# Patient Record
Sex: FEMALE | Race: WHITE | NOT HISPANIC OR LATINO | Employment: OTHER | URBAN - METROPOLITAN AREA
[De-identification: names, ages, dates, MRNs, and addresses within clinical notes are randomized per-mention and may not be internally consistent; named-entity substitution may affect disease eponyms.]

---

## 2018-06-03 ENCOUNTER — HOSPITAL ENCOUNTER (EMERGENCY)
Facility: HOSPITAL | Age: 43
Discharge: HOME | End: 2018-06-03
Attending: EMERGENCY MEDICINE
Payer: COMMERCIAL

## 2018-06-03 ENCOUNTER — APPOINTMENT (OUTPATIENT)
Dept: RADIOLOGY | Facility: HOSPITAL | Age: 43
End: 2018-06-03
Payer: COMMERCIAL

## 2018-06-03 VITALS
WEIGHT: 220 LBS | SYSTOLIC BLOOD PRESSURE: 131 MMHG | HEIGHT: 64 IN | OXYGEN SATURATION: 95 % | DIASTOLIC BLOOD PRESSURE: 73 MMHG | BODY MASS INDEX: 37.56 KG/M2 | RESPIRATION RATE: 20 BRPM | TEMPERATURE: 97.9 F | HEART RATE: 67 BPM

## 2018-06-03 DIAGNOSIS — R07.9 CHEST PAIN: Primary | ICD-10-CM

## 2018-06-03 LAB
ANION GAP SERPL CALC-SCNC: 8 MMOL/L (ref 3–11)
BUN SERPL-MCNC: 10 MG/DL (ref 7–25)
CALCIUM SERPL-MCNC: 8.9 MG/DL (ref 8.6–10.3)
CHLORIDE SERPL-SCNC: 104 MMOL/L (ref 98–107)
CO2 SERPL-SCNC: 25 MMOL/L (ref 21–32)
CREAT SERPL-MCNC: 0.8 MG/DL (ref 0.6–1.2)
D DIMER PPP FEU-MCNC: <0.27 UG/ML FEU (ref 0–0.5)
ERYTHROCYTE [DISTWIDTH] IN BLOOD BY AUTOMATED COUNT: 13 % (ref 11.5–14)
GFR SERPL CREATININE-BSD FRML MDRD: 79 ML/MIN/1.73M*2
GLUCOSE SERPL-MCNC: 96 MG/DL (ref 70–105)
HCT VFR BLD AUTO: 36.9 % (ref 34–45)
HGB BLD-MCNC: 12.8 G/DL (ref 11.5–15.5)
MCH RBC QN AUTO: 30.5 PG (ref 28–33)
MCHC RBC AUTO-ENTMCNC: 34.7 G/DL (ref 32–36)
MCV RBC AUTO: 87.8 FL (ref 81–97)
PLATELET # BLD AUTO: 258 10*3/UL (ref 140–350)
PMV BLD AUTO: 8.2 FL (ref 6.9–10.8)
POTASSIUM SERPL-SCNC: 4.1 MMOL/L (ref 3.5–5.1)
RBC # BLD AUTO: 4.2 10*6/ΜL (ref 3.7–5.3)
SODIUM SERPL-SCNC: 137 MMOL/L (ref 135–145)
WBC # BLD AUTO: 8 10*3/UL (ref 4.5–10.5)

## 2018-06-03 PROCEDURE — 6360000200 HC RX 636 W HCPCS (ALT 250 FOR IP): Performed by: EMERGENCY MEDICINE

## 2018-06-03 PROCEDURE — 85379 FIBRIN DEGRADATION QUANT: CPT | Performed by: EMERGENCY MEDICINE

## 2018-06-03 PROCEDURE — 80048 BASIC METABOLIC PNL TOTAL CA: CPT | Performed by: EMERGENCY MEDICINE

## 2018-06-03 PROCEDURE — 93005 ELECTROCARDIOGRAM TRACING: CPT

## 2018-06-03 PROCEDURE — 85027 COMPLETE CBC AUTOMATED: CPT | Performed by: EMERGENCY MEDICINE

## 2018-06-03 PROCEDURE — 99284 EMERGENCY DEPT VISIT MOD MDM: CPT | Performed by: EMERGENCY MEDICINE

## 2018-06-03 PROCEDURE — 36415 COLL VENOUS BLD VENIPUNCTURE: CPT | Performed by: EMERGENCY MEDICINE

## 2018-06-03 PROCEDURE — 71046 X-RAY EXAM CHEST 2 VIEWS: CPT

## 2018-06-03 PROCEDURE — 96372 THER/PROPH/DIAG INJ SC/IM: CPT

## 2018-06-03 RX ORDER — NAPROXEN 500 MG/1
500 TABLET ORAL 2 TIMES DAILY WITH MEALS
Qty: 20 TABLET | Refills: 0 | Status: SHIPPED | OUTPATIENT
Start: 2018-06-03 | End: 2019-06-03

## 2018-06-03 RX ORDER — CYCLOBENZAPRINE HCL 10 MG
TABLET ORAL
Refills: 0 | COMMUNITY
Start: 2018-05-30 | End: 2019-09-24 | Stop reason: SDUPTHER

## 2018-06-03 RX ORDER — IBUPROFEN 800 MG/1
800 TABLET ORAL
Refills: 0 | COMMUNITY
Start: 2018-05-30 | End: 2019-09-24 | Stop reason: ALTCHOICE

## 2018-06-03 RX ORDER — KETOROLAC TROMETHAMINE 30 MG/ML
60 INJECTION, SOLUTION INTRAMUSCULAR; INTRAVENOUS ONCE
Status: COMPLETED | OUTPATIENT
Start: 2018-06-03 | End: 2018-06-03

## 2018-06-03 RX ADMIN — KETOROLAC TROMETHAMINE 60 MG: 30 INJECTION, SOLUTION INTRAMUSCULAR at 19:35

## 2018-06-03 ASSESSMENT — ENCOUNTER SYMPTOMS
SHORTNESS OF BREATH: 1
SORE THROAT: 0
COUGH: 0
DYSURIA: 0
EYE PAIN: 0
VOMITING: 0
NAUSEA: 0
HEADACHES: 0
CHILLS: 0
DIARRHEA: 0
FEVER: 0
ABDOMINAL PAIN: 0
RHINORRHEA: 0
NECK PAIN: 0
BACK PAIN: 0

## 2018-06-03 ASSESSMENT — PAIN DESCRIPTION - DESCRIPTORS: DESCRIPTORS: SHARP

## 2018-06-04 NOTE — ED PROVIDER NOTES
Room 18    HPI:  Chief Complaint   Patient presents with   • Chest Pain     Chest pain and SOB x7 days. She states it is worse with breathing. Pain is sharp and right sided. Pt was at urgent care last week and diagnosed with inflammation to her rib cartilage.        HPI     Patient is a 43 year old female presenting with right side chest pain. Patient states her symptoms have been gradually worsening over the past 10 days. She has had some shortness of breath with this but she denies any cough or fevers. She has not had any pain or swelling in her legs. She denies recent illness. She has not had any pain or swelling in her legs. No recent travels or surgery. Patient has been seen by her primary care provider for her symptoms and was told this was secondary to costochondritis. However, she states she has continued to have worsening pain.     HISTORY:  Past Medical History:   Diagnosis Date   • Depression    • Diverticulitis    • Fibromyalgia    • Hashimoto's thyroiditis        Past Surgical History:   Procedure Laterality Date   • BACK SURGERY     • CHOLECYSTECTOMY     • HYSTERECTOMY     • REDUCTION MAMMAPLASTY     • TUBAL LIGATION         History reviewed. No pertinent family history.    Social History   Substance Use Topics   • Smoking status: Current Every Day Smoker   • Smokeless tobacco: Never Used   • Alcohol use No       ROS:  Review of Systems   Constitutional: Negative for chills and fever.   HENT: Negative for congestion, rhinorrhea and sore throat.    Eyes: Negative for pain.   Respiratory: Positive for shortness of breath. Negative for cough.    Cardiovascular: Positive for chest pain.   Gastrointestinal: Negative for abdominal pain, diarrhea, nausea and vomiting.   Genitourinary: Negative for dysuria.   Musculoskeletal: Negative for back pain and neck pain.   Skin: Negative for rash.   Neurological: Negative for headaches.       PE:  ED Triage Vitals   Temp Heart Rate Resp BP SpO2   06/03/18 1923  06/03/18 1917 06/03/18 1917 06/03/18 1917 06/03/18 1917   36.6 °C (97.9 °F) 55 18 155/80 97 %      Temp Source Heart Rate Source Patient Position BP Location FiO2 (%)   06/03/18 1923 -- -- -- --   Oral           Physical Exam   Constitutional: She appears well-developed.   HENT:   Head: Atraumatic.   Eyes: Conjunctivae are normal.   Neck: Neck supple.   Cardiovascular: Normal rate and regular rhythm.    Pulmonary/Chest: Effort normal and breath sounds normal. No respiratory distress. She exhibits tenderness.   No crepitus or bruising.    Abdominal: Soft. There is no tenderness.   Musculoskeletal: Normal range of motion. She exhibits no edema.   Neurological: She is alert.   Skin: Skin is warm and dry. No rash noted.   Psychiatric: She has a normal mood and affect.   Nursing note and vitals reviewed.      ED LABS:  Labs Reviewed   D-DIMER, QUANTITATIVE - Normal       Result Value    D-Dimer, Quant (FEU) <0.27      Narrative:     The method used in the determination of this result has a cutoff value of 0.50 ug/mL FEU with a negative predictive value of 95% for DVT and PE.   CBC - Normal    WBC 8.0      RBC 4.20      Hemoglobin 12.8      Hematocrit 36.9      MCV 87.8      MCH 30.5      MCHC 34.7      RDW 13.0      Platelets 258      MPV 8.2     BASIC METABOLIC PANEL - Normal    Sodium 137      Potassium 4.1      Chloride 104      CO2 25      BUN 10      Creatinine 0.8      Glucose 96      Calcium 8.9      Anion Gap 8      eGFR 79      Narrative:     ESTIMATED GFR CALCULATED USING THE IDMS-TRACEABLE MDRD STUDY EQUATION WITH THE RESULT NORMALIZED TO 1.73M^2 BODY SURFACE AREA.    AVERAGE GFR BY AGE RANGE     20-30 years 116 mL/min/1.73m^2  30-40 years 107 mL/min/1.73m^2  40-50 years 99 mL/min/1.73m^2  50-60 years 93 mL/min/1.73m^2  60-70 years 85 mL/min/1.73m^2  70-up years 75 mL/min/1.73m^2         ED IMAGES:  XR chest 2 views   Final Result   Impression:   Stable chest x-ray without acute abnormality seen.          ED  PROCEDURES:  ECG 12 lead - Chest pain  Date/Time: 6/3/2018 10:56 PM  Performed by: BLAKE CARBALLO  Authorized by: BLAKE CARBALLO     Comments:      Sinus rhythm rate 71; no ischemia or ectopy. unchanged from previous.         ED COURSE:       MDM:  MDM    Patient presents for evaluation of chest pain and shortness of breath. She states she has had this for the past week and she has been seen by her primary care provider for this.  She was diagnosed with costochondritis.  She does have chest wall tenderness.  Her records were reviewed and show that she had a chest x-ray completed 10 days ago. this was repeated today but is not changed from previous. EKG shows no signs of PE or cardiac etiology. Labs here are completely normal. She does not have signs of electrolyte abnormality or anemia. Her WBC is normal and her d-dimer is negative. She does have chest wall tenderness on exam and I do not believe this represents an acute cardiac event or a pulmonary emboli. She was given Toradol and feels better on re-evaluation. Naproxen will be prescribed and I encouraged primary care follow-up.      Final diagnoses:   [R07.9] Chest pain   Non-specific chest pain  Shortness of breath  Fibromyalgia     By signing my name, I, Nicole Edward, attest that this documentation has been prepared under the direction and in the presence of Dr. Carballo, 6/3/2018, 10:58 PM.         I, Blake Carballo MD,  have personally performed the services described as documentation by the scibe in my presence.  I attest this is both accurate and complete.     Blake Carballo MD  06/04/18 0101

## 2018-06-04 NOTE — DISCHARGE INSTRUCTIONS
Use ice or heat on chest wall.  Continue pain medication.  Return if new or worsening symptoms or concerns.  Follow-up with your doctor for close reevaluation.

## 2018-06-22 ENCOUNTER — TELEPHONE (OUTPATIENT)
Dept: NEUROSURGERY | Facility: CLINIC | Age: 43
End: 2018-06-22

## 2018-06-22 NOTE — TELEPHONE ENCOUNTER
----- Message from Sasha Wren RN sent at 6/20/2018  5:02 PM MDT -----  Regarding: ref to Dr. Knox  Check with [facility] to see if the MRI ordered back in Dec, 2017 was done there for our review.      ----- Message -----  From: George Mahoney  Sent: 6/20/2018   2:37 PM  To: , #    referral for [facility] for Dr. Knox from [facility]

## 2018-06-22 NOTE — TELEPHONE ENCOUNTER
I called and spoke to the patient who states she is feeling better and would like to call us back if she needs to be seen.  I advised her that I would close this referral in her chart and wait to hear from her.  She stated understanding.

## 2018-11-12 ENCOUNTER — APPOINTMENT (OUTPATIENT)
Dept: RADIOLOGY | Facility: HOSPITAL | Age: 43
End: 2018-11-12
Payer: COMMERCIAL

## 2018-11-12 ENCOUNTER — HOSPITAL ENCOUNTER (EMERGENCY)
Facility: HOSPITAL | Age: 43
Discharge: HOME | End: 2018-11-12
Payer: COMMERCIAL

## 2018-11-12 VITALS
OXYGEN SATURATION: 97 % | SYSTOLIC BLOOD PRESSURE: 157 MMHG | WEIGHT: 234.35 LBS | HEART RATE: 73 BPM | TEMPERATURE: 97.9 F | BODY MASS INDEX: 40.23 KG/M2 | RESPIRATION RATE: 18 BRPM | DIASTOLIC BLOOD PRESSURE: 79 MMHG

## 2018-11-12 DIAGNOSIS — S39.012A LUMBAR STRAIN, INITIAL ENCOUNTER: Primary | ICD-10-CM

## 2018-11-12 DIAGNOSIS — M54.50 LOW BACK PAIN: ICD-10-CM

## 2018-11-12 PROCEDURE — 96372 THER/PROPH/DIAG INJ SC/IM: CPT

## 2018-11-12 PROCEDURE — 2500000200 HC RX 250 WO HCPCS: Performed by: PHYSICIAN ASSISTANT

## 2018-11-12 PROCEDURE — 72100 X-RAY EXAM L-S SPINE 2/3 VWS: CPT

## 2018-11-12 PROCEDURE — 6360000200 HC RX 636 W HCPCS (ALT 250 FOR IP): Performed by: PHYSICIAN ASSISTANT

## 2018-11-12 PROCEDURE — 6370000100 HC RX 637 (ALT 250 FOR IP): Performed by: PHYSICIAN ASSISTANT

## 2018-11-12 PROCEDURE — 99283 EMERGENCY DEPT VISIT LOW MDM: CPT

## 2018-11-12 RX ORDER — KETOROLAC TROMETHAMINE 30 MG/ML
30 INJECTION, SOLUTION INTRAMUSCULAR; INTRAVENOUS ONCE
Status: COMPLETED | OUTPATIENT
Start: 2018-11-12 | End: 2018-11-12

## 2018-11-12 RX ORDER — TIZANIDINE HYDROCHLORIDE 4 MG/1
4 CAPSULE, GELATIN COATED ORAL 3 TIMES DAILY
Qty: 90 CAPSULE | Refills: 0 | Status: SHIPPED | OUTPATIENT
Start: 2018-11-12 | End: 2019-09-24 | Stop reason: ALTCHOICE

## 2018-11-12 RX ORDER — ONDANSETRON 4 MG/1
4 TABLET, ORALLY DISINTEGRATING ORAL ONCE
Status: COMPLETED | OUTPATIENT
Start: 2018-11-12 | End: 2018-11-12

## 2018-11-12 RX ORDER — HYDROCODONE BITARTRATE AND ACETAMINOPHEN 5; 325 MG/1; MG/1
1 TABLET ORAL EVERY 6 HOURS PRN
Qty: 15 TABLET | Refills: 0 | Status: SHIPPED | OUTPATIENT
Start: 2018-11-12 | End: 2019-09-24 | Stop reason: ALTCHOICE

## 2018-11-12 RX ORDER — METHYLPREDNISOLONE 4 MG/1
TABLET ORAL
Qty: 21 TABLET | Refills: 0 | Status: SHIPPED | OUTPATIENT
Start: 2018-11-12 | End: 2019-09-24 | Stop reason: ALTCHOICE

## 2018-11-12 RX ORDER — GABAPENTIN 100 MG/1
CAPSULE ORAL
Refills: 3 | COMMUNITY
Start: 2018-11-05 | End: 2019-09-24 | Stop reason: DRUGHIGH

## 2018-11-12 RX ORDER — ORPHENADRINE CITRATE 30 MG/ML
60 INJECTION INTRAMUSCULAR; INTRAVENOUS ONCE
Status: COMPLETED | OUTPATIENT
Start: 2018-11-12 | End: 2018-11-12

## 2018-11-12 RX ORDER — HYDROCODONE BITARTRATE AND ACETAMINOPHEN 5; 325 MG/1; MG/1
2 TABLET ORAL ONCE
Status: COMPLETED | OUTPATIENT
Start: 2018-11-12 | End: 2018-11-12

## 2018-11-12 RX ADMIN — ONDANSETRON 4 MG: 4 TABLET, ORALLY DISINTEGRATING ORAL at 17:54

## 2018-11-12 RX ADMIN — ORPHENADRINE CITRATE 60 MG: 30 INJECTION INTRAMUSCULAR; INTRAVENOUS at 17:54

## 2018-11-12 RX ADMIN — HYDROCODONE BITARTRATE AND ACETAMINOPHEN 2 TABLET: 5; 325 TABLET ORAL at 18:32

## 2018-11-12 RX ADMIN — KETOROLAC TROMETHAMINE 30 MG: 30 INJECTION, SOLUTION INTRAMUSCULAR at 17:56

## 2018-11-12 ASSESSMENT — ENCOUNTER SYMPTOMS
DYSURIA: 0
BACK PAIN: 1
COUGH: 0
FEVER: 0
ABDOMINAL PAIN: 0
HEMATURIA: 0
SORE THROAT: 0
NAUSEA: 1
VOMITING: 0
SHORTNESS OF BREATH: 0
HEADACHES: 0
ARTHRALGIAS: 0
CHILLS: 0
PALPITATIONS: 0
RHINORRHEA: 0
COLOR CHANGE: 0
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
## 2018-11-13 NOTE — DISCHARGE INSTRUCTIONS
Medications as prescribed.  Follow-up with spine surgery for follow-up of emergency department visit and possible referral to physical therapy.  Follow-up with your primary care in 1 week.  Return to the emergency department for worsening or other concerning symptoms.

## 2018-11-15 NOTE — PROGRESS NOTES
Please call patient and first see if she was expecting to see us directly or if she was to follow-up with her primary care.  The wording in the ER documentation is vague and there is no referral sent to our office.  It looks as though the patient was to follow with her primary care for referral to PT and to us if necessary after conservative therapy.  Otherwise we can offer next available appointment which without further imaging will be difficult to speed up any process.  We will need to evaluate patient first.  They were to be seen earlier in the year but declined an appointment and may do well with conservative therapy.  Otherwise she does still need to follow-up with her primary care for starting conservative therapies and further imaging.  If she would just like to start with this route first that is acceptable as well.

## 2018-12-03 ENCOUNTER — TELEPHONE (OUTPATIENT)
Dept: NEUROSURGERY | Facility: CLINIC | Age: 43
End: 2018-12-03

## 2018-12-03 NOTE — TELEPHONE ENCOUNTER
I left pt a message to see if she had followed up with her primary provider or if she wants to be seen in the clinic. Number provided for her to call back.

## 2018-12-06 NOTE — PROGRESS NOTES
3 attempts have been made to contact the patient with no return call.  (See also telephone encounter from EBENEZER Pink)  In June, 2018, the patient was contacted regarding a referral received for her to be seen and she denied need for apt stating she was feeling better.  She would contact us if needed.

## 2019-02-09 ENCOUNTER — HOSPITAL ENCOUNTER (EMERGENCY)
Facility: HOSPITAL | Age: 44
Discharge: HOME | End: 2019-02-09
Attending: EMERGENCY MEDICINE
Payer: COMMERCIAL

## 2019-02-09 ENCOUNTER — APPOINTMENT (OUTPATIENT)
Dept: CT IMAGING | Facility: HOSPITAL | Age: 44
End: 2019-02-09
Payer: COMMERCIAL

## 2019-02-09 VITALS
SYSTOLIC BLOOD PRESSURE: 156 MMHG | DIASTOLIC BLOOD PRESSURE: 86 MMHG | BODY MASS INDEX: 37.56 KG/M2 | HEIGHT: 64 IN | OXYGEN SATURATION: 97 % | WEIGHT: 220 LBS | TEMPERATURE: 98.6 F | RESPIRATION RATE: 16 BRPM | HEART RATE: 70 BPM

## 2019-02-09 DIAGNOSIS — R07.89 CHEST WALL PAIN: ICD-10-CM
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
## 2019-02-09 DIAGNOSIS — M54.9 BACK PAIN: ICD-10-CM

## 2019-02-09 PROCEDURE — 2500000200 HC RX 250 WO HCPCS: Performed by: EMERGENCY MEDICINE

## 2019-02-09 PROCEDURE — 71260 CT THORAX DX C+: CPT

## 2019-02-09 PROCEDURE — 72128 CT CHEST SPINE W/O DYE: CPT

## 2019-02-09 PROCEDURE — 96374 THER/PROPH/DIAG INJ IV PUSH: CPT

## 2019-02-09 PROCEDURE — 99284 EMERGENCY DEPT VISIT MOD MDM: CPT

## 2019-02-09 PROCEDURE — 2550000100 HC RX 255: Mod: JW | Performed by: EMERGENCY MEDICINE

## 2019-02-09 PROCEDURE — 99284 EMERGENCY DEPT VISIT MOD MDM: CPT | Performed by: EMERGENCY MEDICINE

## 2019-02-09 PROCEDURE — 6360000200 HC RX 636 W HCPCS (ALT 250 FOR IP): Performed by: EMERGENCY MEDICINE

## 2019-02-09 RX ORDER — IOPAMIDOL 755 MG/ML
140 INJECTION, SOLUTION INTRAVASCULAR ONCE
Status: COMPLETED | OUTPATIENT
Start: 2019-02-09 | End: 2019-02-09

## 2019-02-09 RX ORDER — ONDANSETRON 4 MG/1
4 TABLET, ORALLY DISINTEGRATING ORAL ONCE
Status: COMPLETED | OUTPATIENT
Start: 2019-02-09 | End: 2019-02-09

## 2019-02-09 RX ORDER — FENTANYL CITRATE/PF 50 MCG/ML
25 PLASTIC BAG, INJECTION (ML) INTRAVENOUS ONCE
Status: COMPLETED | OUTPATIENT
Start: 2019-02-09 | End: 2019-02-09

## 2019-02-09 RX ADMIN — IOPAMIDOL 140 ML: 755 INJECTION, SOLUTION INTRAVENOUS at 17:50

## 2019-02-09 RX ADMIN — FENTANYL CITRATE 25 MCG: 50 INJECTION, SOLUTION INTRAMUSCULAR; INTRAVENOUS at 17:21

## 2019-02-09 RX ADMIN — ONDANSETRON 4 MG: 4 TABLET, ORALLY DISINTEGRATING ORAL at 17:20

## 2019-02-09 NOTE — ED PROVIDER NOTES
Chief Complaint: Back Pain (PT REPORTS SHE FELL DOWN FOUR STAIRS AT AROUND 1630 TODAY FALLING ONTO BACK. REPORTS LUMBAR PAIN. HX OF BACK SURGERIES.)        HPI:    The patient states just prior to arrival she slipped on some ice and fell down 4 stairs onto her back and left side.  She denies head injury loss of conscious neck pain or sensorimotor loss.  She denies shortness of breath.  She complains of sharp diffuse mid and lower back pain and pain into her flank and left posterior chest wall.  She denies shortness of breath or other complaints or exacerbating or relieving factors.    Past Medical History:   Diagnosis Date   • Depression    • Diverticulitis    • Fibromyalgia    • Hashimoto's thyroiditis        Past Surgical History:   Procedure Laterality Date   • BACK SURGERY     • CHOLECYSTECTOMY     • HYSTERECTOMY     • REDUCTION MAMMAPLASTY     • TUBAL LIGATION         Social History     Socioeconomic History   • Marital status: Single     Spouse name: Not on file   • Number of children: Not on file   • Years of education: Not on file   • Highest education level: Not on file   Social Needs   • Financial resource strain: Not on file   • Food insecurity - worry: Not on file   • Food insecurity - inability: Not on file   • Transportation needs - medical: Not on file   • Transportation needs - non-medical: Not on file   Occupational History   • Not on file   Tobacco Use   • Smoking status: Former Smoker   • Smokeless tobacco: Never Used   Substance and Sexual Activity   • Alcohol use: No   • Drug use: No   • Sexual activity: Not on file   Other Topics Concern   • Not on file   Social History Narrative   • Not on file       No family history on file.    Allergies   Allergen Reactions   • Cephalexin      hives, throat swelling   • Morphine Other (see comments)     Chest Pain         Current Outpatient Medications:   •  ALPRAZolam (XANAX) 0.5 mg tablet, TK 1 T PO  D PRF INCREASED ANXIETY. 20 TS TO LAST 30 DAYS, Disp:  20, Rfl: 5  •  cyclobenzaprine (FLEXERIL) 10 mg tablet, TK 1 T PO TID FOR 10 DAYS PRN, Disp: , Rfl: 0  •  doxepin (SINEquan) 25 mg capsule, Take 25 mg by mouth nightly., Disp: , Rfl:   •  FLUoxetine (PROzac) 20 mg capsule, Take 1 capsule every day by oral route in the morning for 30 days., Disp: 30, Rfl: 3  •  gabapentin (NEURONTIN) 100 mg capsule, TK 2 CS PO TID, Disp: , Rfl: 3  •  HYDROcodone-acetaminophen (NORCO) 5-325 mg per tablet, Take 1 tablet by mouth every 6 (six) hours as needed (Pain)., Disp: 15 tablet, Rfl: 0  •  ibuprofen (ADVIL,MOTRIN) 800 mg tablet, TK 1 T PO TID WF OR MILK FOR 10 DAYS PRN, Disp: , Rfl: 0  •  levothyroxine (SYNTHROID, LEVOTHROID) 175 mcg tablet, TAKE 1 TABLET PO EVERY MORNING ON AN EMPTY STOMACH, Disp: 30, Rfl: 5  •  methylPREDNISolone (MEDROL, ROGE,) 4 mg tablet, Half daily dose at breakfast with food and half daily dose at lunch with food., Disp: 21 tablet, Rfl: 0  •  naproxen (NAPROSYN) 500 mg tablet, Take 1 tablet (500 mg total) by mouth 2 (two) times a day with meals., Disp: 20 tablet, Rfl: 0  •  TiZANidine (ZANAFLEX) 4 mg capsule, Take 1 capsule (4 mg total) by mouth 3 (three) times a day., Disp: 90 capsule, Rfl: 0      ROS:  Constitutional: negative for fever  Eyes: negative for eye pain  ENT: negative for sore throat  Cardiovascular: Positive for chest pain  Respiratory: negative for hemoptysis  GI: negative for abdominal pain  : negative for hematuria  Musculoskeletal: Positive for back pain  Neuro: negative for headache  Hematology: negative for bleeding  Immunology: negative for joint pain      ED Triage Vitals [02/09/19 1656]   Temp Heart Rate Resp BP SpO2   37 °C (98.6 °F) 73 20 179/98 97 %      Temp Source Heart Rate Source Patient Position BP Location FiO2 (%)   Oral -- -- -- --         Physical Exam:  Nursing note and vitals reviewed.  Constitutional: appears well-developed.   HENT: No facial injury  Head: Normocephalic and atraumatic.   Eyes: Pupils are equal, round,  and reactive to light.   Neck: Supple, no lymphadenopathy nontender  Cardiovascular: Regular rate and rhythm with no murmur, rub, or gallop.  Normal pulses.  Pulmonary/Chest: No respiratory distress.  Clear to auscultation bilaterally.  Left posterior lateral chest wall tenderness   abdominal: Soft and nontender.    Back: Mild left CVA tenderness.  Diffuse left thoracic and lumbar paraspinal tenderness  Musculoskeletal: No edema  Neurological: Alert. Focal neurologic deficits  Skin: Skin is warm and dry. No rash noted.   Psychiatric: Normal mood and affect.           Labs:  Labs Reviewed - No data to display      Imaging:  CT thoracic lumbar spine without contrast   Final Result   IMPRESSION:    1. Spinal fixation hardware extending from T6 through L3 with fusion of these vertebra. No acute fracture or subluxation is identified. Findings are unchanged from prior studies.      CT CHEST ABDOMEN PELVIS W IV CONTRAST No Oral Contrast   Final Result   Impression:   No acute injury appreciated.                MDM and ED COURSE  The patient was treated with IV fentanyl and oral Zofran and on reexamination feels much better and is resting comfortably in the room with no new complaints or abnormalities or sign of injury from the event.  She will be discharged home and will return for new or worsening symptoms.       Given   Medications   ondansetron ODT (ZOFRAN-ODT) disintegrating tablet 4 mg (4 mg oral Given 2/9/19 1720)   fentaNYL citrate (PF) 50 mcg/mL injection 25 mcg (25 mcg intravenous Given 2/9/19 1721)   iopamidol (ISOVUE-370) 76 % injection 140 mL (140 mL intravenous Given 2/9/19 1750)           Procedures        Clinical Impression:    Final diagnoses:   [W19.XXXA] Fall, initial encounter   [M54.9] Back pain   [R07.89] Chest wall pain           A voice recognition program was used to aid in documentation of this record.  Sometimes words are not printed exactly as they were spoken.  While efforts were made to  carefully edit and correct any inaccuracies, some areas may be present; please take these into context.  Please contact the provider if areas are identified.       Fransisco Riley MD  02/09/19 1800

## 2019-02-10 NOTE — DISCHARGE INSTRUCTIONS
Return for new or worsening symptoms continue current medication follow-up with your primary care physician

## 2019-04-29 ENCOUNTER — HOSPITAL ENCOUNTER (EMERGENCY)
Facility: HOSPITAL | Age: 44
Discharge: HOME | End: 2019-04-29
Attending: EMERGENCY MEDICINE
Payer: COMMERCIAL

## 2019-04-29 VITALS
BODY MASS INDEX: 39.11 KG/M2 | HEART RATE: 91 BPM | SYSTOLIC BLOOD PRESSURE: 142 MMHG | OXYGEN SATURATION: 92 % | RESPIRATION RATE: 16 BRPM | WEIGHT: 229.06 LBS | TEMPERATURE: 98.1 F | DIASTOLIC BLOOD PRESSURE: 72 MMHG | HEIGHT: 64 IN

## 2019-04-29 DIAGNOSIS — K08.89 PAIN, DENTAL: Primary | ICD-10-CM

## 2019-04-29 DIAGNOSIS — K02.9 PAIN DUE TO DENTAL CARIES: ICD-10-CM

## 2019-04-29 PROCEDURE — 6370000100 HC RX 637 (ALT 250 FOR IP): Performed by: EMERGENCY MEDICINE

## 2019-04-29 PROCEDURE — 99283 EMERGENCY DEPT VISIT LOW MDM: CPT | Performed by: EMERGENCY MEDICINE

## 2019-04-29 RX ORDER — PENICILLIN V POTASSIUM 500 MG/1
500 TABLET, FILM COATED ORAL 4 TIMES DAILY
Qty: 20 TABLET | Refills: 0 | Status: SHIPPED | OUTPATIENT
Start: 2019-04-29 | End: 2019-05-04

## 2019-04-29 RX ORDER — IBUPROFEN 600 MG/1
600 TABLET ORAL ONCE
Status: COMPLETED | OUTPATIENT
Start: 2019-04-29 | End: 2019-04-29

## 2019-04-29 RX ADMIN — IBUPROFEN 600 MG: 600 TABLET ORAL at 08:44

## 2019-04-29 NOTE — ED PROVIDER NOTES
"    HPI:  Chief Complaint   Patient presents with   • Dental Pain     Toothache for 2 days. \"Crown cracked.\"       Patient presents emerged from a chief complaint of toothache.  Patient has known problem with dental caries and she states to her right jaw area she has not tooth that has had a cap on it and it broke over this past weekend.  She states her \"tooth crumbled\".  She complains of constant moderate severe pain to this area.  She denies any facial swelling, trismus, fever, chills, nausea or vomiting.  It is made worse with contact            HISTORY:  Past Medical History:   Diagnosis Date   • Depression    • Diverticulitis    • Fibromyalgia    • Hashimoto's thyroiditis        Past Surgical History:   Procedure Laterality Date   • BACK SURGERY     • CHOLECYSTECTOMY     • HYSTERECTOMY     • REDUCTION MAMMAPLASTY     • TUBAL LIGATION         History reviewed. No pertinent family history.    Social History     Tobacco Use   • Smoking status: Former Smoker   • Smokeless tobacco: Never Used   Substance Use Topics   • Alcohol use: No   • Drug use: No         ROS:  Review of Systems  Pertinent system review and history  PE:  ED Triage Vitals [04/29/19 0820]   Temp Heart Rate Resp BP SpO2   36.7 °C (98.1 °F) 91 16 142/72 92 %      Temp Source Heart Rate Source Patient Position BP Location FiO2 (%)   Oral -- -- -- --       Physical Exam  Patient awake alert mild distress.  No trismus.  Relatively extensive poor dental hygiene with dental caries.  Dentition in question shows marked erosive caries to the right mandible molar dentition.  Slight jamie-gingival erythema.  No fluctuance.  No facial swelling.  No cervical lymphadenopathy.  ED LABS:  Labs Reviewed - No data to display      ED IMAGES:  No orders to display       ED PROCEDURES:  Procedures    ED COURSE:  Clinical Impressions as of Apr 29 0844   Pain, dental   Pain due to dental caries            MDM:  MDM  Patient be given antibiotic and advised use ibuprofen " for dental caries and follow-up with dentist ASAP  Final diagnoses:   [K08.89] Pain, dental   [K02.9] Pain due to dental caries        Corky Garcia MD  04/29/19 0951

## 2019-05-01 ENCOUNTER — TRANSFERRED RECORDS (OUTPATIENT)
Dept: HEALTH INFORMATION MANAGEMENT | Facility: CLINIC | Age: 44
End: 2019-05-01

## 2019-05-08 ENCOUNTER — TRANSFERRED RECORDS (OUTPATIENT)
Dept: HEALTH INFORMATION MANAGEMENT | Facility: CLINIC | Age: 44
End: 2019-05-08

## 2019-05-29 ENCOUNTER — HOSPITAL ENCOUNTER (EMERGENCY)
Facility: HOSPITAL | Age: 44
Discharge: HOME | End: 2019-05-29
Attending: EMERGENCY MEDICINE
Payer: COMMERCIAL

## 2019-05-29 VITALS
BODY MASS INDEX: 40.39 KG/M2 | DIASTOLIC BLOOD PRESSURE: 78 MMHG | RESPIRATION RATE: 16 BRPM | HEIGHT: 64 IN | HEART RATE: 84 BPM | TEMPERATURE: 98.2 F | WEIGHT: 236.55 LBS | SYSTOLIC BLOOD PRESSURE: 152 MMHG | OXYGEN SATURATION: 92 %

## 2019-05-29 DIAGNOSIS — G89.29 CHRONIC BACK PAIN: ICD-10-CM

## 2019-05-29 DIAGNOSIS — M54.50 LOW BACK PAIN: Primary | ICD-10-CM

## 2019-05-29 DIAGNOSIS — M54.9 CHRONIC BACK PAIN: ICD-10-CM

## 2019-05-29 PROCEDURE — 99283 EMERGENCY DEPT VISIT LOW MDM: CPT | Performed by: EMERGENCY MEDICINE

## 2019-05-29 PROCEDURE — 96372 THER/PROPH/DIAG INJ SC/IM: CPT

## 2019-05-29 PROCEDURE — 6360000200 HC RX 636 W HCPCS (ALT 250 FOR IP): Performed by: EMERGENCY MEDICINE

## 2019-05-29 PROCEDURE — 2500000200 HC RX 250 WO HCPCS: Performed by: EMERGENCY MEDICINE

## 2019-05-29 RX ORDER — MORPHINE SULFATE 4 MG/ML
14 INJECTION, SOLUTION INTRAMUSCULAR; INTRAVENOUS ONCE
Status: DISCONTINUED | OUTPATIENT
Start: 2019-05-29 | End: 2019-05-29

## 2019-05-29 RX ORDER — OXYCODONE AND ACETAMINOPHEN 5; 325 MG/1; MG/1
1 TABLET ORAL EVERY 4 HOURS PRN
Qty: 12 TABLET | Refills: 0 | Status: SHIPPED | OUTPATIENT
Start: 2019-05-29 | End: 2019-09-24 | Stop reason: ALTCHOICE

## 2019-05-29 RX ORDER — MORPHINE SULFATE 10 MG/ML
INJECTION INTRAVENOUS
Status: COMPLETED
Start: 2019-05-29 | End: 2019-05-29

## 2019-05-29 RX ORDER — PROMETHAZINE HYDROCHLORIDE 25 MG/ML
25 INJECTION, SOLUTION INTRAMUSCULAR; INTRAVENOUS ONCE
Status: COMPLETED | OUTPATIENT
Start: 2019-05-29 | End: 2019-05-29

## 2019-05-29 RX ORDER — MORPHINE SULFATE 4 MG/ML
14 INJECTION, SOLUTION INTRAMUSCULAR; INTRAVENOUS ONCE
Status: COMPLETED | OUTPATIENT
Start: 2019-05-29 | End: 2019-05-29

## 2019-05-29 RX ADMIN — PROMETHAZINE HYDROCHLORIDE 25 MG: 25 INJECTION INTRAMUSCULAR; INTRAVENOUS at 23:05

## 2019-05-29 RX ADMIN — MORPHINE SULFATE 14 MG: 4 INJECTION, SOLUTION INTRAMUSCULAR; INTRAVENOUS at 23:04
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
## 2019-05-30 NOTE — DISCHARGE INSTRUCTIONS
Follow-up with the rehab doctors in the morning.  Take your medicines as prescribed.  Return to this emergency department for any other problems.

## 2019-06-12 ENCOUNTER — HOSPITAL ENCOUNTER (EMERGENCY)
Facility: HOSPITAL | Age: 44
Discharge: HOME | End: 2019-06-12
Attending: EMERGENCY MEDICINE
Payer: COMMERCIAL

## 2019-06-12 VITALS
BODY MASS INDEX: 38.98 KG/M2 | SYSTOLIC BLOOD PRESSURE: 140 MMHG | OXYGEN SATURATION: 96 % | DIASTOLIC BLOOD PRESSURE: 73 MMHG | HEART RATE: 60 BPM | TEMPERATURE: 98.4 F | WEIGHT: 227.07 LBS | RESPIRATION RATE: 15 BRPM

## 2019-06-12 DIAGNOSIS — M54.50 LOW BACK PAIN: Primary | ICD-10-CM

## 2019-06-12 DIAGNOSIS — G89.29 CHRONIC BACK PAIN: ICD-10-CM

## 2019-06-12 DIAGNOSIS — M54.9 CHRONIC BACK PAIN: ICD-10-CM

## 2019-06-12 PROCEDURE — 2500000200 HC RX 250 WO HCPCS: Performed by: EMERGENCY MEDICINE

## 2019-06-12 PROCEDURE — 99283 EMERGENCY DEPT VISIT LOW MDM: CPT | Performed by: EMERGENCY MEDICINE

## 2019-06-12 PROCEDURE — 96372 THER/PROPH/DIAG INJ SC/IM: CPT

## 2019-06-12 PROCEDURE — 6360000200 HC RX 636 W HCPCS (ALT 250 FOR IP): Performed by: EMERGENCY MEDICINE

## 2019-06-12 RX ORDER — KETOROLAC TROMETHAMINE 30 MG/ML
60 INJECTION, SOLUTION INTRAMUSCULAR; INTRAVENOUS ONCE
Status: COMPLETED | OUTPATIENT
Start: 2019-06-12 | End: 2019-06-12

## 2019-06-12 RX ORDER — ONDANSETRON 4 MG/1
4 TABLET, ORALLY DISINTEGRATING ORAL ONCE
Status: COMPLETED | OUTPATIENT
Start: 2019-06-12 | End: 2019-06-12

## 2019-06-12 RX ORDER — ORPHENADRINE CITRATE 30 MG/ML
60 INJECTION INTRAMUSCULAR; INTRAVENOUS ONCE
Status: COMPLETED | OUTPATIENT
Start: 2019-06-12 | End: 2019-06-12

## 2019-06-12 RX ADMIN — KETOROLAC TROMETHAMINE 60 MG: 30 INJECTION, SOLUTION INTRAMUSCULAR at 22:14

## 2019-06-12 RX ADMIN — ONDANSETRON 4 MG: 4 TABLET, ORALLY DISINTEGRATING ORAL at 22:11

## 2019-06-12 RX ADMIN — ORPHENADRINE CITRATE 60 MG: 30 INJECTION INTRAMUSCULAR; INTRAVENOUS at 22:12

## 2019-06-13 NOTE — DISCHARGE INSTRUCTIONS
Continue your medications for your chronic back pain.  Follow-up with your doctor or pain management doctor for any additional prescriptions.  Continue to use ice or heat on this.  Return if new or worsening symptoms or concerns.  Follow-up with your doctor for close reevaluation.

## 2019-06-13 NOTE — ED PROVIDER NOTES
Room: 3    HPI:  Chief Complaint   Patient presents with   • Back Pain     Pt c/o lower back pain, L leg pain. Pt reports she fell in February, has been waiting for a consult to [facility] and pain is worse.    • Leg Pain     HPI  Patient presents for evaluation of low back pain.  She has a history of chronic low back pain here after fall 5 months ago.  She states she has been to surgeons here who cannot find anything to fix.  She states she is waiting for a referral to [facility] for further evaluation.  She is on gabapentin and muscle relaxants and chronic medications and has been to pain management.  Tonight pain is worse which brings her in.  This is in her left leg and groin and down her entire leg.  She has no loss of bowel control.  She has no weakness or acute neurologic changes.  I reviewed her old records and she has been here several times for dental pain and back pain recently.  She denies urinary symptoms or other symptoms.    HISTORY:  Past Medical History:   Diagnosis Date   • Depression    • Diverticulitis    • Fibromyalgia    • Hashimoto's thyroiditis    Anxiety  Chronic back pain    Past Surgical History:   Procedure Laterality Date   • BACK SURGERY     • CHOLECYSTECTOMY     • HYSTERECTOMY     • REDUCTION MAMMAPLASTY     • TUBAL LIGATION         History reviewed. No pertinent family history.    Social History     Tobacco Use   • Smoking status: Former Smoker   • Smokeless tobacco: Never Used   • Tobacco comment: 'VAPES'   Substance Use Topics   • Alcohol use: No   • Drug use: No       ROS:  Constitutional: Negative for fever.   HENT: Negative for sore throat.    Eyes: Negative for pain.   Respiratory: Negative for shortness of breath.    Cardiovascular: Negative for chest pain.   Gastrointestinal: Negative for abdominal pain.   Endocrine: Negative for polyuria.   Genitourinary: Negative for flank pain.   Musculoskeletal: Positive for back pain.  Pain down left leg  Neurological: Negative for headaches.    Hematological: Negative for bleeding.    PHYSICAL EXAM:  ED Triage Vitals   Temp Heart Rate Resp BP SpO2   06/12/19 2120 06/12/19 2120 06/12/19 2120 06/12/19 2121 06/12/19 2120   36.9 °C (98.4 °F) 60 15 140/73 96 %      Temp Source Heart Rate Source Patient Position BP Location FiO2 (%)   06/12/19 2120 -- -- -- --   Oral         Nursing note and vitals reviewed.  Constitutional: appears well-developed.   HENT: Moist oral mucosa  Head: Normocephalic and atraumatic.   Eyes: Pupils are equal, round, and reactive to light.   Neck: Supple, no lymphadenopathy  Cardiovascular: Regular rate and rhythm with no murmur, rub, or gallop.  Normal pulses.  Pulmonary/Chest: No respiratory distress.  Clear to auscultation bilaterally.    Back: No CVA tenderness.  Musculoskeletal: No edema  Neurological: Alert.   Skin: Skin is warm and dry. No rash noted.   Psychiatric: Normal mood and affect.      Labs Reviewed - No data to display    No orders to display             PROCEDURES:  Procedures      ED COURSE:       MDM:     Patient presents with exacerbation of chronic low back pain.  She is had pain here for months and apparently there is no significant findings to operate on.  Despite this she continues to have pain.  She is been to pain management and had injections.  She is on chronic pain medications.  She comes in tonight with exacerbation of this pain.  She has no acute neuro changes.  She was given Toradol and Norflex intramuscular.  She was given some Zofran at her request.  With her chronic noncancer pain I do not believe narcotics are indicated and she was told to continue her current medications and call her doctor for any changes.  Symptomatic treatment was discussed and outpatient follow-up encouraged.      CLINICAL IMPRESSION:  Final diagnoses:   [M54.5] Low back pain   [M54.9, G89.29] Chronic back pain   Fibromyalgia  Anxiety/depression  Hypothyroid      A voice recognition program was used to aid in documentation of  this record.  Sometimes words are not printed exactly as they were spoken.  While efforts were made to carefully edit and correct any inaccuracies, some areas may be present; please take these into context.  Please contact the provider if areas are identified.             Sandra Carballo MD  06/12/19 3401

## 2019-07-12 ENCOUNTER — HOSPITAL ENCOUNTER (EMERGENCY)
Facility: HOSPITAL | Age: 44
Discharge: HOME | End: 2019-07-12
Attending: EMERGENCY MEDICINE
Payer: COMMERCIAL

## 2019-07-12 ENCOUNTER — APPOINTMENT (OUTPATIENT)
Dept: RADIOLOGY | Facility: HOSPITAL | Age: 44
End: 2019-07-12
Attending: EMERGENCY MEDICINE
Payer: COMMERCIAL

## 2019-07-12 VITALS
BODY MASS INDEX: 39.2 KG/M2 | RESPIRATION RATE: 20 BRPM | OXYGEN SATURATION: 96 % | SYSTOLIC BLOOD PRESSURE: 134 MMHG | HEART RATE: 74 BPM | DIASTOLIC BLOOD PRESSURE: 96 MMHG | TEMPERATURE: 98.1 F | WEIGHT: 228.4 LBS

## 2019-07-12 DIAGNOSIS — M54.9 BACK PAIN: Primary | ICD-10-CM

## 2019-07-12 PROCEDURE — 96372 THER/PROPH/DIAG INJ SC/IM: CPT

## 2019-07-12 PROCEDURE — 6370000100 HC RX 637 (ALT 250 FOR IP): Performed by: EMERGENCY MEDICINE

## 2019-07-12 PROCEDURE — 71046 X-RAY EXAM CHEST 2 VIEWS: CPT

## 2019-07-12 PROCEDURE — 99283 EMERGENCY DEPT VISIT LOW MDM: CPT | Performed by: EMERGENCY MEDICINE

## 2019-07-12 PROCEDURE — 6360000200 HC RX 636 W HCPCS (ALT 250 FOR IP): Performed by: EMERGENCY MEDICINE

## 2019-07-12 RX ORDER — HYDROMORPHONE HYDROCHLORIDE 1 MG/ML
1 INJECTION, SOLUTION INTRAMUSCULAR; INTRAVENOUS; SUBCUTANEOUS ONCE
Status: COMPLETED | OUTPATIENT
Start: 2019-07-12 | End: 2019-07-12

## 2019-07-12 RX ORDER — KETOROLAC TROMETHAMINE 30 MG/ML
60 INJECTION, SOLUTION INTRAMUSCULAR; INTRAVENOUS ONCE
Status: COMPLETED | OUTPATIENT
Start: 2019-07-12 | End: 2019-07-12

## 2019-07-12 RX ORDER — DIAZEPAM 5 MG/1
5 TABLET ORAL ONCE
Status: COMPLETED | OUTPATIENT
Start: 2019-07-12 | End: 2019-07-12

## 2019-07-12 RX ORDER — CYCLOBENZAPRINE HCL 10 MG
10 TABLET ORAL 3 TIMES DAILY PRN
Qty: 20 TABLET | Refills: 0 | Status: SHIPPED | OUTPATIENT
Start: 2019-07-12 | End: 2019-09-25 | Stop reason: HOSPADM

## 2019-07-12 RX ADMIN — HYDROMORPHONE HYDROCHLORIDE 1 MG: 1 INJECTION, SOLUTION INTRAMUSCULAR; INTRAVENOUS; SUBCUTANEOUS at 19:38

## 2019-07-12 RX ADMIN — DIAZEPAM 5 MG: 5 TABLET ORAL at 18:12

## 2019-07-12 RX ADMIN — KETOROLAC TROMETHAMINE 60 MG: 30 INJECTION, SOLUTION INTRAMUSCULAR at 18:13

## 2019-07-12 NOTE — ED PROVIDER NOTES
Chief Complaint   Patient presents with   • Shoulder Pain         HPI:    Patient is a 44 y.o. female who presents with back pain located below her left shoulder blade. Her pain is exacerbated by breathing and moving her arms. She fell 5 months ago and incurred several injuries. She has not had a cough. She states that she is going to [facility].       Past Medical History:   Diagnosis Date   • Depression    • Diverticulitis    • Fibromyalgia    • Hashimoto's thyroiditis        Past Surgical History:   Procedure Laterality Date   • BACK SURGERY     • CHOLECYSTECTOMY     • HYSTERECTOMY     • REDUCTION MAMMAPLASTY     • TUBAL LIGATION         Social History     Socioeconomic History   • Marital status: Single     Spouse name: Not on file   • Number of children: Not on file   • Years of education: Not on file   • Highest education level: Not on file   Occupational History   • Not on file   Social Needs   • Financial resource strain: Not on file   • Food insecurity:     Worry: Not on file     Inability: Not on file   • Transportation needs:     Medical: Not on file     Non-medical: Not on file   Tobacco Use   • Smoking status: Former Smoker   • Smokeless tobacco: Never Used   • Tobacco comment: 'VAPES'   Substance and Sexual Activity   • Alcohol use: No   • Drug use: No   • Sexual activity: Defer   Lifestyle   • Physical activity:     Days per week: Not on file     Minutes per session: Not on file   • Stress: Not on file   Relationships   • Social connections:     Talks on phone: Not on file     Gets together: Not on file     Attends [employer] service: Not on file     Active member of club or organization: Not on file     Attends meetings of clubs or organizations: Not on file     Relationship status: Not on file   • Intimate partner violence:     Fear of current or ex partner: Not on file     Emotionally abused: Not on file     Physically abused: Not on file     Forced sexual activity: Not on file   Other Topics  Concern   • Not on file   Social History Narrative   • Not on file       History reviewed. No pertinent family history.    Allergies   Allergen Reactions   • Cephalexin      hives, throat swelling   • Morphine Other (see comments)     Chest Pain         Current Outpatient Medications:   •  cyclobenzaprine (FLEXERIL) 10 mg tablet, Take 1 tablet (10 mg total) by mouth 3 (three) times a day as needed for muscle spasms for up to 10 days, Disp: 20 tablet, Rfl: 0  •  oxyCODONE-acetaminophen (PERCOCET) 5-325 mg per tablet, Take 1 tablet by mouth every 4 (four) hours as needed for pain scale 8-10/10 Max Daily Amount: 6 tablets, Disp: 12 tablet, Rfl: 0  •  gabapentin (NEURONTIN) 100 mg capsule, TK 2 CS PO TID, Disp: , Rfl: 3  •  HYDROcodone-acetaminophen (NORCO) 5-325 mg per tablet, Take 1 tablet by mouth every 6 (six) hours as needed (Pain)., Disp: 15 tablet, Rfl: 0  •  TiZANidine (ZANAFLEX) 4 mg capsule, Take 1 capsule (4 mg total) by mouth 3 (three) times a day., Disp: 90 capsule, Rfl: 0  •  methylPREDNISolone (MEDROL, ROGE,) 4 mg tablet, Half daily dose at breakfast with food and half daily dose at lunch with food., Disp: 21 tablet, Rfl: 0  •  cyclobenzaprine (FLEXERIL) 10 mg tablet, TK 1 T PO TID FOR 10 DAYS PRN, Disp: , Rfl: 0  •  ibuprofen (ADVIL,MOTRIN) 800 mg tablet, TK 1 T PO TID WF OR MILK FOR 10 DAYS PRN, Disp: , Rfl: 0  •  doxepin (SINEquan) 25 mg capsule, Take 25 mg by mouth nightly., Disp: , Rfl:   •  ALPRAZolam (XANAX) 0.5 mg tablet, TK 1 T PO  D PRF INCREASED ANXIETY. 20 TS TO LAST 30 DAYS, Disp: 20, Rfl: 5  •  FLUoxetine (PROzac) 20 mg capsule, Take 1 capsule every day by oral route in the morning for 30 days., Disp: 30, Rfl: 3  •  levothyroxine (SYNTHROID, LEVOTHROID) 175 mcg tablet, TAKE 1 TABLET PO EVERY MORNING ON AN EMPTY STOMACH, Disp: 30, Rfl: 5      ROS:  Constitutional: negative for fever  Eyes: negative for eye pain  ENT: negative for sore throat  Cardiovascular: negative for chest  pain  Respiratory: negative for hemoptysis  GI: negative for abdominal pain  : negative for hematuria  Musculoskeletal: positive for back pain  Neuro: negative for headache  Hematology: negative for bleeding  Immunology: negative for joint pain      ED Triage Vitals [07/12/19 1735]   Temp Heart Rate Resp BP SpO2   36.7 °C (98.1 °F) 74 20 134/96 96 %      Temp Source Heart Rate Source Patient Position BP Location FiO2 (%)   Oral -- -- -- --         Physical Exam:  Nursing note and vitals reviewed.  Constitutional: appears well-developed.   Head: Normocephalic and atraumatic.   Eyes: Pupils are equal, round, and reactive to light.   Neck: Supple, no lymphadenopathy  Cardiovascular: Regular rate and rhythm with no murmur, rub, or gallop.  Normal pulses.  Pulmonary/Chest: No respiratory distress.  Clear to auscultation bilaterally.  Abdominal: Soft and nontender.    Back: Tender to palpation over left rhomboid major and tender with any movement of her left shoulder   Musculoskeletal: No edema  Neurological: Alert.   Skin: Skin is warm and dry. No rash noted.   Psychiatric: Normal mood and affect.       Labs:  Labs Reviewed - No data to display      Imaging:  X-ray chest 2 views   Final Result   IMPRESSION:   1.  Negative, no acute process.                MDM:  Patient presents with back pain that is worse with deep breaths and arm movement. She is hemodynamically stable. She is point tender over the rhomboid major. She is PERC negative making pulmonary embolism unlikely. Chest x-ray shows no evidence of pneumonia or rib fracture. She was given Toradol and Dilaudid in the emergency department for pain. She was also given Valium here. She will be given a prescription for Flexeril. All results were discussed with the patient.  She is comfortable with discharge and outpatient follow-up.  Return instructions were given.    Given   ED Medication Administration from 07/12/2019 3061 to 07/12/2019 1948       Date/Time Order  Dose Route Action Action by     07/12/2019 1812 diazePAM (VALIUM) tablet 5 mg 5 mg oral Given [place], T     07/12/2019 1813 ketorolac (TORADOL) injection 60 mg 60 mg intramuscular Given [place], T     07/12/2019 1938 HYDROmorphone (DILAUDID) injection 1 mg 1 mg intramuscular Given [place], T                   Procedures        Clinical Impression:  Back pain  Fibromyalgia    By signing my name, IKeny attest that this documentation has been prepared under the direction and in the presence of the attending physician, 7/12/2019, 5:50 PM.     A voice recognition program was used to aid in documentation of this record.  Sometimes words are not printed exactly as they were spoken.  While efforts were made to carefully edit and correct any inaccuracies, some areas may be present; please take these into context.  Please contact the provider if areas are identified.      I, Dr. De La Cruz, personally performed the services described in this documentation as scribed by the medical scribe in my presence, and it is both accurate and complete.       Marvin De La Cruz MD  07/21/19 9217

## 2019-07-13 NOTE — DISCHARGE INSTRUCTIONS
Take medications as prescribed.  Continue all other medications.  Follow-up with your primary physician.

## 2019-08-29 ENCOUNTER — TRANSFERRED RECORDS (OUTPATIENT)
Dept: HEALTH INFORMATION MANAGEMENT | Facility: CLINIC | Age: 44
End: 2019-08-29

## 2019-09-24 ENCOUNTER — HOSPITAL ENCOUNTER (INPATIENT)
Facility: HOSPITAL | Age: 44
LOS: 1 days | Discharge: HOME | DRG: 392 | End: 2019-09-25
Attending: EMERGENCY MEDICINE | Admitting: INTERNAL MEDICINE
Payer: COMMERCIAL

## 2019-09-24 ENCOUNTER — APPOINTMENT (OUTPATIENT)
Dept: CT IMAGING | Facility: HOSPITAL | Age: 44
DRG: 392 | End: 2019-09-24
Payer: COMMERCIAL

## 2019-09-24 DIAGNOSIS — E66.9 OBESITY: ICD-10-CM

## 2019-09-24 DIAGNOSIS — R11.0 NAUSEA: ICD-10-CM

## 2019-09-24 DIAGNOSIS — K92.2 LOWER GI BLEED: ICD-10-CM

## 2019-09-24 DIAGNOSIS — K57.90 DIVERTICULOSIS: ICD-10-CM

## 2019-09-24 DIAGNOSIS — M79.7 FIBROMYALGIA: ICD-10-CM

## 2019-09-24 DIAGNOSIS — R10.31 RIGHT LOWER QUADRANT ABDOMINAL PAIN: Primary | ICD-10-CM

## 2019-09-24 DIAGNOSIS — R73.9 HYPERGLYCEMIA: ICD-10-CM

## 2019-09-24 DIAGNOSIS — G89.29 CHRONIC PAIN: ICD-10-CM

## 2019-09-24 PROBLEM — E03.8 OTHER SPECIFIED HYPOTHYROIDISM: Status: ACTIVE | Noted: 2019-09-24

## 2019-09-24 PROBLEM — M54.9 CHRONIC BACK PAIN: Status: ACTIVE | Noted: 2019-09-24

## 2019-09-24 LAB
ABO GROUP (TYPE) IN BLOOD: NORMAL
ALBUMIN SERPL-MCNC: 4 G/DL (ref 3.5–5.3)
ALP SERPL-CCNC: 83 U/L (ref 37–98)
ALT SERPL-CCNC: 41 U/L (ref 0–52)
ANION GAP SERPL CALC-SCNC: 8 MMOL/L (ref 3–11)
ANTIBODY SCREEN: NORMAL
APTT PPP: 31.3 S (ref 25.1–36.5)
AST SERPL-CCNC: 18 U/L (ref 0–39)
BACTERIA #/AREA URNS AUTO: NORMAL /HPF
BASOPHILS # BLD AUTO: 0 10*3/UL
BASOPHILS # BLD AUTO: 0 10*3/UL
BASOPHILS NFR BLD AUTO: 1 % (ref 0–2)
BASOPHILS NFR BLD AUTO: 1 % (ref 0–2)
BILIRUB SERPL-MCNC: 0.27 MG/DL (ref 0–1.4)
BILIRUB UR QL STRIP.AUTO: NEGATIVE
BUN SERPL-MCNC: 7 MG/DL (ref 7–25)
CALCIUM ALBUM COR SERPL-MCNC: 9 MG/DL (ref 8.6–10.3)
CALCIUM SERPL-MCNC: 9 MG/DL (ref 8.6–10.3)
CHLORIDE SERPL-SCNC: 103 MMOL/L (ref 98–107)
CLARITY UR: CLEAR
CO2 SERPL-SCNC: 25 MMOL/L (ref 21–32)
COLOR UR: ABNORMAL
CREAT SERPL-MCNC: 0.61 MG/DL (ref 0.6–1.2)
D AG BLD QL: NORMAL
EOSINOPHIL # BLD AUTO: 0.1 10*3/UL
EOSINOPHIL # BLD AUTO: 0.1 10*3/UL
EOSINOPHIL NFR BLD AUTO: 2 % (ref 0–3)
EOSINOPHIL NFR BLD AUTO: 2 % (ref 0–3)
ERYTHROCYTE [DISTWIDTH] IN BLOOD BY AUTOMATED COUNT: 12.9 % (ref 11.5–14)
ERYTHROCYTE [DISTWIDTH] IN BLOOD BY AUTOMATED COUNT: 13.1 % (ref 11.5–14)
GFR SERPL CREATININE-BSD FRML MDRD: 111 ML/MIN/1.73M*2
GLUCOSE SERPL-MCNC: 127 MG/DL (ref 70–105)
GLUCOSE UR STRIP.AUTO-MCNC: NEGATIVE MG/DL
HCT VFR BLD AUTO: 34.7 % (ref 34–45)
HCT VFR BLD AUTO: 36.7 % (ref 34–45)
HGB BLD-MCNC: 11.9 G/DL (ref 11.5–15.5)
HGB BLD-MCNC: 12.8 G/DL (ref 11.5–15.5)
HGB UR QL STRIP.AUTO: NEGATIVE
INR BLD: 1.1 INR
KETONES UR STRIP.AUTO-MCNC: NEGATIVE MG/DL
LEUKOCYTE ESTERASE UR QL STRIP: NEGATIVE
LIPASE SERPL-CCNC: 8 U/L (ref 11–82)
LYMPHOCYTES # BLD AUTO: 1.7 10*3/UL
LYMPHOCYTES # BLD AUTO: 2.1 10*3/UL
LYMPHOCYTES NFR BLD AUTO: 28 % (ref 11–47)
LYMPHOCYTES NFR BLD AUTO: 35 % (ref 11–47)
MCH RBC QN AUTO: 30.2 PG (ref 28–33)
MCH RBC QN AUTO: 30.5 PG (ref 28–33)
MCHC RBC AUTO-ENTMCNC: 34.2 G/DL (ref 32–36)
MCHC RBC AUTO-ENTMCNC: 34.9 G/DL (ref 32–36)
MCV RBC AUTO: 87.2 FL (ref 81–97)
MCV RBC AUTO: 88.2 FL (ref 81–97)
MONOCYTES # BLD AUTO: 0.3 10*3/UL
MONOCYTES # BLD AUTO: 0.4 10*3/UL
MONOCYTES NFR BLD AUTO: 5 % (ref 3–11)
MONOCYTES NFR BLD AUTO: 6 % (ref 3–11)
NEUTROPHILS # BLD AUTO: 3.4 10*3/UL
NEUTROPHILS # BLD AUTO: 3.9 10*3/UL
NEUTROPHILS NFR BLD AUTO: 56 % (ref 41–81)
NEUTROPHILS NFR BLD AUTO: 65 % (ref 41–81)
NITRITE UR QL STRIP.AUTO: NEGATIVE
PH UR STRIP.AUTO: 7 PH
PLATELET # BLD AUTO: 201 10*3/UL (ref 140–350)
PLATELET # BLD AUTO: 229 10*3/UL (ref 140–350)
PMV BLD AUTO: 7.8 FL (ref 6.9–10.8)
PMV BLD AUTO: 7.8 FL (ref 6.9–10.8)
POTASSIUM SERPL-SCNC: 3.8 MMOL/L (ref 3.5–5.1)
PROT SERPL-MCNC: 6.7 G/DL (ref 6–8.3)
PROT UR STRIP.AUTO-MCNC: NEGATIVE MG/DL
PROTHROMBIN TIME: 12.1 S (ref 9.4–12.5)
RBC # BLD AUTO: 3.94 10*6/ΜL (ref 3.7–5.3)
RBC # BLD AUTO: 4.21 10*6/ΜL (ref 3.7–5.3)
RBC #/AREA URNS AUTO: NORMAL /HPF
SECOND/CONFIRMATORY ABORH PERFORMED: NORMAL
SODIUM SERPL-SCNC: 136 MMOL/L (ref 135–145)
SP GR UR STRIP.AUTO: 1.04 (ref 1–1.03)
SQUAMOUS #/AREA URNS AUTO: NORMAL /HPF
UROBILINOGEN UR STRIP.AUTO-MCNC: <2 E.U./DL
WBC # BLD AUTO: 6 10*3/UL (ref 4.5–10.5)
WBC # BLD AUTO: 6.1 10*3/UL (ref 4.5–10.5)
WBC #/AREA URNS AUTO: NEGATIVE /HPF

## 2019-09-24 PROCEDURE — 86900 BLOOD TYPING SEROLOGIC ABO: CPT | Performed by: EMERGENCY MEDICINE

## 2019-09-24 PROCEDURE — 2580000300 HC RX 258: Performed by: INTERNAL MEDICINE

## 2019-09-24 PROCEDURE — 74177 CT ABD & PELVIS W/CONTRAST: CPT

## 2019-09-24 PROCEDURE — 96374 THER/PROPH/DIAG INJ IV PUSH: CPT

## 2019-09-24 PROCEDURE — 99285 EMERGENCY DEPT VISIT HI MDM: CPT | Performed by: EMERGENCY MEDICINE

## 2019-09-24 PROCEDURE — 36415 COLL VENOUS BLD VENIPUNCTURE: CPT | Performed by: EMERGENCY MEDICINE

## 2019-09-24 PROCEDURE — 83690 ASSAY OF LIPASE: CPT | Performed by: EMERGENCY MEDICINE

## 2019-09-24 PROCEDURE — (BLANK) HC ROOM PRIVATE

## 2019-09-24 PROCEDURE — 96375 TX/PRO/DX INJ NEW DRUG ADDON: CPT

## 2019-09-24 PROCEDURE — 85025 COMPLETE CBC W/AUTO DIFF WBC: CPT | Performed by: EMERGENCY MEDICINE

## 2019-09-24 PROCEDURE — 99222 1ST HOSP IP/OBS MODERATE 55: CPT | Mod: AI | Performed by: INTERNAL MEDICINE

## 2019-09-24 PROCEDURE — 2550000100 HC RX 255: Mod: JW | Performed by: EMERGENCY MEDICINE

## 2019-09-24 PROCEDURE — 80053 COMPREHEN METABOLIC PANEL: CPT | Performed by: EMERGENCY MEDICINE

## 2019-09-24 PROCEDURE — 85610 PROTHROMBIN TIME: CPT | Performed by: EMERGENCY MEDICINE

## 2019-09-24 PROCEDURE — 2500000200 HC RX 250 WO HCPCS: Performed by: INTERNAL MEDICINE

## 2019-09-24 PROCEDURE — 6370000100 HC RX 637 (ALT 250 FOR IP): Performed by: INTERNAL MEDICINE

## 2019-09-24 PROCEDURE — 85025 COMPLETE CBC W/AUTO DIFF WBC: CPT | Performed by: INTERNAL MEDICINE

## 2019-09-24 PROCEDURE — 2580000300 HC RX 258: Performed by: EMERGENCY MEDICINE

## 2019-09-24 PROCEDURE — 6370000100 HC RX 637 (ALT 250 FOR IP): Performed by: NEUROLOGICAL SURGERY

## 2019-09-24 PROCEDURE — 99285 EMERGENCY DEPT VISIT HI MDM: CPT

## 2019-09-24 PROCEDURE — 81001 URINALYSIS AUTO W/SCOPE: CPT | Performed by: EMERGENCY MEDICINE

## 2019-09-24 PROCEDURE — 6360000200 HC RX 636 W HCPCS (ALT 250 FOR IP): Performed by: INTERNAL MEDICINE

## 2019-09-24 PROCEDURE — 85730 THROMBOPLASTIN TIME PARTIAL: CPT | Performed by: EMERGENCY MEDICINE

## 2019-09-24 PROCEDURE — 6360000200 HC RX 636 W HCPCS (ALT 250 FOR IP): Performed by: EMERGENCY MEDICINE

## 2019-09-24 PROCEDURE — 2500000200 HC RX 250 WO HCPCS: Performed by: EMERGENCY MEDICINE

## 2019-09-24 RX ORDER — TRAZODONE HYDROCHLORIDE 50 MG/1
50 TABLET ORAL ONCE
Status: COMPLETED | OUTPATIENT
Start: 2019-09-24 | End: 2019-09-24

## 2019-09-24 RX ORDER — OXYCODONE HYDROCHLORIDE 5 MG/1
5 TABLET ORAL EVERY 4 HOURS PRN
Status: DISCONTINUED | OUTPATIENT
Start: 2019-09-24 | End: 2019-09-25 | Stop reason: HOSPADM

## 2019-09-24 RX ORDER — GABAPENTIN 100 MG/1
100 CAPSULE ORAL 3 TIMES DAILY
Status: DISCONTINUED | OUTPATIENT
Start: 2019-09-24 | End: 2019-09-24

## 2019-09-24 RX ORDER — MORPHINE SULFATE 4 MG/ML
INJECTION, SOLUTION INTRAMUSCULAR; INTRAVENOUS
Status: DISCONTINUED
Start: 2019-09-24 | End: 2019-09-25 | Stop reason: HOSPADM

## 2019-09-24 RX ORDER — SODIUM CHLORIDE 0.9 % (FLUSH) 0.9 %
3 SYRINGE (ML) INJECTION AS NEEDED
Status: DISCONTINUED | OUTPATIENT
Start: 2019-09-24 | End: 2019-09-25 | Stop reason: HOSPADM

## 2019-09-24 RX ORDER — ALPRAZOLAM 0.25 MG/1
0.25 TABLET ORAL 2 TIMES DAILY PRN
Status: DISCONTINUED | OUTPATIENT
Start: 2019-09-24 | End: 2019-09-25

## 2019-09-24 RX ORDER — MORPHINE SULFATE 4 MG/ML
4 INJECTION, SOLUTION INTRAMUSCULAR; INTRAVENOUS ONCE
Status: COMPLETED | OUTPATIENT
Start: 2019-09-24 | End: 2019-09-24

## 2019-09-24 RX ORDER — GABAPENTIN 600 MG/1
1200 TABLET ORAL 3 TIMES DAILY
Refills: 3 | COMMUNITY
Start: 2019-08-29 | End: 2021-08-19 | Stop reason: SDUPTHER

## 2019-09-24 RX ORDER — ESOMEPRAZOLE SODIUM 40 MG/1
40 INJECTION, POWDER, LYOPHILIZED, FOR SOLUTION INTRAVENOUS
Status: DISCONTINUED | OUTPATIENT
Start: 2019-09-24 | End: 2019-09-25 | Stop reason: HOSPADM

## 2019-09-24 RX ORDER — MORPHINE SULFATE 4 MG/ML
4 INJECTION, SOLUTION INTRAMUSCULAR; INTRAVENOUS ONCE
Status: DISCONTINUED | OUTPATIENT
Start: 2019-09-24 | End: 2019-09-25 | Stop reason: HOSPADM

## 2019-09-24 RX ORDER — IOPAMIDOL 755 MG/ML
140 INJECTION, SOLUTION INTRAVASCULAR ONCE
Status: COMPLETED | OUTPATIENT
Start: 2019-09-24 | End: 2019-09-24

## 2019-09-24 RX ORDER — ALPRAZOLAM 1 MG/1
.5-1 TABLET ORAL 2 TIMES DAILY
Refills: 5 | COMMUNITY
Start: 2019-09-17 | End: 2022-09-28 | Stop reason: ALTCHOICE

## 2019-09-24 RX ORDER — FLUOXETINE HYDROCHLORIDE 20 MG/1
20 CAPSULE ORAL NIGHTLY
Status: DISCONTINUED | OUTPATIENT
Start: 2019-09-25 | End: 2019-09-25 | Stop reason: HOSPADM

## 2019-09-24 RX ORDER — ONDANSETRON 4 MG/1
4 TABLET, FILM COATED ORAL EVERY 6 HOURS PRN
Status: DISCONTINUED | OUTPATIENT
Start: 2019-09-24 | End: 2019-09-25 | Stop reason: HOSPADM

## 2019-09-24 RX ORDER — FLUOXETINE HYDROCHLORIDE 20 MG/1
40 CAPSULE ORAL DAILY
Status: DISCONTINUED | OUTPATIENT
Start: 2019-09-25 | End: 2019-09-24

## 2019-09-24 RX ORDER — FLUOXETINE HYDROCHLORIDE 20 MG/1
20 CAPSULE ORAL DAILY
Status: DISCONTINUED | OUTPATIENT
Start: 2019-09-24 | End: 2019-09-24

## 2019-09-24 RX ORDER — FLUOXETINE HYDROCHLORIDE 20 MG/1
40 CAPSULE ORAL NIGHTLY
Status: DISCONTINUED | OUTPATIENT
Start: 2019-09-24 | End: 2019-09-25 | Stop reason: HOSPADM

## 2019-09-24 RX ORDER — FLUOXETINE HYDROCHLORIDE 40 MG/1
40 CAPSULE ORAL EVERY EVENING
Refills: 6 | COMMUNITY
Start: 2019-09-12 | End: 2022-09-28 | Stop reason: ALTCHOICE

## 2019-09-24 RX ORDER — TIZANIDINE 4 MG/1
4 TABLET ORAL EVERY 6 HOURS PRN
Status: DISCONTINUED | OUTPATIENT
Start: 2019-09-24 | End: 2019-09-25 | Stop reason: HOSPADM

## 2019-09-24 RX ORDER — GABAPENTIN 400 MG/1
1200 CAPSULE ORAL 3 TIMES DAILY
Status: DISCONTINUED | OUTPATIENT
Start: 2019-09-24 | End: 2019-09-25 | Stop reason: HOSPADM

## 2019-09-24 RX ORDER — TRAZODONE HYDROCHLORIDE 100 MG/1
300 TABLET ORAL NIGHTLY
Refills: 2 | COMMUNITY
Start: 2019-09-08

## 2019-09-24 RX ORDER — OXYCODONE HYDROCHLORIDE 5 MG/1
2.5 TABLET ORAL EVERY 6 HOURS PRN
Refills: 0 | COMMUNITY
Start: 2019-09-11 | End: 2019-09-24 | Stop reason: ALTCHOICE

## 2019-09-24 RX ORDER — SODIUM CHLORIDE 9 MG/ML
100 INJECTION, SOLUTION INTRAVENOUS CONTINUOUS
Status: DISCONTINUED | OUTPATIENT
Start: 2019-09-24 | End: 2019-09-25 | Stop reason: HOSPADM

## 2019-09-24 RX ORDER — AMOXICILLIN 500 MG/1
500 CAPSULE ORAL 3 TIMES DAILY
Refills: 0 | COMMUNITY
Start: 2019-09-11 | End: 2019-09-24 | Stop reason: ALTCHOICE

## 2019-09-24 RX ORDER — SODIUM CHLORIDE 9 MG/ML
1000 INJECTION, SOLUTION INTRAVENOUS ONCE
Status: COMPLETED | OUTPATIENT
Start: 2019-09-24 | End: 2019-09-24

## 2019-09-24 RX ORDER — ONDANSETRON HYDROCHLORIDE 2 MG/ML
4 INJECTION, SOLUTION INTRAVENOUS EVERY 6 HOURS PRN
Status: DISCONTINUED | OUTPATIENT
Start: 2019-09-24 | End: 2019-09-25 | Stop reason: HOSPADM

## 2019-09-24 RX ORDER — OXYCODONE HYDROCHLORIDE 5 MG/1
10 TABLET ORAL EVERY 4 HOURS PRN
Status: DISCONTINUED | OUTPATIENT
Start: 2019-09-24 | End: 2019-09-25

## 2019-09-24 RX ORDER — ACETAMINOPHEN 325 MG/1
325-650 TABLET ORAL EVERY 4 HOURS PRN
Status: DISCONTINUED | OUTPATIENT
Start: 2019-09-24 | End: 2019-09-25 | Stop reason: HOSPADM

## 2019-09-24 RX ORDER — HYDROMORPHONE HYDROCHLORIDE 1 MG/ML
0.4 INJECTION, SOLUTION INTRAMUSCULAR; INTRAVENOUS; SUBCUTANEOUS EVERY 4 HOURS PRN
Status: DISCONTINUED | OUTPATIENT
Start: 2019-09-24 | End: 2019-09-25 | Stop reason: HOSPADM

## 2019-09-24 RX ORDER — CARISOPRODOL 350 MG/1
350 TABLET ORAL 4 TIMES DAILY
Refills: 0 | COMMUNITY
Start: 2019-08-19 | End: 2022-01-12 | Stop reason: ALTCHOICE

## 2019-09-24 RX ORDER — ONDANSETRON HYDROCHLORIDE 2 MG/ML
4 INJECTION, SOLUTION INTRAVENOUS ONCE
Status: COMPLETED | OUTPATIENT
Start: 2019-09-24 | End: 2019-09-24

## 2019-09-24 RX ADMIN — OXYCODONE HYDROCHLORIDE 5 MG: 5 TABLET ORAL at 20:45

## 2019-09-24 RX ADMIN — IOPAMIDOL 140 ML: 755 INJECTION, SOLUTION INTRAVENOUS at 12:05

## 2019-09-24 RX ADMIN — SODIUM CHLORIDE 100 ML/HR: 9 INJECTION, SOLUTION INTRAVENOUS at 16:32

## 2019-09-24 RX ADMIN — FLUOXETINE 20 MG: 20 CAPSULE ORAL at 16:41

## 2019-09-24 RX ADMIN — TIZANIDINE 4 MG: 4 TABLET ORAL at 18:43

## 2019-09-24 RX ADMIN — GABAPENTIN 1200 MG: 400 CAPSULE ORAL at 20:45

## 2019-09-24 RX ADMIN — MORPHINE SULFATE 4 MG: 4 INJECTION, SOLUTION INTRAMUSCULAR; INTRAVENOUS at 11:15

## 2019-09-24 RX ADMIN — ONDANSETRON 4 MG: 2 INJECTION INTRAMUSCULAR; INTRAVENOUS at 22:22

## 2019-09-24 RX ADMIN — GABAPENTIN 100 MG: 100 CAPSULE ORAL at 16:41

## 2019-09-24 RX ADMIN — FLUOXETINE 40 MG: 20 CAPSULE ORAL at 22:21

## 2019-09-24 RX ADMIN — TRAZODONE HYDROCHLORIDE 50 MG: 50 TABLET ORAL at 22:21

## 2019-09-24 RX ADMIN — ESOMEPRAZOLE SODIUM 40 MG: 40 INJECTION, POWDER, LYOPHILIZED, FOR SOLUTION INTRAVENOUS at 16:41

## 2019-09-24 RX ADMIN — SODIUM CHLORIDE 1000 ML: 9 INJECTION, SOLUTION INTRAVENOUS at 11:14

## 2019-09-24 RX ADMIN — ONDANSETRON 4 MG: 2 INJECTION INTRAMUSCULAR; INTRAVENOUS at 11:15

## 2019-09-24 ASSESSMENT — ACTIVITIES OF DAILY LIVING (ADL)
ADEQUATE_TO_COMPLETE_ADL: YES
ASSISTIVE_DEVICES: CANE;EYEGLASSES;DENTURES LOWER;DENTURES UPPER

## 2019-09-24 NOTE — MEDICATION HISTORY SPECIALIST NOTES
[facility] ED-225    CSN: [id number]  : [id number]    Information sources:  EPIC  Complete Dispense Report  Go Reconcile    Allergies verified.    Spoke with patient for home medications and last doses.  Updated per CDR.    New medications added: alprazolam  Discontinued medications:ibuprofen oxycodone, amoxicillin, percocet norco tizanidine medrol flexeril, doxepin  Dose changes: gabapentin

## 2019-09-24 NOTE — H&P
HOSPITALIST HISTORY AND PHYSICAL NOTE      Subjective     Chief Complaint   Patient presents with   • Abdominal Pain     PT C/O RECTAL BLEEDING. ONSET TODAY. BRIGHT RED. HX DIVERTICULITIS/HEMORRHIDS.                HPI:  Isabell Harper is an 44 y.o. female with history of hypothyroidism, fibromyalgia, chronic back pain, depression/anxiety, diverticulosis who presents to the ED with complaints of right lower quadrant abdominal pain ongoing for the past 3 days and 5 episodes of bloody bowel movements this morning.     Patient reports of right lower quadrant abdominal pain ongoing for the past 3 days.  Gradual onset.  Moderate to severe intensity.  Constant.  No relieving, precipitating factors.  Reports of mild nausea.  No vomiting episodes.  Denies any fever or chills.    From this morning onward, patient reports of 5 bloody bowel movements.    Reports that it was not stool mixed with blood; rather, just bloody bowel movments with clots.     Reports bowel movements in prior days were normal.  Patient reports of history of diverticulosis.  Reports of colonoscopy done around 3 years ago as outpatient            Past Medical History:   Diagnosis Date   • Chronic pain    • Depression    • Diverticulitis    • Fibromyalgia    • Hashimoto's thyroiditis          Past Surgical History:   Procedure Laterality Date   • BACK SURGERY     • CHOLECYSTECTOMY     • HYSTERECTOMY     • REDUCTION MAMMAPLASTY     • TUBAL LIGATION           Prior to Admission medications    Medication Sig Start Date End Date Taking? Authorizing Provider   carisoprodol (SOMA ORAL) Take by mouth   Yes Historical Provider, MD   trazodone HCl (TRAZODONE ORAL) Take by mouth   Yes Historical Provider, MD   gabapentin (NEURONTIN) 100 mg capsule TK 2 CS PO TID 11/5/18  Yes Historical Provider, MD   ibuprofen (ADVIL,MOTRIN) 800 mg tablet TK 1 T PO TID WF OR MILK FOR 10 DAYS PRN 5/30/18  Yes Historical Provider, MD   ALPRAZolam (XANAX) 0.5 mg tablet TK 1 T PO  D PRF  INCREASED ANXIETY. 20 TS TO LAST 30 DAYS 3/1/17  Yes Conversion Provider   FLUoxetine (PROzac) 20 mg capsule Take 1 capsule every day by oral route in the morning for 30 days. 2/13/17  Yes Conversion Provider   levothyroxine (SYNTHROID, LEVOTHROID) 175 mcg tablet TAKE 1 TABLET PO EVERY MORNING ON AN EMPTY STOMACH 2/23/17  Yes Conversion Provider   cyclobenzaprine (FLEXERIL) 10 mg tablet Take 1 tablet (10 mg total) by mouth 3 (three) times a day as needed for muscle spasms for up to 10 days 7/12/19 7/22/19  Marvin De La Cruz MD   oxyCODONE-acetaminophen (PERCOCET) 5-325 mg per tablet Take 1 tablet by mouth every 4 (four) hours as needed for pain scale 8-10/10 Max Daily Amount: 6 tablets 5/29/19   Stephen Conroy MD   HYDROcodone-acetaminophen (NORCO) 5-325 mg per tablet Take 1 tablet by mouth every 6 (six) hours as needed (Pain). 11/12/18   BRADLEY Clark   TiZANidine (ZANAFLEX) 4 mg capsule Take 1 capsule (4 mg total) by mouth 3 (three) times a day. 11/12/18 11/12/19  BRADLEY Clark   methylPREDNISolone (MEDROL, ROGE,) 4 mg tablet Half daily dose at breakfast with food and half daily dose at lunch with food. 11/12/18   BRADLEY Clark   cyclobenzaprine (FLEXERIL) 10 mg tablet TK 1 T PO TID FOR 10 DAYS PRN 5/30/18   Historical Provider, MD   doxepin (SINEquan) 25 mg capsule Take 25 mg by mouth nightly.    Historical Provider, MD               Allergies   Allergen Reactions   • Cephalexin      hives, throat swelling   • Morphine Other (see comments)     Chest Pain; pt reported to this RN that she does not have an allergy to Morphine and was okay w/Dr. Conroy ordering Morphine for her pain.           History reviewed. No pertinent family history.      Social History     Socioeconomic History   • Marital status: Single     Spouse name: Not on file   • Number of children: Not on file   • Years of education: Not on file   • Highest education level: Not on file   Occupational History   • Not on file   Social Needs  "  • Financial resource strain: Not on file   • Food insecurity:     Worry: Not on file     Inability: Not on file   • Transportation needs:     Medical: Not on file     Non-medical: Not on file   Tobacco Use   • Smoking status: Former Smoker     Packs/day: 0.00   • Smokeless tobacco: Never Used   • Tobacco comment: 'VAPES'   Substance and Sexual Activity   • Alcohol use: No   • Drug use: No   • Sexual activity: Defer   Lifestyle   • Physical activity:     Days per week: Not on file     Minutes per session: Not on file   • Stress: Not on file   Relationships   • Social connections:     Talks on phone: Not on file     Gets together: Not on file     Attends Zoroastrianism service: Not on file     Active member of club or organization: Not on file     Attends meetings of clubs or organizations: Not on file     Relationship status: Not on file   • Intimate partner violence:     Fear of current or ex partner: Not on file     Emotionally abused: Not on file     Physically abused: Not on file     Forced sexual activity: Not on file   Other Topics Concern   • Not on file   Social History Narrative   • Not on file          Review of Systems  Remainder of review of system negative other than mentioned history of presenting    Objective       /61 (Patient Position: Head of bed 30 degrees or higher)   Pulse 60   Temp 36.7 °C (98.1 °F) (Oral)   Resp 16   Ht 1.626 m (5' 4\")   Wt 101 kg (222 lb 7.1 oz)   SpO2 99%   BMI 38.18 kg/m²       Physical Exam     General- No distress  HEENT- Atraumatic  Neck-Supple  Chest- Bilateral air entry, clear  CVS- S1, S2 heard regular  P/A-Soft, mild right lower quadrant abdominal tenderness.  No guarding, no rigidity, bowel sounds heard  CNS- Alert, oriented non focal exam  Extremities- no edema  Psych-anxious        Labs:   CBC:   Lab Results   Component Value Date    WBC 6.1 09/24/2019    RBC 4.21 09/24/2019    HGB 12.8 09/24/2019    HCT 36.7 09/24/2019     09/24/2019     CMP: "   Lab Results   Component Value Date     09/24/2019    K 3.8 09/24/2019     09/24/2019    CO2 25 09/24/2019    GLUCOSE 127 (H) 09/24/2019    CREATININE 0.61 09/24/2019    CALCIUM 9.0 09/24/2019    ALBUMIN 4.0 09/24/2019    ALKPHOS 83 09/24/2019    BILITOT 0.27 09/24/2019    ALT 41 09/24/2019    AST 18 09/24/2019    BUN 7 09/24/2019    ANIONGAP 8 09/24/2019     BMP:   Lab Results   Component Value Date    GLUCOSE 127 (H) 09/24/2019     09/24/2019    K 3.8 09/24/2019     09/24/2019    CO2 25 09/24/2019    BUN 7 09/24/2019    CREATININE 0.61 09/24/2019    CALCIUM 9.0 09/24/2019     BNP: No results found for: BNP  Coagulation:   Lab Results   Component Value Date    PT 12.1 09/24/2019    INR 1.1 09/24/2019    APTT 31.3 09/24/2019     Cardiac markers: No results found for: TROPONINT, MYOGLOBIN  ABGs: No results found for: PHART, PHCAP, PHVEN, PO2, PO2ART, PO2CAP, QKX3BKM, YHE5LTH, PCO2, BASEEXCESS  Amylase: No results found for: AMYLASE  Lactate: No results found for: LACTATE      Radiology  Ct Abdomen Pelvis W Iv Contrast No Oral Contrast    Result Date: 9/24/2019  Narrative: Exam: CT of the abdomen and pelvis with contrast from 09/24/2019 Clinical History:  Abdominal pain Comparison(s): CT 2/9/2019 Technique: Helical axial imaging was performed through the abdomen and pelvis after the intravenous administration of 100 cc of Isovue-370 and without enteric contrast. Five millimeter axial reconstructions and coronal reformations were constructed and reviewed. Dose Reduction Technique: Dose reduction technique utilized; automatic/anatomic modulation of X-ray tube current (Auto mA). Findings: Mild nonspecific nodularity of the right adrenal gland is unchanged. Hepatic steatosis. Cholecystectomy changes. Otherwise unremarkable intra-abdominal organs. Colonic diverticulosis. No dilated bowel. Appendix unremarkable. Lung bases clear. Fusion instrumentation of the spine.     Impression: Impression:  Colonic diverticulosis. No acute findings.            ASSESSMENT and PLAN    1. Lower GI bleed  Reports of 5 bloody bowel movements this morning.  Right lower quadrant pain for past 3 days.  CT abdomen unremarkable other than diverticulosis  Could be self-limited diverticular bleed, hemorrhoidal bleed.  Stable vital signs, hemoglobin hematocrit 12.8  Spoke with GI.  Outpatient records reviewed-colonoscopy done in 2016-noted diverticulosis and internal hemorrhoids.  Monitor hemoglobin hematocrit.  Keep on IV fluids.  Keep on liquid diet for today.    2. Hypothyroidism  Resume Synthroid    3.  Chronic back pain  Keep on as needed pain medications, muscle relaxant    4. Anxiety /depression  Resume antidepressant.    5.  Fibromyalgia  Resume gabapentin    DVT PROPHYLAXIS: Antiembolic calf pump.  No Lovenox/heparin in view of GI bleed    CODE STATUS : Full CODE STATUS      ANTIONETTE WESTBROOK MD  1:36 PM, 9/24/2019.

## 2019-09-24 NOTE — PLAN OF CARE
Problem: Safety Adult - Fall  Goal: Free from fall injury  Description  INTERVENTIONS:    Please reference Cares/Safety Flowsheet under Aparicio Fall Risk for interventions.  Outcome: Progressing     Problem: Pain - Adult  Goal: Verbalizes/displays adequate comfort level or baseline comfort level  Description  INTERVENTIONS:  1. Encourage patient to monitor pain and request interventions  2. Assess pain using the appropriate pain scale  3. Administer analgesics based on type and severity of pain and evaluate response  4. Educate/Implement non-pharmacological measures as appropriate and evaluate response  5. Consider cultural, developmental and social influences on pain and pain management  6. Notify Provider if interventions unsuccessful or patient reports new pain  Outcome: Progressing     Problem: Infection - Adult  Goal: Absence of infection during hospitalization  Description  INTERVENTIONS:  1. Assess and monitor for signs and symptoms of infection  2. Monitor lab/diagnostic results  3. Monitor all insertion sites/wounds/incisions  4. Monitor secretions for changes in amount and color  5. Administer medications as ordered  6. Educate and encourage patient and family to use good hand hygiene technique  7. Identify and educate in appropriate isolation precautions for identified infection/condition  Outcome: Progressing     Problem: Safety Adult  Goal: Patient will remain safe during hospitalization  Description  INTERVENTIONS    1. Assess patient for fall risk and implement interventions if needed  2. Use safe transport techniques  3. Assess patient using the appropriate Joss skin assessment scale  4. Assess patient for risk of aspiration  5. Assess patient for risk of elopement  6. Assess patient for risk of suicide  Outcome: Progressing     Problem: Daily Care  Goal: Daily care needs are met  Description  INTERVENTIONS:   1. Assess and monitor skin integrity  2. Identify patients at risk for skin breakdown on  admission and per policy  3. Assess and monitor ability to perform self care and identify potential discharge needs  4. Assess skin integrity/risk for skin breakdown  5. Assist patient with activities of daily living as needed  6. Encourage independent activity per ability   7. Provide mouth care   8. Include patient/family/caregiver in decisions related to daily care   Outcome: Progressing     Problem: Knowledge Deficit  Goal: Patient/family/caregiver demonstrates understanding of disease process, treatment plan, medications, and discharge instructions  Description  INTERVENTIONS:   1. Complete learning assessment and assess knowledge base  2. Provide teaching at level of understanding   3. Provide teaching via preferred learning methods  Outcome: Progressing     Problem: Discharge Barriers  Goal: Patient's discharge needs are met  Description  INTERVENTIONS:  1. Assess patient for self-management skills  2. Encourage participation in management  3. Identify potential discharge barriers on admission and throughout hospital stay  4. Involve patient/family/caregiver in discharge planning process  5. Collaborate with case management/ for discharge needs  Outcome: Progressing     Problem: Gastrointestinal - Adult  Goal: Minimal or absence of nausea and vomiting  Description  INTERVENTIONS:  1. Ensure adequate hydration  2. Monitor intake and output  3. Maintain NPO status until nausea and vomiting are resolved  4. Nasogastric tube to low intermittent suction as ordered  5. Administer ordered antiemetic medications as needed  6. Provide nonpharmacologic comfort measures as appropriate  7. Advance diet as ordered  8. Nutrition consult as indicated   Outcome: Progressing  Goal: Maintains or returns to baseline digestive function  Description  INTERVENTIONS:  1. Assess bowel function  2. Ensure adequate hydration  3. Administer ordered medications as needed  4. Encourage mobilization and activity  5.  Nutrition consult as indicated  6. Assess hydration and nutritional status  7. Assess characteristics and frequency of stool  8. Monitor for metabolic panel imbalances  9. Assess for treatment effectiveness  Outcome: Progressing  Goal: Maintains adequate nutritional intake  Description  INTERVENTIONS:  1. Monitor percentage of each meal consumed  2. Identify factors contributing to decreased intake, treat as appropriate  3. Assist with meals as needed  4. Monitor I&O, weight and lab values  5. Obtain nutritional consult as indicated  6. Administer alternative nutrition interventions as ordered  Outcome: Progressing  Goal: Establish and maintain optimal ostomy function  Description  INTERVENTIONS:  1. Assess bowel function  2. Encourage mobilization and activity  3. Assess ostomy stoma characteristics  4. Assess skin surrounding ostomy stoma  5. Empty/Change ostomy pouch as needed  6. Provide ostomy care  7. Consult Wound Care/Ostomy Nurses as indicated  8. Nutrition consult as indicated  Outcome: Progressing  Goal: Maintains Optimal Tissue Perfusion  Description  INTERVENTIONS:  1. Monitor for increased abdominal girth, firmness or intra-abdominal pressure  2. Monitor nutritional intake, intake/output, and weight  3. Assess for signs of dehydration  4. Assess blood pressure, heart rate, and oxygen saturation  5. Assess skin color, capillary refill and edema  6. Monitor metabolic panels, blood count panels, and coagulations panels as ordered  7. Assess bowel function  8. Assess for blood in emesis and stool  Outcome: Progressing  Goal: Nutrient intake appropriate for improving, restoring or maintaining nutritional needs  Description  INTERVENTIONS:  1. Assess nutritional status  2. Monitor oral intake, labs, and treatment plans  3. Provide appropriate diets, oral nutritional supplements, and vitamin/mineral supplements  4. Monitor, and adjust tube feedings and TPN/PPN based on assessed needs  5. Provide specific  nutrition education as appropriate  Outcome: Progressing

## 2019-09-24 NOTE — ED PROVIDER NOTES
Abdominal Pain (PT C/O RECTAL BLEEDING. ONSET TODAY. BRIGHT RED. HX DIVERTICULITIS/HEMORRHIDS. ) and Black or Bloody Stool        HPI:  This is a 44-year-old female presenting to the emergency department with complaints of abdominal pain.    Patient describes a sharp stabbing type discomfort located right lower quadrant.  Symptoms have been present for the last 3 days.  She is unaware of anything that worsens or improves her pain.  She does feel as though she has some radiation of her discomfort to the right lower back.  She is uncertain of this, as she suffers from chronic back and hip pain.  She denies any fevers or chills.  No vomiting or diarrhea.  Patient does state that she has had some rectal bleeding.  She describes passing both bright red blood and clots.  In addition, patient states that she has a history of diverticulitis.  She believes she has hemorrhoids as well but has not had significant rectal bleeding in the past.        Past Medical History:   Diagnosis Date   • Chronic pain    • Depression    • Diverticulitis    • Fibromyalgia    • Hashimoto's thyroiditis        Past Surgical History:   Procedure Laterality Date   • BACK SURGERY     • CHOLECYSTECTOMY     • HYSTERECTOMY     • REDUCTION MAMMAPLASTY     • TUBAL LIGATION         Social History     Socioeconomic History   • Marital status: Single     Spouse name: Not on file   • Number of children: Not on file   • Years of education: Not on file   • Highest education level: Not on file   Occupational History   • Not on file   Social Needs   • Financial resource strain: Not on file   • Food insecurity:     Worry: Not on file     Inability: Not on file   • Transportation needs:     Medical: Not on file     Non-medical: Not on file   Tobacco Use   • Smoking status: Former Smoker     Packs/day: 0.00   • Smokeless tobacco: Never Used   • Tobacco comment: 'VAPES'   Substance and Sexual Activity   • Alcohol use: No   • Drug use: No   • Sexual activity: Defer    Lifestyle   • Physical activity:     Days per week: Not on file     Minutes per session: Not on file   • Stress: Not on file   Relationships   • Social connections:     Talks on phone: Not on file     Gets together: Not on file     Attends Gnosticism service: Not on file     Active member of club or organization: Not on file     Attends meetings of clubs or organizations: Not on file     Relationship status: Not on file   • Intimate partner violence:     Fear of current or ex partner: Not on file     Emotionally abused: Not on file     Physically abused: Not on file     Forced sexual activity: Not on file   Other Topics Concern   • Not on file   Social History Narrative   • Not on file       History reviewed. No pertinent family history.    Allergies   Allergen Reactions   • Cephalexin      hives, throat swelling   • Morphine Other (see comments)     Chest Pain         Current Outpatient Medications:   •  carisoprodol (SOMA ORAL), Take by mouth, Disp: , Rfl:   •  trazodone HCl (TRAZODONE ORAL), Take by mouth, Disp: , Rfl:   •  gabapentin (NEURONTIN) 100 mg capsule, TK 2 CS PO TID, Disp: , Rfl: 3  •  ibuprofen (ADVIL,MOTRIN) 800 mg tablet, TK 1 T PO TID WF OR MILK FOR 10 DAYS PRN, Disp: , Rfl: 0  •  ALPRAZolam (XANAX) 0.5 mg tablet, TK 1 T PO  D PRF INCREASED ANXIETY. 20 TS TO LAST 30 DAYS, Disp: 20, Rfl: 5  •  FLUoxetine (PROzac) 20 mg capsule, Take 1 capsule every day by oral route in the morning for 30 days., Disp: 30, Rfl: 3  •  levothyroxine (SYNTHROID, LEVOTHROID) 175 mcg tablet, TAKE 1 TABLET PO EVERY MORNING ON AN EMPTY STOMACH, Disp: 30, Rfl: 5  •  cyclobenzaprine (FLEXERIL) 10 mg tablet, Take 1 tablet (10 mg total) by mouth 3 (three) times a day as needed for muscle spasms for up to 10 days, Disp: 20 tablet, Rfl: 0  •  oxyCODONE-acetaminophen (PERCOCET) 5-325 mg per tablet, Take 1 tablet by mouth every 4 (four) hours as needed for pain scale 8-10/10 Max Daily Amount: 6 tablets, Disp: 12 tablet, Rfl: 0  •   HYDROcodone-acetaminophen (NORCO) 5-325 mg per tablet, Take 1 tablet by mouth every 6 (six) hours as needed (Pain)., Disp: 15 tablet, Rfl: 0  •  TiZANidine (ZANAFLEX) 4 mg capsule, Take 1 capsule (4 mg total) by mouth 3 (three) times a day., Disp: 90 capsule, Rfl: 0  •  methylPREDNISolone (MEDROL, ROGE,) 4 mg tablet, Half daily dose at breakfast with food and half daily dose at lunch with food., Disp: 21 tablet, Rfl: 0  •  cyclobenzaprine (FLEXERIL) 10 mg tablet, TK 1 T PO TID FOR 10 DAYS PRN, Disp: , Rfl: 0  •  doxepin (SINEquan) 25 mg capsule, Take 25 mg by mouth nightly., Disp: , Rfl:       ROS:  Constitutional: negative for fever  Eyes: negative for visual changes  ENT: negative for sore throat  Cardiovascular: negative for chest pain  Respiratory: negative for shortness of breath   GI: Positive for abdominal pain  : negative for hematuria  Musculoskeletal: Positive for back pain  Neuro: negative for headache  Hematology: Positive ve for bleeding  Immunology: negative for joint pain      ED Triage Vitals   Temp Heart Rate Resp BP SpO2   09/24/19 1104 09/24/19 1104 09/24/19 1104 09/24/19 1104 09/24/19 1104   36.7 °C (98.1 °F) 66 16 120/67 95 %      Temp Source Heart Rate Source Patient Position BP Location FiO2 (%)   09/24/19 1104 -- 09/24/19 1157 -- --   Oral  Head of bed 30 degrees or higher           Physical Exam:  Nursing note and vitals reviewed.  Constitutional: Nontoxic-appearing obese middle-aged female.  Head: Normocephalic and atraumatic. Mucous membranes are moist.   Eyes: Pupils are equal, round, and reactive to light.   Neck: Supple.  Cardiovascular: Bradycardic without murmur.   Pulmonary/Chest: No respiratory distress.  Clear to auscultation bilaterally.  Abdominal: Moderate tenderness in the right lower quadrant.  Remainder of the abdomen is unremarkable.  Back: No CVA tenderness. No midline tenderness.   Musculoskeletal: No edema  Neurological: Alert.   Skin: Skin is warm and dry. No rash  noted.   Psychiatric: Normal mood and affect.        Labs:  Labs Reviewed   COMPREHENSIVE METABOLIC PANEL - Abnormal       Result Value    Sodium 136      Potassium 3.8      Chloride 103      CO2 25      Anion Gap 8      BUN 7      Creatinine 0.61      Glucose 127 (*)     Calcium 9.0      AST 18      ALT (SGPT) 41      Alkaline Phosphatase 83      Total Protein 6.7      Albumin 4.0      Total Bilirubin 0.27      eGFR 111      Corrected Calcium 9.0      Narrative:     Estimated GFR calculated using the 2009 CKD-EPI creatinine equation.   LIPASE - Abnormal    Lipase 8 (*)    CBC WITH AUTO DIFFERENTIAL    WBC 6.1      RBC 4.21      Hemoglobin 12.8      Hematocrit 36.7      MCV 87.2      MCH 30.5      MCHC 34.9      RDW 12.9      Platelets 229      MPV 7.8      Neutrophils% 65      Lymphocytes% 28      Monocytes% 5      Eosinophils% 2      Basophils% 1      Neutrophils Absolute 3.90      Lymphocytes Absolute 1.70      Monocytes Absolute 0.30      Eosinophils Absolute 0.10      Basophils Absolute 0.00     TYPE AND SCREEN    Narrative:     The following orders were created for panel order Type and screen.  Procedure                               Abnormality         Status                     ---------                               -----------         ------                     Type and screen[[id number]]                                   Final result               Second/Confirm ABO/RH Typ...[[id number]]                      Final result                 Please view results for these tests on the individual orders.   TYPE AND SCREEN (PERFORMABLE ONLY)    ABO Type A      Rh Type POS      Antibody Screen NEG     URINALYSIS WITH MICROSCOPIC    Narrative:     The following orders were created for panel order Urinalysis w/microscopic Urine, Clean Catch.  Procedure                               Abnormality         Status                     ---------                               -----------         ------                      Urinalysis, microscopic U...[[id number]]                      In process                 Urinalysis, dipstick Urin...[[id number]]                      In process                   Please view results for these tests on the individual orders.   URINALYSIS, MICROSCOPIC ONLY   URINALYSIS, DIPSTICK ONLY, FOR USE WITH MICROSCOPIC PANEL       Imaging:  CT ABDOMEN PELVIS W IV CONTRAST No Oral Contrast   Final Result   Impression:   Colonic diverticulosis. No acute findings.          MDM:   This is a 44-year-old female presenting to the emergency department with complaints of right lower quadrant pain.  Patient has a history of diverticular disease and also complains of some rectal bleeding.  She will be given a liter normal saline along with morphine and Zofran for discomfort.  Baseline labs will be checked and a CT of her abdomen pelvis will be performed.    She remained stable.  Patient states that she does not feel any better however.  She reports at least 5 episodes of bright red blood per rectum this morning.  CT does reveal diverticulosis.  Remainder of her work-up is normal and her hemoglobin appears stable.  Given her lack of resolution of symptoms and numerous episodes of bright red blood with a history of diverticulosis, the patient will be admitted to the hospitalist service.  Case was discussed with the admitting physician.  She will be placed on medical floor.          Given   Medications   morphine injection 4 mg (4 mg intravenous Given 9/24/19 1115)   sodium chloride 0.9 % bolus 1,000 mL (1,000 mL intravenous New Bag/New Syringe 9/24/19 1114)   ondansetron (ZOFRAN) injection 4 mg (4 mg intravenous Given 9/24/19 1115)   iopamidol (ISOVUE-370) 76 % injection 140 mL (140 mL intravenous Given 9/24/19 1205)         Procedure    Procedures        Clinical Impression:    Final diagnoses:   [R10.31] Right lower quadrant abdominal pain   [K57.90] Diverticulosis   [K92.2] Lower GI bleed   [R73.9] Hyperglycemia   [E66.9]  Obesity   [G89.29] Chronic pain   [M79.7] Fibromyalgia                Stephen Conroy MD  09/24/19 1790

## 2019-09-25 VITALS
SYSTOLIC BLOOD PRESSURE: 158 MMHG | HEIGHT: 64 IN | RESPIRATION RATE: 18 BRPM | BODY MASS INDEX: 37.98 KG/M2 | WEIGHT: 222.44 LBS | OXYGEN SATURATION: 97 % | HEART RATE: 72 BPM | TEMPERATURE: 97.9 F | DIASTOLIC BLOOD PRESSURE: 65 MMHG

## 2019-09-25 LAB
ANION GAP SERPL CALC-SCNC: 7 MMOL/L (ref 3–11)
BUN SERPL-MCNC: 4 MG/DL (ref 7–25)
CALCIUM SERPL-MCNC: 8.9 MG/DL (ref 8.6–10.3)
CHLORIDE SERPL-SCNC: 106 MMOL/L (ref 98–107)
CO2 SERPL-SCNC: 28 MMOL/L (ref 21–32)
CREAT SERPL-MCNC: 0.66 MG/DL (ref 0.6–1.2)
CRP SERPL-MCNC: 11.4 MG/L
ERYTHROCYTE [DISTWIDTH] IN BLOOD BY AUTOMATED COUNT: 12.8 % (ref 11.5–14)
GFR SERPL CREATININE-BSD FRML MDRD: 108 ML/MIN/1.73M*2
GLUCOSE SERPL-MCNC: 108 MG/DL (ref 70–105)
HCT VFR BLD AUTO: 36.6 % (ref 34–45)
HGB BLD-MCNC: 12.6 G/DL (ref 11.5–15.5)
HGB BLD-MCNC: 12.7 G/DL (ref 11.5–15.5)
MCH RBC QN AUTO: 30.4 PG (ref 28–33)
MCHC RBC AUTO-ENTMCNC: 34.5 G/DL (ref 32–36)
MCV RBC AUTO: 88 FL (ref 81–97)
PLATELET # BLD AUTO: 222 10*3/UL (ref 140–350)
PMV BLD AUTO: 7.9 FL (ref 6.9–10.8)
POTASSIUM SERPL-SCNC: 3.8 MMOL/L (ref 3.5–5.1)
RBC # BLD AUTO: 4.16 10*6/ΜL (ref 3.7–5.3)
SODIUM SERPL-SCNC: 141 MMOL/L (ref 135–145)
WBC # BLD AUTO: 5.9 10*3/UL (ref 4.5–10.5)

## 2019-09-25 PROCEDURE — 85027 COMPLETE CBC AUTOMATED: CPT | Performed by: INTERNAL MEDICINE

## 2019-09-25 PROCEDURE — 86140 C-REACTIVE PROTEIN: CPT | Performed by: INTERNAL MEDICINE

## 2019-09-25 PROCEDURE — 6370000100 HC RX 637 (ALT 250 FOR IP): Performed by: INTERNAL MEDICINE

## 2019-09-25 PROCEDURE — 36415 COLL VENOUS BLD VENIPUNCTURE: CPT | Performed by: INTERNAL MEDICINE

## 2019-09-25 PROCEDURE — 80048 BASIC METABOLIC PNL TOTAL CA: CPT | Performed by: INTERNAL MEDICINE

## 2019-09-25 PROCEDURE — 2500000200 HC RX 250 WO HCPCS: Performed by: INTERNAL MEDICINE

## 2019-09-25 PROCEDURE — 6360000200 HC RX 636 W HCPCS (ALT 250 FOR IP): Performed by: INTERNAL MEDICINE

## 2019-09-25 PROCEDURE — 2580000300 HC RX 258: Performed by: INTERNAL MEDICINE

## 2019-09-25 PROCEDURE — 85018 HEMOGLOBIN: CPT | Performed by: INTERNAL MEDICINE

## 2019-09-25 RX ORDER — CARISOPRODOL 350 MG/1
350 TABLET ORAL
Status: DISCONTINUED | OUTPATIENT
Start: 2019-09-25 | End: 2019-09-25 | Stop reason: HOSPADM

## 2019-09-25 RX ORDER — ONDANSETRON 4 MG/1
4 TABLET, FILM COATED ORAL EVERY 6 HOURS PRN
Qty: 10 TABLET | Refills: 2 | Status: SHIPPED | OUTPATIENT
Start: 2019-09-25 | End: 2019-10-02

## 2019-09-25 RX ORDER — HYDROMORPHONE HYDROCHLORIDE 1 MG/ML
0.4 INJECTION, SOLUTION INTRAMUSCULAR; INTRAVENOUS; SUBCUTANEOUS EVERY 4 HOURS PRN
Status: DISCONTINUED | OUTPATIENT
Start: 2019-09-25 | End: 2019-09-25 | Stop reason: HOSPADM

## 2019-09-25 RX ORDER — HYDROMORPHONE HYDROCHLORIDE 1 MG/ML
0.8 INJECTION, SOLUTION INTRAMUSCULAR; INTRAVENOUS; SUBCUTANEOUS EVERY 4 HOURS PRN
Status: DISCONTINUED | OUTPATIENT
Start: 2019-09-25 | End: 2019-09-25 | Stop reason: HOSPADM

## 2019-09-25 RX ORDER — OXYCODONE HYDROCHLORIDE 5 MG/1
5 TABLET ORAL EVERY 4 HOURS PRN
Qty: 15 TABLET | Refills: 0 | Status: SHIPPED | OUTPATIENT
Start: 2019-09-25 | End: 2019-09-28

## 2019-09-25 RX ORDER — METOCLOPRAMIDE HYDROCHLORIDE 5 MG/ML
10 INJECTION INTRAMUSCULAR; INTRAVENOUS EVERY 6 HOURS PRN
Status: DISCONTINUED | OUTPATIENT
Start: 2019-09-25 | End: 2019-09-25 | Stop reason: HOSPADM

## 2019-09-25 RX ORDER — KETOROLAC TROMETHAMINE 30 MG/ML
30 INJECTION, SOLUTION INTRAMUSCULAR; INTRAVENOUS EVERY 6 HOURS PRN
Status: DISCONTINUED | OUTPATIENT
Start: 2019-09-25 | End: 2019-09-25 | Stop reason: HOSPADM

## 2019-09-25 RX ORDER — ALPRAZOLAM 0.5 MG/1
1 TABLET ORAL 2 TIMES DAILY PRN
Status: DISCONTINUED | OUTPATIENT
Start: 2019-09-25 | End: 2019-09-25 | Stop reason: HOSPADM

## 2019-09-25 RX ADMIN — OXYCODONE HYDROCHLORIDE 5 MG: 5 TABLET ORAL at 10:39

## 2019-09-25 RX ADMIN — CARISOPRODOL 350 MG: 350 TABLET ORAL at 17:01

## 2019-09-25 RX ADMIN — KETOROLAC TROMETHAMINE 30 MG: 30 INJECTION, SOLUTION INTRAMUSCULAR at 09:08

## 2019-09-25 RX ADMIN — TIZANIDINE 4 MG: 4 TABLET ORAL at 02:09

## 2019-09-25 RX ADMIN — ALPRAZOLAM 0.75 MG: 0.25 TABLET ORAL at 15:43

## 2019-09-25 RX ADMIN — ESOMEPRAZOLE SODIUM 40 MG: 40 INJECTION, POWDER, LYOPHILIZED, FOR SOLUTION INTRAVENOUS at 15:44

## 2019-09-25 RX ADMIN — SODIUM CHLORIDE 100 ML/HR: 9 INJECTION, SOLUTION INTRAVENOUS at 03:35

## 2019-09-25 RX ADMIN — CARISOPRODOL 350 MG: 350 TABLET ORAL at 10:35

## 2019-09-25 RX ADMIN — ONDANSETRON 4 MG: 2 INJECTION INTRAMUSCULAR; INTRAVENOUS at 06:06

## 2019-09-25 RX ADMIN — ALPRAZOLAM 0.25 MG: 0.25 TABLET ORAL at 14:34

## 2019-09-25 RX ADMIN — KETOROLAC TROMETHAMINE 30 MG: 30 INJECTION, SOLUTION INTRAMUSCULAR at 17:12

## 2019-09-25 RX ADMIN — LEVOTHYROXINE SODIUM 175 MCG: 150 TABLET ORAL at 06:06

## 2019-09-25 RX ADMIN — OXYCODONE HYDROCHLORIDE 5 MG: 5 TABLET ORAL at 06:07

## 2019-09-25 RX ADMIN — GABAPENTIN 1200 MG: 400 CAPSULE ORAL at 09:08

## 2019-09-25 RX ADMIN — OXYCODONE HYDROCHLORIDE 5 MG: 5 TABLET ORAL at 02:08

## 2019-09-25 RX ADMIN — GABAPENTIN 1200 MG: 400 CAPSULE ORAL at 14:34

## 2019-09-25 NOTE — NURSING END OF SHIFT
Nursing End of Shift Summary    Goals:  Clinical Goals for the Shift: monitor Bowel movements for blood, maintain hemodynamic stability, and control pain    Narrative Summary of Progress Towards Clinical Goals:  No BM this shift, no evidence of bleeding with urination.  Hemodynamic stability maintained and pain controlled with PO meds.    Barriers for Transfer or Discharge: yes  Pending cause of bleeding

## 2019-09-25 NOTE — INTERDISCIPLINARY/THERAPY
Case Management [id number]    Chart reviewed, CM rounded with MDR this morning. CM met with patient at this time. CM introduced self and explained role of CM. Patient denies any DME at home except for a CPAP which she does not use. Preferred pharmacy is [facility]. Patient states that she will have a ride at discharge from her boyfriend or a friend. No concerns voiced from patient at this time. CM will continue to follow.

## 2019-09-25 NOTE — PLAN OF CARE
Problem: Pain - Adult  Goal: Verbalizes/displays adequate comfort level or baseline comfort level  Description  INTERVENTIONS:  1. Encourage patient to monitor pain and request interventions  2. Assess pain using the appropriate pain scale  3. Administer analgesics based on type and severity of pain and evaluate response  4. Educate/Implement non-pharmacological measures as appropriate and evaluate response  5. Consider cultural, developmental and social influences on pain and pain management  6. Notify Provider if interventions unsuccessful or patient reports new pain  Outcome: Progressing     Problem: Infection - Adult  Goal: Absence of infection during hospitalization  Description  INTERVENTIONS:  1. Assess and monitor for signs and symptoms of infection  2. Monitor lab/diagnostic results  3. Monitor all insertion sites/wounds/incisions  4. Monitor secretions for changes in amount and color  5. Administer medications as ordered  6. Educate and encourage patient and family to use good hand hygiene technique  7. Identify and educate in appropriate isolation precautions for identified infection/condition  Outcome: Progressing     Problem: Safety Adult  Goal: Patient will remain safe during hospitalization  Description  INTERVENTIONS    1. Assess patient for fall risk and implement interventions if needed  2. Use safe transport techniques  3. Assess patient using the appropriate Joss skin assessment scale  4. Assess patient for risk of aspiration  5. Assess patient for risk of elopement  6. Assess patient for risk of suicide  Outcome: Progressing     Problem: Knowledge Deficit  Goal: Patient/family/caregiver demonstrates understanding of disease process, treatment plan, medications, and discharge instructions  Description  INTERVENTIONS:   1. Complete learning assessment and assess knowledge base  2. Provide teaching at level of understanding   3. Provide teaching via preferred learning methods  Outcome:  Progressing  Flowsheets (Taken 9/25/2019 1130)  Patient/family/caregiver demonstrates understanding of disease process, treatment plan, medications, and discharge instructions: Provide teaching at level of understanding     Problem: Gastrointestinal - Adult  Goal: Minimal or absence of nausea and vomiting  Description  INTERVENTIONS:  1. Ensure adequate hydration  2. Monitor intake and output  3. Maintain NPO status until nausea and vomiting are resolved  4. Nasogastric tube to low intermittent suction as ordered  5. Administer ordered antiemetic medications as needed  6. Provide nonpharmacologic comfort measures as appropriate  7. Advance diet as ordered  8. Nutrition consult as indicated   Outcome: Progressing  Goal: Maintains adequate nutritional intake  Description  INTERVENTIONS:  1. Monitor percentage of each meal consumed  2. Identify factors contributing to decreased intake, treat as appropriate  3. Assist with meals as needed  4. Monitor I&O, weight and lab values  5. Obtain nutritional consult as indicated  6. Administer alternative nutrition interventions as ordered  Outcome: Progressing

## 2019-09-26 NOTE — DISCHARGE SUMMARY
Inpatient Discharge Summary    BRIEF OVERVIEW  Admitting Provider: Gael Han MD  Discharge Provider: Get Halie MD  Consultations: None  Primary Care Physician at Discharge: NIKKY FERNÁNDEZ [phone]     Admission Date: 9/24/2019     Discharge Date: 9/25/2019    Primary Discharge Diagnosis  Hematochezia, presumptively due to diverticular bleed    Secondary Discharge Diagnosis  Known diverticulosis  No evidence of diverticulitis  Tension headache, improved  Anxiety  Fibromyalgia    Discharge Disposition  01 - Home or Self-Care  Code Status at Discharge: Full Code     Discharge medication list      START taking these medications      Instructions   ondansetron 4 mg tablet  Commonly known as:  ZOFRAN   Take 1 tablet (4 mg total) by mouth every 6 (six) hours as needed for nausea for up to 7 days     oxyCODONE 5 mg immediate release tablet  Commonly known as:  ROXICODONE   Take 1 tablet (5 mg total) by mouth every 4 (four) hours as needed for pain scale 8-10/10 or pain scale 4-7/10 (pain) for up to 3 days Max Daily Amount: 30 mg        CONTINUE taking these medications      Instructions   ALPRAZolam 1 mg tablet  Commonly known as:  XANAX      FLUoxetine 20 mg capsule  Commonly known as:  PROzac      FLUoxetine 40 mg capsule  Commonly known as:  PROzac      gabapentin 600 mg tablet  Commonly known as:  NEURONTIN      levothyroxine 175 mcg tablet  Commonly known as:  SYNTHROID, LEVOTHROID      carisoprodol 350 mg tablet  Commonly known as:  SOMA      traZODone 100 mg tablet  Commonly known as:  DESYREL         STOP taking these medications    cyclobenzaprine 10 mg tablet  Commonly known as:  FLEXERIL           Where to Get Your Medications      These medications were sent to [id number] - [place] [id number]  [address] [facility] [address]  [address] [facility] [address]    Phone:  [phone]   · ondansetron 4 mg tablet     You can get these medications from any  pharmacy    Bring a paper prescription for each of these medications  · oxyCODONE 5 mg immediate release tablet             Outpatient Follow-Up  No future appointments.      Test Results Pending at Discharge      DETAILS OF HOSPITAL STAY    Presenting Problem/History of Present Illness  Diverticulosis [K57.90]  Obesity [E66.9]  Chronic pain [G89.29]  Lower GI bleed [K92.2]  Hyperglycemia [R73.9]  Fibromyalgia [M79.7]  Right lower quadrant abdominal pain [R10.31]  From the admission History and Physical of Dr. Gael Han:  Isabell Harper is an 44 y.o. female with history of hypothyroidism, fibromyalgia, chronic back pain, depression/anxiety, diverticulosis who presents to the ED with complaints of right lower quadrant abdominal pain ongoing for the past 3 days and 5 episodes of bloody bowel movements this morning.      Patient reports of right lower quadrant abdominal pain ongoing for the past 3 days.  Gradual onset.  Moderate to severe intensity.  Constant.  No relieving, precipitating factors.  Reports of mild nausea.  No vomiting episodes.  Denies any fever or chills.     From this morning onward, patient reports of 5 bloody bowel movements.    Reports that it was not stool mixed with blood; rather, just bloody bowel movements with clots.      Reports bowel movements in prior days were normal.  Patient reports of history of diverticulosis.  Reports of colonoscopy done around 3 years ago as outpatient    Hospital Course  Patient was admitted for observation with a report of 5 bloody stools. Her hematochezia was non-recurrent during her hospitalization, thought likely due to a diverticular bleed, and her hemoglobin remained stable. Discharge was allowed later on 9/25 with the patient in stable condition. Outpatient colonoscopy was discussed with the GI service, who set it up.    The patient complained of a severe headache the morning of 9/25, thought likely representative of a tension headache, possibly with an  element of rebound off NSAID/Soma. She had some associated nausea. She denied photophobia and phonophobia.  With query, her headache was pressure-like, not pounding/pulsatile. She reported having infrequent migraines on the order of once every 2 months, and when pressed indicated this headache was different. Toradol 30 mg IV x1 was ordered, her Soma reordered/reconciled, and she had a significant improvement in her headache with near resolution by the time of discharge.     Operative Procedures Performed  None    Hospital Problems  Principal Problem:    Lower GI bleed  Active Problems:    Right lower quadrant abdominal pain    Fibromyalgia    Chronic back pain    Other specified hypothyroidism        Physical Exam at Discharge  Discharge Condition: Stable  Heart Rate: 72  Resp: 18  BP: 158/65  Temp: 36.6 °C (97.9 °F)  Weight: 101 kg (222 lb 7.1 oz)  General: Alert, cooperative, obese, non-toxic/ill appearing.  HEENT: Mucous membranes moist.  Neck: No nuchal rigidity.  Lungs: Clear to ausculation.  Cor: Regular rhythm without murmur, rub or gallop.  Abdomen: Soft, rounded, nontender.  Extremities: Without cyanosis or edema.  Neuro: Nonfocal exam.    Time spent coordinating discharge: 35 minutes    KELLEE ATKINSON MD

## 2019-10-08 ENCOUNTER — OFFICE VISIT (OUTPATIENT)
Dept: PAIN MEDICINE | Facility: HOSPITAL | Age: 44
End: 2019-10-08
Payer: COMMERCIAL

## 2019-10-08 VITALS — HEART RATE: 58 BPM | DIASTOLIC BLOOD PRESSURE: 64 MMHG | SYSTOLIC BLOOD PRESSURE: 130 MMHG | RESPIRATION RATE: 18 BRPM

## 2019-10-08 DIAGNOSIS — M96.1 POSTLAMINECTOMY SYNDROME OF LUMBAR REGION: ICD-10-CM

## 2019-10-08 DIAGNOSIS — M54.41 CHRONIC LEFT-SIDED LOW BACK PAIN WITH BILATERAL SCIATICA: Primary | ICD-10-CM

## 2019-10-08 DIAGNOSIS — M47.26 OSTEOARTHRITIS OF SPINE WITH RADICULOPATHY, LUMBAR REGION: ICD-10-CM

## 2019-10-08 DIAGNOSIS — G89.29 CHRONIC LEFT-SIDED LOW BACK PAIN WITH BILATERAL SCIATICA: Primary | ICD-10-CM

## 2019-10-08 DIAGNOSIS — M46.1 SACROILIITIS (CMS/HCC): ICD-10-CM
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
## 2019-10-08 DIAGNOSIS — M54.42 CHRONIC LEFT-SIDED LOW BACK PAIN WITH BILATERAL SCIATICA: Primary | ICD-10-CM

## 2019-10-08 DIAGNOSIS — G89.29 OTHER CHRONIC PAIN: ICD-10-CM
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
## 2019-10-08 PROCEDURE — G0463 HOSPITAL OUTPT CLINIC VISIT: HCPCS

## 2019-10-08 RX ORDER — METHYLPREDNISOLONE 4 MG/1
TABLET ORAL
Qty: 21 TABLET | Refills: 0 | Status: SHIPPED | OUTPATIENT
Start: 2019-10-08 | End: 2019-10-15

## 2019-10-08 ASSESSMENT — ENCOUNTER SYMPTOMS
CONSTIPATION: 0
WHEEZING: 0
DIAPHORESIS: 1
FEVER: 0
HEADACHES: 1
NUMBNESS: 1
VOMITING: 1
JOINT SWELLING: 0
ABDOMINAL PAIN: 1
BLOOD IN STOOL: 1
ACTIVITY CHANGE: 1
NECK PAIN: 0
BACK PAIN: 1
PALPITATIONS: 0
HEMATURIA: 0
SHORTNESS OF BREATH: 0
MYALGIAS: 1
APPETITE CHANGE: 1
BRUISES/BLEEDS EASILY: 0
FATIGUE: 1
DIFFICULTY URINATING: 0
NAUSEA: 1
TREMORS: 0
SEIZURES: 0
WEAKNESS: 1
ABDOMINAL DISTENTION: 1
COUGH: 0
DIARRHEA: 1
SLEEP DISTURBANCE: 0
CHILLS: 0
DIZZINESS: 0
ARTHRALGIAS: 1

## 2019-10-08 ASSESSMENT — PAIN - FUNCTIONAL ASSESSMENT: PAIN_FUNCTIONAL_ASSESSMENT: 0-10

## 2019-10-08 NOTE — PATIENT INSTRUCTIONS
Pernell if any changes and if Aric has any ideas      Patient Education     Facet Syndrome    Facet syndrome is a condition in which joints (facet joints) that connect the bones of the spine (vertebrae) become damaged. Facet joints help the spine move, and they usually wear down (degenerate) or become inflamed as you age. This can cause pain and stiffness in the neck (cervical facet syndrome) or in the lower back (lumbar facet syndrome).  When a facet joint becomes damaged, a vertebra may slip forward, out of its normal place in the spine. Damage to a facet joint can also damage nerves near the spine, which can cause tingling or weakness in the arms or legs. Facet syndrome can make it difficult to turn the head or bend backward without pain. This condition typically gets worse over time.  What are the causes?  Common causes of this condition include:  · Age-related inflammation of the facet joints (arthritis) that may create extra bone on the joint surface (bone spurs).  · Age-related decrease in space between the vertebrae (disk degeneration and cartilage degeneration).  · Repetitive stress on the spine, such as repetitive twisting of the back.  · Injury (trauma) to the back or neck.  What increases the risk?  The following factors may make you more likely to develop this condition:  · Playing contact sports.  · Doing activities or sports that involve repetitive twisting motions or repetitive heavy lifting.  · Having poor back strength and flexibility.  · Having another back or spine condition, such as scoliosis.  What are the signs or symptoms?  Symptoms of facet syndrome may include:  · An ache in the neck or lower back. This may get worse when you twist or arch your back, or when you look up.  · Stiffness in the neck or lower back.  · Numbness, tingling, or weakness in the arms or legs.  Symptoms of cervical facet syndrome may include:  · Headache.  · Pain at the back of the head.  · Pain in the shoulder  blades.  Symptoms of lumbar facet syndrome may include pain in any of the following areas:  · Groin.  · Thighs.  · Lower back.  · Buttocks.  · Hips.  How is this diagnosed?  This condition may be diagnosed based on:  · Your symptoms.  · Your medical history.  · A physical exam.  · Imaging tests, such as:  ? X-rays.  ? MRI.  · A procedure in which medicines to numb the area (local anesthetic) and medicines to reduce inflammation (steroids) are injected into your affected joint (facet joint block).  How is this treated?  Treatment for this condition may include:  · Stopping or modifying activities that make your condition worse.  · Medicines that help reduce pain and inflammation.  · Steroid injections to help reduce severe pain.  · Physical therapy.  · Radiofrequency ablation. This is a surgical procedure that uses high-frequency radio waves to block signals from affected nerves.  · Surgery to stabilize your spine or to take pressure off your nerves. This is rare.  Follow these instructions at home:  Activity  · Rest your neck and back as told by your health care provider.  · Return to your normal activities as told by your health care provider. Ask your health care provider what activities are safe for you.  · If physical therapy was prescribed, do exercises as told by your health care provider.  General instructions  · Take over-the-counter and prescription medicines only as told by your health care provider.  · Do not drive or operate heavy machinery while taking prescription pain medicines.  · Do not use any tobacco products, such as cigarettes, chewing tobacco, and e-cigarettes. Tobacco can delay bone healing. If you need help quitting, ask your health care provider.  · Use good posture throughout your daily activities. Good posture means that your spine is in its natural S-curve position (your spine is neutral), your shoulders are pulled back slightly, and your head is not forward.  · Keep all follow-up visits  as told by your health care provider. This is important.  Contact a health care provider if:  · You have symptoms that get worse or do not improve in 2-4 weeks of treatment.  · You have numbness or weakness in any part of your body.  · You lose control over your bladder or bowel function.  This information is not intended to replace advice given to you by your health care provider. Make sure you discuss any questions you have with your health care provider.  Document Released: 12/18/2006 Document Revised: 12/27/2018 Document Reviewed: 08/29/2016  [employer] Interactive Patient Education © 2019 [employer]       Patient Education     Facet Joint Block  The facet joints connect the bones of the spine (vertebrae). They make it possible for you to bend, twist, and make other movements with your spine. They also keep you from bending too far, twisting too far, and making other excessive movements.  A facet joint block is a procedure where a numbing medicine (anesthetic) is injected into a facet joint. Often, a type of anti-inflammatory medicine called a steroid is also injected. A facet joint block may be done to diagnose neck or back pain. If the pain gets better after a facet joint block, it means the pain is probably coming from the facet joint. If the pain does not get better, it means the pain is probably not coming from the facet joint. A facet joint block may also be done to relieve neck or back pain caused by an inflamed facet joint. A facet joint block is only done to relieve pain if the pain does not improve with other methods, such as medicine, exercise programs, and physical therapy.  Tell a health care provider about:  · Any allergies you have.  · All medicines you are taking, including vitamins, herbs, eye drops, creams, and over-the-counter medicines.  · Any problems you or family members have had with anesthetic medicines.  · Any blood disorders you have.  · Any surgeries you have had.  · Any medical  conditions you have.  · Whether you are pregnant or may be pregnant.  What are the risks?  Generally, this is a safe procedure. However, problems may occur, including:  · Bleeding.  · Injury to a nerve near the injection site.  · Pain at the injection site.  · Weakness or numbness in areas controlled by nerves near the injection site.  · Infection.  · Temporary fluid retention.  · Allergic reactions to medicines or dyes.  · Injury to other structures or organs near the injection site.  What happens before the procedure?  · Follow instructions from your health care provider about eating or drinking restrictions.  · Ask your health care provider about:  ? Changing or stopping your regular medicines. This is especially important if you are taking diabetes medicines or blood thinners.  ? Taking medicines such as aspirin and ibuprofen. These medicines can thin your blood. Do not take these medicines before your procedure if your health care provider instructs you not to.  · Do not take any new dietary supplements or medicines without asking your health care provider first.  · Plan to have someone take you home after the procedure.  What happens during the procedure?    · You may need to remove your clothing and dress in an open-back gown.  · The procedure will be done while you are lying on an X-ray table. You will most likely be asked to lie on your stomach, but you may be asked to lie in a different position if an injection will be made in your neck.  · Machines will be used to monitor your oxygen levels, heart rate, and blood pressure.  · If an injection will be made in your neck, an IV tube will be inserted into one of your veins. Fluids and medicine will flow directly into your body through the IV tube.  · The area over the facet joint where the injection will be made will be cleaned with soap. The surrounding skin will be covered with clean drapes.  · A numbing medicine (local anesthetic) will be applied to your  skin. Your skin may sting or burn for a moment.  · A video X-ray machine (fluoroscopy) will be used to locate the joint. In some cases, a CT scan may be used.  · A contrast dye may be injected into the facet joint area to help locate the joint.  · When the joint is located, an anesthetic will be injected into the joint through the needle.  · Your health care provider will ask you whether you feel pain relief. If you do feel relief, a steroid may be injected to provide pain relief for a longer period of time. If you do not feel relief or feel only partial relief, additional injections of an anesthetic may be made in other facet joints.  · The needle will be removed.  · Your skin will be cleaned.  · A bandage (dressing) will be applied over each injection site.  The procedure may vary among health care providers and hospitals.  What happens after the procedure?  · You will be observed for 15-30 minutes before being allowed to go home.  This information is not intended to replace advice given to you by your health care provider. Make sure you discuss any questions you have with your health care provider.  Document Released: 05/08/2008 Document Revised: 12/20/2018 Document Reviewed: 09/12/2016  [email or web address] Interactive Patient Education © 2019 [employer]       Patient Education   Radiofrequency Lesioning  Radiofrequency lesioning is a procedure that is performed to relieve pain. The procedure is often used for back, neck, or arm pain. Radiofrequency lesioning involves the use of a machine that creates radio waves to make heat. During the procedure, the heat is applied to the nerve that carries the pain signal. The heat damages the nerve and interferes with the pain signal. Pain relief usually starts about 2 weeks after the procedure and lasts for 6 months to 1 year.  Tell a health care provider about:  · Any allergies you have.  · All medicines you are taking, including vitamins, herbs, eye drops, creams, and  over-the-counter medicines.  · Any problems you or family members have had with anesthetic medicines.  · Any blood disorders you have.  · Any surgeries you have had.  · Any medical conditions you have.  · Whether you are pregnant or may be pregnant.  What are the risks?  Generally, this is a safe procedure. However, problems may occur, including:  · Pain or soreness at the injection site.  · Infection at the injection site.  · Damage to nerves or blood vessels.  What happens before the procedure?  · Ask your health care provider about:  ? Changing or stopping your regular medicines. This is especially important if you are taking diabetes medicines or blood thinners.  ? Taking medicines such as aspirin and ibuprofen. These medicines can thin your blood. Do not take these medicines before your procedure if your health care provider instructs you not to.  · Follow instructions from your health care provider about eating or drinking restrictions.  · Plan to have someone take you home after the procedure.  · If you go home right after the procedure, plan to have someone with you for 24 hours.  What happens during the procedure?  · You will be given one or more of the following:  ? A medicine to help you relax (sedative).  ? A medicine to numb the area (local anesthetic).  · You will be awake during the procedure. You will need to be able to talk with the health care provider during the procedure.  · With the help of a type of X-ray (fluoroscopy), the health care provider will insert a radiofrequency needle into the area to be treated.  · Next, a wire that carries the radio waves (electrode) will be put through the radiofrequency needle. An electrical pulse will be sent through the electrode to verify the correct nerve. You will feel a tingling sensation, and you may have muscle twitching.  · Then, the tissue that is around the needle tip will be heated by an electric current that is passed using the radiofrequency  machine. This will numb the nerves.  · A bandage (dressing) will be put on the insertion area after the procedure is done.  The procedure may vary among health care providers and hospitals.  What happens after the procedure?  · Your blood pressure, heart rate, breathing rate, and blood oxygen level will be monitored often until the medicines you were given have worn off.  · Return to your normal activities as directed by your health care provider.  This information is not intended to replace advice given to you by your health care provider. Make sure you discuss any questions you have with your health care provider.  Document Released: 08/15/2012 Document Revised: 05/25/2017 Document Reviewed: 01/25/2016  [employer] Interactive Patient Education © 2019 [employer]       Patient Education     Sacroiliac Joint Dysfunction    Sacroiliac joint dysfunction is a condition that causes inflammation on one or both sides of the sacroiliac (SI) joint. The SI joint connects the lower part of the spine (sacrum) with the two upper portions of the pelvis (ilium). This condition causes deep aching or burning pain in the low back. In some cases, the pain may also spread into one or both buttocks, hips, or thighs.  What are the causes?  This condition may be caused by:  · Pregnancy. During pregnancy, extra stress is put on the SI joints because the pelvis widens.  · Injury, such as:  ? Injuries from car accidents.  ? Sports-related injuries.  ? Work-related injuries.  · Having one leg that is shorter than the other.  · Conditions that affect the joints, such as:  ? Rheumatoid arthritis.  ? Gout.  ? Psoriatic arthritis.  ? Joint infection (septic arthritis).  Sometimes, the cause of SI joint dysfunction is not known.  What are the signs or symptoms?  Symptoms of this condition include:  · Aching or burning pain in the lower back. The pain may also spread to other areas, such as:  ? Buttocks.  ? Groin.  ? Thighs.  · Muscle spasms in or  around the painful areas.  · Increased pain when standing, walking, running, stair climbing, bending, or lifting.  How is this diagnosed?  This condition is diagnosed with a physical exam and medical history. During the exam, the health care provider may move one or both of your legs to different positions to check for pain. Various tests may be done to confirm the diagnosis, including:  · Imaging tests to look for other causes of pain. These may include:  ? MRI.  ? CT scan.  ? Bone scan.  · Diagnostic injection. A numbing medicine is injected into the SI joint using a needle. If your pain is temporarily improved or stopped after the injection, this can indicate that SI joint dysfunction is the problem.  How is this treated?  Treatment depends on the cause and severity of your condition. Treatment options may include:  · Ice or heat applied to the lower back area after an injury. This may help reduce pain and muscle spasms.  · Medicines to relieve pain or inflammation or to relax the muscles.  · Wearing a back brace (sacroiliac brace) to help support the joint while your back is healing.  · Physical therapy to increase muscle strength around the joint and flexibility at the joint. This may also involve learning proper body positions and ways of moving to relieve stress on the joint.  · Direct manipulation of the SI joint.  · Injections of steroid medicine into the joint to reduce pain and swelling.  · Radiofrequency ablation to burn away nerves that are carrying pain messages from the joint.  · Use of a device that provides electrical stimulation to help reduce pain at the joint.  · Surgery to put in screws and plates that limit or prevent joint motion. This is rare.  Follow these instructions at home:  Medicines  · Take over-the-counter and prescription medicines only as told by your health care provider.  · Do not drive or use heavy machinery while taking prescription pain medicine.  · If you are taking  prescription pain medicine, take actions to prevent or treat constipation. Your health care provider may recommend that you:  ? Drink enough fluid to keep your urine pale yellow.  ? Eat foods that are high in fiber, such as fresh fruits and vegetables, whole grains, and beans.  ? Limit foods that are high in fat and processed sugars, such as fried or sweet foods.  ? Take an over-the-counter or prescription medicine for constipation.  If you have a brace:  · Wear the brace as told by your health care provider. Remove it only as told by your health care provider.  · Keep the brace clean.  · If the brace is not waterproof:  ? Do not let it get wet.  ? Cover it with a watertight covering when you take a bath or a shower.  Managing pain, stiffness, and swelling    · Icing can help with pain and swelling. Heat may help with muscle tension or spasms. Ask your health care provider if you should use ice or heat.  · If directed, put ice on the affected area:  ? If you have a removable brace, remove it as told by your health care provider.  ? Put ice in a plastic bag.  ? Place a towel between your skin and the bag.  ? Leave the ice on for 20 minutes, 2-3 times a day.  · If directed, apply heat to the affected area. Use the heat source that your health care provider recommends, such as a moist heat pack or a heating pad.  ? Place a towel between your skin and the heat source.  ? Leave the heat on for 20-30 minutes.  ? Remove the heat if your skin turns bright red. This is especially important if you are unable to feel pain, heat, or cold. You may have a greater risk of getting burned.  General instructions  · Rest as needed. Ask your health care provider what activities are safe for you.  · Return to your normal activities as told by your health care provider.  · Exercise as directed by your health care provider or physical therapist.  · Do not use any products that contain nicotine or tobacco, such as cigarettes and  e-cigarettes. These can delay bone healing. If you need help quitting, ask your health care provider.  · Keep all follow-up visits as told by your health care provider. This is important.  Contact a health care provider if:  · Your pain is not controlled with medicine.  · You have a fever.  · Your pain is getting worse.  Get help right away if:  · You have weakness, numbness, or tingling in your legs or feet.  · You lose control of your bladder or bowel.  Summary  · Sacroiliac joint dysfunction is a condition that causes inflammation on one or both sides of the sacroiliac (SI) joint.  · This condition causes deep aching or burning pain in the low back. In some cases, the pain may also spread into one or both buttocks, hips, or thighs.  · Treatment depends on the cause and severity of your condition. It may include medicines to reduce pain and swelling or to relax muscles.  This information is not intended to replace advice given to you by your health care provider. Make sure you discuss any questions you have with your health care provider.  Document Released: 03/16/2010 Document Revised: 01/28/2019 Document Reviewed: 01/28/2019  [employer] Interactive Patient Education © 2019 [employer]
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
## 2019-11-08 ENCOUNTER — PROCEDURE VISIT (OUTPATIENT)
Dept: PAIN MEDICINE | Facility: HOSPITAL | Age: 44
End: 2019-11-08
Payer: COMMERCIAL

## 2019-11-08 ENCOUNTER — HOSPITAL ENCOUNTER (OUTPATIENT)
Dept: FLUOROSCOPY | Facility: HOSPITAL | Age: 44
Discharge: STILL ADMITTED OR ADMINISTRATIVE | End: 2019-11-08
Payer: COMMERCIAL

## 2019-11-08 VITALS
HEART RATE: 67 BPM | DIASTOLIC BLOOD PRESSURE: 75 MMHG | SYSTOLIC BLOOD PRESSURE: 131 MMHG | OXYGEN SATURATION: 95 % | RESPIRATION RATE: 16 BRPM

## 2019-11-08 DIAGNOSIS — M54.42 CHRONIC LEFT-SIDED LOW BACK PAIN WITH BILATERAL SCIATICA: ICD-10-CM

## 2019-11-08 DIAGNOSIS — G89.29 CHRONIC LEFT-SIDED LOW BACK PAIN WITH BILATERAL SCIATICA: ICD-10-CM

## 2019-11-08 DIAGNOSIS — M54.41 CHRONIC LEFT-SIDED LOW BACK PAIN WITH BILATERAL SCIATICA: ICD-10-CM
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
## 2019-11-08 DIAGNOSIS — M96.1 POSTLAMINECTOMY SYNDROME OF LUMBAR REGION: ICD-10-CM

## 2019-11-08 DIAGNOSIS — M46.1 SACROILIITIS (CMS/HCC): ICD-10-CM

## 2019-11-08 DIAGNOSIS — G89.29 OTHER CHRONIC PAIN: ICD-10-CM

## 2019-11-08 DIAGNOSIS — M47.26 OSTEOARTHRITIS OF SPINE WITH RADICULOPATHY, LUMBAR REGION: ICD-10-CM

## 2019-11-08 PROCEDURE — 64494 INJ PARAVERT F JNT L/S 2 LEV: CPT | Mod: LT

## 2019-11-08 PROCEDURE — 64493 INJ PARAVERT F JNT L/S 1 LEV: CPT | Mod: LT

## 2019-11-08 PROCEDURE — 64495 INJ PARAVERT F JNT L/S 3 LEV: CPT | Mod: LT

## 2019-11-08 PROCEDURE — 6360000200 HC RX 636 W HCPCS (ALT 250 FOR IP)

## 2019-11-08 RX ORDER — LIDOCAINE HYDROCHLORIDE 10 MG/ML
30 INJECTION, SOLUTION EPIDURAL; INFILTRATION; INTRACAUDAL; PERINEURAL ONCE
Status: COMPLETED | OUTPATIENT
Start: 2019-11-08 | End: 2019-11-08

## 2019-11-08 RX ORDER — BUPIVACAINE HYDROCHLORIDE 5 MG/ML
30 INJECTION, SOLUTION EPIDURAL; INTRACAUDAL ONCE
Status: COMPLETED | OUTPATIENT
Start: 2019-11-08 | End: 2019-11-08

## 2019-11-08 RX ORDER — TRIAMCINOLONE ACETONIDE 40 MG/ML
40 INJECTION, SUSPENSION INTRA-ARTICULAR; INTRAMUSCULAR ONCE
Status: COMPLETED | OUTPATIENT
Start: 2019-11-08 | End: 2019-11-08

## 2019-11-08 RX ADMIN — TRIAMCINOLONE ACETONIDE 40 MG: 40 INJECTION, SUSPENSION INTRA-ARTICULAR; INTRAMUSCULAR at 14:28

## 2019-11-08 RX ADMIN — LIDOCAINE HYDROCHLORIDE 30 ML: 10 INJECTION, SOLUTION EPIDURAL; INFILTRATION; INTRACAUDAL; PERINEURAL at 14:27

## 2019-11-08 RX ADMIN — BUPIVACAINE HYDROCHLORIDE 30 ML: 5 INJECTION, SOLUTION EPIDURAL; INTRACAUDAL at 14:28

## 2019-11-08 ASSESSMENT — PAIN SCALES - GENERAL: PAINLEVEL: 8

## 2019-11-08 ASSESSMENT — PAIN - FUNCTIONAL ASSESSMENT: PAIN_FUNCTIONAL_ASSESSMENT: 0-10

## 2019-11-08 ASSESSMENT — PAIN DESCRIPTION - DESCRIPTORS: DESCRIPTORS: ACHING;BURNING;SHARP
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
## 2019-11-08 NOTE — PATIENT INSTRUCTIONS
Follow up as already scheduled      Patient Education     Facet Joint Block, Care After  Refer to this sheet in the next few weeks. These instructions provide you with information about caring for yourself after your procedure. Your health care provider may also give you more specific instructions. Your treatment has been planned according to current medical practices, but problems sometimes occur. Call your health care provider if you have any problems or questions after your procedure.  What can I expect after the procedure?  After the procedure, it is common to have:  · Some tenderness over the injection sites for 2 days after the procedure.  · A temporary increase in blood sugar if you have diabetes.  Follow these instructions at home:  · Keep track of the amount of pain relief you feel and how long it lasts.  · Take over-the-counter and prescription medicines only as told by your health care provider. You may need to limit pain medicine within the first 4-6 hours after the procedure.  · Remove your bandages (dressings) the morning after the procedure.  · For the first 24 hours after the procedure:  ? Do not apply heat near or over the injection sites.  ? Do not take a bath or soak in water, such as in a pool or lake.  ? Do not drive or operate heavy machinery unless approved by your health care provider.  ? Avoid activities that require a lot of energy.  · If the injection site is tender, try applying ice to the area. To do this:  ? Put ice in a plastic bag.  ? Place a towel between your skin and the bag.  ? Leave the ice on for 20 minutes, 2-3 times a day.  · Keep all follow-up visits as told by your health care provider. This is important.  Contact a health care provider if:  · Fluid is coming from an injection site.  · There is significant bleeding or swelling at an injection site.  · You have diabetes and your blood sugar is above 180 mg/dL.  Get help right away if:  · You have a fever.  · You have  worsening pain or swelling around an injection site.  · There are red streaks around an injection site.  · You develop severe pain that is not controlled by your medicines.  · You develop a headache, stiff neck, nausea, or vomiting.  · Your eyes become very sensitive to light.  · You have weakness, paralysis, or tingling in your arms or legs that was not present before the procedure.  · You have difficulty urinating or breathing.  This information is not intended to replace advice given to you by your health care provider. Make sure you discuss any questions you have with your health care provider.  Document Released: 12/04/2013 Document Revised: 05/03/2017 Document Reviewed: 09/12/2016  [employer] Patient Education © 2019 [employer]

## 2019-11-27 ENCOUNTER — HOSPITAL ENCOUNTER (OUTPATIENT)
Dept: FLUOROSCOPY | Facility: HOSPITAL | Age: 44
Discharge: STILL ADMITTED OR ADMINISTRATIVE | End: 2019-11-27
Payer: COMMERCIAL

## 2019-11-27 ENCOUNTER — PROCEDURE VISIT (OUTPATIENT)
Dept: PAIN MEDICINE | Facility: HOSPITAL | Age: 44
End: 2019-11-27
Payer: COMMERCIAL

## 2019-11-27 VITALS
DIASTOLIC BLOOD PRESSURE: 46 MMHG | SYSTOLIC BLOOD PRESSURE: 133 MMHG | OXYGEN SATURATION: 95 % | RESPIRATION RATE: 16 BRPM | HEART RATE: 71 BPM

## 2019-11-27 DIAGNOSIS — G89.29 OTHER CHRONIC PAIN: ICD-10-CM

## 2019-11-27 DIAGNOSIS — M54.42 CHRONIC LEFT-SIDED LOW BACK PAIN WITH BILATERAL SCIATICA: ICD-10-CM

## 2019-11-27 DIAGNOSIS — G89.29 CHRONIC LEFT-SIDED LOW BACK PAIN WITH BILATERAL SCIATICA: ICD-10-CM
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
## 2019-11-27 DIAGNOSIS — M96.1 POSTLAMINECTOMY SYNDROME OF LUMBAR REGION: ICD-10-CM

## 2019-11-27 DIAGNOSIS — M54.41 CHRONIC LEFT-SIDED LOW BACK PAIN WITH BILATERAL SCIATICA: ICD-10-CM

## 2019-11-27 DIAGNOSIS — M46.1 SACROILIITIS (CMS/HCC): ICD-10-CM

## 2019-11-27 DIAGNOSIS — M47.26 OSTEOARTHRITIS OF SPINE WITH RADICULOPATHY, LUMBAR REGION: ICD-10-CM

## 2019-11-27 PROCEDURE — 64494 INJ PARAVERT F JNT L/S 2 LEV: CPT | Mod: LT

## 2019-11-27 PROCEDURE — 64495 INJ PARAVERT F JNT L/S 3 LEV: CPT | Mod: LT

## 2019-11-27 PROCEDURE — 64493 INJ PARAVERT F JNT L/S 1 LEV: CPT | Mod: LT

## 2019-11-27 PROCEDURE — 6360000200 HC RX 636 W HCPCS (ALT 250 FOR IP)

## 2019-11-27 RX ORDER — BUPIVACAINE HYDROCHLORIDE 5 MG/ML
30 INJECTION, SOLUTION EPIDURAL; INTRACAUDAL ONCE
Status: COMPLETED | OUTPATIENT
Start: 2019-11-27 | End: 2019-11-27

## 2019-11-27 RX ORDER — LIDOCAINE HYDROCHLORIDE 10 MG/ML
30 INJECTION, SOLUTION EPIDURAL; INFILTRATION; INTRACAUDAL; PERINEURAL ONCE
Status: COMPLETED | OUTPATIENT
Start: 2019-11-27 | End: 2019-11-27

## 2019-11-27 RX ORDER — TRIAMCINOLONE ACETONIDE 40 MG/ML
40 INJECTION, SUSPENSION INTRA-ARTICULAR; INTRAMUSCULAR ONCE
Status: COMPLETED | OUTPATIENT
Start: 2019-11-27 | End: 2019-11-27

## 2019-11-27 RX ORDER — DICYCLOMINE HYDROCHLORIDE 20 MG/1
20 TABLET ORAL EVERY 6 HOURS PRN
COMMUNITY
End: 2020-05-07

## 2019-11-27 RX ADMIN — LIDOCAINE HYDROCHLORIDE 30 ML: 10 INJECTION, SOLUTION EPIDURAL; INFILTRATION; INTRACAUDAL; PERINEURAL at 15:18

## 2019-11-27 RX ADMIN — BUPIVACAINE HYDROCHLORIDE 30 ML: 5 INJECTION, SOLUTION EPIDURAL; INTRACAUDAL at 15:20

## 2019-11-27 RX ADMIN — TRIAMCINOLONE ACETONIDE 40 MG: 40 INJECTION, SUSPENSION INTRA-ARTICULAR; INTRAMUSCULAR at 15:20

## 2019-11-27 NOTE — PATIENT INSTRUCTIONS
Patient Education     Facet Syndrome    Facet syndrome is a condition in which joints (facet joints) that connect the bones of the spine (vertebrae) become damaged. Facet joints help the spine move, and they usually wear down (degenerate) or become inflamed as you age. This can cause pain and stiffness in the neck (cervical facet syndrome) or in the lower back (lumbar facet syndrome).  When a facet joint becomes damaged, a vertebra may slip forward, out of its normal place in the spine. Damage to a facet joint can also damage nerves near the spine, which can cause tingling or weakness in the arms or legs. Facet syndrome can make it difficult to turn the head or bend backward without pain. This condition typically gets worse over time.  What are the causes?  Common causes of this condition include:  · Age-related inflammation of the facet joints (arthritis) that may create extra bone on the joint surface (bone spurs).  · Age-related decrease in space between the vertebrae (disk degeneration and cartilage degeneration).  · Repetitive stress on the spine, such as repetitive twisting of the back.  · Injury (trauma) to the back or neck.  What increases the risk?  The following factors may make you more likely to develop this condition:  · Playing contact sports.  · Doing activities or sports that involve repetitive twisting motions or repetitive heavy lifting.  · Having poor back strength and flexibility.  · Having another back or spine condition, such as scoliosis.  What are the signs or symptoms?  Symptoms of facet syndrome may include:  · An ache in the neck or lower back. This may get worse when you twist or arch your back, or when you look up.  · Stiffness in the neck or lower back.  · Numbness, tingling, or weakness in the arms or legs.  Symptoms of cervical facet syndrome may include:  · Headache.  · Pain at the back of the head.  · Pain in the shoulder blades.  Symptoms of lumbar facet syndrome may include pain  in any of the following areas:  · Groin.  · Thighs.  · Lower back.  · Buttocks.  · Hips.  How is this diagnosed?  This condition may be diagnosed based on:  · Your symptoms.  · Your medical history.  · A physical exam.  · Imaging tests, such as:  ? X-rays.  ? MRI.  · A procedure in which medicines to numb the area (local anesthetic) and medicines to reduce inflammation (steroids) are injected into your affected joint (facet joint block).  How is this treated?  Treatment for this condition may include:  · Stopping or modifying activities that make your condition worse.  · Medicines that help reduce pain and inflammation.  · Steroid injections to help reduce severe pain.  · Physical therapy.  · Radiofrequency ablation. This is a surgical procedure that uses high-frequency radio waves to block signals from affected nerves.  · Surgery to stabilize your spine or to take pressure off your nerves. This is rare.  Follow these instructions at home:  Activity  · Rest your neck and back as told by your health care provider.  · Return to your normal activities as told by your health care provider. Ask your health care provider what activities are safe for you.  · If physical therapy was prescribed, do exercises as told by your health care provider.  General instructions  · Take over-the-counter and prescription medicines only as told by your health care provider.  · Do not drive or operate heavy machinery while taking prescription pain medicines.  · Do not use any tobacco products, such as cigarettes, chewing tobacco, and e-cigarettes. Tobacco can delay bone healing. If you need help quitting, ask your health care provider.  · Use good posture throughout your daily activities. Good posture means that your spine is in its natural S-curve position (your spine is neutral), your shoulders are pulled back slightly, and your head is not forward.  · Keep all follow-up visits as told by your health care provider. This is  important.  Contact a health care provider if:  · You have symptoms that get worse or do not improve in 2-4 weeks of treatment.  · You have numbness or weakness in any part of your body.  · You lose control over your bladder or bowel function.  This information is not intended to replace advice given to you by your health care provider. Make sure you discuss any questions you have with your health care provider.  Document Released: 12/18/2006 Document Revised: 12/27/2018 Document Reviewed: 08/29/2016  [employer] Interactive Patient Education © 2019 [employer]       Patient Education     Facet Joint Block  The facet joints connect the bones of the spine (vertebrae). They make it possible for you to bend, twist, and make other movements with your spine. They also keep you from bending too far, twisting too far, and making other excessive movements.  A facet joint block is a procedure where a numbing medicine (anesthetic) is injected into a facet joint. Often, a type of anti-inflammatory medicine called a steroid is also injected. A facet joint block may be done to diagnose neck or back pain. If the pain gets better after a facet joint block, it means the pain is probably coming from the facet joint. If the pain does not get better, it means the pain is probably not coming from the facet joint. A facet joint block may also be done to relieve neck or back pain caused by an inflamed facet joint. A facet joint block is only done to relieve pain if the pain does not improve with other methods, such as medicine, exercise programs, and physical therapy.  Tell a health care provider about:  · Any allergies you have.  · All medicines you are taking, including vitamins, herbs, eye drops, creams, and over-the-counter medicines.  · Any problems you or family members have had with anesthetic medicines.  · Any blood disorders you have.  · Any surgeries you have had.  · Any medical conditions you have.  · Whether you are pregnant  or may be pregnant.  What are the risks?  Generally, this is a safe procedure. However, problems may occur, including:  · Bleeding.  · Injury to a nerve near the injection site.  · Pain at the injection site.  · Weakness or numbness in areas controlled by nerves near the injection site.  · Infection.  · Temporary fluid retention.  · Allergic reactions to medicines or dyes.  · Injury to other structures or organs near the injection site.  What happens before the procedure?  · Follow instructions from your health care provider about eating or drinking restrictions.  · Ask your health care provider about:  ? Changing or stopping your regular medicines. This is especially important if you are taking diabetes medicines or blood thinners.  ? Taking medicines such as aspirin and ibuprofen. These medicines can thin your blood. Do not take these medicines before your procedure if your health care provider instructs you not to.  · Do not take any new dietary supplements or medicines without asking your health care provider first.  · Plan to have someone take you home after the procedure.  What happens during the procedure?    · You may need to remove your clothing and dress in an open-back gown.  · The procedure will be done while you are lying on an X-ray table. You will most likely be asked to lie on your stomach, but you may be asked to lie in a different position if an injection will be made in your neck.  · Machines will be used to monitor your oxygen levels, heart rate, and blood pressure.  · If an injection will be made in your neck, an IV tube will be inserted into one of your veins. Fluids and medicine will flow directly into your body through the IV tube.  · The area over the facet joint where the injection will be made will be cleaned with soap. The surrounding skin will be covered with clean drapes.  · A numbing medicine (local anesthetic) will be applied to your skin. Your skin may sting or burn for a  moment.  · A video X-ray machine (fluoroscopy) will be used to locate the joint. In some cases, a CT scan may be used.  · A contrast dye may be injected into the facet joint area to help locate the joint.  · When the joint is located, an anesthetic will be injected into the joint through the needle.  · Your health care provider will ask you whether you feel pain relief. If you do feel relief, a steroid may be injected to provide pain relief for a longer period of time. If you do not feel relief or feel only partial relief, additional injections of an anesthetic may be made in other facet joints.  · The needle will be removed.  · Your skin will be cleaned.  · A bandage (dressing) will be applied over each injection site.  The procedure may vary among health care providers and hospitals.  What happens after the procedure?  · You will be observed for 15-30 minutes before being allowed to go home.  This information is not intended to replace advice given to you by your health care provider. Make sure you discuss any questions you have with your health care provider.  Document Released: 05/08/2008 Document Revised: 12/20/2018 Document Reviewed: 09/12/2016  [employer] Interactive Patient Education © 2019 [employer]       Patient Education     Facet Joint Block, Care After  Refer to this sheet in the next few weeks. These instructions provide you with information about caring for yourself after your procedure. Your health care provider may also give you more specific instructions. Your treatment has been planned according to current medical practices, but problems sometimes occur. Call your health care provider if you have any problems or questions after your procedure.  What can I expect after the procedure?  After the procedure, it is common to have:  · Some tenderness over the injection sites for 2 days after the procedure.  · A temporary increase in blood sugar if you have diabetes.  Follow these instructions at  home:  · Keep track of the amount of pain relief you feel and how long it lasts.  · Take over-the-counter and prescription medicines only as told by your health care provider. You may need to limit pain medicine within the first 4-6 hours after the procedure.  · Remove your bandages (dressings) the morning after the procedure.  · For the first 24 hours after the procedure:  ? Do not apply heat near or over the injection sites.  ? Do not take a bath or soak in water, such as in a pool or lake.  ? Do not drive or operate heavy machinery unless approved by your health care provider.  ? Avoid activities that require a lot of energy.  · If the injection site is tender, try applying ice to the area. To do this:  ? Put ice in a plastic bag.  ? Place a towel between your skin and the bag.  ? Leave the ice on for 20 minutes, 2-3 times a day.  · Keep all follow-up visits as told by your health care provider. This is important.  Contact a health care provider if:  · Fluid is coming from an injection site.  · There is significant bleeding or swelling at an injection site.  · You have diabetes and your blood sugar is above 180 mg/dL.  Get help right away if:  · You have a fever.  · You have worsening pain or swelling around an injection site.  · There are red streaks around an injection site.  · You develop severe pain that is not controlled by your medicines.  · You develop a headache, stiff neck, nausea, or vomiting.  · Your eyes become very sensitive to light.  · You have weakness, paralysis, or tingling in your arms or legs that was not present before the procedure.  · You have difficulty urinating or breathing.  This information is not intended to replace advice given to you by your health care provider. Make sure you discuss any questions you have with your health care provider.  Document Released: 12/04/2013 Document Revised: 05/03/2017 Document Reviewed: 09/12/2016  [employer] Interactive Patient Education © 2019 [employer]
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
## 2019-12-02 ENCOUNTER — OFFICE VISIT (OUTPATIENT)
Dept: PAIN MEDICINE | Facility: HOSPITAL | Age: 44
End: 2019-12-02
Payer: COMMERCIAL

## 2019-12-02 DIAGNOSIS — M54.42 CHRONIC LEFT-SIDED LOW BACK PAIN WITH BILATERAL SCIATICA: Primary | ICD-10-CM

## 2019-12-02 DIAGNOSIS — M54.41 CHRONIC LEFT-SIDED LOW BACK PAIN WITH BILATERAL SCIATICA: Primary | ICD-10-CM

## 2019-12-02 DIAGNOSIS — M47.26 OSTEOARTHRITIS OF SPINE WITH RADICULOPATHY, LUMBAR REGION: ICD-10-CM

## 2019-12-02 DIAGNOSIS — G89.29 CHRONIC LEFT-SIDED LOW BACK PAIN WITH BILATERAL SCIATICA: Primary | ICD-10-CM

## 2019-12-02 DIAGNOSIS — M96.1 POSTLAMINECTOMY SYNDROME OF LUMBAR REGION: ICD-10-CM

## 2019-12-02 PROCEDURE — G0463 HOSPITAL OUTPT CLINIC VISIT: HCPCS

## 2019-12-02 RX ORDER — DICLOFENAC SODIUM 10 MG/G
4 GEL TOPICAL 4 TIMES DAILY PRN
Qty: 100 G | Refills: 3 | Status: ON HOLD | OUTPATIENT
Start: 2019-12-02 | End: 2022-01-12

## 2019-12-02 ASSESSMENT — PAIN DESCRIPTION - DESCRIPTORS: DESCRIPTORS: ACHING;DULL;SHARP;STABBING

## 2019-12-02 ASSESSMENT — ENCOUNTER SYMPTOMS
BACK PAIN: 1
JOINT SWELLING: 0
SLEEP DISTURBANCE: 0
ARTHRALGIAS: 1
MYALGIAS: 1
NECK PAIN: 0

## 2019-12-02 ASSESSMENT — PAIN - FUNCTIONAL ASSESSMENT: PAIN_FUNCTIONAL_ASSESSMENT: 0-10

## 2019-12-02 NOTE — LETTER
12/02/19       [facility]  [address] [id number]  [phone]  Dept: [phone]    12/2/2019    Re: Isabell CHAVEZ Dombarbara    To whom it may concern:    This is a letter of medical necessity to proceed with radiofrequency ablation of the left lumbar 3, 4, 5, S1, for the treatment of lumbosacral degenerative joint disease and facet syndrome.  Radiofrequency as a procedure in which bursts of electricity are applied through an insulated needle to reconfigure protein molecules in the nerve.  This in addition of nerve transmission can last for months to years.    This is a 44 y.o. femalewho has had left lumbar 3, 4, 5, S1 medial nerve branch block with 100% relief.  During that time of relief the patient was able to easier to move around.  Patient's pain is starting to return back to baseline.  On exam, patient has increased pain with facet loading and paravertebral tenderness of the left lumbar spine with extension and no true radicular pain.  Patient has tried physical therapy and an MRI has been reviewed.     This patient has had a successful nerve block with local anesthetic and steroid but with only temporary relief.  Radiofrequency would allow for extended relief of pain by inhibition of nerve transmission for months to years.  Thus, the patient would not need multiple steroid blocks, decreasing the amount of cost and morbidity for the patient.    For the reasons above we would like to proceed with radiofrequency to provide the patient with longer lasting pain relief and better quality of life.    Sincerely,      Oneil Valdez, CNP/[facility]

## 2019-12-02 NOTE — PROGRESS NOTES
"Subjective     Patient ID: Isabell Harper is a 44 y.o. female.    HPI  Here for follow-up on the left lumbar facets at L3, 4, 5, S1 done on 11/8/2019 and 11/27/19. She had 100% relief and with relief she was able to move easier and stand.  She is  around the the left side and \"feels a lump still\".  She is having some right sided pain too and wonders about doing the right side.     She has had some tingling in her legs and is actually fused T10-L3.    Pain Score: 6(worst 8; best 5)  Pain Type: Chronic pain  Pain Location: Back  Pain Descriptors: Aching, Dull, Sharp, Stabbing  Pain Frequency: Constant/continuous  Pain Onset: Awakened from sleep  Clinical Progression: Not changed  Pain Interventions: Home medication, Repositioned, Rest, Cold applied(stretching; topicals)    Past Medical History:   Diagnosis Date   • Chronic pain    • Depression    • Diverticulitis    • Extremity pain    • Fibromyalgia    • Hashimoto's thyroiditis    • Low back pain         Past Surgical History:   Procedure Laterality Date   • BACK SURGERY     • CHOLECYSTECTOMY     • HYSTERECTOMY     • REDUCTION MAMMAPLASTY     • TUBAL LIGATION           Review of Systems   Musculoskeletal: Positive for arthralgias, back pain and myalgias. Negative for joint swelling and neck pain.   Psychiatric/Behavioral: Negative for sleep disturbance.       Objective   There were no vitals taken for this visit.       Current Outpatient Medications:   •  dicyclomine (Bentyl) 20 mg tablet, Take 20 mg by mouth every 6 (six) hours, Disp: , Rfl:   •  ALPRAZolam (XANAX) 1 mg tablet, Take 1 mg by mouth daily as needed, Disp: , Rfl: 5  •  gabapentin (NEURONTIN) 600 mg tablet, Take 1,200 mg by mouth 3 (three) times a day, Disp: , Rfl: 3  •  FLUoxetine (PROzac) 40 mg capsule, Take 40 mg by mouth every evening Take along with 20mg, total daily dose of 60mg , Disp: , Rfl: 6  •  traZODone (DESYREL) 100 mg tablet, Take 150 mg by mouth nightly, Disp: , Rfl: 2  •  " carisoprodol (SOMA) 350 mg tablet, Take 350 mg by mouth 4 (four) times a day, Disp: , Rfl: 0  •  FLUoxetine (PROzac) 20 mg capsule, Take 20 mg by mouth every evening Take along with 40mg, total daily dose of 60mg , Disp: 30, Rfl: 3  •  levothyroxine (SYNTHROID, LEVOTHROID) 175 mcg tablet, Take 175 mcg by mouth every morning  , Disp: 30, Rfl: 5    Imaging:  Fl Pain Injection No Charge    Result Date: 11/27/2019  Narrative: NOTE: This study was stored without submission for formal interpretation.    Fl Pain Injection No Charge    Result Date: 11/8/2019  Narrative: NOTE: This study was stored without submission for formal interpretation.        Physical Exam  Vitals signs and nursing note reviewed.   Constitutional:       General: She is not in acute distress.     Appearance: She is well-developed.   HENT:      Head: Normocephalic.   Eyes:      Conjunctiva/sclera: Conjunctivae normal.   Neck:      Musculoskeletal: Normal range of motion.   Cardiovascular:      Rate and Rhythm: Normal rate.      Heart sounds: No murmur.   Pulmonary:      Effort: Pulmonary effort is normal. No respiratory distress.   Abdominal:      General: There is no distension.   Musculoskeletal:         General: Tenderness present. No deformity.      Lumbar back: She exhibits tenderness. She exhibits normal range of motion.        Back:       Comments: Pain with facet loading to L>R and no swelling noted to the tender area with palpation    No radicular pain but sometimes to knees and back of legs     Skin:     General: Skin is warm and dry.      Findings: No rash.   Neurological:      Mental Status: She is alert and oriented to person, place, and time.   Psychiatric:         Behavior: Behavior normal.         Thought Content: Thought content normal.         Judgment: Judgment normal.         Assessment/Plan     1. Chronic left-sided low back pain with bilateral sciatica    2. Postlaminectomy syndrome of lumbar region    3. Osteoarthritis of spine  with radiculopathy, lumbar region      Set up left sided RF and she wants sedation.  She now wants to see about right sided facets and possible RF if this helps.  Will try voltaren gel to the area that is tender to the spine and where she fell.     Discussed possibility of only getting to RFC here and we will see if she can proceed with radiofrequency of the left and then possibly the right if she gets relief.  I will follow-up with her after the right sided facets.    DISCUSSION  Today's plan was a combined effort between the patient and myself. The patient understood the plan, verbally consented, and agreed to participate. An After Visit Summary with special instructions/information regarding today's office visit was given to the patient (see patient instructions).     The diagnostic assessment has been reviewed. Patient and/or patient's surrogate has expressed a good understanding of the assessment and recommendations from today's visit. There are no apparent barriers to understanding this information. Patient’s questions were addressed.  I have reviewed the patients current medications using all immediate resources available.     Patient has been advised to contact this office or the answering service with questions or concerns that may arise. Patient has been advised to follow up with Primary Care Provider for general medical needs.     A total of 15 minutes was required for this visit. Greater than 50% of this time was spent in direct face to face communication with the patient and/or surrogate in order to provide counseling regarding discussing left RF and right facets and Voltaren gel.    Dragon voice recognition program was used to aid in this documentation which might generate inaccuracies despite efforts made to avoid them.  Please take it into context and contact the provider with any questions.    Oneil Valdez, CNP

## 2019-12-02 NOTE — PATIENT INSTRUCTIONS
We will call you for the RF of the left lumbar spine and then set up right sided facets and see if you can have right sided RF to your spine    Try voltaren gel to your painful areas    Patient Education   Radiofrequency Lesioning  Radiofrequency lesioning is a procedure that is performed to relieve pain. The procedure is often used for back, neck, or arm pain. Radiofrequency lesioning involves the use of a machine that creates radio waves to make heat. During the procedure, the heat is applied to the nerve that carries the pain signal. The heat damages the nerve and interferes with the pain signal. Pain relief usually starts about 2 weeks after the procedure and lasts for 6 months to 1 year.  Tell a health care provider about:  · Any allergies you have.  · All medicines you are taking, including vitamins, herbs, eye drops, creams, and over-the-counter medicines.  · Any problems you or family members have had with anesthetic medicines.  · Any blood disorders you have.  · Any surgeries you have had.  · Any medical conditions you have.  · Whether you are pregnant or may be pregnant.  What are the risks?  Generally, this is a safe procedure. However, problems may occur, including:  · Pain or soreness at the injection site.  · Infection at the injection site.  · Damage to nerves or blood vessels.  What happens before the procedure?  · Ask your health care provider about:  ? Changing or stopping your regular medicines. This is especially important if you are taking diabetes medicines or blood thinners.  ? Taking medicines such as aspirin and ibuprofen. These medicines can thin your blood. Do not take these medicines before your procedure if your health care provider instructs you not to.  · Follow instructions from your health care provider about eating or drinking restrictions.  · Plan to have someone take you home after the procedure.  · If you go home right after the procedure, plan to have someone with you for 24  hours.  What happens during the procedure?  · You will be given one or more of the following:  ? A medicine to help you relax (sedative).  ? A medicine to numb the area (local anesthetic).  · You will be awake during the procedure. You will need to be able to talk with the health care provider during the procedure.  · With the help of a type of X-ray (fluoroscopy), the health care provider will insert a radiofrequency needle into the area to be treated.  · Next, a wire that carries the radio waves (electrode) will be put through the radiofrequency needle. An electrical pulse will be sent through the electrode to verify the correct nerve. You will feel a tingling sensation, and you may have muscle twitching.  · Then, the tissue that is around the needle tip will be heated by an electric current that is passed using the radiofrequency machine. This will numb the nerves.  · A bandage (dressing) will be put on the insertion area after the procedure is done.  The procedure may vary among health care providers and hospitals.  What happens after the procedure?  · Your blood pressure, heart rate, breathing rate, and blood oxygen level will be monitored often until the medicines you were given have worn off.  · Return to your normal activities as directed by your health care provider.  This information is not intended to replace advice given to you by your health care provider. Make sure you discuss any questions you have with your health care provider.  Document Released: 08/15/2012 Document Revised: 05/25/2017 Document Reviewed: 01/25/2016  [employer] Patient Education © 2019 [employer]

## 2019-12-03 ENCOUNTER — APPOINTMENT (OUTPATIENT)
Dept: LAB | Facility: HOSPITAL | Age: 44
End: 2019-12-03
Payer: COMMERCIAL

## 2019-12-03 DIAGNOSIS — R19.5 ABNORMAL FECES: ICD-10-CM

## 2019-12-03 LAB
ADV 40+41 DNA STL QL NAA+NON-PROBE: NEGATIVE
ASTRO TYP 1-8 RNA STL QL NAA+NON-PROBE: NEGATIVE
C CAYETANENSIS DNA STL QL NAA+NON-PROBE: NEGATIVE
C COLI+JEJ+UPSA DNA STL QL NAA+NON-PROBE: NEGATIVE
C DIF TOX TCDA+TCDB STL QL NAA+NON-PROBE: POSITIVE
C DIFF TOX A+B STL QL IA: NEGATIVE
CRYPTOSP DNA STL QL NAA+NON-PROBE: NEGATIVE
E HISTOLYT DNA STL QL NAA+NON-PROBE: NEGATIVE
EAEC PAA PLAS AGGR+AATA ST NAA+NON-PRB: NEGATIVE
EC STX1+STX2 GENES STL QL NAA+NON-PROBE: NEGATIVE
EPEC EAE GENE STL QL NAA+NON-PROBE: NEGATIVE
ETEC LTA+ST1A+ST1B TOX ST NAA+NON-PROBE: NEGATIVE
G LAMBLIA DNA STL QL NAA+NON-PROBE: NEGATIVE
LACTOFERRIN, QUAL: NEGATIVE
NOROVIRUS GI+II RNA STL QL NAA+NON-PROBE: NEGATIVE
P SHIGELLOIDES DNA STL QL NAA+NON-PROBE: NEGATIVE
RVA RNA STL QL NAA+NON-PROBE: NEGATIVE
S ENT+BONG DNA STL QL NAA+NON-PROBE: NEGATIVE
SAPO I+II+IV+V RNA STL QL NAA+NON-PROBE: NEGATIVE
SHIGELLA SP+EIEC IPAH ST NAA+NON-PROBE: NEGATIVE
V CHOL+PARA+VUL DNA STL QL NAA+NON-PROBE: NEGATIVE
V CHOLERAE DNA STL QL NAA+NON-PROBE: NEGATIVE
Y ENTEROCOL DNA STL QL NAA+NON-PROBE: NEGATIVE

## 2019-12-03 PROCEDURE — 87507 IADNA-DNA/RNA PROBE TQ 12-25: CPT | Mod: GZ

## 2019-12-03 PROCEDURE — 89055 LEUKOCYTE ASSESSMENT FECAL: CPT

## 2019-12-03 PROCEDURE — 89125 SPECIMEN FAT STAIN: CPT

## 2019-12-04 ENCOUNTER — HOSPITAL ENCOUNTER (OUTPATIENT)
Dept: FLUOROSCOPY | Facility: HOSPITAL | Age: 44
Discharge: STILL ADMITTED OR ADMINISTRATIVE | End: 2019-12-04
Payer: COMMERCIAL

## 2019-12-04 ENCOUNTER — PROCEDURE VISIT (OUTPATIENT)
Dept: PAIN MEDICINE | Facility: HOSPITAL | Age: 44
End: 2019-12-04
Payer: COMMERCIAL

## 2019-12-04 VITALS
SYSTOLIC BLOOD PRESSURE: 115 MMHG | HEART RATE: 61 BPM | DIASTOLIC BLOOD PRESSURE: 68 MMHG | RESPIRATION RATE: 18 BRPM | BODY MASS INDEX: 37.93 KG/M2 | WEIGHT: 221 LBS | OXYGEN SATURATION: 96 %

## 2019-12-04 DIAGNOSIS — M47.26 OSTEOARTHRITIS OF SPINE WITH RADICULOPATHY, LUMBAR REGION: ICD-10-CM

## 2019-12-04 DIAGNOSIS — M96.1 POSTLAMINECTOMY SYNDROME OF LUMBAR REGION: ICD-10-CM

## 2019-12-04 DIAGNOSIS — M54.41 CHRONIC LEFT-SIDED LOW BACK PAIN WITH BILATERAL SCIATICA: ICD-10-CM

## 2019-12-04 DIAGNOSIS — M54.42 CHRONIC LEFT-SIDED LOW BACK PAIN WITH BILATERAL SCIATICA: ICD-10-CM

## 2019-12-04 DIAGNOSIS — G89.29 CHRONIC LEFT-SIDED LOW BACK PAIN WITH BILATERAL SCIATICA: ICD-10-CM

## 2019-12-04 PROCEDURE — 99157 MOD SED OTHER PHYS/QHP EA: CPT

## 2019-12-04 PROCEDURE — 99156 MOD SED OTH PHYS/QHP 5/>YRS: CPT

## 2019-12-04 PROCEDURE — 6360000200 HC RX 636 W HCPCS (ALT 250 FOR IP)

## 2019-12-04 PROCEDURE — 64636 DESTROY L/S FACET JNT ADDL: CPT

## 2019-12-04 PROCEDURE — 77003 FLUOROGUIDE FOR SPINE INJECT: CPT

## 2019-12-04 PROCEDURE — 64635 DESTROY LUMB/SAC FACET JNT: CPT | Mod: LT

## 2019-12-04 RX ORDER — TRIAMCINOLONE ACETONIDE 40 MG/ML
40 INJECTION, SUSPENSION INTRA-ARTICULAR; INTRAMUSCULAR ONCE
Status: COMPLETED | OUTPATIENT
Start: 2019-12-04 | End: 2019-12-04

## 2019-12-04 RX ORDER — MIDAZOLAM HYDROCHLORIDE 1 MG/ML
1 INJECTION INTRAMUSCULAR; INTRAVENOUS EVERY 5 MIN PRN
Status: DISCONTINUED | OUTPATIENT
Start: 2019-12-04 | End: 2020-01-11 | Stop reason: ALTCHOICE

## 2019-12-04 RX ORDER — LIDOCAINE HYDROCHLORIDE 10 MG/ML
30 INJECTION, SOLUTION EPIDURAL; INFILTRATION; INTRACAUDAL; PERINEURAL ONCE
Status: COMPLETED | OUTPATIENT
Start: 2019-12-04 | End: 2019-12-04

## 2019-12-04 RX ORDER — BUPIVACAINE HYDROCHLORIDE 5 MG/ML
30 INJECTION, SOLUTION EPIDURAL; INTRACAUDAL ONCE
Status: COMPLETED | OUTPATIENT
Start: 2019-12-04 | End: 2019-12-04

## 2019-12-04 RX ORDER — FENTANYL CITRATE/PF 50 MCG/ML
50 PLASTIC BAG, INJECTION (ML) INTRAVENOUS EVERY 5 MIN PRN
Status: DISCONTINUED | OUTPATIENT
Start: 2019-12-04 | End: 2020-01-11 | Stop reason: ALTCHOICE

## 2019-12-04 RX ADMIN — LIDOCAINE HYDROCHLORIDE 30 ML: 10 INJECTION, SOLUTION EPIDURAL; INFILTRATION; INTRACAUDAL; PERINEURAL at 15:31

## 2019-12-04 RX ADMIN — MIDAZOLAM HYDROCHLORIDE 1 MG: 1 INJECTION INTRAMUSCULAR; INTRAVENOUS at 15:32

## 2019-12-04 RX ADMIN — Medication 50 MCG: at 15:32

## 2019-12-04 RX ADMIN — TRIAMCINOLONE ACETONIDE 40 MG: 40 INJECTION, SUSPENSION INTRA-ARTICULAR; INTRAMUSCULAR at 15:43

## 2019-12-04 RX ADMIN — BUPIVACAINE HYDROCHLORIDE 30 ML: 5 INJECTION, SOLUTION EPIDURAL; INTRACAUDAL at 15:42

## 2019-12-04 NOTE — PATIENT INSTRUCTIONS
Patient Education   Radiofrequency Lesioning  Radiofrequency lesioning is a procedure that is performed to relieve pain. The procedure is often used for back, neck, or arm pain. Radiofrequency lesioning involves the use of a machine that creates radio waves to make heat. During the procedure, the heat is applied to the nerve that carries the pain signal. The heat damages the nerve and interferes with the pain signal. Pain relief usually starts about 2 weeks after the procedure and lasts for 6 months to 1 year.  Tell a health care provider about:  · Any allergies you have.  · All medicines you are taking, including vitamins, herbs, eye drops, creams, and over-the-counter medicines.  · Any problems you or family members have had with anesthetic medicines.  · Any blood disorders you have.  · Any surgeries you have had.  · Any medical conditions you have.  · Whether you are pregnant or may be pregnant.  What are the risks?  Generally, this is a safe procedure. However, problems may occur, including:  · Pain or soreness at the injection site.  · Infection at the injection site.  · Damage to nerves or blood vessels.  What happens before the procedure?  · Ask your health care provider about:  ? Changing or stopping your regular medicines. This is especially important if you are taking diabetes medicines or blood thinners.  ? Taking medicines such as aspirin and ibuprofen. These medicines can thin your blood. Do not take these medicines before your procedure if your health care provider instructs you not to.  · Follow instructions from your health care provider about eating or drinking restrictions.  · Plan to have someone take you home after the procedure.  · If you go home right after the procedure, plan to have someone with you for 24 hours.  What happens during the procedure?  · You will be given one or more of the following:  ? A medicine to help you relax (sedative).  ? A medicine to numb the area (local  anesthetic).  · You will be awake during the procedure. You will need to be able to talk with the health care provider during the procedure.  · With the help of a type of X-ray (fluoroscopy), the health care provider will insert a radiofrequency needle into the area to be treated.  · Next, a wire that carries the radio waves (electrode) will be put through the radiofrequency needle. An electrical pulse will be sent through the electrode to verify the correct nerve. You will feel a tingling sensation, and you may have muscle twitching.  · Then, the tissue that is around the needle tip will be heated by an electric current that is passed using the radiofrequency machine. This will numb the nerves.  · A bandage (dressing) will be put on the insertion area after the procedure is done.  The procedure may vary among health care providers and hospitals.  What happens after the procedure?  · Your blood pressure, heart rate, breathing rate, and blood oxygen level will be monitored often until the medicines you were given have worn off.  · Return to your normal activities as directed by your health care provider.  This information is not intended to replace advice given to you by your health care provider. Make sure you discuss any questions you have with your health care provider.  Document Released: 08/15/2012 Document Revised: 05/25/2017 Document Reviewed: 01/25/2016  [employer] Patient Education © 2019 [employer]       Patient Education   Radiofrequency Lesioning, Care After  Refer to this sheet in the next few weeks. These instructions provide you with information about caring for yourself after your procedure. Your health care provider may also give you more specific instructions. Your treatment has been planned according to current medical practices, but problems sometimes occur. Call your health care provider if you have any problems or questions after your procedure.  What can I expect after the  procedure?  After the procedure, it is common to have:  · Pain from the burned nerve.  · Temporary numbness.  Follow these instructions at home:  · Take over-the-counter and prescription medicines only as told by your health care provider.  · Return to your normal activities as told by your health care provider. Ask your health care provider what activities are safe for you.  · Pay close attention to how you feel after the procedure. If you start to have pain, write down when it hurts and how it feels. This will help you and your health care provider to know if you need an additional treatment.  · Check your needle insertion site every day for signs of infection. Watch for:  ? Redness, swelling, or pain.  ? Fluid, blood, or pus.  · Keep all follow-up visits as told by your health care provider. This is important.  Contact a health care provider if:  · Your pain does not get better.  · You have redness, swelling, or pain at the needle insertion site.  · You have fluid, blood, or pus coming from the needle insertion site.  · You have a fever.  Get help right away if:  · You develop sudden, severe pain.  · You develop numbness or tingling near the procedure site that does not go away.  This information is not intended to replace advice given to you by your health care provider. Make sure you discuss any questions you have with your health care provider.  Document Released: 08/16/2012 Document Revised: 05/25/2017 Document Reviewed: 01/25/2016  [employer] Interactive Patient Education © 2019 [employer]     Adult Sedation Discharge Instructions    You were sedated with Versed/Fentanyl by Dr. Monet for  Your procedure of RF    Activities for the next 24 hours  Do not drive a vehicle or operate heavy machinery or power tools.  · Do not make important personal or business decision or sign legal documents.  · Limit activities. No activities that require concentration or coordination (this includes Cooking).  · It is  recommended a responsible adult stay with you for the rest of the day.    Diet  Return to your previous diet.  No alcohol or other intoxicating substances for the next 24 hours.    Medications  Resume your previous medications.  The following medications should not be taken for 8 hours.  They may be taken with caution in the 16 hours following that.  · Pain Medications  · Muscle Relaxants  · Sleeping Pills  · Tranquilizers    Normal Symptoms  You may experience one or more of the following in the first 24 hours after sedation:  · Coordination Difficulty  · Drowsiness / Dizziness  · Forgetfulness  · Nausea/Vomiting    Abnormal Symptoms  If you have any of the following symptoms call your doctor:  · Unusual Pain  Persistent Vomiting  · Bleeding   Rash or hives  · Abnormal Breathing Fever of 100.6 or More      If you have any other questions or concerns call [phone]
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
## 2019-12-04 NOTE — PROGRESS NOTES
Radiofrequency Neuroablation   Testing Date:12/04/19   Pad Placement: Left Lateral Abdominal  Location: Lumbar    Nerve/Needle Details       Nerve Level L3 L5 L5 S1   Side left left left left   Needle Type Curved Curved Curved Curved   Needle Length 10cm 10cm 10cm 10cm   Needle Tip Sharp Sharp Sharp Sharp   Active Tip  10mm 10mm 10mm 10mm   Sensory Testing       Impedence [phone]   First Felt @ 0.94 0.65 0.49 0.28   Sensation pressure pressure pressure pressure    Maximum V 0.94 0.65 0.49 0.28   Sensation pressure pressure pressure pressure   Motor Testing       Impedence [phone]   First Felt @ 2.00 1.00 0.68 0.48   Motor Response Tapping Tapping Tapping Tapping   Maximum V 2.0 1.21 1.02 0.60   Motor Response Tapping Tapping Tapping Tapping   Major Muscle Movement No No No No   Multifidus Response @ +   + + none   Ablation Details       Lesion Time  90sec 90sec 90sec 90sec   Temp (Heat)  80C 80C 80C 80C      *Abbreviations: Tap-Tapping, Pu-Pulsing, T-Tingling, B-Buzzing, P-Pressure, Pa- Pain,  0- None         Double Burn, last site did not get up to temp, burned again as a single probe

## 2019-12-05 LAB — FAT STL QN: NORMAL G

## 2019-12-27 ENCOUNTER — ANESTHESIA (OUTPATIENT)
Dept: GASTROENTEROLOGY | Facility: HOSPITAL | Age: 44
End: 2019-12-27
Payer: COMMERCIAL

## 2019-12-27 ENCOUNTER — HOSPITAL ENCOUNTER (OUTPATIENT)
Facility: HOSPITAL | Age: 44
Setting detail: OUTPATIENT SURGERY
Discharge: HOME | End: 2019-12-27
Attending: INTERNAL MEDICINE | Admitting: INTERNAL MEDICINE
Payer: COMMERCIAL

## 2019-12-27 ENCOUNTER — ANESTHESIA EVENT (OUTPATIENT)
Dept: GASTROENTEROLOGY | Facility: HOSPITAL | Age: 44
End: 2019-12-27
Payer: COMMERCIAL

## 2019-12-27 VITALS
RESPIRATION RATE: 16 BRPM | SYSTOLIC BLOOD PRESSURE: 128 MMHG | TEMPERATURE: 97.3 F | OXYGEN SATURATION: 95 % | DIASTOLIC BLOOD PRESSURE: 70 MMHG | HEIGHT: 64 IN | WEIGHT: 217 LBS | HEART RATE: 77 BPM | BODY MASS INDEX: 37.05 KG/M2

## 2019-12-27 PROCEDURE — (BLANK): Performed by: INTERNAL MEDICINE

## 2019-12-27 PROCEDURE — 2580000300 HC RX 258: Performed by: NURSE ANESTHETIST, CERTIFIED REGISTERED

## 2019-12-27 PROCEDURE — (BLANK) HC MAC ANESTHESIA FACILITY CHARGE 1ST 15 MIN: Performed by: INTERNAL MEDICINE

## 2019-12-27 PROCEDURE — 6360000200 HC RX 636 W HCPCS (ALT 250 FOR IP): Performed by: NURSE ANESTHETIST, CERTIFIED REGISTERED

## 2019-12-27 PROCEDURE — (BLANK) HC MAC ANESTHESIA FACILITY CHARGE EACH ADDITIONAL MIN: Performed by: INTERNAL MEDICINE

## 2019-12-27 PROCEDURE — 00813 ANES UPR LWR GI NDSC PX: CPT | Mod: QS,P3 | Performed by: NURSE ANESTHETIST, CERTIFIED REGISTERED

## 2019-12-27 PROCEDURE — (BLANK) HC RECOVERY PHASE-2 1ST 1/2 HOUR ACUITY LEVEL 1: Performed by: INTERNAL MEDICINE

## 2019-12-27 RX ORDER — PROPOFOL 10 MG/ML
INJECTION, EMULSION INTRAVENOUS AS NEEDED
Status: DISCONTINUED | OUTPATIENT
Start: 2019-12-27 | End: 2019-12-27 | Stop reason: SURG

## 2019-12-27 RX ORDER — SODIUM CHLORIDE, SODIUM LACTATE, POTASSIUM CHLORIDE, CALCIUM CHLORIDE 600; 310; 30; 20 MG/100ML; MG/100ML; MG/100ML; MG/100ML
INJECTION, SOLUTION INTRAVENOUS CONTINUOUS PRN
Status: DISCONTINUED | OUTPATIENT
Start: 2019-12-27 | End: 2019-12-27 | Stop reason: SURG

## 2019-12-27 RX ORDER — PROPOFOL 10 MG/ML
INJECTION, EMULSION INTRAVENOUS CONTINUOUS PRN
Status: DISCONTINUED | OUTPATIENT
Start: 2019-12-27 | End: 2019-12-27 | Stop reason: SURG

## 2019-12-27 RX ORDER — MIDAZOLAM HYDROCHLORIDE 1 MG/ML
INJECTION INTRAMUSCULAR; INTRAVENOUS AS NEEDED
Status: DISCONTINUED | OUTPATIENT
Start: 2019-12-27 | End: 2019-12-27 | Stop reason: SURG

## 2019-12-27 RX ADMIN — MIDAZOLAM HYDROCHLORIDE 1 MG: 1 INJECTION, SOLUTION INTRAMUSCULAR; INTRAVENOUS at 11:20

## 2019-12-27 RX ADMIN — PROPOFOL 30 MG: 10 INJECTION, EMULSION INTRAVENOUS at 11:20

## 2019-12-27 RX ADMIN — PROPOFOL 300 MCG/KG/MIN: 10 INJECTION, EMULSION INTRAVENOUS at 11:20

## 2019-12-27 RX ADMIN — SODIUM CHLORIDE, POTASSIUM CHLORIDE, SODIUM LACTATE AND CALCIUM CHLORIDE: 600; 310; 30; 20 INJECTION, SOLUTION INTRAVENOUS at 11:10

## 2019-12-27 ASSESSMENT — ENCOUNTER SYMPTOMS: EXERCISE TOLERANCE: GOOD (4-7 METS)

## 2019-12-27 NOTE — ANESTHESIA PREPROCEDURE EVALUATION
"Pre-Procedure Assessment    Patient: Isabell Harper, female, 44 y.o.    Ht Readings from Last 1 Encounters:   12/27/19 1.626 m (5' 4\")     Wt Readings from Last 1 Encounters:   12/27/19 98.4 kg (217 lb)       Last Vitals  /64 (12/27/19 1031)    Temp 36.2 °C (97.2 °F) (12/27/19 1031)    Pulse 60 (12/27/19 1031)   Resp 16 (12/27/19 1031)    SpO2 92 % (12/27/19 1031)    Pain Score         Problem list reviewed and Medical history reviewed           Airway   Mallampati: II  TM distance: >3 FB  Neck ROM: full      Dental    (+) upper dentures and lower dentures    Pulmonary     breath sounds clear to auscultation  Cardiovascular - negative ROS  Exercise tolerance: good (4-7 METS)    Rhythm: regular  Rate: normal    Mental Status/Neuro/Psych    Pt is alert.      (+) arthritis, peripheral neuropathy,     GI/Hepatic/Renal      Endo/Other    (+) hypothyroidism,   Abdominal   (+) obese,     Abdomen: soft.  Bowel sounds: normal.          Social History     Tobacco Use   • Smoking status: Former Smoker     Packs/day: 0.00   • Smokeless tobacco: Never Used   • Tobacco comment: 'VAPES'   Substance Use Topics   • Alcohol use: No      Hematology   WBC   Date Value Ref Range Status   09/25/2019 5.9 4.5 - 10.5 10*3/uL Final     RBC   Date Value Ref Range Status   09/25/2019 4.16 3.70 - 5.30 10*6/µL Final     MCV   Date Value Ref Range Status   09/25/2019 88.0 81.0 - 97.0 fL Final     Hemoglobin   Date Value Ref Range Status   09/25/2019 12.7 11.5 - 15.5 g/dL Final     Hematocrit   Date Value Ref Range Status   09/25/2019 36.6 34.0 - 45.0 % Final     Platelets   Date Value Ref Range Status   09/25/2019 222 140 - 350 10*3/uL Final      Coagulation   Protime   Date Value Ref Range Status   09/24/2019 12.1 9.4 - 12.5 s Final     PTT   Date Value Ref Range Status   09/24/2019 31.3 25.1 - 36.5 s Final     INR   Date Value Ref Range Status   09/24/2019 1.1 <=1.1 INR Final      General Chemistry   Calcium   Date Value Ref Range Status "   09/25/2019 8.9 8.6 - 10.3 mg/dL Final     BUN   Date Value Ref Range Status   09/25/2019 4 (L) 7 - 25 mg/dL Final     Creatinine   Date Value Ref Range Status   09/25/2019 0.66 0.60 - 1.20 mg/dL Final     Glucose   Date Value Ref Range Status   09/25/2019 108 (H) 70 - 105 mg/dL Final     Sodium   Date Value Ref Range Status   09/25/2019 141 135 - 145 mmol/L Final     Potassium   Date Value Ref Range Status   09/25/2019 3.8 3.5 - 5.1 mmol/L Final     CO2   Date Value Ref Range Status   09/25/2019 28 21 - 32 mmol/L Final     Chloride   Date Value Ref Range Status   09/25/2019 106 98 - 107 mmol/L Final     Anesthesia Plan    ASA 3   NPO status reviewed: > 4 hours    MAC         Induction: intravenous                     Plan discussed with attending.

## 2019-12-27 NOTE — H&P
"GASTROENTEROLOGY OUTPATIENT HISTORY AND PHYSICAL    Today's Date  12/27/19    Subjective     HPI  Isabell Harper is a 44 y.o. female who has chronic diarrhea and rectal bleeding. Celiac serologies were borderline positive.     History  Past Medical History:   Diagnosis Date   • Chronic pain    • Depression    • Diverticulitis    • Extremity pain    • Fibromyalgia    • Hashimoto's thyroiditis    • Low back pain      Past Surgical History:   Procedure Laterality Date   • BACK SURGERY     • CHOLECYSTECTOMY     • HYSTERECTOMY     • REDUCTION MAMMAPLASTY     • TUBAL LIGATION         Objective     Physical Exam  /64   Pulse 60   Temp 36.2 °C (97.2 °F) (Temporal)   Resp 16   Ht 1.626 m (5' 4\")   Wt 98.4 kg (217 lb)   SpO2 92%   BMI 37.25 kg/m²   Physical Exam  Alert, responsive  Chest clear bs  CVS RRR  abd soft, nontender, no masses  Coagulopathy   No       Assessment/Impression  Diarrhea and rectal bleeding      ASA  3    Plan   EGD + Colonoscopy    Kelvin Cordova MD PhD      "

## 2019-12-27 NOTE — POST-PROCEDURE NOTE
Gastroenterologist Brief Op Note    *See provation record for full operative report*    Isabell CHAVEZ Leroy  12/27/2019    Pre-op Diagnosis     * Hematochezia [K92.1]     * Large stool [R19.5]     * Abnormal celiac antibody panel [R89.4]       EGD- HIATUS HERNIA  COLON- DIVERTICULOSIS, DIARRHEA    [unfilled]     Anesthesia:  [unfilled]    Specimens:  Order Name Source Comment Collection Info Order Time   PATHOLOGY SPECIMEN (HISTOLOGY) Small Intestine, Duodenum  Collected By: Kelvin Cordova MD PhD 12/27/2019 11:35 AM   PATHOLOGY SPECIMEN (HISTOLOGY) Large Intestine, Right/Ascending Colon  Collected By: Kelvin Cordova MD PhD 12/27/2019 11:38 AM         Complications:  None    Findings  EGD: small H-H; o/w normal esophagus, stomach and duodenum. Bx taken in duodenum to check for celiac disease  Colon: moderate hemorrhoids, mild sigmoid diverticulosis; no polyps, signs of colitis or malignancy. Bx taken to check for microscopic colitis    Plan  1. Resume regular diet and meds  2. Daily fiber supplement and trial of probiotic  3. F/u path    Kelvin Cordova MD PhD  Phone Number: [phone]    Date: 12/27/2019  Time: 11:47 AM

## 2019-12-27 NOTE — ANESTHESIA POSTPROCEDURE EVALUATION
Patient: Isabell Harper    Procedure Summary     Date:  12/27/19 Room / Location:  [facility] ENDO 03 / [facility] Endoscopy    Anesthesia Start:  1116 Anesthesia Stop:  1148    Procedures:       ESOPHAGOGASTRODUODENOSCOPY WITH DUODENAL BIOPSIES (N/A Esophagus)      COLONOSCOPY WITH ASCENDING COLON BIOPSIES (N/A Anus) Diagnosis:       Hematochezia      Large stool      Abnormal celiac antibody panel      (EGD- HIATUS HERNIA)      (COLON- DIVERTICULOSIS, DIARRHEA)    Provider:  Kelvin Codrova MD PhD Responsible Provider:  Kelvin Cordova MD PhD    Anesthesia Type:  MAC ASA Status:  3          Anesthesia Type: MAC    Last vitals  Vitals Value Taken Time   BP     Temp     Pulse     Resp     SpO2     Pain Score         Anesthesia Post Evaluation    Patient location during evaluation: bedside  Patient participation: complete - patient participated  Level of consciousness: awake and alert  Pain management: adequate  Airway patency: patent  Anesthetic complications: no  Cardiovascular status: acceptable  Respiratory status: acceptable  Hydration status: acceptable  May dismiss recovered patient based on consultation with the appropriate physicians and/or meeting appropriate discharge criteria

## 2020-01-10 ENCOUNTER — HOSPITAL ENCOUNTER (OUTPATIENT)
Dept: FLUOROSCOPY | Facility: HOSPITAL | Age: 45
Discharge: STILL ADMITTED OR ADMINISTRATIVE | End: 2020-01-10
Payer: COMMERCIAL

## 2020-01-10 ENCOUNTER — PROCEDURE VISIT (OUTPATIENT)
Dept: PAIN MEDICINE | Facility: HOSPITAL | Age: 45
End: 2020-01-10
Payer: COMMERCIAL

## 2020-01-10 VITALS
SYSTOLIC BLOOD PRESSURE: 118 MMHG | OXYGEN SATURATION: 93 % | DIASTOLIC BLOOD PRESSURE: 66 MMHG | RESPIRATION RATE: 16 BRPM | HEART RATE: 85 BPM

## 2020-01-10 DIAGNOSIS — M96.1 POSTLAMINECTOMY SYNDROME OF LUMBAR REGION: ICD-10-CM

## 2020-01-10 DIAGNOSIS — G89.29 CHRONIC LEFT-SIDED LOW BACK PAIN WITH BILATERAL SCIATICA: ICD-10-CM

## 2020-01-10 DIAGNOSIS — M47.26 OSTEOARTHRITIS OF SPINE WITH RADICULOPATHY, LUMBAR REGION: ICD-10-CM

## 2020-01-10 DIAGNOSIS — M54.42 CHRONIC LEFT-SIDED LOW BACK PAIN WITH BILATERAL SCIATICA: ICD-10-CM

## 2020-01-10 DIAGNOSIS — M54.41 CHRONIC LEFT-SIDED LOW BACK PAIN WITH BILATERAL SCIATICA: ICD-10-CM

## 2020-01-10 PROCEDURE — 64494 INJ PARAVERT F JNT L/S 2 LEV: CPT | Mod: RT

## 2020-01-10 PROCEDURE — 64495 INJ PARAVERT F JNT L/S 3 LEV: CPT | Mod: RT

## 2020-01-10 PROCEDURE — 64493 INJ PARAVERT F JNT L/S 1 LEV: CPT | Mod: RT

## 2020-01-10 PROCEDURE — 6360000200 HC RX 636 W HCPCS (ALT 250 FOR IP)

## 2020-01-10 RX ORDER — TRIAMCINOLONE ACETONIDE 40 MG/ML
40 INJECTION, SUSPENSION INTRA-ARTICULAR; INTRAMUSCULAR ONCE
Status: COMPLETED | OUTPATIENT
Start: 2020-01-10 | End: 2020-01-10

## 2020-01-10 RX ORDER — LIDOCAINE HYDROCHLORIDE 10 MG/ML
30 INJECTION, SOLUTION EPIDURAL; INFILTRATION; INTRACAUDAL; PERINEURAL ONCE
Status: COMPLETED | OUTPATIENT
Start: 2020-01-10 | End: 2020-01-10

## 2020-01-10 RX ORDER — BUPIVACAINE HYDROCHLORIDE 5 MG/ML
30 INJECTION, SOLUTION EPIDURAL; INTRACAUDAL ONCE
Status: COMPLETED | OUTPATIENT
Start: 2020-01-10 | End: 2020-01-10

## 2020-01-10 RX ADMIN — LIDOCAINE HYDROCHLORIDE 30 ML: 10 INJECTION, SOLUTION EPIDURAL; INFILTRATION; INTRACAUDAL; PERINEURAL at 14:03

## 2020-01-10 RX ADMIN — BUPIVACAINE HYDROCHLORIDE 3 ML: 5 INJECTION, SOLUTION EPIDURAL; INTRACAUDAL at 14:04

## 2020-01-10 RX ADMIN — TRIAMCINOLONE ACETONIDE 40 MG: 40 INJECTION, SUSPENSION INTRA-ARTICULAR; INTRAMUSCULAR at 14:04

## 2020-01-10 ASSESSMENT — PAIN SCALES - GENERAL: PAINLEVEL: 8

## 2020-01-10 ASSESSMENT — PAIN DESCRIPTION - DESCRIPTORS: DESCRIPTORS: ACHING;BURNING

## 2020-01-10 NOTE — PROGRESS NOTES
Preoperative diagnosis: Degenerative joint disease of the lumbosacral facet joint and lumbosacral back spasm.  Postoperative diagnosis: Same  Procedure: Lumbar facet median nerve blocks on the right at L3-4, L4-5, L5-S1, and S1 medial nerves (anatomical locations L4, L5, sacral alar notch, and lateral S1 neural foraminal opening) under flouroscopic guidance  Complications: None  Anesthetic: Local  HPI:.  This is a 45-year-old female has fusion surgery down through the L3 vertebral body and initially presented to us with left-sided low back pain.  Dr. Monet treated her left side with facet medial nerve branch blocks and subsequently performed radiofrequency ablation where she is pain-free on that side.  As she became more active she started noticed pain on the right side.  On physical exam the patient has paravertebral tenderness of the lumbosacral spine and worsening pain with hyperextension.  Patient denies any significant radicular symptoms.  Radiologic study showed diffuse degeneration throughout the lumbosacral spine.  Patient has tried physical therapy and medications with limited success.  Limitations include standing up straight, bending over, and doing some of her daily activities    Procedure: Risks and benefits of the procedure were explained and informed consent was obtained.  Patient was placed in the prone position.  Skin and deep tissue infiltrated with 1% lidocaine. 4   22-gauge needles were inserted under fluoroscopic guidance to come into bony contact at the junction of the superior articular process and transverse process on the left at the L4 vertebral body the L5 vertebral body the left sacral alar notch and lateral to the S1 neural foraminal opening .  After negative aspiration 0.5 cc of 0.5% Marcaine and Kenalog was injected in each needle.  A total of 40 mg of Kenalog was given.  Patient tolerated the procedure well.  Plan: Patient was monitored 20 minutes post procedure at which point they  reported greater than 80% relief.  Her second injection is already scheduled for about 3 weeks from now..           No

## 2020-01-29 ENCOUNTER — HOSPITAL ENCOUNTER (EMERGENCY)
Facility: HOSPITAL | Age: 45
Discharge: HOME | End: 2020-01-29
Attending: EMERGENCY MEDICINE
Payer: COMMERCIAL

## 2020-01-29 VITALS
HEIGHT: 64 IN | HEART RATE: 63 BPM | RESPIRATION RATE: 17 BRPM | TEMPERATURE: 98.4 F | SYSTOLIC BLOOD PRESSURE: 128 MMHG | DIASTOLIC BLOOD PRESSURE: 74 MMHG | BODY MASS INDEX: 39.05 KG/M2 | OXYGEN SATURATION: 95 % | WEIGHT: 228.73 LBS

## 2020-01-29 DIAGNOSIS — R00.1 BRADYCARDIA: ICD-10-CM

## 2020-01-29 DIAGNOSIS — Z98.890 HISTORY OF HEMORRHOIDECTOMY: ICD-10-CM

## 2020-01-29 DIAGNOSIS — I95.9 HYPOTENSION: ICD-10-CM

## 2020-01-29 DIAGNOSIS — R33.9 URINARY RETENTION: ICD-10-CM

## 2020-01-29 DIAGNOSIS — K62.89 RECTAL PAIN: Primary | ICD-10-CM

## 2020-01-29 DIAGNOSIS — E86.0 DEHYDRATION: ICD-10-CM

## 2020-01-29 LAB
ALBUMIN SERPL-MCNC: 3.8 G/DL (ref 3.5–5.3)
ALP SERPL-CCNC: 95 U/L (ref 37–98)
ALT SERPL-CCNC: 25 U/L (ref 0–52)
ANION GAP SERPL CALC-SCNC: 8 MMOL/L (ref 3–11)
AST SERPL-CCNC: 11 U/L (ref 0–39)
BACTERIA #/AREA URNS AUTO: NORMAL /HPF
BASOPHILS # BLD AUTO: 0 10*3/UL
BASOPHILS NFR BLD AUTO: 1 % (ref 0–2)
BILIRUB SERPL-MCNC: 0.26 MG/DL (ref 0–1.4)
BILIRUB UR QL: NEGATIVE
BUN SERPL-MCNC: 8 MG/DL (ref 7–25)
CALCIUM ALBUM COR SERPL-MCNC: 8.9 MG/DL (ref 8.6–10.3)
CALCIUM SERPL-MCNC: 8.7 MG/DL (ref 8.6–10.3)
CHLORIDE SERPL-SCNC: 103 MMOL/L (ref 98–107)
CLARITY UR: CLEAR
CO2 SERPL-SCNC: 26 MMOL/L (ref 21–32)
COLOR UR: YELLOW
CREAT SERPL-MCNC: 0.59 MG/DL (ref 0.6–1.2)
EOSINOPHIL # BLD AUTO: 0.1 10*3/UL
EOSINOPHIL NFR BLD AUTO: 2 % (ref 0–3)
ERYTHROCYTE [DISTWIDTH] IN BLOOD BY AUTOMATED COUNT: 14 % (ref 11.5–14)
GFR SERPL CREATININE-BSD FRML MDRD: 111 ML/MIN/1.73M*2
GLUCOSE SERPL-MCNC: 85 MG/DL (ref 70–105)
GLUCOSE UR QL: NEGATIVE MG/DL
HCT VFR BLD AUTO: 36 % (ref 34–45)
HGB BLD-MCNC: 12.1 G/DL (ref 11.5–15.5)
HGB UR QL: NEGATIVE
KETONES UR-MCNC: NEGATIVE MG/DL
LEUKOCYTE ESTERASE UR QL STRIP: NEGATIVE
LYMPHOCYTES # BLD AUTO: 1.7 10*3/UL
LYMPHOCYTES NFR BLD AUTO: 24 % (ref 11–47)
MCH RBC QN AUTO: 29.3 PG (ref 28–33)
MCHC RBC AUTO-ENTMCNC: 33.5 G/DL (ref 32–36)
MCV RBC AUTO: 87.3 FL (ref 81–97)
MONOCYTES # BLD AUTO: 0.5 10*3/UL
MONOCYTES NFR BLD AUTO: 7 % (ref 3–11)
NEUTROPHILS # BLD AUTO: 4.8 10*3/UL
NEUTROPHILS NFR BLD AUTO: 67 % (ref 41–81)
NITRITE UR QL: NEGATIVE
PH UR: 6 PH
PLATELET # BLD AUTO: 230 10*3/UL (ref 140–350)
PMV BLD AUTO: 7.2 FL (ref 6.9–10.8)
POTASSIUM SERPL-SCNC: 3.6 MMOL/L (ref 3.5–5.1)
PROT SERPL-MCNC: 6.5 G/DL (ref 6–8.3)
PROT UR STRIP-MCNC: NEGATIVE MG/DL
RBC # BLD AUTO: 4.12 10*6/ΜL (ref 3.7–5.3)
RBC #/AREA URNS AUTO: NORMAL /HPF
SODIUM SERPL-SCNC: 137 MMOL/L (ref 135–145)
SP GR UR: >=1.03 (ref 1–1.03)
SQUAMOUS #/AREA URNS AUTO: NORMAL /HPF
UROBILINOGEN UR-MCNC: 0.2 E.U./DL
WBC # BLD AUTO: 7.2 10*3/UL (ref 4.5–10.5)
WBC #/AREA URNS AUTO: NORMAL /HPF

## 2020-01-29 PROCEDURE — 36415 COLL VENOUS BLD VENIPUNCTURE: CPT | Performed by: EMERGENCY MEDICINE

## 2020-01-29 PROCEDURE — 96375 TX/PRO/DX INJ NEW DRUG ADDON: CPT

## 2020-01-29 PROCEDURE — 99284 EMERGENCY DEPT VISIT MOD MDM: CPT | Performed by: EMERGENCY MEDICINE

## 2020-01-29 PROCEDURE — 99284 EMERGENCY DEPT VISIT MOD MDM: CPT

## 2020-01-29 PROCEDURE — 6360000200 HC RX 636 W HCPCS (ALT 250 FOR IP): Performed by: EMERGENCY MEDICINE

## 2020-01-29 PROCEDURE — 96374 THER/PROPH/DIAG INJ IV PUSH: CPT

## 2020-01-29 PROCEDURE — 96361 HYDRATE IV INFUSION ADD-ON: CPT

## 2020-01-29 PROCEDURE — 80053 COMPREHEN METABOLIC PANEL: CPT | Performed by: EMERGENCY MEDICINE

## 2020-01-29 PROCEDURE — 85025 COMPLETE CBC W/AUTO DIFF WBC: CPT | Performed by: EMERGENCY MEDICINE

## 2020-01-29 PROCEDURE — 2580000300 HC RX 258: Performed by: EMERGENCY MEDICINE

## 2020-01-29 PROCEDURE — 81001 URINALYSIS AUTO W/SCOPE: CPT | Performed by: EMERGENCY MEDICINE

## 2020-01-29 RX ORDER — SODIUM CHLORIDE 9 MG/ML
1000 INJECTION, SOLUTION INTRAVENOUS ONCE
Status: COMPLETED | OUTPATIENT
Start: 2020-01-29 | End: 2020-01-29

## 2020-01-29 RX ORDER — MORPHINE SULFATE 4 MG/ML
4 INJECTION, SOLUTION INTRAMUSCULAR; INTRAVENOUS ONCE
Status: COMPLETED | OUTPATIENT
Start: 2020-01-29 | End: 2020-01-29

## 2020-01-29 RX ORDER — OXYCODONE AND ACETAMINOPHEN 5; 325 MG/1; MG/1
1 TABLET ORAL EVERY 4 HOURS PRN
Qty: 24 TABLET | Refills: 0 | Status: SHIPPED | OUTPATIENT
Start: 2020-01-29 | End: 2020-02-13 | Stop reason: ALTCHOICE

## 2020-01-29 RX ORDER — ONDANSETRON HYDROCHLORIDE 2 MG/ML
4 INJECTION, SOLUTION INTRAVENOUS ONCE
Status: COMPLETED | OUTPATIENT
Start: 2020-01-29 | End: 2020-01-29

## 2020-01-29 RX ADMIN — ONDANSETRON 4 MG: 2 INJECTION INTRAMUSCULAR; INTRAVENOUS at 16:50

## 2020-01-29 RX ADMIN — MORPHINE SULFATE 4 MG: 4 INJECTION, SOLUTION INTRAMUSCULAR; INTRAVENOUS at 16:52

## 2020-01-29 RX ADMIN — SODIUM CHLORIDE 1000 ML: 9 INJECTION, SOLUTION INTRAVENOUS at 16:52

## 2020-01-29 NOTE — ED PROVIDER NOTES
Urinary Retention (Patient states she had rectal surgery 2 weeks ago and has had pain since. Patient also states urinary retention since 0200. )        HPI:    Patient is a 45 year old female presenting with post surgical intermittent urinary retention. She states that she has not urinated in 13 hours and reports constant bladder pressure. She had a Hemorrhoidectomy on January 17th (performed by Dr. Michelle) and admits to rectal pain. She called Dr. Michelle's nurse, who recommended that she visit the Emergency Department to have her bladder scanned. She reports good and bad days of pain following the surgery. She was given Roxicodone after her surgery for pain management.         Past Medical History:   Diagnosis Date   • Chronic pain    • Complication of anesthesia    • Dental disease    • Depression    • Diverticulitis    • Endocrine disorder    • Extremity pain    • Fibromyalgia    • Hashimoto's thyroiditis    • Hypothyroidism    • Injury of back    • Low back pain    • Neurologic disorder    • Respiratory disease    • Sleep apnea     does not use cpap   • Wears dentures        Past Surgical History:   Procedure Laterality Date   • BACK SURGERY     • CHOLECYSTECTOMY     • COLONOSCOPY N/A 12/27/2019    Procedure: COLONOSCOPY WITH ASCENDING COLON BIOPSIES;  Surgeon: Kelvin Cordova MD PhD;  Location: [facility];  Service: Endoscopy;  Laterality: N/A;   • ESOPHAGOGASTRODUODENOSCOPY N/A 12/27/2019    Procedure: ESOPHAGOGASTRODUODENOSCOPY WITH DUODENAL BIOPSIES;  Surgeon: Kelvin Cordova MD PhD;  Location: [facility];  Service: Endoscopy;  Laterality: N/A;   • EXAMINATION UNDER ANESTHESIA N/A 1/17/2020    Procedure: EXAM UNDER ANESTHESIA;  Surgeon: Steve Michelle MD;  Location: [facility] OR;  Service: General;  Laterality: N/A;   • HEMORRHOID SURGERY N/A 1/17/2020    Procedure: HEMORRHOIDECTOMY TIMES ONE with BANDING TIMES ONE;  Surgeon: Steve Michelle MD;  Location: [facility] OR;  Service: General;  Laterality: N/A;   •  HYSTERECTOMY     • REDUCTION MAMMAPLASTY     • TUBAL LIGATION         Social History     Socioeconomic History   • Marital status: Single     Spouse name: Not on file   • Number of children: Not on file   • Years of education: Not on file   • Highest education level: Not on file   Occupational History   • Not on file   Social Needs   • Financial resource strain: Not on file   • Food insecurity     Worry: Not on file     Inability: Not on file   • Transportation needs     Medical: Not on file     Non-medical: Not on file   Tobacco Use   • Smoking status: Former Smoker     Packs/day: 0.00   • Smokeless tobacco: Never Used   • Tobacco comment: 'VAPES'   Substance and Sexual Activity   • Alcohol use: No   • Drug use: No   • Sexual activity: Defer   Lifestyle   • Physical activity     Days per week: Not on file     Minutes per session: Not on file   • Stress: Not on file   Relationships   • Social connections     Talks on phone: Not on file     Gets together: Not on file     Attends [employer] service: Not on file     Active member of club or organization: Not on file     Attends meetings of clubs or organizations: Not on file     Relationship status: Not on file   • Intimate partner violence     Fear of current or ex partner: Not on file     Emotionally abused: Not on file     Physically abused: Not on file     Forced sexual activity: Not on file   Other Topics Concern   • Not on file   Social History Narrative   • Not on file       History reviewed. No pertinent family history.    Allergies   Allergen Reactions   • Cephalexin      hives, throat swelling   • Morphine Other (see comments)     Chest Pain; pt reported to this RN that she does not have an allergy to Morphine and was okay w/Dr. Conroy ordering Morphine for her pain.     PT STATES SHE IS NOT ALLERGIC          Current Outpatient Medications:   •  oxyCODONE-acetaminophen (PERCOCET) 5-325 mg per tablet, Take 1 tablet by mouth every 4 (four) hours as needed for pain  scale 8-10/10 (Pain) Max Daily Amount: 6 tablets, Disp: 24 tablet, Rfl: 0  •  diclofenac sodium (VOLTAREN) 1 % gel, Apply 4 g topically 4 (four) times a day as needed (pain), Disp: 100 g, Rfl: 3  •  dicyclomine (Bentyl) 20 mg tablet, Take 20 mg by mouth every 6 (six) hours as needed  , Disp: , Rfl:   •  ALPRAZolam (XANAX) 1 mg tablet, Take 1 mg by mouth daily as needed, Disp: , Rfl: 5  •  gabapentin (NEURONTIN) 600 mg tablet, Take 1,200 mg by mouth 3 (three) times a day, Disp: , Rfl: 3  •  FLUoxetine (PROzac) 40 mg capsule, Take 40 mg by mouth every evening Take along with 20mg, total daily dose of 60mg , Disp: , Rfl: 6  •  traZODone (DESYREL) 100 mg tablet, Take 150 mg by mouth nightly, Disp: , Rfl: 2  •  carisoprodol (SOMA) 350 mg tablet, Take 350 mg by mouth 4 (four) times a day, Disp: , Rfl: 0  •  FLUoxetine (PROzac) 20 mg capsule, Take 20 mg by mouth every evening Take along with 40mg, total daily dose of 60mg , Disp: 30, Rfl: 3  •  levothyroxine (SYNTHROID, LEVOTHROID) 175 mcg tablet, Take 175 mcg by mouth every morning  , Disp: 30, Rfl: 5      ROS:  Constitutional: negative for fever  Eyes: negative for visual changes  ENT: negative for sore throat  Cardiovascular: negative for chest pain  Respiratory: negative for shortness of breath   GI: negative for abdominal pain, positive for urinary retention and rectal pain.   : negative for hematuria  Musculoskeletal: negative for back pain  Neuro: negative for headache  Hematology: negative for bleeding  Immunology: negative for joint pain      ED Triage Vitals   Temp Heart Rate Resp BP SpO2   01/29/20 1435 01/29/20 1435 01/29/20 1435 01/29/20 1435 01/29/20 1435   36.9 °C (98.4 °F) 70 20 97/62 97 %      Temp Source Heart Rate Source Patient Position BP Location FiO2 (%)   01/29/20 1435 -- 01/29/20 1656 -- --   Oral  Head of bed 30 degrees or higher           Physical Exam:  Nursing note and vitals reviewed.  Constitutional: Non-toxic but uncomfortable appearing  middle-aged female   Head: Normocephalic and atraumatic. Mucous membranes are moist.   Eyes: Pupils are equal, round, and reactive to light.   Neck: Supple.  Pulmonary/Chest: Normal respiratory excursion   Abdominal: Abdomen is soft with minimal suprapubic tenderness    Musculoskeletal: No edema  Neurological: Alert.   Skin: Skin is warm and dry. No rash noted.   Psychiatric: Normal mood and affect.        Labs:  Labs Reviewed   COMPREHENSIVE METABOLIC PANEL - Abnormal       Result Value    Sodium 137      Potassium 3.6      Chloride 103      CO2 26      Anion Gap 8      BUN 8      Creatinine 0.59 (*)     Glucose 85      Calcium 8.7      AST 11      ALT (SGPT) 25      Alkaline Phosphatase 95      Total Protein 6.5      Albumin 3.8      Total Bilirubin 0.26      eGFR 111      Corrected Calcium 8.9      Narrative:     Estimated GFR calculated using the 2009 CKD-EPI creatinine equation.   URINALYSIS, MICROSCOPIC ONLY - Normal    RBC, Urine 0-2      WBC, Urine 0-4      Squamous Epithelial, Urine 0-4      Bacteria, Urine None seen     URINALYSIS, DIPSTICK ONLY, FOR USE WITH MICROSCOPIC PANEL - Normal    Color, Urine Yellow      Clarity, Urine Clear      Specific Gravity, Urine >=1.030      Leukocytes, Urine Negative      Nitrite, Urine Negative      Protein, Urine Negative      Ketones, Urine Negative      Urobilinogen, Urine 0.2      Bilirubin, Urine Negative      Blood, Urine Negative      Glucose, Urine Negative      pH, Urine 6.0     URINALYSIS WITH MICROSCOPIC    Narrative:     The following orders were created for panel order Urinalysis w/microscopic Urine, Clean Catch.  Procedure                               Abnormality         Status                     ---------                               -----------         ------                     Urinalysis, microscopic U...[[id number]]  Normal              Final result               Urinalysis, dipstick Urin...[[id number]]  Normal              Final result                  Please view results for these tests on the individual orders.   CBC WITH AUTO DIFFERENTIAL    WBC 7.2      RBC 4.12      Hemoglobin 12.1      Hematocrit 36.0      MCV 87.3      MCH 29.3      MCHC 33.5      RDW 14.0      Platelets 230      MPV 7.2      Neutrophils% 67      Lymphocytes% 24      Monocytes% 7      Eosinophils% 2      Basophils% 1      Neutrophils Absolute 4.80      Lymphocytes Absolute 1.70      Monocytes Absolute 0.50      Eosinophils Absolute 0.10      Basophils Absolute 0.00         Imaging:  No orders to display       MDM:    Patient is a 45 year old status post rectal surgery presenting with intermittent urinary retention. A Mcmahon will be introduced and her urine will be sent.      Mcmahon was placed and only 75 cc were drained. She does not show evidence of having a UTI. A CMP and CBC will be obtained. She will be given 1 L normal Saline.    With IV fluids, she eventually drained 300 cc out of her Mcmahon. Her labs were normal and rectal exam showed normal post-surgical findings. She will be discharged home and was instructed to have her Mcmahon pulled on either Friday or Monday by her Primary Provider. Patient was discharged home in stable condition and advised to return to the Emergency Department if her symptoms worsen or new symptoms of concern develop.        Given   Medications   sodium chloride 0.9 % bolus 1,000 mL (0 mL intravenous Stopped 1/29/20 1754)   morphine injection 4 mg (4 mg intravenous Given 1/29/20 1652)   ondansetron (ZOFRAN) injection 4 mg (4 mg intravenous Given 1/29/20 1650)         Procedure    Procedures        Clinical Impression:    Final diagnoses:   [K62.89] Rectal pain   [R33.9] Urinary retention   [Z98.890] History of hemorrhoidectomy   [E86.0] Dehydration   [I95.9] Hypotension   [R00.1] Bradycardia     By signing my name, I, Job Cr, attest that this documentation has been prepared under the direction and in the presence of Dr. Conroy, 1/29/2020, 3:24 PM.    I,  Stephen Conroy MD, personally performed the services described in this documentation as transcribed by an emergency department scribe, in my presence, and it is both accurate and complete.      Stephen Conroy MD  02/01/20 [id number]

## 2020-01-30 NOTE — DISCHARGE INSTRUCTIONS
Follow-up with your primary care physician to have your Mcmahon removed on Friday or Monday.  Take your pain medicines as needed.  Return to this emergency department for any other problems.

## 2020-02-12 ENCOUNTER — HOSPITAL ENCOUNTER (OUTPATIENT)
Dept: FLUOROSCOPY | Facility: HOSPITAL | Age: 45
Discharge: STILL ADMITTED OR ADMINISTRATIVE | End: 2020-02-12
Payer: COMMERCIAL

## 2020-02-12 ENCOUNTER — PROCEDURE VISIT (OUTPATIENT)
Dept: PAIN MEDICINE | Facility: HOSPITAL | Age: 45
End: 2020-02-12
Payer: COMMERCIAL

## 2020-02-12 VITALS — SYSTOLIC BLOOD PRESSURE: 114 MMHG | DIASTOLIC BLOOD PRESSURE: 51 MMHG | OXYGEN SATURATION: 98 % | HEART RATE: 64 BPM

## 2020-02-12 DIAGNOSIS — M47.26 OSTEOARTHRITIS OF SPINE WITH RADICULOPATHY, LUMBAR REGION: ICD-10-CM

## 2020-02-12 DIAGNOSIS — M54.42 CHRONIC LEFT-SIDED LOW BACK PAIN WITH BILATERAL SCIATICA: ICD-10-CM

## 2020-02-12 DIAGNOSIS — G89.29 CHRONIC LEFT-SIDED LOW BACK PAIN WITH BILATERAL SCIATICA: ICD-10-CM

## 2020-02-12 DIAGNOSIS — M54.41 CHRONIC LEFT-SIDED LOW BACK PAIN WITH BILATERAL SCIATICA: ICD-10-CM

## 2020-02-12 DIAGNOSIS — M96.1 POSTLAMINECTOMY SYNDROME OF LUMBAR REGION: ICD-10-CM

## 2020-02-12 PROCEDURE — 64493 INJ PARAVERT F JNT L/S 1 LEV: CPT | Mod: RT

## 2020-02-12 PROCEDURE — 64495 INJ PARAVERT F JNT L/S 3 LEV: CPT | Mod: RT

## 2020-02-12 PROCEDURE — 64494 INJ PARAVERT F JNT L/S 2 LEV: CPT | Mod: RT

## 2020-02-12 PROCEDURE — 6360000200 HC RX 636 W HCPCS (ALT 250 FOR IP)

## 2020-02-12 PROCEDURE — 6360000200 HC RX 636 W HCPCS (ALT 250 FOR IP): Performed by: ANESTHESIOLOGY

## 2020-02-12 RX ORDER — LIDOCAINE HYDROCHLORIDE 10 MG/ML
30 INJECTION, SOLUTION EPIDURAL; INFILTRATION; INTRACAUDAL; PERINEURAL ONCE
Status: COMPLETED | OUTPATIENT
Start: 2020-02-12 | End: 2020-02-12

## 2020-02-12 RX ORDER — BUPIVACAINE HYDROCHLORIDE 5 MG/ML
30 INJECTION, SOLUTION EPIDURAL; INTRACAUDAL ONCE
Status: COMPLETED | OUTPATIENT
Start: 2020-02-12 | End: 2020-02-12

## 2020-02-12 RX ORDER — TRIAMCINOLONE ACETONIDE 40 MG/ML
40 INJECTION, SUSPENSION INTRA-ARTICULAR; INTRAMUSCULAR ONCE
Status: COMPLETED | OUTPATIENT
Start: 2020-02-12 | End: 2020-02-12

## 2020-02-12 RX ADMIN — TRIAMCINOLONE ACETONIDE 40 MG: 40 INJECTION, SUSPENSION INTRA-ARTICULAR; INTRAMUSCULAR at 11:34

## 2020-02-12 RX ADMIN — BUPIVACAINE HYDROCHLORIDE 30 ML: 5 INJECTION, SOLUTION EPIDURAL; INTRACAUDAL at 11:34

## 2020-02-12 RX ADMIN — LIDOCAINE HYDROCHLORIDE 30 ML: 10 INJECTION, SOLUTION EPIDURAL; INFILTRATION; INTRACAUDAL; PERINEURAL at 11:33

## 2020-02-12 NOTE — PROGRESS NOTES
Procedures  Date of service:2/12/2020    Procedure: Right Lumbar 3, 4, 5, sacral 1 medial branch nerve block    Preprocedure diagnosis:  1. Right Lumbar Facet Syndrome  2. Right Lumbar DJD    Postprocedure diagnosis:  1. Right Lumbar Facet Syndrome  2. Right Lumbar DJD    Complications: None    Findings: Successful block    Anesthesia: Local    History: 45-year-old female presents with severe right-sided low back pain.  On physical examination she has severe pain to palpation in the right side along with pain with hyperextension.  She feels this pain is very debilitating for her.  She denies significant radicular symptoms.  On radiological imaging this demonstrates degenerative changes.  She is tried physical therapy and medication therapy in the past but unfortunately this has not been beneficial for her.  She did have radiofrequency on the left-hand side and still has near 100% relief.  She has had one test injection on the right side and would like to proceed with a second in hopes of moving forward with radiofrequency ablation.  Risks and benefits of the procedure were discussed with the patient and she would like to proceed with this intervention today.    Procedure: Consent obtained.  Patient brought to the fluoroscopy suite and positioned in a prone position.  Timeout procedure completed.  Back Betadine prepped and draped in a sterile fashion.  Utilizing fluoroscopy and an AP view the target zone for the Right Lumbar 3, 4, 5, sacral 1 medial branch nerve was identified.  Utilizing a 22-gauge 3-1/2 inch spinal needle the target areas were entered.  Contrast demonstrated adequate spread and no intravascular uptake.  A diagnostic solution consisting of 40 mg of Kenalog and 1 milliliters of 0.5% bupivacaine was divided equally between the injection sites.  Patient tolerated procedure very well.  20 minutes post procedure patient reports greater than 80 percent relief of pain.  On physical examination patient has  decreased pain to palpation and with hyperextension.    Plan: Follow up in 2-3 weeks for a radiofrequency ablation evaluation

## 2020-02-13 ENCOUNTER — OFFICE VISIT (OUTPATIENT)
Dept: PAIN MEDICINE | Facility: HOSPITAL | Age: 45
End: 2020-02-13
Payer: COMMERCIAL
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
## 2020-02-13 DIAGNOSIS — M47.26 OSTEOARTHRITIS OF SPINE WITH RADICULOPATHY, LUMBAR REGION: ICD-10-CM

## 2020-02-13 DIAGNOSIS — M54.42 CHRONIC LEFT-SIDED LOW BACK PAIN WITH BILATERAL SCIATICA: ICD-10-CM

## 2020-02-13 DIAGNOSIS — G89.29 CHRONIC LEFT-SIDED LOW BACK PAIN WITH BILATERAL SCIATICA: ICD-10-CM

## 2020-02-13 DIAGNOSIS — M96.1 POSTLAMINECTOMY SYNDROME OF LUMBAR REGION: ICD-10-CM

## 2020-02-13 DIAGNOSIS — M54.41 CHRONIC LEFT-SIDED LOW BACK PAIN WITH BILATERAL SCIATICA: ICD-10-CM

## 2020-02-13 DIAGNOSIS — M79.7 FIBROMYALGIA: Primary | ICD-10-CM

## 2020-02-13 PROCEDURE — G0463 HOSPITAL OUTPT CLINIC VISIT: HCPCS

## 2020-02-13 ASSESSMENT — ENCOUNTER SYMPTOMS
JOINT SWELLING: 0
ARTHRALGIAS: 1
SLEEP DISTURBANCE: 0
NECK PAIN: 0
MYALGIAS: 1
BACK PAIN: 1

## 2020-02-13 ASSESSMENT — PAIN - FUNCTIONAL ASSESSMENT: PAIN_FUNCTIONAL_ASSESSMENT: 0-10

## 2020-02-13 ASSESSMENT — PAIN DESCRIPTION - DESCRIPTORS: DESCRIPTORS: ACHING;SHARP;RADIATING

## 2020-02-13 NOTE — PATIENT INSTRUCTIONS
We will plan on RF of the lumbar facet joints in the fall and think about what levels you would want if you want to get this 2x/year    They will call you for the CT of the lumbar spine to evaluate that mass    We will try SI injection and if you get relief and it doesn't last you can have RF to the SI joints without any changes to your lumbar area      Patient Education     Sacroiliac Joint Dysfunction    Sacroiliac joint dysfunction is a condition that causes inflammation on one or both sides of the sacroiliac (SI) joint. The SI joint connects the lower part of the spine (sacrum) with the two upper portions of the pelvis (ilium). This condition causes deep aching or burning pain in the low back. In some cases, the pain may also spread into one or both buttocks, hips, or thighs.  What are the causes?  This condition may be caused by:  · Pregnancy. During pregnancy, extra stress is put on the SI joints because the pelvis widens.  · Injury, such as:  ? Injuries from car accidents.  ? Sports-related injuries.  ? Work-related injuries.  · Having one leg that is shorter than the other.  · Conditions that affect the joints, such as:  ? Rheumatoid arthritis.  ? Gout.  ? Psoriatic arthritis.  ? Joint infection (septic arthritis).  Sometimes, the cause of SI joint dysfunction is not known.  What are the signs or symptoms?  Symptoms of this condition include:  · Aching or burning pain in the lower back. The pain may also spread to other areas, such as:  ? Buttocks.  ? Groin.  ? Thighs.  · Muscle spasms in or around the painful areas.  · Increased pain when standing, walking, running, stair climbing, bending, or lifting.  How is this diagnosed?  This condition is diagnosed with a physical exam and medical history. During the exam, the health care provider may move one or both of your legs to different positions to check for pain. Various tests may be done to confirm the diagnosis, including:  · Imaging tests to look for other  causes of pain. These may include:  ? MRI.  ? CT scan.  ? Bone scan.  · Diagnostic injection. A numbing medicine is injected into the SI joint using a needle. If your pain is temporarily improved or stopped after the injection, this can indicate that SI joint dysfunction is the problem.  How is this treated?  Treatment depends on the cause and severity of your condition. Treatment options may include:  · Ice or heat applied to the lower back area after an injury. This may help reduce pain and muscle spasms.  · Medicines to relieve pain or inflammation or to relax the muscles.  · Wearing a back brace (sacroiliac brace) to help support the joint while your back is healing.  · Physical therapy to increase muscle strength around the joint and flexibility at the joint. This may also involve learning proper body positions and ways of moving to relieve stress on the joint.  · Direct manipulation of the SI joint.  · Injections of steroid medicine into the joint to reduce pain and swelling.  · Radiofrequency ablation to burn away nerves that are carrying pain messages from the joint.  · Use of a device that provides electrical stimulation to help reduce pain at the joint.  · Surgery to put in screws and plates that limit or prevent joint motion. This is rare.  Follow these instructions at home:  Medicines  · Take over-the-counter and prescription medicines only as told by your health care provider.  · Do not drive or use heavy machinery while taking prescription pain medicine.  · If you are taking prescription pain medicine, take actions to prevent or treat constipation. Your health care provider may recommend that you:  ? Drink enough fluid to keep your urine pale yellow.  ? Eat foods that are high in fiber, such as fresh fruits and vegetables, whole grains, and beans.  ? Limit foods that are high in fat and processed sugars, such as fried or sweet foods.  ? Take an over-the-counter or prescription medicine for  constipation.  If you have a brace:  · Wear the brace as told by your health care provider. Remove it only as told by your health care provider.  · Keep the brace clean.  · If the brace is not waterproof:  ? Do not let it get wet.  ? Cover it with a watertight covering when you take a bath or a shower.  Managing pain, stiffness, and swelling         · Icing can help with pain and swelling. Heat may help with muscle tension or spasms. Ask your health care provider if you should use ice or heat.  · If directed, put ice on the affected area:  ? If you have a removable brace, remove it as told by your health care provider.  ? Put ice in a plastic bag.  ? Place a towel between your skin and the bag.  ? Leave the ice on for 20 minutes, 2-3 times a day.  · If directed, apply heat to the affected area. Use the heat source that your health care provider recommends, such as a moist heat pack or a heating pad.  ? Place a towel between your skin and the heat source.  ? Leave the heat on for 20-30 minutes.  ? Remove the heat if your skin turns bright red. This is especially important if you are unable to feel pain, heat, or cold. You may have a greater risk of getting burned.  General instructions  · Rest as needed. Ask your health care provider what activities are safe for you.  · Return to your normal activities as told by your health care provider.  · Exercise as directed by your health care provider or physical therapist.  · Do not use any products that contain nicotine or tobacco, such as cigarettes and e-cigarettes. These can delay bone healing. If you need help quitting, ask your health care provider.  · Keep all follow-up visits as told by your health care provider. This is important.  Contact a health care provider if:  · Your pain is not controlled with medicine.  · You have a fever.  · Your pain is getting worse.  Get help right away if:  · You have weakness, numbness, or tingling in your legs or feet.  · You lose  control of your bladder or bowel.  Summary  · Sacroiliac joint dysfunction is a condition that causes inflammation on one or both sides of the sacroiliac (SI) joint.  · This condition causes deep aching or burning pain in the low back. In some cases, the pain may also spread into one or both buttocks, hips, or thighs.  · Treatment depends on the cause and severity of your condition. It may include medicines to reduce pain and swelling or to relax muscles.  This information is not intended to replace advice given to you by your health care provider. Make sure you discuss any questions you have with your health care provider.  Document Released: 03/16/2010 Document Revised: 08/14/2019 Document Reviewed: 01/28/2019  [employer] Interactive Patient Education © 2019 [employer]

## 2020-02-13 NOTE — PROGRESS NOTES
Subjective     Patient ID: Isabell Harper is a 45 y.o. female.    HPI  Here for follow-up after facets on the right L3, 4, 5, S1 done 1/10/20 and radiofrequency of the left L3, 4, 5, S1 12/4/19.  RF gave her 80% relief and facets were 100% for 1 day and with relief she was able to help her daughter at the dentist and stand longer.      At her last visit she had tingling in her legs and is fused T10-L3.  We tried Voltaren gel to her back.    She remains with pain at an area above the facets done and its tender and hurts with movement. She said since she fell this pain has remained and wondering what it is.  It is tender with palpation and slightly swollen.     Pain Score: 7(worst 9; best 0 (after inj yesteday))  Pain Type: Chronic pain  Pain Location: Back  Pain Radiating Towards: angelo legs  Pain Descriptors: Aching, Sharp, Radiating  Pain Frequency: Constant/continuous  Pain Onset: Awakened from sleep  Clinical Progression: Not changed  Pain Interventions: Home medication, Repositioned, Cold applied, Rest(topicals & Stretching)    Past Medical History:   Diagnosis Date   • Chronic pain    • Complication of anesthesia    • Dental disease    • Depression    • Diverticulitis    • Endocrine disorder    • Extremity pain    • Fibromyalgia    • Hashimoto's thyroiditis    • Hypothyroidism    • Injury of back    • Low back pain    • Neurologic disorder    • Respiratory disease    • Sleep apnea     does not use cpap   • Wears dentures         Past Surgical History:   Procedure Laterality Date   • BACK SURGERY     • CHOLECYSTECTOMY     • COLONOSCOPY N/A 12/27/2019    Procedure: COLONOSCOPY WITH ASCENDING COLON BIOPSIES;  Surgeon: Kelvin Cordova MD PhD;  Location: [facility];  Service: Endoscopy;  Laterality: N/A;   • ESOPHAGOGASTRODUODENOSCOPY N/A 12/27/2019    Procedure: ESOPHAGOGASTRODUODENOSCOPY WITH DUODENAL BIOPSIES;  Surgeon: Kelvin Cordova MD PhD;  Location: [facility];  Service: Endoscopy;  Laterality: N/A;   •  EXAMINATION UNDER ANESTHESIA N/A 1/17/2020    Procedure: EXAM UNDER ANESTHESIA;  Surgeon: Steve Michelle MD;  Location: [facility] OR;  Service: General;  Laterality: N/A;   • HEMORRHOID SURGERY N/A 1/17/2020    Procedure: HEMORRHOIDECTOMY TIMES ONE with BANDING TIMES ONE;  Surgeon: Steve Michelle MD;  Location: [facility] OR;  Service: General;  Laterality: N/A;   • HYSTERECTOMY     • REDUCTION MAMMAPLASTY     • TUBAL LIGATION           Review of Systems   Musculoskeletal: Positive for arthralgias, back pain and myalgias. Negative for joint swelling and neck pain.   Psychiatric/Behavioral: Negative for sleep disturbance.       Objective   There were no vitals taken for this visit.       Current Outpatient Medications:   •  diclofenac sodium (VOLTAREN) 1 % gel, Apply 4 g topically 4 (four) times a day as needed (pain), Disp: 100 g, Rfl: 3  •  dicyclomine (Bentyl) 20 mg tablet, Take 20 mg by mouth every 6 (six) hours as needed  , Disp: , Rfl:   •  ALPRAZolam (XANAX) 1 mg tablet, Take 1 mg by mouth daily as needed, Disp: , Rfl: 5  •  gabapentin (NEURONTIN) 600 mg tablet, Take 1,200 mg by mouth 3 (three) times a day, Disp: , Rfl: 3  •  FLUoxetine (PROzac) 40 mg capsule, Take 40 mg by mouth every evening Take along with 20mg, total daily dose of 60mg , Disp: , Rfl: 6  •  traZODone (DESYREL) 100 mg tablet, Take 150 mg by mouth nightly, Disp: , Rfl: 2  •  carisoprodol (SOMA) 350 mg tablet, Take 350 mg by mouth 4 (four) times a day, Disp: , Rfl: 0  •  FLUoxetine (PROzac) 20 mg capsule, Take 20 mg by mouth every evening Take along with 40mg, total daily dose of 60mg , Disp: 30, Rfl: 3  •  levothyroxine (SYNTHROID, LEVOTHROID) 175 mcg tablet, Take 175 mcg by mouth every morning  , Disp: 30, Rfl: 5    Imaging:  Fl Pain Injection No Charge    Result Date: 2/12/2020  Narrative: NOTE: This study was stored without submission for formal interpretation.        Physical Exam  Vitals signs and nursing note reviewed.   Constitutional:        General: She is not in acute distress.     Appearance: She is well-developed.   HENT:      Head: Normocephalic.   Eyes:      Conjunctiva/sclera: Conjunctivae normal.   Neck:      Musculoskeletal: Normal range of motion.   Cardiovascular:      Rate and Rhythm: Normal rate.   Pulmonary:      Effort: Pulmonary effort is normal. No respiratory distress.   Abdominal:      General: There is no distension.   Musculoskeletal:         General: Tenderness present. No deformity.      Thoracic back: She exhibits tenderness.      Lumbar back: She exhibits decreased range of motion and tenderness.        Back:         Legs:       Comments: Pain with facet loading to mid left side of back and tender to palpation and slightly swollen    Pain with facet loading to lumbar spine     Legs tingle but no weakness    SI pain with palpation      Skin:     General: Skin is warm and dry.      Findings: No rash.   Neurological:      Mental Status: She is alert and oriented to person, place, and time.   Psychiatric:         Behavior: Behavior normal.         Thought Content: Thought content normal.         Judgment: Judgment normal.         Assessment/Plan     1. Fibromyalgia    2. Chronic left-sided low back pain with bilateral sciatica    3. Osteoarthritis of spine with radiculopathy, lumbar region    4. Postlaminectomy syndrome of lumbar region    5. Fall, subsequent encounter      Due to Medicare/ Medicaid guidelines apparently this patient is at 5 lumbar facets for the year.  Decided facet blocks and radiofrequency and then did right-sided blocks so apparently now he cannot proceed with radiofrequency.  She is upset and wonders why they make these rules and then not allow her to complete the care.  For now that makes her ineligible for any radiofrequency until November 2020.    She thinks these treatments are great and is hoping to continue with repeat radiofrequency along with initial radiofrequency to the left.    We will do some more  imaging to her spine as the MRI failed to show any soft tissue changes. Will do TPI to the tender area if nothing shows up on imaging and work up her SI joints as she admits to some lower SI/hip pain and is hoping to find treatments to help all of her pain.     DISCUSSION  Today's plan was a combined effort between the patient and myself. The patient understood the plan, verbally consented, and agreed to participate. An After Visit Summary with special instructions/information regarding today's office visit was given to the patient (see patient instructions).     The diagnostic assessment has been reviewed. Patient and/or patient's surrogate has expressed a good understanding of the assessment and recommendations from today's visit. There are no apparent barriers to understanding this information. Patient’s questions were addressed.  I have reviewed the patients current medications using all immediate resources available.     Patient has been advised to contact this office or the answering service with questions or concerns that may arise. Patient has been advised to follow up with Primary Care Provider for general medical needs.     A total of 15 minutes was required for this visit. Greater than 50% of this time was spent in direct face to face communication with the patient and/or surrogate in order to provide counseling regarding her steroid injections and medicaid rules and options and imaging.    Dragon voice recognition program was used to aid in this documentation which might generate inaccuracies despite efforts made to avoid them.  Please take it into context and contact the provider with any questions.    Oneil Valdez, CNP

## 2020-02-21 ENCOUNTER — HOSPITAL ENCOUNTER (OUTPATIENT)
Dept: CT IMAGING | Facility: HOSPITAL | Age: 45
Discharge: HOME | End: 2020-02-21
Payer: COMMERCIAL
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
## 2020-02-21 DIAGNOSIS — M47.26 OSTEOARTHRITIS OF SPINE WITH RADICULOPATHY, LUMBAR REGION: ICD-10-CM

## 2020-02-21 DIAGNOSIS — M54.41 CHRONIC LEFT-SIDED LOW BACK PAIN WITH BILATERAL SCIATICA: ICD-10-CM

## 2020-02-21 DIAGNOSIS — M79.7 FIBROMYALGIA: ICD-10-CM

## 2020-02-21 DIAGNOSIS — G89.29 CHRONIC LEFT-SIDED LOW BACK PAIN WITH BILATERAL SCIATICA: ICD-10-CM

## 2020-02-21 DIAGNOSIS — M96.1 POSTLAMINECTOMY SYNDROME OF LUMBAR REGION: ICD-10-CM

## 2020-02-21 DIAGNOSIS — M54.42 CHRONIC LEFT-SIDED LOW BACK PAIN WITH BILATERAL SCIATICA: ICD-10-CM

## 2020-02-21 PROCEDURE — G1004 CDSM NDSC: HCPCS

## 2020-02-21 NOTE — PROGRESS NOTES
Her imaging didn't show a mass over the tender area near her spine and proceed with TPI and I think SI are set up

## 2020-03-05 ENCOUNTER — PROCEDURE VISIT (OUTPATIENT)
Dept: PAIN MEDICINE | Facility: HOSPITAL | Age: 45
End: 2020-03-05
Payer: COMMERCIAL

## 2020-03-05 VITALS
DIASTOLIC BLOOD PRESSURE: 72 MMHG | HEART RATE: 77 BPM | RESPIRATION RATE: 16 BRPM | SYSTOLIC BLOOD PRESSURE: 127 MMHG | OXYGEN SATURATION: 94 %

## 2020-03-05 DIAGNOSIS — M54.41 CHRONIC LEFT-SIDED LOW BACK PAIN WITH BILATERAL SCIATICA: ICD-10-CM

## 2020-03-05 DIAGNOSIS — M47.26 OSTEOARTHRITIS OF SPINE WITH RADICULOPATHY, LUMBAR REGION: ICD-10-CM

## 2020-03-05 DIAGNOSIS — G89.29 CHRONIC LEFT-SIDED LOW BACK PAIN WITH BILATERAL SCIATICA: ICD-10-CM
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
## 2020-03-05 DIAGNOSIS — M96.1 POSTLAMINECTOMY SYNDROME OF LUMBAR REGION: ICD-10-CM

## 2020-03-05 DIAGNOSIS — M79.7 FIBROMYALGIA: ICD-10-CM

## 2020-03-05 DIAGNOSIS — M54.42 CHRONIC LEFT-SIDED LOW BACK PAIN WITH BILATERAL SCIATICA: ICD-10-CM

## 2020-03-05 PROCEDURE — 6360000200 HC RX 636 W HCPCS (ALT 250 FOR IP)

## 2020-03-05 PROCEDURE — 20553 NJX 1/MLT TRIGGER POINTS 3/>: CPT

## 2020-03-05 RX ORDER — TRIAMCINOLONE ACETONIDE 40 MG/ML
40 INJECTION, SUSPENSION INTRA-ARTICULAR; INTRAMUSCULAR AS NEEDED
Status: DISCONTINUED | OUTPATIENT
Start: 2020-03-05 | End: 2020-05-07

## 2020-03-05 RX ORDER — BUPIVACAINE HYDROCHLORIDE 5 MG/ML
30 INJECTION, SOLUTION EPIDURAL; INTRACAUDAL ONCE
Status: COMPLETED | OUTPATIENT
Start: 2020-03-05 | End: 2020-03-05

## 2020-03-05 RX ADMIN — BUPIVACAINE HYDROCHLORIDE 30 ML: 5 INJECTION, SOLUTION EPIDURAL; INTRACAUDAL at 14:27

## 2020-03-05 RX ADMIN — TRIAMCINOLONE ACETONIDE 40 MG: 40 INJECTION, SUSPENSION INTRA-ARTICULAR; INTRAMUSCULAR at 14:27

## 2020-03-05 NOTE — PATIENT INSTRUCTIONS
No hot tubs, tub baths or swimming pools for 24 hours.   Monitor for signs of infection; redness, swelling or foul drainage  Go to the Emergency Room for any weakness, numbness, severe headaches, loss of bowel or bladder control, or shortness of breath     Call [phone] if any questions or concerns

## 2020-03-05 NOTE — PROGRESS NOTES
Procedures  Preoperative diagnosis: Left lumbar myofascial pain  Postoperative diagnosis: Same  Procedure: Trigger point injections left lumbar area  Complications: None  Anesthetic: Local  HPI: This is a 45-year-old female who last year fell and had a large hematoma in the left lateral lumbar area.  She also has ongoing axial pain and we had hoped that the axial injections would help this pain but it has not.  On exam she has tenderness to palpation in proximally 5 cm circumferential area lateral to the spine with an otherwise normal exam.  Her recent CAT scan was negative concerning any mass.    Procedure: Risks and benefits of the procedure were explained informed consent was obtained.  The patient was in the sitting position.  The patient was prepped and draped in usual sterile fashion.  Lateral to the upper lumbar spine tender spots were identified and a 5 cm circumferential area and at 8 locations a 27-gauge needle was inserted at the paralumbar spinal muscles, the latissimus dorsi muscle, and the spinal erector muscles and at each location after negative aspiration 1 cc of 0.5% Marcaine and Kenalog was injected.  A total of 40 mg of Kenalog was given    Plan: Patient is already scheduled with Christa for follow-up.

## 2020-05-04 NOTE — PROGRESS NOTES
11/8/19 - L)L3,4,5,S1 MNB  40mg Kenalog  11/27/19 - L)L3,4,5,S1 MNB  40mg Kenalog  12/4/19 - L)L3,4,5,S1 RF  40mg Kenalog  1/10/2020 - R)L3,4,5,S1 MNB  40mg Kenalog  2/13/2020 - R)L3,4,5,S1 MNB  40mg Kenalog  3/5/2020 - TPIs  40mg Kenalog

## 2020-05-06 NOTE — PROGRESS NOTES
Subjective   Per discussion with Oneil Valdez, JOYCE, Isabell, has verbally consented to be treated via telemedicine (video) visit: Yes. A total of 20 minutes were required for this telemedicine (video) visit.  Patient Location: Home  Provider Location: Clinic  Technology used by Provider: Phone    HPI  HPI  Isabell Harper is a 45 y.o. female who presents for follow-up on injections, she had trigger point injections on 3/5/2020, this was due to a fall with a tender area remaining after hematoma she did get some relief but its still bulging and tender.  She is using voltaren gel and getting some relief but its not decreasing in size and its not red.       We were hoping to get SI injections but those were canceled due to COVID and she wasn't feeling well.  She wants these repeated and has SI and hip pain.      She started having more tailbone pain and says her tailbone broke during childbirth 19 years ago and about a month ago she started having more tailbone bone and vaginal pain.       In the past she has had radiofrequency of the left L3, 4, 5, S1 on 12/4/2019 and the right facets on 1/10/2020 but due to Medicare guidelines she is at the max for the year for lumbar blocks so we cannot proceed with radiofrequency.     She is fused T10-L3.      The following have been reviewed and updated as appropriate in this visit:    Allergies   Allergen Reactions   • Cephalexin      hives, throat swelling   • Morphine Other (see comments)     Chest Pain; pt reported to this RN that she does not have an allergy to Morphine and was okay w/Dr. Conroy ordering Morphine for her pain.     PT STATES SHE IS NOT ALLERGIC      Current Outpatient Medications   Medication Sig Dispense Refill   • diclofenac sodium (VOLTAREN) 1 % gel Apply 4 g topically 4 (four) times a day as needed (pain) 100 g 3   • ALPRAZolam (XANAX) 1 mg tablet Take 1 mg by mouth daily as needed  5   • gabapentin (NEURONTIN) 600 mg tablet Take 1,200 mg by mouth 3 (three) times  a day  3   • FLUoxetine (PROzac) 40 mg capsule Take 40 mg by mouth every evening Take along with 20mg, total daily dose of 60mg   6   • traZODone (DESYREL) 100 mg tablet Take 150 mg by mouth nightly  2   • carisoprodol (SOMA) 350 mg tablet Take 350 mg by mouth 4 (four) times a day  0   • FLUoxetine (PROzac) 20 mg capsule Take 20 mg by mouth every evening Take along with 40mg, total daily dose of 60mg  30 3   • levothyroxine (SYNTHROID, LEVOTHROID) 175 mcg tablet Take 175 mcg by mouth every morning   30 5     No current facility-administered medications for this visit.      Past Medical History:   Diagnosis Date   • Chronic pain    • Complication of anesthesia    • Dental disease    • Depression    • Diverticulitis    • Endocrine disorder    • Extremity pain    • Fibromyalgia    • Hashimoto's thyroiditis    • Hypothyroidism    • Injury of back    • Low back pain    • Neurologic disorder    • Respiratory disease    • Sleep apnea     does not use cpap   • Wears dentures      Past Surgical History:   Procedure Laterality Date   • BACK SURGERY     • CHOLECYSTECTOMY     • COLONOSCOPY N/A 12/27/2019    Procedure: COLONOSCOPY WITH ASCENDING COLON BIOPSIES;  Surgeon: Kelvin Cordova MD PhD;  Location: [facility];  Service: Endoscopy;  Laterality: N/A;   • ESOPHAGOGASTRODUODENOSCOPY N/A 12/27/2019    Procedure: ESOPHAGOGASTRODUODENOSCOPY WITH DUODENAL BIOPSIES;  Surgeon: Kelvin Cordova MD PhD;  Location: [facility];  Service: Endoscopy;  Laterality: N/A;   • EXAMINATION UNDER ANESTHESIA N/A 1/17/2020    Procedure: EXAM UNDER ANESTHESIA;  Surgeon: Steve Michelle MD;  Location: [facility] OR;  Service: General;  Laterality: N/A;   • HEMORRHOID SURGERY N/A 1/17/2020    Procedure: HEMORRHOIDECTOMY TIMES ONE with BANDING TIMES ONE;  Surgeon: Steve Michelle MD;  Location: [facility] OR;  Service: General;  Laterality: N/A;   • HYSTERECTOMY     • REDUCTION MAMMAPLASTY     • TUBAL LIGATION        History reviewed. No pertinent family  history.  Social History     Occupational History   • Not on file   Tobacco Use   • Smoking status: Former Smoker     Packs/day: 0.00   • Smokeless tobacco: Never Used   • Tobacco comment: 'VAPES'   Substance and Sexual Activity   • Alcohol use: No   • Drug use: No   • Sexual activity: Defer   Social History Narrative   • Not on file       Review of Systems   Genitourinary: Positive for vaginal pain.   Musculoskeletal: Positive for arthralgias, back pain and myalgias.   Neurological: Negative for weakness.       Objective   Physical Exam  Nursing note reviewed.   Musculoskeletal:         General: Tenderness (moves easily in video and no weakness) present.   Neurological:      Mental Status: She is alert and oriented to person, place, and time.   Psychiatric:         Mood and Affect: Mood normal.         Behavior: Behavior normal.         Thought Content: Thought content normal.         Judgment: Judgment normal.         Assessment/Plan   Diagnoses and all orders for this visit:    Fibromyalgia    Chronic left-sided low back pain with bilateral sciatica    Osteoarthritis of spine with radiculopathy, lumbar region    Sacroiliitis (CMS/HCC) (HCC)    Postlaminectomy syndrome of lumbar region    Coccygeal pain      Discussed her SI pain and will repeat and sacrococcygeal injection and see about pelvic PT and valium vaginal suppositories for her vaginal pain.  She is worried about the swelling and tenderness to her back and I discussed with her that it looks like the trigger points were done to the muscles around the area and that it did not show a mass on imaging.  She said the area is just very tender and I told her to go have it evaluated with her PCP.  She is trying Voltaren gel over it with some relief.  If it is truly hematoma it will takes a while for it to resolve.  She said she will ask her PCP about it.  She wants another trigger point around it.    I discussed her steroid count and we are setting up for  different injections but we can only give steroids for 2 of them so I would like her to get a steroid injection in the sacroiliac joint and then use lidocaine for the next 1.  She could then get steroid in the sacrococcygeal joint and no steroid and or trigger point injections.  She said this is okay and hopefully things will start to calm down.  If her SI injections work should be a candidate for radiofrequency and this would not interfere with her lumbar radiofrequency.    She will call with any changes but agrees that the coccygeal pain is contributing to some of her pain in her vagina and we will send her to the physical therapist about this and be more aggressive with interventions.

## 2020-05-07 ENCOUNTER — TELEMEDICINE (OUTPATIENT)
Dept: PAIN MEDICINE | Facility: HOSPITAL | Age: 45
End: 2020-05-07
Payer: COMMERCIAL

## 2020-05-07 DIAGNOSIS — M47.26 OSTEOARTHRITIS OF SPINE WITH RADICULOPATHY, LUMBAR REGION: ICD-10-CM

## 2020-05-07 DIAGNOSIS — M96.1 POSTLAMINECTOMY SYNDROME OF LUMBAR REGION: ICD-10-CM

## 2020-05-07 DIAGNOSIS — G89.29 CHRONIC LEFT-SIDED LOW BACK PAIN WITH BILATERAL SCIATICA: ICD-10-CM

## 2020-05-07 DIAGNOSIS — M46.1 SACROILIITIS (CMS/HCC): ICD-10-CM

## 2020-05-07 DIAGNOSIS — M79.7 FIBROMYALGIA: Primary | ICD-10-CM

## 2020-05-07 DIAGNOSIS — M54.41 CHRONIC LEFT-SIDED LOW BACK PAIN WITH BILATERAL SCIATICA: ICD-10-CM

## 2020-05-07 DIAGNOSIS — M53.3 COCCYGEAL PAIN: ICD-10-CM

## 2020-05-07 DIAGNOSIS — M54.42 CHRONIC LEFT-SIDED LOW BACK PAIN WITH BILATERAL SCIATICA: ICD-10-CM

## 2020-05-07 ASSESSMENT — ENCOUNTER SYMPTOMS
ARTHRALGIAS: 1
WEAKNESS: 0
MYALGIAS: 1
BACK PAIN: 1

## 2020-05-07 ASSESSMENT — PAIN - FUNCTIONAL ASSESSMENT: PAIN_FUNCTIONAL_ASSESSMENT: 0-10

## 2020-05-07 NOTE — PROGRESS NOTES
11/8/19 - L)L3,4,5,S1 MNB  40mg Kenalog  11/27/19 - L)L3,4,5,S1 MNB  40mg Kenalog  12/4/19 - L)L3,4,5,S1 RF  40mg Kenalog  1/10/2020 - R)L3,4,5,S1 MNB  40mg Kenalog  2/13/2020 - R)L3,4,5,S1 MNB  40mg Kenalog  3/5/2020 - TPIs  40mg Kenalog    Upcoming Injections  B)SI   Sacrococcygeal  TPI Lumbar region

## 2020-05-12 ENCOUNTER — TELEPHONE (OUTPATIENT)
Dept: PAIN MEDICINE | Facility: HOSPITAL | Age: 45
End: 2020-05-12

## 2020-05-14 ENCOUNTER — PROCEDURE VISIT (OUTPATIENT)
Dept: PAIN MEDICINE | Facility: HOSPITAL | Age: 45
End: 2020-05-14
Payer: COMMERCIAL

## 2020-05-14 ENCOUNTER — HOSPITAL ENCOUNTER (OUTPATIENT)
Dept: FLUOROSCOPY | Facility: HOSPITAL | Age: 45
Discharge: STILL ADMITTED OR ADMINISTRATIVE | End: 2020-05-14
Payer: COMMERCIAL

## 2020-05-14 VITALS
RESPIRATION RATE: 16 BRPM | DIASTOLIC BLOOD PRESSURE: 89 MMHG | HEART RATE: 61 BPM | OXYGEN SATURATION: 94 % | SYSTOLIC BLOOD PRESSURE: 147 MMHG

## 2020-05-14 DIAGNOSIS — M54.42 CHRONIC LEFT-SIDED LOW BACK PAIN WITH BILATERAL SCIATICA: ICD-10-CM

## 2020-05-14 DIAGNOSIS — M54.41 CHRONIC LEFT-SIDED LOW BACK PAIN WITH BILATERAL SCIATICA: ICD-10-CM

## 2020-05-14 DIAGNOSIS — G89.29 CHRONIC LEFT-SIDED LOW BACK PAIN WITH BILATERAL SCIATICA: ICD-10-CM

## 2020-05-14 DIAGNOSIS — M79.7 FIBROMYALGIA: ICD-10-CM
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
## 2020-05-14 DIAGNOSIS — M46.1 SACROILIITIS (CMS/HCC): Primary | ICD-10-CM

## 2020-05-14 DIAGNOSIS — M47.26 OSTEOARTHRITIS OF SPINE WITH RADICULOPATHY, LUMBAR REGION: ICD-10-CM

## 2020-05-14 DIAGNOSIS — M96.1 POSTLAMINECTOMY SYNDROME OF LUMBAR REGION: ICD-10-CM

## 2020-05-14 PROCEDURE — 77002 NEEDLE LOCALIZATION BY XRAY: CPT | Mod: NC

## 2020-05-14 PROCEDURE — 27096 INJECT SACROILIAC JOINT: CPT | Mod: 50

## 2020-05-14 RX ORDER — LIDOCAINE HYDROCHLORIDE 10 MG/ML
30 INJECTION, SOLUTION EPIDURAL; INFILTRATION; INTRACAUDAL; PERINEURAL ONCE
Status: ACTIVE | OUTPATIENT
Start: 2020-05-14

## 2020-05-14 RX ORDER — BUPIVACAINE HYDROCHLORIDE 5 MG/ML
30 INJECTION, SOLUTION EPIDURAL; INTRACAUDAL ONCE
Status: COMPLETED | OUTPATIENT
Start: 2020-05-14 | End: 2020-05-14

## 2020-05-14 RX ADMIN — BUPIVACAINE HYDROCHLORIDE 30 ML: 5 INJECTION, SOLUTION EPIDURAL; INTRACAUDAL at 15:24

## 2020-05-14 ASSESSMENT — PAIN - FUNCTIONAL ASSESSMENT: PAIN_FUNCTIONAL_ASSESSMENT: 0-10

## 2020-05-14 ASSESSMENT — PAIN DESCRIPTION - DESCRIPTORS: DESCRIPTORS: ACHING;SHARP;STABBING

## 2020-05-14 NOTE — PROGRESS NOTES
Procedures  Procedure performed:  Bilateral sacroiliac joint injections under fluoroscopic guidance [[facility] only]    Preoperative diagnosis:  1. Sacroiliitis (CMS/HCC) (HCC)      Postoperative diagnosis:  1. Sacroiliitis (CMS/HCC) (HCC)      Findings:  Successful procedure under direct fluoroscopy    Anesthesia:  Local    Complications:  None    History of present illness:  The patient is a 44 y/o female who presents for bilateral SI injections. She has multiple pain complaints and is scheduled for multiple other procedures, but does indicate that pain in her bilateral low back/buttock region is the most pressing. She has exquisite ttp of the bilateral sacroiliac joints. No palpable deformities. Skin intact. She does have a significant myofascial component. No gross neuro deficits identified.     Although she has multiple issues at hand, and a significant myofascial component, I do think she would benefit from SI injections. We will perform diagnostic injections only (no steroid) given her higher steroid count and other injections coming up. All risks , benefits and alternatives discussed and consent obtained.    Procedure in detail:  The patient was brought to the fluoroscopy suite where a timeout was performed to ensure correct patient and correct procedure.  Next, the patient was placed in the prone position and the region of the bilateral sacroiliac joints prepped and draped in sterile manner.  Next an AP fluoroscopic image was utilized to delineate the anatomy of the right sacroiliac joint. Next, the  skin overlying the inferior aspect of the joint space was anesthetized with 1% lidocaine.  Next a 22-gauge 3-1/2 inch spinal needle was advanced under AP fluoroscopic guidance until a change of resistance was noted indicating advancement into the joint space. At this time aspiration on the needle was performed and was negative.  After negative aspiration to treatment medications consisting of 2 cc of 0.5% bupivacaine  was easily injected.  Patient tolerated the injection without complaint.  The spinal needle was subsequently removed and hemostasis was confirmed.  The same process was repeated for the left side.  Post procedure the patient was observed in the recovery and then discharged in stable condition.    Plan:  Already has other scheduled procedures    Stephen Alonso MD

## 2020-05-14 NOTE — PATIENT INSTRUCTIONS
Sacroiliac (SI) Joint Injection   Sacroiliac (SI) joint connects the spine to the pelvis. The SI joint is connected to the sacrum (tailbone) and iliac bone (pelvis) by strong ligaments. The SI joint has little to no motion. The SI joint supports the entire weight of the upper body when standing up, which causes stress on these joints over time. SI pain is usually located in the lower back or the back of the hips. The pain can also be in the groin, thighs, and go down the leg. SI pain is usually worse when standing and walking and improves when lying down.  Some causes of SI pain:   · Arthritis: due to the wearing down of cartilage (acts as a shock absorber between bones) which allows bones to rub against each other.   · Pregnancy: during pregnancy hormones are released that allow ligaments to relax this gives the SI joints increased motion, which is to prepare for baby's delivery. After delivery as things try to get back to normal, everything doesn't always fit back together the same way.   · Change in normal walking: this can add increased stress on the SI joint. This could be caused by one leg being longer than the other, pain in the hip, knee, ankle, or foot.   · Inflammation  SI Joint injection procedure: This procedure only takes a few minutes.   · You will lay on your stomach on the procedure table  · The nurse will expose the skin over area to be injected.   · Cleansing agent, usually Betadine, will be used to clean the skin area  · The doctor will use x-ray (fluoroscopy) guidance to find the exact spot of the SI joint.   · Lidocaine is used to numb the skin where needle is to be placed. (This may sting briefly)  · The doctor will place the needle using x-ray guidance. You may feel some pressure as the needle is being placed.   · The doctor will slowly inject a combination of Lidocaine or Marcaine (numbing agents) mixed with the steroid into the SI joint.   · The needle is then removed by the doctor and the  procedure is complete.  · A band-aide will be placed over the injection site.         Post-procedure:   - Band-aids may be taken off 1 hour after procedure  - For 24 hours: Do not soak the injection site in water - no bathtubs, hot tubs, or swimming. Showering is ok.   - Numbing agent wears off in about 2-12 hours depending on the agent used  - Pain may increase after the numbing agent has worn off.   - Icing the procedure area for 10 minutes off and on over the next 1-2 days can be helpful.  - Steroid will usually take 1-3 days to start to work, but can take up to 7 days in some cases.   - You can return to normal activity but be careful to not overdo it.   - Resume medicines on your regular schedule and eat/drink as normal  - If you are diabetic, monitor blood sugars closely as they can increase for up to 7 days after your procedure. Talk to primary doctor about any blood sugar issues.   - Fill out pain diary as instructed and bring back to your next appointment. The most important numbers from the pain log are the percentage (%) of relief numbers and goals. When trying to come up with the percentage of relief number, it has more to do with how you are able to function, not just the pain. For example: activities can be done easier or for longer periods of time, than before the injections.   Call Pain Management at [phone] if any of the following signs of infection occur:   - Increase in your temperature  - Increase in redness or swelling to injection site  - Drainage from the injection site  Go the Emergency Room for any major symptoms that you did not have before the procedure:   - Shortness of breath  - Loss of bowel or bladder control  - Increased muscle weakness or not able to walk

## 2020-06-30 ENCOUNTER — TELEPHONE (OUTPATIENT)
Dept: PAIN MEDICINE | Facility: HOSPITAL | Age: 45
End: 2020-06-30

## 2020-07-08 ENCOUNTER — TELEMEDICINE (OUTPATIENT)
Dept: PAIN MEDICINE | Facility: HOSPITAL | Age: 45
End: 2020-07-08
Payer: COMMERCIAL

## 2020-07-08 DIAGNOSIS — M54.41 CHRONIC LEFT-SIDED LOW BACK PAIN WITH BILATERAL SCIATICA: Primary | ICD-10-CM

## 2020-07-08 DIAGNOSIS — M46.1 SACROILIITIS (CMS/HCC): ICD-10-CM

## 2020-07-08 DIAGNOSIS — M96.1 POSTLAMINECTOMY SYNDROME OF LUMBAR REGION: ICD-10-CM

## 2020-07-08 DIAGNOSIS — M47.26 OSTEOARTHRITIS OF SPINE WITH RADICULOPATHY, LUMBAR REGION: ICD-10-CM
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
## 2020-07-08 DIAGNOSIS — G89.29 CHRONIC LEFT-SIDED LOW BACK PAIN WITH BILATERAL SCIATICA: Primary | ICD-10-CM

## 2020-07-08 DIAGNOSIS — M54.42 CHRONIC LEFT-SIDED LOW BACK PAIN WITH BILATERAL SCIATICA: Primary | ICD-10-CM

## 2020-07-08 DIAGNOSIS — M53.3 COCCYGEAL PAIN: ICD-10-CM

## 2020-07-08 PROCEDURE — 6360000200 HC RX 636 W HCPCS (ALT 250 FOR IP)

## 2020-07-08 ASSESSMENT — ENCOUNTER SYMPTOMS
BACK PAIN: 1
MYALGIAS: 1
ARTHRALGIAS: 1

## 2020-07-08 ASSESSMENT — PAIN - FUNCTIONAL ASSESSMENT: PAIN_FUNCTIONAL_ASSESSMENT: 0-10

## 2020-07-08 ASSESSMENT — PAIN DESCRIPTION - DESCRIPTORS: DESCRIPTORS: ACHING;DULL;SHARP;STABBING

## 2020-07-08 NOTE — PROGRESS NOTES
Subjective   Per discussion with Oneil Valdez, JOYCE, Isabell, has verbally consented to be treated via telemedicine (video) visit: Yes.  Patient Location: Home  Provider Location: Clinic  Technology used by Provider: Phone    HPI  Isabell Harper is a 45 y.o. female who presents for follow-up after bilateral SI injections on 5/14/2020, these gave her great relief the day of the injection since no steroid was used.     She had to cancel other appointments for trigger point and sacrococcygeal injections.  She had a trigger point in March after she fell and had a hematoma that was tender.  She was using Voltaren gel but it was not helping much.  At our last visit she had tailbone pain and vaginal pain and we tried Valium vaginal suppository and wondered about pelvic PT, the suppository worked a little.  She had sent a lot of messages about her pain and we tried steroid pack.  She is fused T10-L3.    She went to a local stem cell place and had injections the end of May at [facility] with Dr Olsen and got a darya to help pay for it.  She can tell some relief now already.  They injected her spine in a few places that were tender and she has had knees, hips, tailbone, the mass and her spine in a few and then IV stem cells.     She has had radiofrequency of the left and right facets but due to Medicare guidelines she is at her max for the year until Nov 2020.  She thinks she will need this again in the fall.    11/8/19 - L)L3,4,5,S1 MNB  40mg Kenalog  11/27/19 - L)L3,4,5,S1 MNB  40mg Kenalog  12/4/19 - L)L3,4,5,S1 RF  40mg Kenalog  1/10/2020 - R)L3,4,5,S1 MNB  40mg Kenalog  2/13/2020 - R)L3,4,5,S1 MNB  40mg Kenalog  3/5/2020 - TPIs  40mg Kenalog   5/14/2020 bilat SI     Pain Score: 5 - Moderate pain(worst 9; best 4)  Pain Type: Chronic pain  Pain Location: Generalized  Pain Descriptors: Aching, Dull, Sharp, Stabbing  Pain Frequency: Constant/continuous  Pain Onset: Awakened from sleep  Clinical Progression: Gradually  improving(stem cell therapy)  Pain Interventions: Medication (See MAR), Repositioned, Rest    HPI    The following have been reviewed and updated as appropriate in this visit:    Allergies   Allergen Reactions   • Cephalexin      hives, throat swelling   • Morphine Other (see comments)     Chest Pain; pt reported to this RN that she does not have an allergy to Morphine and was okay w/Dr. Conroy ordering Morphine for her pain.     PT STATES SHE IS NOT ALLERGIC      Current Outpatient Medications   Medication Sig Dispense Refill   • diclofenac sodium (VOLTAREN) 1 % gel Apply 4 g topically 4 (four) times a day as needed (pain) 100 g 3   • ALPRAZolam (XANAX) 1 mg tablet Take 1 mg by mouth daily as needed  5   • gabapentin (NEURONTIN) 600 mg tablet Take 1,200 mg by mouth 3 (three) times a day  3   • FLUoxetine (PROzac) 40 mg capsule Take 40 mg by mouth every evening Take along with 20mg, total daily dose of 60mg   6   • traZODone (DESYREL) 100 mg tablet Take 150 mg by mouth nightly  2   • carisoprodol (SOMA) 350 mg tablet Take 350 mg by mouth 4 (four) times a day  0   • FLUoxetine (PROzac) 20 mg capsule Take 20 mg by mouth every evening Take along with 40mg, total daily dose of 60mg  30 3   • levothyroxine (SYNTHROID, LEVOTHROID) 175 mcg tablet Take 175 mcg by mouth every morning   30 5     Current Facility-Administered Medications   Medication Dose Route Frequency Provider Last Rate Last Dose   • lidocaine PF (XYLOCAINE) 10 mg/mL (1 %) vial 30 mL  30 mL injection Once Stephen Alonso MD         Past Medical History:   Diagnosis Date   • Chronic pain    • Complication of anesthesia    • Dental disease    • Depression    • Diverticulitis    • Endocrine disorder    • Extremity pain    • Fibromyalgia    • Hashimoto's thyroiditis    • Hypothyroidism    • Injury of back    • Low back pain    • Neurologic disorder    • Respiratory disease    • Sleep apnea     does not use cpap   • Wears dentures      Past Surgical History:    Procedure Laterality Date   • BACK SURGERY     • CHOLECYSTECTOMY     • COLONOSCOPY N/A 12/27/2019    Procedure: COLONOSCOPY WITH ASCENDING COLON BIOPSIES;  Surgeon: Kelvin Cordova MD PhD;  Location: [facility];  Service: Endoscopy;  Laterality: N/A;   • ESOPHAGOGASTRODUODENOSCOPY N/A 12/27/2019    Procedure: ESOPHAGOGASTRODUODENOSCOPY WITH DUODENAL BIOPSIES;  Surgeon: Kelvin Cordova MD PhD;  Location: [facility];  Service: Endoscopy;  Laterality: N/A;   • EXAMINATION UNDER ANESTHESIA N/A 1/17/2020    Procedure: EXAM UNDER ANESTHESIA;  Surgeon: Steve Michelle MD;  Location: [facility] OR;  Service: General;  Laterality: N/A;   • HEMORRHOID SURGERY N/A 1/17/2020    Procedure: HEMORRHOIDECTOMY TIMES ONE with BANDING TIMES ONE;  Surgeon: Steve Michelle MD;  Location: [facility];  Service: General;  Laterality: N/A;   • HYSTERECTOMY     • REDUCTION MAMMAPLASTY     • TUBAL LIGATION        History reviewed. No pertinent family history.  Social History     Occupational History   • Not on file   Tobacco Use   • Smoking status: Former Smoker     Packs/day: 0.00   • Smokeless tobacco: Never Used   • Tobacco comment: 'VAPES'   Substance and Sexual Activity   • Alcohol use: No   • Drug use: No   • Sexual activity: Defer   Social History Narrative   • Not on file       Review of Systems   Musculoskeletal: Positive for arthralgias, back pain and myalgias.       Objective   Physical Exam  Nursing note reviewed.   Skin:     Coloration: Skin is not pale.   Neurological:      Mental Status: She is alert and oriented to person, place, and time.   Psychiatric:         Mood and Affect: Mood normal.         Behavior: Behavior normal.         Thought Content: Thought content normal.         Judgment: Judgment normal.         Assessment/Plan   Diagnoses and all orders for this visit:    Chronic left-sided low back pain with bilateral sciatica    Osteoarthritis of spine with radiculopathy, lumbar region    Coccygeal pain    Sacroiliitis  (CMS/HCC) (HCC)    Postlaminectomy syndrome of lumbar region    Fall, subsequent encounter    She has had relief with lumbar RF and now sacroiliac injections so this could be repeated when she is able but for now she wants to wait this out and see what the stem cells do for her.  She also has weaned herself down on the gabapentin and muscle medications and is doing well.  We decided we will talk again in September and see if we can set her up for lumbar RFA she does feel this helped her a lot.  She will call with any other changes.    A total of 10 minutes were required for this telemedicine (video) visit.

## 2020-10-12 ENCOUNTER — TRANSCRIBE ORDERS (OUTPATIENT)
Dept: DIABETES SERVICES | Facility: CLINIC | Age: 45
End: 2020-10-12

## 2020-10-12 DIAGNOSIS — R73.01 ABNORMAL FASTING GLUCOSE: Primary | ICD-10-CM

## 2020-10-31 ENCOUNTER — APPOINTMENT (OUTPATIENT)
Dept: LAB | Facility: HOSPITAL | Age: 45
End: 2020-10-31
Payer: COMMERCIAL

## 2020-10-31 DIAGNOSIS — R19.7 DIARRHEA, UNSPECIFIED TYPE: Primary | ICD-10-CM

## 2020-10-31 LAB
ADV 40+41 DNA STL QL NAA+NON-PROBE: NEGATIVE
ASTRO TYP 1-8 RNA STL QL NAA+NON-PROBE: NEGATIVE
C CAYETANENSIS DNA STL QL NAA+NON-PROBE: NEGATIVE
C COLI+JEJ+UPSA DNA STL QL NAA+NON-PROBE: NEGATIVE
C DIF TOX TCDA+TCDB STL QL NAA+NON-PROBE: POSITIVE
C DIFF TOX A+B STL QL IA: NEGATIVE
CRYPTOSP DNA STL QL NAA+NON-PROBE: NEGATIVE
E HISTOLYT DNA STL QL NAA+NON-PROBE: NEGATIVE
EAEC PAA PLAS AGGR+AATA ST NAA+NON-PRB: NEGATIVE
EC STX1+STX2 GENES STL QL NAA+NON-PROBE: NEGATIVE
EPEC EAE GENE STL QL NAA+NON-PROBE: NEGATIVE
ETEC LTA+ST1A+ST1B TOX ST NAA+NON-PROBE: NEGATIVE
G LAMBLIA DNA STL QL NAA+NON-PROBE: NEGATIVE
NOROVIRUS GI+II RNA STL QL NAA+NON-PROBE: NEGATIVE
P SHIGELLOIDES DNA STL QL NAA+NON-PROBE: NEGATIVE
RVA RNA STL QL NAA+NON-PROBE: NEGATIVE
S ENT+BONG DNA STL QL NAA+NON-PROBE: NEGATIVE
SAPO I+II+IV+V RNA STL QL NAA+NON-PROBE: NEGATIVE
SHIGELLA SP+EIEC IPAH ST NAA+NON-PROBE: NEGATIVE
V CHOL+PARA+VUL DNA STL QL NAA+NON-PROBE: NEGATIVE
V CHOLERAE DNA STL QL NAA+NON-PROBE: NEGATIVE
Y ENTEROCOL DNA STL QL NAA+NON-PROBE: NEGATIVE

## 2020-10-31 PROCEDURE — 87507 IADNA-DNA/RNA PROBE TQ 12-25: CPT

## 2020-11-02 ENCOUNTER — HOSPITAL ENCOUNTER (EMERGENCY)
Facility: HOSPITAL | Age: 45
Discharge: HOME | End: 2020-11-02
Attending: EMERGENCY MEDICINE
Payer: COMMERCIAL

## 2020-11-02 VITALS
SYSTOLIC BLOOD PRESSURE: 101 MMHG | BODY MASS INDEX: 37.73 KG/M2 | OXYGEN SATURATION: 93 % | RESPIRATION RATE: 16 BRPM | HEIGHT: 64 IN | TEMPERATURE: 98.6 F | WEIGHT: 221 LBS | DIASTOLIC BLOOD PRESSURE: 68 MMHG | HEART RATE: 66 BPM

## 2020-11-02 DIAGNOSIS — R74.8 ELEVATED ALKALINE PHOSPHATASE LEVEL: ICD-10-CM

## 2020-11-02 DIAGNOSIS — E66.9 OBESITY: ICD-10-CM

## 2020-11-02 DIAGNOSIS — R10.9 ABDOMINAL PAIN: ICD-10-CM

## 2020-11-02 DIAGNOSIS — E86.0 DEHYDRATION: ICD-10-CM

## 2020-11-02 DIAGNOSIS — A04.72 C. DIFFICILE COLITIS: Primary | ICD-10-CM

## 2020-11-02 LAB
ALBUMIN SERPL-MCNC: 4.3 G/DL (ref 3.5–5.3)
ALP SERPL-CCNC: 112 U/L (ref 37–98)
ALT SERPL-CCNC: 44 U/L (ref 7–52)
ANION GAP SERPL CALC-SCNC: 12 MMOL/L (ref 3–11)
AST SERPL-CCNC: 32 U/L
BILIRUB SERPL-MCNC: 0.39 MG/DL (ref 0.2–1.4)
BUN SERPL-MCNC: 11 MG/DL (ref 7–25)
CALCIUM ALBUM COR SERPL-MCNC: 9.4 MG/DL (ref 8.6–10.3)
CALCIUM SERPL-MCNC: 9.6 MG/DL (ref 8.6–10.3)
CHLORIDE SERPL-SCNC: 101 MMOL/L (ref 98–107)
CO2 SERPL-SCNC: 25 MMOL/L (ref 21–32)
CREAT SERPL-MCNC: 0.61 MG/DL (ref 0.6–1.1)
GFR SERPL CREATININE-BSD FRML MDRD: 110 ML/MIN/1.73M*2
GLUCOSE SERPL-MCNC: 86 MG/DL (ref 70–105)
POTASSIUM SERPL-SCNC: 4 MMOL/L (ref 3.5–5.1)
PROT SERPL-MCNC: 7.3 G/DL (ref 6–8.3)
SODIUM SERPL-SCNC: 138 MMOL/L (ref 135–145)

## 2020-11-02 PROCEDURE — 80053 COMPREHEN METABOLIC PANEL: CPT | Performed by: EMERGENCY MEDICINE

## 2020-11-02 PROCEDURE — 96374 THER/PROPH/DIAG INJ IV PUSH: CPT

## 2020-11-02 PROCEDURE — 36415 COLL VENOUS BLD VENIPUNCTURE: CPT | Performed by: EMERGENCY MEDICINE

## 2020-11-02 PROCEDURE — 96375 TX/PRO/DX INJ NEW DRUG ADDON: CPT

## 2020-11-02 PROCEDURE — 6360000200 HC RX 636 W HCPCS (ALT 250 FOR IP): Performed by: EMERGENCY MEDICINE

## 2020-11-02 PROCEDURE — 96361 HYDRATE IV INFUSION ADD-ON: CPT

## 2020-11-02 PROCEDURE — 2580000300 HC RX 258: Performed by: EMERGENCY MEDICINE

## 2020-11-02 PROCEDURE — 99283 EMERGENCY DEPT VISIT LOW MDM: CPT | Performed by: EMERGENCY MEDICINE

## 2020-11-02 RX ORDER — ONDANSETRON HYDROCHLORIDE 2 MG/ML
4 INJECTION, SOLUTION INTRAVENOUS ONCE
Status: COMPLETED | OUTPATIENT
Start: 2020-11-02 | End: 2020-11-02

## 2020-11-02 RX ORDER — KETOROLAC TROMETHAMINE 30 MG/ML
30 INJECTION, SOLUTION INTRAMUSCULAR; INTRAVENOUS ONCE
Status: COMPLETED | OUTPATIENT
Start: 2020-11-02 | End: 2020-11-02

## 2020-11-02 RX ORDER — HYDROCODONE BITARTRATE AND ACETAMINOPHEN 5; 325 MG/1; MG/1
1-2 TABLET ORAL EVERY 4 HOURS PRN
Qty: 30 TABLET | Refills: 0 | Status: SHIPPED | OUTPATIENT
Start: 2020-11-02 | End: 2021-08-09 | Stop reason: ALTCHOICE

## 2020-11-02 RX ORDER — SODIUM CHLORIDE 9 MG/ML
2000 INJECTION, SOLUTION INTRAVENOUS ONCE
Status: COMPLETED | OUTPATIENT
Start: 2020-11-02 | End: 2020-11-02

## 2020-11-02 RX ORDER — MORPHINE SULFATE 4 MG/ML
4 INJECTION, SOLUTION INTRAMUSCULAR; INTRAVENOUS ONCE
Status: COMPLETED | OUTPATIENT
Start: 2020-11-02 | End: 2020-11-02

## 2020-11-02 RX ORDER — ONDANSETRON 4 MG/1
4 TABLET, ORALLY DISINTEGRATING ORAL EVERY 8 HOURS PRN
Qty: 10 TABLET | Refills: 0 | Status: SHIPPED | OUTPATIENT
Start: 2020-11-02 | End: 2020-11-09

## 2020-11-02 RX ADMIN — SODIUM CHLORIDE 2000 ML: 9 INJECTION, SOLUTION INTRAVENOUS at 16:30

## 2020-11-02 RX ADMIN — MORPHINE SULFATE 4 MG: 4 INJECTION, SOLUTION INTRAMUSCULAR; INTRAVENOUS at 16:30

## 2020-11-02 RX ADMIN — ONDANSETRON 4 MG: 2 INJECTION INTRAMUSCULAR; INTRAVENOUS at 16:32

## 2020-11-02 RX ADMIN — KETOROLAC TROMETHAMINE 30 MG: 30 INJECTION, SOLUTION INTRAMUSCULAR at 18:27

## 2020-11-02 NOTE — ED PROVIDER NOTES
"Abdominal Pain (Pt in for abdominal pain, reports tested positive for C Diff today, \"feels like someones ripping my insides out.\" n/v/d and pain x 1 week. )        HPI:  A 45-year-old female presenting to the emergency department with complaints of generalized weakness, dizziness and lightheadedness, and abdominal pain.  Patient states that her symptoms of been present for several days.  She was seen by her primary care physician and ultimately diagnosed with C. difficile.  Patient states that her pain has become intolerable and she feels very weak and is having troubles getting around.  No fevers or chills.  No similar infections in the past.  No other complaints.  Patient takes no immunocompromising medications.      Past Medical History:   Diagnosis Date   • Chronic pain    • Complication of anesthesia    • Dental disease    • Depression    • Diverticulitis    • Endocrine disorder    • Extremity pain    • Fibromyalgia    • Hashimoto's thyroiditis    • Hypothyroidism    • Injury of back    • Low back pain    • Neurologic disorder    • Respiratory disease    • Sleep apnea     does not use cpap   • Wears dentures        Past Surgical History:   Procedure Laterality Date   • BACK SURGERY     • CHOLECYSTECTOMY     • COLONOSCOPY N/A 12/27/2019    Procedure: COLONOSCOPY WITH ASCENDING COLON BIOPSIES;  Surgeon: Kelvin Cordova MD PhD;  Location: [facility];  Service: Endoscopy;  Laterality: N/A;   • ESOPHAGOGASTRODUODENOSCOPY N/A 12/27/2019    Procedure: ESOPHAGOGASTRODUODENOSCOPY WITH DUODENAL BIOPSIES;  Surgeon: Kelvin Cordova MD PhD;  Location: [facility];  Service: Endoscopy;  Laterality: N/A;   • EXAMINATION UNDER ANESTHESIA N/A 1/17/2020    Procedure: EXAM UNDER ANESTHESIA;  Surgeon: Steve Michelle MD;  Location: [facility];  Service: General;  Laterality: N/A;   • HEMORRHOID SURGERY N/A 1/17/2020    Procedure: HEMORRHOIDECTOMY TIMES ONE with BANDING TIMES ONE;  Surgeon: Steve Michelle MD;  Location: [facility] OR; "  Service: General;  Laterality: N/A;   • HYSTERECTOMY     • REDUCTION MAMMAPLASTY     • TUBAL LIGATION         Social History     Socioeconomic History   • Marital status: Single     Spouse name: Not on file   • Number of children: Not on file   • Years of education: Not on file   • Highest education level: Not on file   Occupational History   • Not on file   Social Needs   • Financial resource strain: Not on file   • Food insecurity     Worry: Not on file     Inability: Not on file   • Transportation needs     Medical: Not on file     Non-medical: Not on file   Tobacco Use   • Smoking status: Former Smoker     Packs/day: 0.00   • Smokeless tobacco: Never Used   • Tobacco comment: 'VAPES'   Substance and Sexual Activity   • Alcohol use: No   • Drug use: No   • Sexual activity: Defer   Lifestyle   • Physical activity     Days per week: Not on file     Minutes per session: Not on file   • Stress: Not on file   Relationships   • Social connections     Talks on phone: Not on file     Gets together: Not on file     Attends [employer] service: Not on file     Active member of club or organization: Not on file     Attends meetings of clubs or organizations: Not on file     Relationship status: Not on file   • Intimate partner violence     Fear of current or ex partner: Not on file     Emotionally abused: Not on file     Physically abused: Not on file     Forced sexual activity: Not on file   Other Topics Concern   • Not on file   Social History Narrative   • Not on file       History reviewed. No pertinent family history.    Allergies   Allergen Reactions   • Cephalexin      hives, throat swelling   • Morphine Other (see comments)     Chest Pain; pt reported to this RN that she does not have an allergy to Morphine and was okay w/Dr. Conroy ordering Morphine for her pain.     PT STATES SHE IS NOT ALLERGIC          Current Outpatient Medications:   •  HYDROcodone-acetaminophen (NORCO) 5-325 mg per tablet, Take 1-2 tablets by  mouth every 4 (four) hours as needed (Pain) Max Daily Amount: 12 tablets, Disp: 30 tablet, Rfl: 0  •  ondansetron ODT (ZOFRAN-ODT) 4 mg disintegrating tablet, Take 1 tablet (4 mg total) by mouth every 8 (eight) hours as needed for nausea or vomiting for up to 7 days, Disp: 10 tablet, Rfl: 0  •  diclofenac sodium (VOLTAREN) 1 % gel, Apply 4 g topically 4 (four) times a day as needed (pain), Disp: 100 g, Rfl: 3  •  ALPRAZolam (XANAX) 1 mg tablet, Take 1 mg by mouth daily as needed, Disp: , Rfl: 5  •  gabapentin (NEURONTIN) 600 mg tablet, Take 1,200 mg by mouth 3 (three) times a day, Disp: , Rfl: 3  •  FLUoxetine (PROzac) 40 mg capsule, Take 40 mg by mouth every evening Take along with 20mg, total daily dose of 60mg , Disp: , Rfl: 6  •  traZODone (DESYREL) 100 mg tablet, Take 150 mg by mouth nightly, Disp: , Rfl: 2  •  carisoprodol (SOMA) 350 mg tablet, Take 350 mg by mouth 4 (four) times a day, Disp: , Rfl: 0  •  FLUoxetine (PROzac) 20 mg capsule, Take 20 mg by mouth every evening Take along with 40mg, total daily dose of 60mg , Disp: 30, Rfl: 3  •  levothyroxine (SYNTHROID, LEVOTHROID) 175 mcg tablet, Take 175 mcg by mouth every morning  , Disp: 30, Rfl: 5      ROS:  Constitutional: negative for fever  Eyes: negative for visual changes  ENT: negative for sore throat  Cardiovascular: negative for chest pain  Respiratory: negative for shortness of breath   GI: Positive for abdominal pain  : negative for hematuria  Musculoskeletal: negative for back pain  Neuro: negative for headache  Hematology: negative for bleeding  Immunology: negative for joint pain      ED Triage Vitals   Temp Heart Rate Resp BP SpO2   11/02/20 1300 11/02/20 1300 11/02/20 1300 11/02/20 1300 11/02/20 1300   37 °C (98.6 °F) 92 16 153/81 95 %      Temp Source Heart Rate Source Patient Position BP Location FiO2 (%)   11/02/20 1300 -- 11/02/20 1628 -- --   Oral  Head of bed 30 degrees or higher           Physical Exam:  Nursing note and vitals  reviewed.  Constitutional: Nontoxic-appearing, weak, uncomfortable middle-aged female  Head: Normocephalic and atraumatic. Mucous membranes are dry  Eyes: Pupils are equal, round, and reactive to light.   Neck: Supple.  Cardiovascular: Mild tachycardia without murmur.   Pulmonary/Chest: No respiratory distress.  Clear to auscultation bilaterally.  Abdominal: Soft.  Minimal diffuse tenderness.  Back: No CVA tenderness. No midline tenderness.   Musculoskeletal: No edema  Neurological: Alert.   Skin: Skin is warm and dry. No rash noted.   Psychiatric: Normal mood and affect.        Labs:  Labs Reviewed   COMPREHENSIVE METABOLIC PANEL - Abnormal       Result Value    Sodium 138      Potassium 4.0      Chloride 101      CO2 25      Anion Gap 12 (*)     BUN 11      Creatinine 0.61      Glucose 86      Calcium 9.6      AST 32      ALT (SGPT) 44      Alkaline Phosphatase 112 (*)     Total Protein 7.3      Albumin 4.3      Total Bilirubin 0.39      eGFR 110      Corrected Calcium 9.4      Narrative:     Estimated GFR calculated using the 2009 CKD-EPI creatinine equation.       Imaging:  No orders to display       MDM:   This is a 45-year-old female presenting to the emergency department with complaints of C. difficile.  Patient will be given 2 L of normal saline along with morphine and Zofran.  A CMP will be checked and the patient will be observed.  Patient looks improved after treatment.  Labs are without significant abnormality.  Her primary care physician did call a prescription in for C. difficile.  Patient will be discharged with prescriptions for hydrocodone and Zofran.  Reasons to return the emergency department were discussed at length.  Patient verbalizes an understanding this will now be discharged home.        Given   Medications   ketorolac (TORADOL) injection 30 mg (has no administration in time range)   sodium chloride 0.9 % bolus 2,000 mL (2,000 mL intravenous New Bag/New Syringe 11/2/20 8858)   morphine  injection 4 mg (4 mg intravenous Given 11/2/20 1630)   ondansetron (ZOFRAN) injection 4 mg (4 mg intravenous Given 11/2/20 1632)         Procedure    Procedures        Clinical Impression:    Final diagnoses:   [A04.72] C. difficile colitis   [R10.9] Abdominal pain   [E86.0] Dehydration   [E66.9] Obesity   [R74.8] Elevated alkaline phosphatase level         I, Stephen Conroy MD, personally performed the services described in this documentation as transcribed by an emergency department scribe, in my presence, and it is both accurate and complete.      Stephen Conroy MD  11/02/20 5333

## 2020-11-03 NOTE — DISCHARGE INSTRUCTIONS
Drink plenty of fluids.  Start your Flagyl tonight.  You may also take pain medications and nausea meds as needed.  Follow-up with your primary care physician.

## 2020-12-23 ENCOUNTER — HOSPITAL ENCOUNTER (EMERGENCY)
Facility: HOSPITAL | Age: 45
Discharge: HOME | End: 2020-12-23
Attending: EMERGENCY MEDICINE
Payer: COMMERCIAL

## 2020-12-23 VITALS
TEMPERATURE: 97.9 F | BODY MASS INDEX: 40.05 KG/M2 | HEART RATE: 59 BPM | HEIGHT: 64 IN | RESPIRATION RATE: 13 BRPM | OXYGEN SATURATION: 92 % | DIASTOLIC BLOOD PRESSURE: 69 MMHG | WEIGHT: 234.57 LBS | SYSTOLIC BLOOD PRESSURE: 105 MMHG

## 2020-12-23 DIAGNOSIS — R11.0 NAUSEA: ICD-10-CM

## 2020-12-23 DIAGNOSIS — R19.7 DIARRHEA: ICD-10-CM

## 2020-12-23 DIAGNOSIS — R10.9 ABDOMINAL PAIN: Primary | ICD-10-CM

## 2020-12-23 LAB
ALBUMIN SERPL-MCNC: 4.4 G/DL (ref 3.5–5.3)
ALP SERPL-CCNC: 116 U/L (ref 37–98)
ALT SERPL-CCNC: 40 U/L (ref 7–52)
ANION GAP SERPL CALC-SCNC: 8 MMOL/L (ref 3–11)
AST SERPL-CCNC: 27 U/L
BASOPHILS # BLD AUTO: 0.1 10*3/UL
BASOPHILS NFR BLD AUTO: 1 % (ref 0–2)
BILIRUB SERPL-MCNC: 0.47 MG/DL (ref 0.2–1.4)
BUN SERPL-MCNC: 9 MG/DL (ref 7–25)
CALCIUM ALBUM COR SERPL-MCNC: 8.9 MG/DL (ref 8.6–10.3)
CALCIUM SERPL-MCNC: 9.2 MG/DL (ref 8.6–10.3)
CHLORIDE SERPL-SCNC: 100 MMOL/L (ref 98–107)
CO2 SERPL-SCNC: 28 MMOL/L (ref 21–32)
CREAT SERPL-MCNC: 0.68 MG/DL (ref 0.6–1.1)
EOSINOPHIL # BLD AUTO: 0.1 10*3/UL
EOSINOPHIL NFR BLD AUTO: 1 % (ref 0–3)
ERYTHROCYTE [DISTWIDTH] IN BLOOD BY AUTOMATED COUNT: 13.1 % (ref 11.5–14)
GFR SERPL CREATININE-BSD FRML MDRD: 106 ML/MIN/1.73M*2
GLUCOSE SERPL-MCNC: 88 MG/DL (ref 70–105)
HCT VFR BLD AUTO: 39 % (ref 34–45)
HGB BLD-MCNC: 13.1 G/DL (ref 11.5–15.5)
LYMPHOCYTES # BLD AUTO: 1.9 10*3/UL
LYMPHOCYTES NFR BLD AUTO: 22 % (ref 11–47)
MCH RBC QN AUTO: 28.8 PG (ref 28–33)
MCHC RBC AUTO-ENTMCNC: 33.5 G/DL (ref 32–36)
MCV RBC AUTO: 86.1 FL (ref 81–97)
MONOCYTES # BLD AUTO: 0.4 10*3/UL
MONOCYTES NFR BLD AUTO: 5 % (ref 3–11)
NEUTROPHILS # BLD AUTO: 6.1 10*3/UL
NEUTROPHILS NFR BLD AUTO: 71 % (ref 41–81)
PLATELET # BLD AUTO: 278 10*3/UL (ref 140–350)
PMV BLD AUTO: 7.7 FL (ref 6.9–10.8)
POTASSIUM SERPL-SCNC: 4.1 MMOL/L (ref 3.5–5.1)
PROT SERPL-MCNC: 7.4 G/DL (ref 6–8.3)
RBC # BLD AUTO: 4.53 10*6/ΜL (ref 3.7–5.3)
SODIUM SERPL-SCNC: 136 MMOL/L (ref 135–145)
WBC # BLD AUTO: 8.6 10*3/UL (ref 4.5–10.5)

## 2020-12-23 PROCEDURE — 36415 COLL VENOUS BLD VENIPUNCTURE: CPT | Performed by: EMERGENCY MEDICINE

## 2020-12-23 PROCEDURE — 96361 HYDRATE IV INFUSION ADD-ON: CPT

## 2020-12-23 PROCEDURE — 96374 THER/PROPH/DIAG INJ IV PUSH: CPT

## 2020-12-23 PROCEDURE — 80053 COMPREHEN METABOLIC PANEL: CPT | Performed by: EMERGENCY MEDICINE

## 2020-12-23 PROCEDURE — 85025 COMPLETE CBC W/AUTO DIFF WBC: CPT | Performed by: EMERGENCY MEDICINE

## 2020-12-23 PROCEDURE — 6360000200 HC RX 636 W HCPCS (ALT 250 FOR IP): Performed by: EMERGENCY MEDICINE

## 2020-12-23 PROCEDURE — 2580000300 HC RX 258: Performed by: EMERGENCY MEDICINE

## 2020-12-23 PROCEDURE — 96375 TX/PRO/DX INJ NEW DRUG ADDON: CPT

## 2020-12-23 PROCEDURE — 99284 EMERGENCY DEPT VISIT MOD MDM: CPT | Performed by: EMERGENCY MEDICINE

## 2020-12-23 RX ORDER — HYDROMORPHONE HYDROCHLORIDE 1 MG/ML
1 INJECTION, SOLUTION INTRAMUSCULAR; INTRAVENOUS; SUBCUTANEOUS
Status: DISCONTINUED | OUTPATIENT
Start: 2020-12-23 | End: 2020-12-23 | Stop reason: HOSPADM

## 2020-12-23 RX ORDER — HYDROCODONE BITARTRATE AND ACETAMINOPHEN 5; 325 MG/1; MG/1
1 TABLET ORAL EVERY 4 HOURS PRN
Qty: 20 TABLET | Refills: 0 | Status: SHIPPED | OUTPATIENT
Start: 2020-12-23 | End: 2021-08-09 | Stop reason: ALTCHOICE

## 2020-12-23 RX ORDER — SODIUM CHLORIDE 9 MG/ML
2000 INJECTION, SOLUTION INTRAVENOUS ONCE
Status: COMPLETED | OUTPATIENT
Start: 2020-12-23 | End: 2020-12-23

## 2020-12-23 RX ORDER — METOCLOPRAMIDE HYDROCHLORIDE 5 MG/ML
10 INJECTION INTRAMUSCULAR; INTRAVENOUS ONCE
Status: COMPLETED | OUTPATIENT
Start: 2020-12-23 | End: 2020-12-23

## 2020-12-23 RX ORDER — ONDANSETRON 4 MG/1
4 TABLET, ORALLY DISINTEGRATING ORAL EVERY 8 HOURS PRN
Qty: 20 TABLET | Refills: 0 | Status: SHIPPED | OUTPATIENT
Start: 2020-12-23 | End: 2020-12-30

## 2020-12-23 RX ADMIN — SODIUM CHLORIDE 2000 ML: 9 INJECTION, SOLUTION INTRAVENOUS at 13:32

## 2020-12-23 RX ADMIN — HYDROMORPHONE HYDROCHLORIDE 1 MG: 1 INJECTION, SOLUTION INTRAMUSCULAR; INTRAVENOUS; SUBCUTANEOUS at 13:37

## 2020-12-23 RX ADMIN — METOCLOPRAMIDE 10 MG: 5 INJECTION, SOLUTION INTRAMUSCULAR; INTRAVENOUS at 13:35

## 2020-12-23 NOTE — ED PROVIDER NOTES
CC: Abdominal pain  HPI:  Patient is a 45 y.o. female presenting to the emergency department with increasing constant aching 8/10 abdominal pain and associated sore throat, nausea, burning with urination, urine retention, myalgias, joint pains, headache, blurry vision and diarrhea (24x daily) that began 5-6 weeks ago after being diagnosed with c-diff.    She has c-diff and reports anytime she eats she has a bowel movement. She has an appointment for January 19 th. She takes Flagel and Vancomycin.     Past Medical History:   Diagnosis Date   • Chronic pain    • Complication of anesthesia    • Dental disease    • Depression    • Diverticulitis    • Endocrine disorder    • Extremity pain    • Fibromyalgia    • Hashimoto's thyroiditis    • Hypothyroidism    • Injury of back    • Low back pain    • Neurologic disorder    • Respiratory disease    • Sleep apnea     does not use cpap   • Wears dentures        Past Surgical History:   Procedure Laterality Date   • BACK SURGERY     • CHOLECYSTECTOMY     • COLONOSCOPY N/A 12/27/2019    Procedure: COLONOSCOPY WITH ASCENDING COLON BIOPSIES;  Surgeon: Kelvin Cordova MD PhD;  Location: [facility];  Service: Endoscopy;  Laterality: N/A;   • ESOPHAGOGASTRODUODENOSCOPY N/A 12/27/2019    Procedure: ESOPHAGOGASTRODUODENOSCOPY WITH DUODENAL BIOPSIES;  Surgeon: Kelvin Cordova MD PhD;  Location: [facility];  Service: Endoscopy;  Laterality: N/A;   • EXAMINATION UNDER ANESTHESIA N/A 1/17/2020    Procedure: EXAM UNDER ANESTHESIA;  Surgeon: Steve Michelle MD;  Location: [facility] OR;  Service: General;  Laterality: N/A;   • HEMORRHOID SURGERY N/A 1/17/2020    Procedure: HEMORRHOIDECTOMY TIMES ONE with BANDING TIMES ONE;  Surgeon: Steve Michelle MD;  Location: [facility] OR;  Service: General;  Laterality: N/A;   • HYSTERECTOMY     • REDUCTION MAMMAPLASTY     • TUBAL LIGATION         Social History     Socioeconomic History   • Marital status: Single     Spouse name: Not on file   • Number of  children: Not on file   • Years of education: Not on file   • Highest education level: Not on file   Occupational History   • Not on file   Social Needs   • Financial resource strain: Not on file   • Food insecurity     Worry: Not on file     Inability: Not on file   • Transportation needs     Medical: Not on file     Non-medical: Not on file   Tobacco Use   • Smoking status: Former Smoker     Packs/day: 0.00   • Smokeless tobacco: Never Used   • Tobacco comment: 'VAPES'   Substance and Sexual Activity   • Alcohol use: No   • Drug use: No   • Sexual activity: Defer   Lifestyle   • Physical activity     Days per week: Not on file     Minutes per session: Not on file   • Stress: Not on file   Relationships   • Social connections     Talks on phone: Not on file     Gets together: Not on file     Attends [employer] service: Not on file     Active member of club or organization: Not on file     Attends meetings of clubs or organizations: Not on file     Relationship status: Not on file   • Intimate partner violence     Fear of current or ex partner: Not on file     Emotionally abused: Not on file     Physically abused: Not on file     Forced sexual activity: Not on file   Other Topics Concern   • Not on file   Social History Narrative   • Not on file       History reviewed. No pertinent family history.    Allergies   Allergen Reactions   • Cephalexin      hives, throat swelling   • Morphine Other (see comments)     Chest Pain; pt reported to this RN that she does not have an allergy to Morphine and was maco w/Dr. Conroy ordering Morphine for her pain.     PT STATES SHE IS NOT ALLERGIC          Current Outpatient Medications:   •  HYDROcodone-acetaminophen (NORCO) 5-325 mg per tablet, Take 1-2 tablets by mouth every 4 (four) hours as needed (Pain) Max Daily Amount: 12 tablets, Disp: 30 tablet, Rfl: 0  •  diclofenac sodium (VOLTAREN) 1 % gel, Apply 4 g topically 4 (four) times a day as needed (pain), Disp: 100 g, Rfl: 3  •   ALPRAZolam (XANAX) 1 mg tablet, Take 1 mg by mouth daily as needed, Disp: , Rfl: 5  •  gabapentin (NEURONTIN) 600 mg tablet, Take 1,200 mg by mouth 3 (three) times a day, Disp: , Rfl: 3  •  FLUoxetine (PROzac) 40 mg capsule, Take 40 mg by mouth every evening Take along with 20mg, total daily dose of 60mg , Disp: , Rfl: 6  •  traZODone (DESYREL) 100 mg tablet, Take 150 mg by mouth nightly, Disp: , Rfl: 2  •  carisoprodol (SOMA) 350 mg tablet, Take 350 mg by mouth 4 (four) times a day, Disp: , Rfl: 0  •  FLUoxetine (PROzac) 20 mg capsule, Take 20 mg by mouth every evening Take along with 40mg, total daily dose of 60mg , Disp: 30, Rfl: 3  •  levothyroxine (SYNTHROID, LEVOTHROID) 175 mcg tablet, Take 175 mcg by mouth every morning  , Disp: 30, Rfl: 5      ROS:    Constitutional: negative for fever  Neuro: positive for headache  Eyes: positive for blurred vision  ENT: positive for sore throat  Cardiovascular: positive for chest pain  Respiratory: negative for shortness of breath  GI: positive for abdominal pain, negative for vomiting, positive for diarrhea  : positive for burning with urination, positive for urinary retention  Musculoskeletal: positive for new joint pain, positive for myalgias.  Rheumatologic: positive for new joint pain        ED Triage Vitals   Temp Heart Rate Resp BP SpO2   12/23/20 1323 12/23/20 1323 12/23/20 1323 12/23/20 1323 12/23/20 1323   36.6 °C (97.9 °F) 71 18 124/88 91 %      Temp Source Heart Rate Source Patient Position BP Location FiO2 (%)   12/23/20 1323 -- 12/23/20 1430 -- --   Oral  Head of bed 30 degrees or higher           Physical Exam:  Nursing note and vitals reviewed.  Constitutional: No acute distress.   HENT: Conjunctiva is normal and non-injected. Mucous membranes moist.   Eyes: Pupils are equal, round, and reactive to light.   Neck: Trachea midline.   Cardiovascular: Well perfused, no cyanosis.   Pulmonary/Chest: No respiratory distress, equal chest rise, full sentences  between breaths.   Abdominal: Non-distended.  Musculoskeletal: No edema.   Neurological: Alert. Normal mentation.   Skin: Exposed areas of skin without rash or lesion.  Psychiatric: Normal affect.    Labs Reviewed   COMPREHENSIVE METABOLIC PANEL - Abnormal       Result Value    Sodium 136      Potassium 4.1      Chloride 100      CO2 28      Anion Gap 8      BUN 9      Creatinine 0.68      Glucose 88      Calcium 9.2      AST 27      ALT (SGPT) 40      Alkaline Phosphatase 116 (*)     Total Protein 7.4      Albumin 4.4      Total Bilirubin 0.47      eGFR 106      Corrected Calcium 8.9      Narrative:     Estimated GFR calculated using the 2009 CKD-EPI creatinine equation.   CBC WITH AUTO DIFFERENTIAL    WBC 8.6      RBC 4.53      Hemoglobin 13.1      Hematocrit 39.0      MCV 86.1      MCH 28.8      MCHC 33.5      RDW 13.1      Platelets 278      MPV 7.7      Neutrophils% 71      Lymphocytes% 22      Monocytes% 5      Eosinophils% 1      Basophils% 1      Neutrophils Absolute 6.10      Lymphocytes Absolute 1.90      Monocytes Absolute 0.40      Eosinophils Absolute 0.10      Basophils Absolute 0.10     GI PATHOGEN PANEL, PCR       No orders to display         Procedures:  Procedures      MDM:    45 year old female presented with abdominal pain and c-diff. Reglan, IV fluids and Dilaudid was given. Labs were unremarkable. Patient had stated she was having over 24 episodes of diarrhea in a 24 hour period but was not able to provide a stool sample while here at his facility. Results were discussed with the patient and she was comfortable with outpatient follow-up. Patient was stable for discharge home with Norco and Zofran. Return instructions were given.                Clinical Impression:  Final diagnoses:   [R10.9] Abdominal pain   [R19.7] Diarrhea   [R11.0] Nausea     By signing my name, I, Linda Quintero, attest that this documentation has been prepared under the direction and in the presence of Dr. Adame;  12/23/2020, 1:21 PM.    I, Dr. Adame personally performed the services described in this document as written by the scribe in my presence.  It has been reviewed and is both accurate and complete.    A voice recognition program was used to aid in the documentation of this record. Sometimes words are not printed exactly as they were spoken. While efforts were made to carefully edit and correct any inaccuracies, some errors may be present; please take these into context. Please contact the provider if errors are identified.      Dwain Adame MD  12/27/20 0043

## 2020-12-23 NOTE — DISCHARGE INSTRUCTIONS
Return to the emergency department if you develop any worsening or new concerning symptoms.  Call Dr. Adame in the emergency department after 1 PM tomorrow for test results.  [phone]

## 2020-12-24 ENCOUNTER — APPOINTMENT (OUTPATIENT)
Dept: LAB | Facility: HOSPITAL | Age: 45
End: 2020-12-24
Payer: COMMERCIAL

## 2020-12-24 DIAGNOSIS — A04.72 C. DIFFICILE DIARRHEA: Primary | ICD-10-CM

## 2020-12-24 LAB
ADV 40+41 DNA STL QL NAA+NON-PROBE: NEGATIVE
ASTRO TYP 1-8 RNA STL QL NAA+NON-PROBE: NEGATIVE
C CAYETANENSIS DNA STL QL NAA+NON-PROBE: NEGATIVE
C COLI+JEJ+UPSA DNA STL QL NAA+NON-PROBE: NEGATIVE
C DIF TOX TCDA+TCDB STL QL NAA+NON-PROBE: NEGATIVE
CRYPTOSP DNA STL QL NAA+NON-PROBE: NEGATIVE
E HISTOLYT DNA STL QL NAA+NON-PROBE: NEGATIVE
EAEC PAA PLAS AGGR+AATA ST NAA+NON-PRB: NEGATIVE
EC STX1+STX2 GENES STL QL NAA+NON-PROBE: NEGATIVE
EPEC EAE GENE STL QL NAA+NON-PROBE: NEGATIVE
ETEC LTA+ST1A+ST1B TOX ST NAA+NON-PROBE: NEGATIVE
G LAMBLIA DNA STL QL NAA+NON-PROBE: NEGATIVE
NOROVIRUS GI+II RNA STL QL NAA+NON-PROBE: NEGATIVE
P SHIGELLOIDES DNA STL QL NAA+NON-PROBE: NEGATIVE
RVA RNA STL QL NAA+NON-PROBE: NEGATIVE
S ENT+BONG DNA STL QL NAA+NON-PROBE: NEGATIVE
SAPO I+II+IV+V RNA STL QL NAA+NON-PROBE: NEGATIVE
SHIGELLA SP+EIEC IPAH ST NAA+NON-PROBE: NEGATIVE
V CHOL+PARA+VUL DNA STL QL NAA+NON-PROBE: NEGATIVE
V CHOLERAE DNA STL QL NAA+NON-PROBE: NEGATIVE
Y ENTEROCOL DNA STL QL NAA+NON-PROBE: NEGATIVE

## 2020-12-24 PROCEDURE — 87507 IADNA-DNA/RNA PROBE TQ 12-25: CPT

## 2020-12-31 ENCOUNTER — APPOINTMENT (OUTPATIENT)
Dept: LAB | Facility: HOSPITAL | Age: 45
End: 2020-12-31
Payer: COMMERCIAL

## 2020-12-31 DIAGNOSIS — R19.7 DIARRHEA OF PRESUMED INFECTIOUS ORIGIN: ICD-10-CM

## 2020-12-31 PROCEDURE — 87324 CLOSTRIDIUM AG IA: CPT

## 2021-01-05 ENCOUNTER — TELEPHONE (OUTPATIENT)
Dept: DIABETES SERVICES | Facility: CLINIC | Age: 46
End: 2021-01-05

## 2021-01-05 NOTE — TELEPHONE ENCOUNTER
Isabell left me a voice mail earlier this morning stating she needed to reschedule her appointment with me later today as she has another appointment.  I returned her call and did reschedule her for 1/20/21.  This is her 4th cancellation with me (cancelled on 11/13/20, 11/18/20, 12/1/20 and today).   I explained to her that I am happy to see her and it is important to be seen to help prevent type 2 diabetes. I also explained that an appointment reserves time on my schedule and that cancelled appointments become unproductive time.   I encouraged her to keep her appointment on 1/20/21. She stated that she would.

## 2021-01-20 ENCOUNTER — TELEPHONE - BILLABLE (OUTPATIENT)
Dept: DIABETES SERVICES | Facility: CLINIC | Age: 46
End: 2021-01-20
Payer: COMMERCIAL

## 2021-01-20 DIAGNOSIS — R73.01 ABNORMAL FASTING GLUCOSE: Primary | ICD-10-CM

## 2021-01-20 PROCEDURE — 97802 MEDICAL NUTRITION INDIV IN: CPT | Mod: GT | Performed by: DIETITIAN, REGISTERED

## 2021-01-20 NOTE — PROGRESS NOTES
Subjective   Per discussion with Diane Andrade RD, Isabell, has verbally consented to be treated via a telephone based visit: Yes. A total of 55 minutes were required for this telephone based visit.   Patient Location: home  Provider Location: Clinic  Technology used by Provider: Phone  A charge for 4 units of MNT [id number] is placed    Isabell Harper is a 46 y.o. female who presents for Medical Nutrition Therapy for abnormal fasting glucose    Medical Nutrition Therapy (MNT)      Beginning Time: 1030     End Time: 1125  MNT Provider Order: Dr. Shelby Bateman    46 y.o.    female  Others Present: unaccompanied    Nutrition Assessment  Food/Nutrition - Related History   Previous MNT/Date: No previous visit found    Pertinent Medications:   Current Outpatient Medications on File Prior to Visit   Medication Sig Dispense Refill   • HYDROcodone-acetaminophen (Norco) 5-325 mg per tablet Take 1 tablet by mouth every 4 (four) hours as needed (Abdominal pain) Max Daily Amount: 6 tablets 20 tablet 0   • HYDROcodone-acetaminophen (NORCO) 5-325 mg per tablet Take 1-2 tablets by mouth every 4 (four) hours as needed (Pain) Max Daily Amount: 12 tablets 30 tablet 0   • diclofenac sodium (VOLTAREN) 1 % gel Apply 4 g topically 4 (four) times a day as needed (pain) 100 g 3   • ALPRAZolam (XANAX) 1 mg tablet Take 1 mg by mouth daily as needed  5   • gabapentin (NEURONTIN) 600 mg tablet Take 1,200 mg by mouth 3 (three) times a day  3   • FLUoxetine (PROzac) 40 mg capsule Take 40 mg by mouth every evening Take along with 20mg, total daily dose of 60mg   6   • traZODone (DESYREL) 100 mg tablet Take 150 mg by mouth nightly  2   • carisoprodol (SOMA) 350 mg tablet Take 350 mg by mouth 4 (four) times a day  0   • FLUoxetine (PROzac) 20 mg capsule Take 20 mg by mouth every evening Take along with 40mg, total daily dose of 60mg  30 3   • levothyroxine (SYNTHROID, LEVOTHROID) 175 mcg tablet Take 175 mcg by mouth every morning   30 5     Current  "Facility-Administered Medications on File Prior to Visit   Medication Dose Route Frequency Provider Last Rate Last Admin   • lidocaine PF (XYLOCAINE) 10 mg/mL (1 %) vial 30 mL  30 mL injection Once Stephen Alonso MD         Supplements/Herbals: Doterra essential oil (oregano) 1 drop added to a Doterra Veggie Cap (empty capsule) to help w/ GI issues; does not take daily as is too spicy; also takes Hair, Skin & Nails multivitamin  Alcohol Use:   Social History     Substance and Sexual Activity   Alcohol Use No       Client History  Medical History per Dr. Bateman's office notes sent also include: obesity, anxiety, hyperlipidemia, non-alcoholic fatty liver disease  Past Medical History:   Diagnosis Date   • Chronic pain    • Complication of anesthesia    • Dental disease    • Depression    • Diverticulitis    • Endocrine disorder    • Extremity pain    • Fibromyalgia    • Hashimoto's thyroiditis    • Hypothyroidism    • Injury of back    • Low back pain    • Neurologic disorder    • Respiratory disease    • Sleep apnea     does not use cpap   • Wears dentures      Problems:  Specialty Problems        Endocrinology Problems    Other specified hypothyroidism            Occupation:  disabled  Socioeconomic Concerns:  None identified in conversation today  Shops/Cooks for Self: Boyfriend, herself or her mom cook \"quick and easy\" things  Muslim / Cultural / Ethnic Food Practices: none  Physical Activity: Yes   Type: Recumbent bicyclye   Frequency: daily Duration: 10 minutes    Patient reports the following:   Has had life long issues with her weight; a usual weight for her for a long time was 210-220# up until about 1 year ago when she gained weight up to 238#; her goal weight is to get below 200#.  Changes made last fall when Dr. Bateman told her about the high glucose:  cut out sweet foods and reduced intake of regular soda from 3 times weekly to about 1 time monthly;also reduced eating out fast food from 3 times weekly to " "about one time per month now; initially lost about 8-10# but regained some of it.  Lives with her mother and boyfriend; all three of them cook but most of what is cooked is \"quick and easy\".      Activity: just got a recumbent bike for her birthday and is up to 8 minutes daily on it; her goal is to work up to 30 minutes daily; does some walking too but overall walking is difficult as she has rods in her back and has fibromyalgia, both of which contribute to pain. Denies being told that she should not exercise.     Has had a lot of GI issues in the last few months; had C. Diff that was treated, but is being worked up for ongoing GI issues that include diarrhea; was on metformin but it was stopped in early January by Dr. Bateman to see if the diarrhea decreased but there has not been a lot of change since stopping it; was also on a fiber supplement due to hemorrhoids but also stopped that due to the GI issues; was taking a probiotic as well after learning of the C. Diff until she ran out just 3 days ago; had an Ultrasound yesterday morning and hopes to get more information about what is going on by the end of this week.  Had abnormal celiac blood work in the past, but the ones checked recently were normal.     Does not understand why she does not lose more weight as is not eating that much and in fact, some days her appetite is not that good due to the GI issues; when this is the case, she may use Breakfast Essentials powder added to 8-16 oz 2% milk in place of breakfast food.      Home weight today: 232.2#.    3 day food recall:  1/19: no breakfast due to Ultrasound  Lunch [employer]-had a BLT and a banana  Supper - 2 containers of regular, pre-made jello  1/18: breakfast 2 pieces white toast w/ butter  Lunch: 3 slices [place] surpreme pizza  Supper: none  1/17: breakfast 2 slices white toast w/ butter  Lunch: Grilled cheese-2 slices white bread, 1 slice American Cheese, butter on bread  Supper: about 1-1.5 ups " "Hamburger [place], 12 oz can regular soda  Water with all other meals    Anthropometrics  Ht:   Ht Readings from Last 1 Encounters:   12/23/20 1.626 m (5' 4\")      Wt:   Wt Readings from Last 1 Encounters:   12/23/20 106.4 kg (234 lb 9.1 oz)      BMI:   BMI Readings from Last 1 Encounters:   12/23/20 40.26 kg/m²      BP:   BP Readings from Last 1 Encounters:   12/23/20 105/69     Patient reports usual body weight (UBW) of 210-220# for many years    Weight hx in Epic or records sent by PCP:  228.73# (1/29/20)  238# (9/25/20)  234.57# (12/23/20)     Biochemical Data, Medical Tests, and Procedures  Pertinent Labs: A1c 5.74 (6/23/20),  glucose 120 and 112 (9/23/20 & 6/23/20 respectively)    Social / Diet History  Allergies/Intolerances:   Allergies   Allergen Reactions   • Cephalexin      hives, throat swelling   • Morphine Other (see comments)     Chest Pain; pt reported to this RN that she does not have an allergy to Morphine and was okay w/Dr. Conroy ordering Morphine for her pain.     PT STATES SHE IS NOT ALLERGIC        Nutrition Prescription: Calorie controlled Heart Healthy eating pattern  Estimated needs: [id number] kcals/d ([place] RMR 1685 x sedentary PAL 1.0-1.4)    Nutrition Diagnosis  Problem #1: Overweight/obesity  Obese, Class III  Related to: history of excess calories compared to calorie utilization, though they could be other factors as well (ex. Insulin resistance, metabolic disturbances)  As evidenced by: 40    Intervention / Plan  Obtained weight and diet hx  Suggested that patient perform complete food journals of 7 typical days to become aware of her actual calorie intake     Handouts provided:   Food Tracker-1 week  AVS    CLINICAL Goals  5-10% weight loss in 6 months to reduce risk of progression to type 2 diabetes    Evaluation  Receptivity to education: good      Comment: Patient expresses frustration over her current state of stable weight in the setting of GI disturbances in part due to " C. Diff and other unknown causes to this point.   Her food history does not suggest excessive calorie intake at during the 3 day period, however, by having the patient keep complete food records, she may have increased awareness of actual portions and calories consumed, which may help direct next steps to help her lose weight.   Will also evaluate next time, her use and/or compliance to CPAP.  It is noted in her medical hx that she does not use CPAP which if that is current, would impact her ability to lose weight.  It is also noted that she has a referral in Epic for polysomnography, and is scheduled in Sleep Medicine tomorrow.      Patient states understanding of the information presented. and Patient needs reinforcement of the information.    Follow-up Plan: scheduled for 2/10/21 to review her food records and additional nutrition interventions for weight loss as well as strategies to improve the quality of her food intake as GI issues allow

## 2021-01-20 NOTE — PATIENT INSTRUCTIONS
Summary of Discussion:    You could improve the quality of your diet (for example, increasing intake of vegetables and fruit), however, there is not an obvious source of excess calories that you consistently consume that points to a single area where changes will reduce calorie intake.    Keeping a food journal may be helpful to find out your actual calorie intake and can help decide next steps.   Accurate food journaling means keeping a written record of everything you eat and drink and the portions.  Do measure foods before eating them.    You can keep a written record or use an electronic tracker.   Examples of electronic trackers:   My Fitness Pal    Fooducate   Nutritionix   Cronometer  Some are free, some are not.    [employer] and [employer] are good websites that you will find good information about the calories in food.  They may have the ability to track your daily intake too.    The idea is to not change anything for now in your eating practices but instead to keep a record for 7 days that reflect your usual eating practices.    Record the following:   What you are eating drinking   How much you are eating and drinking (measure)   The time that you eat and drink these items   Find the calories in each item   Total the calories for each day.    After finding out your usual calorie intake, we can discuss next steps.    Your goal: keep a 7 day record of food intake.

## 2021-01-21 ENCOUNTER — APPOINTMENT (OUTPATIENT)
Dept: LAB | Facility: HOSPITAL | Age: 46
End: 2021-01-21
Payer: COMMERCIAL

## 2021-01-21 DIAGNOSIS — Z86.19 HISTORY OF CLOSTRIDIUM DIFFICILE INFECTION: Primary | ICD-10-CM

## 2021-01-21 DIAGNOSIS — R19.5 LOOSE STOOLS: ICD-10-CM

## 2021-01-21 LAB
C DIFF TOX A+B STL QL IA: NEGATIVE
C DIFF TOX GENS STL QL NAA+PROBE: POSITIVE

## 2021-01-21 PROCEDURE — 83993 ASSAY FOR CALPROTECTIN FECAL: CPT | Mod: NC

## 2021-01-21 PROCEDURE — 87493 C DIFF AMPLIFIED PROBE: CPT

## 2021-01-23 LAB — CALPROTECTIN STL-MCNT: 16.6 MCG/G

## 2021-02-08 ENCOUNTER — HOSPITAL ENCOUNTER (EMERGENCY)
Facility: HOSPITAL | Age: 46
Discharge: HOME | End: 2021-02-08
Attending: EMERGENCY MEDICINE
Payer: COMMERCIAL

## 2021-02-08 ENCOUNTER — APPOINTMENT (OUTPATIENT)
Dept: RADIOLOGY | Facility: HOSPITAL | Age: 46
End: 2021-02-08
Payer: COMMERCIAL

## 2021-02-08 VITALS
DIASTOLIC BLOOD PRESSURE: 58 MMHG | WEIGHT: 239.2 LBS | OXYGEN SATURATION: 90 % | BODY MASS INDEX: 40.84 KG/M2 | HEIGHT: 64 IN | TEMPERATURE: 97.5 F | SYSTOLIC BLOOD PRESSURE: 111 MMHG | RESPIRATION RATE: 17 BRPM | HEART RATE: 72 BPM

## 2021-02-08 DIAGNOSIS — M54.50 LOW BACK PAIN: Primary | ICD-10-CM

## 2021-02-08 PROCEDURE — 99284 EMERGENCY DEPT VISIT MOD MDM: CPT | Performed by: EMERGENCY MEDICINE

## 2021-02-08 PROCEDURE — 6370000100 HC RX 637 (ALT 250 FOR IP): Performed by: EMERGENCY MEDICINE

## 2021-02-08 PROCEDURE — 6360000200 HC RX 636 W HCPCS (ALT 250 FOR IP): Performed by: EMERGENCY MEDICINE

## 2021-02-08 PROCEDURE — 72100 X-RAY EXAM L-S SPINE 2/3 VWS: CPT

## 2021-02-08 PROCEDURE — 96372 THER/PROPH/DIAG INJ SC/IM: CPT

## 2021-02-08 RX ORDER — OXYCODONE AND ACETAMINOPHEN 5; 325 MG/1; MG/1
1 TABLET ORAL EVERY 4 HOURS PRN
Qty: 15 TABLET | Refills: 0 | Status: SHIPPED | OUTPATIENT
Start: 2021-02-08 | End: 2021-02-23

## 2021-02-08 RX ORDER — OXYCODONE AND ACETAMINOPHEN 5; 325 MG/1; MG/1
2 TABLET ORAL ONCE
Status: COMPLETED | OUTPATIENT
Start: 2021-02-08 | End: 2021-02-08

## 2021-02-08 RX ORDER — ONDANSETRON 4 MG/1
4 TABLET, ORALLY DISINTEGRATING ORAL ONCE
Status: COMPLETED | OUTPATIENT
Start: 2021-02-08 | End: 2021-02-08

## 2021-02-08 RX ORDER — HYDROMORPHONE HYDROCHLORIDE 1 MG/ML
1 INJECTION, SOLUTION INTRAMUSCULAR; INTRAVENOUS; SUBCUTANEOUS ONCE
Status: COMPLETED | OUTPATIENT
Start: 2021-02-08 | End: 2021-02-08

## 2021-02-08 RX ADMIN — OXYCODONE HYDROCHLORIDE AND ACETAMINOPHEN 2 TABLET: 5; 325 TABLET ORAL at 14:27

## 2021-02-08 RX ADMIN — HYDROMORPHONE HYDROCHLORIDE 1 MG: 1 INJECTION, SOLUTION INTRAMUSCULAR; INTRAVENOUS; SUBCUTANEOUS at 12:38

## 2021-02-08 RX ADMIN — ONDANSETRON 4 MG: 4 TABLET, ORALLY DISINTEGRATING ORAL at 12:40

## 2021-02-12 NOTE — ED PROVIDER NOTES
"    HPI:  Chief Complaint   Patient presents with   • Back Pain     REPORTS LOWER BACK PAIN SINCE YESTERDAY AFTER SITTING ON THE FLOOR AND TWISTING.  REPORTS BILATERAL FEET TINGLING (R>L)       Patient who has a history of chronic back pain and remote history of back injury for which she had Rascon rods placed presents to the emergency department with a chief complaint of low back pain.  She states this is been ongoing since yesterday morning at which time while sitting on the floor and twisting she felt a \"pop\" and now has had tingling to her feet bilaterally right greater than the left.  She denies any fever, chills, incontinence of bowel or urine, weakness or inability to walk.          HISTORY:  Past Medical History:   Diagnosis Date   • Chronic pain    • Complication of anesthesia    • Dental disease    • Depression    • Diverticulitis    • Endocrine disorder    • Extremity pain    • Fibromyalgia    • Hashimoto's thyroiditis    • Hypothyroidism    • Injury of back    • Low back pain    • Neurologic disorder    • Respiratory disease    • Sleep apnea     does not use cpap   • Wears dentures        Past Surgical History:   Procedure Laterality Date   • BACK SURGERY     • CHOLECYSTECTOMY     • COLONOSCOPY N/A 12/27/2019    Procedure: COLONOSCOPY WITH ASCENDING COLON BIOPSIES;  Surgeon: Kelvin Cordova MD PhD;  Location: [facility] Endoscopy;  Service: Endoscopy;  Laterality: N/A;   • ESOPHAGOGASTRODUODENOSCOPY N/A 12/27/2019    Procedure: ESOPHAGOGASTRODUODENOSCOPY WITH DUODENAL BIOPSIES;  Surgeon: Kelvin Cordova MD PhD;  Location: [facility] Endoscopy;  Service: Endoscopy;  Laterality: N/A;   • EXAMINATION UNDER ANESTHESIA N/A 1/17/2020    Procedure: EXAM UNDER ANESTHESIA;  Surgeon: Steve Michelle MD;  Location: [facility] OR;  Service: General;  Laterality: N/A;   • HEMORRHOID SURGERY N/A 1/17/2020    Procedure: HEMORRHOIDECTOMY TIMES ONE with BANDING TIMES ONE;  Surgeon: Steve Michelle MD;  Location: [facility] OR;  Service: " "General;  Laterality: N/A;   • HYSTERECTOMY     • REDUCTION MAMMAPLASTY     • TUBAL LIGATION         History reviewed. No pertinent family history.    Social History     Tobacco Use   • Smoking status: Former Smoker     Packs/day: 0.00   • Smokeless tobacco: Never Used   • Tobacco comment: 'VAPES'   Substance Use Topics   • Alcohol use: No   • Drug use: No         ROS:  Review of Systems  Pertinent system review document HPI  PE:  ED Triage Vitals   Temp Heart Rate Resp BP SpO2   02/08/21 1012 02/08/21 1012 02/08/21 1012 02/08/21 1012 02/08/21 1012   36.4 °C (97.5 °F) 75 16 108/76 96 %      Temp Source Heart Rate Source Patient Position BP Location FiO2 (%)   02/08/21 1012 -- 02/08/21 1300 -- --   Oral  Left lateral         Physical Exam  Patient awake alert moderate anxious distress.  Vital signs are stable she is afebrile.  Back she has surgical scar involving the entire extent of her back consistent with prior hardware stabilization.  Diffuse tenderness without swelling, ecchymosis or deformity to the lumbar area more paralumbar.  Extremities well perfused normal muscular bulk and tone.  No weakness to extensor toe strength or leg extension.  Patellar reflexes 1+ at symmetrical.  Sensation intact to light touch.  Skin is warm and dry.  ED LABS:  Labs Reviewed - No data to display      ED IMAGES:  X-ray lumbar spine 2 or 3 views   Final Result   IMPRESSION:        1.  No radiographic evidence of an acute osseous abnormality.          ED PROCEDURES:  Procedures    ED COURSE:   Patient presents with back pain history chronic back pain and because of the \"pop\" sensation she had and with the hardware it will be judged for integrity by doing plain films.           MDM:  MDM  Patient's x-rays without evidence of acute injury or malalignment or fracture of any of the hardware.  Suspect more of a strain scenario for which one would hope that is a spontaneous resolving self-limited process but if not referral to PCP for " follow-up advised.  Medication for pain dispense.  No evidence of significant neurologic deficits.  No evidence of infectious etiology or major compressive etiology.  Final diagnoses:   [M54.5] Low back pain        Corky Garcia MD  02/12/21 0757

## 2021-03-12 ENCOUNTER — APPOINTMENT (OUTPATIENT)
Dept: LAB | Facility: HOSPITAL | Age: 46
End: 2021-03-12
Payer: COMMERCIAL

## 2021-03-12 DIAGNOSIS — R19.7 DIARRHEA, UNSPECIFIED TYPE: ICD-10-CM

## 2021-03-12 LAB
C DIFF TOX A+B STL QL IA: NEGATIVE
C DIFF TOX GENS STL QL NAA+PROBE: POSITIVE

## 2021-03-12 PROCEDURE — 87493 C DIFF AMPLIFIED PROBE: CPT

## 2021-03-24 ENCOUNTER — TELEPHONE (OUTPATIENT)
Dept: DIABETES SERVICES | Facility: CLINIC | Age: 46
End: 2021-03-24

## 2021-03-24 NOTE — TELEPHONE ENCOUNTER
Patient is scheduled with me today at 245PM (telephone consult).   I called her for the appointment however, there was no answer.   I left a voice mail requested she call me back prior to 330PM today if she was able to have the appointment today or to call back at her convenience to reschedule.   My desk phone number was given.

## 2021-04-01 LAB — SARS-COV-2 RDRP RESP QL NAA+PROBE: NEGATIVE

## 2021-04-06 ENCOUNTER — ANESTHESIA (OUTPATIENT)
Dept: GASTROENTEROLOGY | Facility: HOSPITAL | Age: 46
End: 2021-04-06
Payer: COMMERCIAL

## 2021-04-06 ENCOUNTER — HOSPITAL ENCOUNTER (OUTPATIENT)
Facility: HOSPITAL | Age: 46
Setting detail: OUTPATIENT SURGERY
Discharge: HOME | End: 2021-04-06
Attending: INTERNAL MEDICINE | Admitting: INTERNAL MEDICINE
Payer: COMMERCIAL

## 2021-04-06 ENCOUNTER — ANESTHESIA EVENT (OUTPATIENT)
Dept: GASTROENTEROLOGY | Facility: HOSPITAL | Age: 46
End: 2021-04-06
Payer: COMMERCIAL

## 2021-04-06 VITALS
BODY MASS INDEX: 39.67 KG/M2 | HEIGHT: 64 IN | RESPIRATION RATE: 16 BRPM | WEIGHT: 232.37 LBS | SYSTOLIC BLOOD PRESSURE: 118 MMHG | DIASTOLIC BLOOD PRESSURE: 70 MMHG | TEMPERATURE: 97.2 F | HEART RATE: 61 BPM | OXYGEN SATURATION: 97 %

## 2021-04-06 PROCEDURE — 6370000100 HC RX 637 (ALT 250 FOR IP): Performed by: INTERNAL MEDICINE

## 2021-04-06 PROCEDURE — 00811 ANES LWR INTST NDSC NOS: CPT | Mod: QS,P2 | Performed by: NURSE ANESTHETIST, CERTIFIED REGISTERED

## 2021-04-06 PROCEDURE — (BLANK) HC MAC ANESTHESIA FACILITY CHARGE EACH ADDITIONAL MIN: Performed by: INTERNAL MEDICINE

## 2021-04-06 PROCEDURE — 2580000300 HC RX 258: Performed by: NURSE ANESTHETIST, CERTIFIED REGISTERED

## 2021-04-06 PROCEDURE — (BLANK) HC RECOVERY PHASE-2 1ST 1/2 HOUR ACUITY LEVEL 2: Performed by: INTERNAL MEDICINE

## 2021-04-06 PROCEDURE — 6360000200 HC RX 636 W HCPCS (ALT 250 FOR IP): Mod: JW | Performed by: NURSE ANESTHETIST, CERTIFIED REGISTERED

## 2021-04-06 PROCEDURE — (BLANK) HC RECOVERY PHASE-2 EACH ADDITIONAL 1/2 HOUR ACUITY LEVEL 2: Performed by: INTERNAL MEDICINE

## 2021-04-06 PROCEDURE — (BLANK): Performed by: INTERNAL MEDICINE

## 2021-04-06 PROCEDURE — (BLANK) HC MAC ANESTHESIA FACILITY CHARGE 1ST 15 MIN: Performed by: INTERNAL MEDICINE

## 2021-04-06 DEVICE — KIT FECAL MICROBIOLA PREP LOWR LOWER ADMINISTRATION: Type: IMPLANTABLE DEVICE | Status: FUNCTIONAL

## 2021-04-06 RX ORDER — LOPERAMIDE HCL 2 MG
8 CAPSULE ORAL ONCE
Status: COMPLETED | OUTPATIENT
Start: 2021-04-06 | End: 2021-04-06

## 2021-04-06 RX ORDER — SODIUM CHLORIDE, SODIUM LACTATE, POTASSIUM CHLORIDE, CALCIUM CHLORIDE 600; 310; 30; 20 MG/100ML; MG/100ML; MG/100ML; MG/100ML
INJECTION, SOLUTION INTRAVENOUS CONTINUOUS PRN
Status: DISCONTINUED | OUTPATIENT
Start: 2021-04-06 | End: 2021-04-06 | Stop reason: SURG

## 2021-04-06 RX ORDER — HYOSCYAMINE SULFATE 0.125 MG
0.12 TABLET ORAL ONCE
Status: COMPLETED | OUTPATIENT
Start: 2021-04-06 | End: 2021-04-06

## 2021-04-06 RX ORDER — PROPOFOL 10 MG/ML
INJECTION, EMULSION INTRAVENOUS CONTINUOUS PRN
Status: DISCONTINUED | OUTPATIENT
Start: 2021-04-06 | End: 2021-04-06 | Stop reason: SURG

## 2021-04-06 RX ORDER — LIDOCAINE HYDROCHLORIDE 20 MG/ML
INJECTION, SOLUTION EPIDURAL; INFILTRATION; INTRACAUDAL; PERINEURAL AS NEEDED
Status: DISCONTINUED | OUTPATIENT
Start: 2021-04-06 | End: 2021-04-06 | Stop reason: SURG

## 2021-04-06 RX ADMIN — SODIUM CHLORIDE, POTASSIUM CHLORIDE, SODIUM LACTATE AND CALCIUM CHLORIDE: 600; 310; 30; 20 INJECTION, SOLUTION INTRAVENOUS at 12:01

## 2021-04-06 RX ADMIN — LIDOCAINE HYDROCHLORIDE 80 MG: 20 INJECTION, SOLUTION EPIDURAL; INFILTRATION; INTRACAUDAL; PERINEURAL at 12:05

## 2021-04-06 RX ADMIN — LOPERAMIDE HYDROCHLORIDE 8 MG: 2 CAPSULE ORAL at 13:02

## 2021-04-06 RX ADMIN — HYOSCYAMINE SULFATE 0.12 MG: 0.12 TABLET ORAL at 13:02

## 2021-04-06 RX ADMIN — PROPOFOL 300 MCG/KG/MIN: 10 INJECTION, EMULSION INTRAVENOUS at 12:04

## 2021-04-06 ASSESSMENT — PAIN DESCRIPTION - DESCRIPTORS: DESCRIPTORS: PRESSURE;ACHING

## 2021-04-06 ASSESSMENT — ENCOUNTER SYMPTOMS: EXERCISE TOLERANCE: GOOD (>7 METS)

## 2021-04-06 NOTE — ANESTHESIA POSTPROCEDURE EVALUATION
Patient: Isabell Harper    Procedure Summary     Date: 04/06/21 Room / Location: [facility] ENDO 02 / [facility] Endoscopy    Anesthesia Start: 1201 Anesthesia Stop: 1227    Procedure: COLONOSCOPY with FMT (N/A Anus) Diagnosis:       Clostridium difficile infection      (hemorrhoids )    Providers: Maria Dolores Richardson MD Responsible Provider: Maria Dolores Richardson MD    Anesthesia Type: MAC ASA Status: 2          Anesthesia Type: MAC    Last vitals  Vitals Value Taken Time   BP     Temp     Pulse     Resp     SpO2     Pain Score         Anesthesia Post Evaluation    Patient location during evaluation: bedside  Patient participation: complete - patient participated  Level of consciousness: awake  Pain management: adequate  Airway patency: patent  Anesthetic complications: no  Cardiovascular status: acceptable  Respiratory status: acceptable and nasal cannula  Hydration status: acceptable  May dismiss recovered patient based on consultation with the appropriate physicians and/or meeting appropriate discharge criteria      Cosmetic?  This procedure is not cosmetic.

## 2021-04-06 NOTE — POST-PROCEDURE NOTE
Brief GI Post-op Note     *See provation record for full operative report*     04/06/21    Isabell Harper is a 46 y.o. female     Pre-op Diagnosis:   Refractory CDI    Procedure:    COLONOSCOPY with fecal microbiota transplant     Anesthesia:  MAC    Specimens:  None    Complications: None    Findings:  Please see my procedure report    Recommendations: Please see my procedure report      Maria Dolores Richardson MD

## 2021-04-06 NOTE — ANESTHESIA PREPROCEDURE EVALUATION
"Pre-Procedure Assessment    Patient: Isabell Harper, female, 46 y.o.    Ht Readings from Last 1 Encounters:   02/08/21 1.626 m (5' 4\")     Wt Readings from Last 1 Encounters:   02/08/21 108.5 kg (239 lb 3.2 oz)       Last Vitals  BP      Temp      Pulse     Resp      SpO2      Pain Score         Problem list reviewed and Medical history reviewed    History of anesthetic complications:          Airway   Mallampati: II  TM distance: >3 FB  Neck ROM: full      Dental      Pulmonary - normal exam    breath sounds clear to auscultation  (+) sleep apnea,   Cardiovascular - negative ROS and normal exam  Exercise tolerance: good (>7 METS)    Rhythm: regular  Rate: normal    Mental Status/Neuro/Psych    Pt is alert.      (+) arthritis, back injury, peripheral neuropathy,     GI/Hepatic/Renal - negative ROS     Endo/Other    (+) hypothyroidism,   Abdominal   (+) obese,           Social History     Tobacco Use   • Smoking status: Former Smoker     Packs/day: 0.00   • Smokeless tobacco: Never Used   • Tobacco comment: 'VAPES'   Substance Use Topics   • Alcohol use: No      Hematology   WBC   Date Value Ref Range Status   12/23/2020 8.6 4.5 - 10.5 10*3/uL Final     RBC   Date Value Ref Range Status   12/23/2020 4.53 3.70 - 5.30 10*6/µL Final     MCV   Date Value Ref Range Status   12/23/2020 86.1 81.0 - 97.0 fL Final     Hemoglobin   Date Value Ref Range Status   12/23/2020 13.1 11.5 - 15.5 g/dL Final     Hematocrit   Date Value Ref Range Status   12/23/2020 39.0 34.0 - 45.0 % Final     Platelets   Date Value Ref Range Status   12/23/2020 278 140 - 350 10*3/uL Final      Coagulation   Protime   Date Value Ref Range Status   09/24/2019 12.1 9.4 - 12.5 s Final     PTT   Date Value Ref Range Status   09/24/2019 31.3 25.1 - 36.5 s Final     INR   Date Value Ref Range Status   09/24/2019 1.1 <=1.1 INR Final      General Chemistry   Calcium   Date Value Ref Range Status   12/23/2020 9.2 8.6 - 10.3 mg/dL Final     BUN   Date Value Ref " Range Status   12/23/2020 9 7 - 25 mg/dL Final     Creatinine   Date Value Ref Range Status   12/23/2020 0.68 0.60 - 1.10 mg/dL Final     Glucose   Date Value Ref Range Status   12/23/2020 88 70 - 105 mg/dL Final     Sodium   Date Value Ref Range Status   12/23/2020 136 135 - 145 mmol/L Final     Potassium   Date Value Ref Range Status   12/23/2020 4.1 3.5 - 5.1 mmol/L Final     CO2   Date Value Ref Range Status   12/23/2020 28 21 - 32 mmol/L Final     Chloride   Date Value Ref Range Status   12/23/2020 100 98 - 107 mmol/L Final     Anesthesia Plan    ASA 2   NPO status reviewed: > 6 hours    MAC         Induction: intravenous       Additional Comments: C-Diff          Anesthetic plan and risks discussed with patient.  Use of blood products discussed with patient who consented to blood products.     Plan discussed with attending.

## 2021-04-06 NOTE — H&P
GI Endoscopy pre-procedure H&P    I have reviewed anesthesia pre evaluation report and discussed with CRNA. I have also queried the GI Consult Note by Cammy Sandra CNP dated 2/1/21.     Coagulopathy   No    Review of Systems  Queried    Physical Exam:     Nursing notes and vitals reviewed.  Constitutional: Alert and oriented to person, place, and time; in no acute distress.   HEENT: Normocephalic and atraumatic. PERRLA. EOM intact. Conjunctivae are clear.   Neck: Supple with no lymphadenopathy.   Cardiovascular: Normal rate, regular rhythm and normal heart sounds.    Pulmonary: Clear to ascultation bilaterally. No wheezes or rhonchi.   Abdominal: Non-distended; soft; normoactive bowel sounds; non-tender to palpation    CRISTIAN: Deferred   Psychiatric: Normal mood and affect.     ASA  2    Procedure indications:  C.diff associated diarrhea refractory to pharmacological management with vancomycin    Plan   Colonsocopy and MAC-CRNA    Informed Consent  Patient    The risks and benefits of colonoscopy as well as risks of FMT have been discussed with the patient, including possibility of the procedure-related complications such as bleeding, infection that can be transmitted with the scope, and perforation; as well as sedation-related complications, including hypotension, hypoxia and even death. The patient appeared to understand all the risks involved and gave me permission to proceed.     Maria Dolores Richardson MD  04/06/21

## 2021-04-06 NOTE — PERIOPERATIVE NURSING NOTE
Dr. Richardson at bedside with procedure report. Received orders for PO Levsin 0.125mg, Imodium 8mg, for patient to be NPO except for sips with meds, patient to lay on right side for 2hr.

## 2021-05-17 ENCOUNTER — APPOINTMENT (OUTPATIENT)
Dept: LAB | Facility: HOSPITAL | Age: 46
End: 2021-05-17
Payer: COMMERCIAL

## 2021-06-03 ENCOUNTER — APPOINTMENT (OUTPATIENT)
Dept: LAB | Facility: HOSPITAL | Age: 46
End: 2021-06-03
Payer: COMMERCIAL

## 2021-06-03 DIAGNOSIS — K92.1 BLACK STOOLS: ICD-10-CM

## 2021-06-03 LAB — FECAL OCCULT BLOOD SCREEN, POC: NEGATIVE

## 2021-06-03 PROCEDURE — 82274 ASSAY TEST FOR BLOOD FECAL: CPT

## 2021-08-09 ENCOUNTER — CONSULT (OUTPATIENT)
Dept: SURGERY | Facility: CLINIC | Age: 46
End: 2021-08-09
Payer: COMMERCIAL

## 2021-08-09 VITALS
SYSTOLIC BLOOD PRESSURE: 129 MMHG | HEIGHT: 64 IN | HEART RATE: 65 BPM | WEIGHT: 247.8 LBS | DIASTOLIC BLOOD PRESSURE: 74 MMHG | RESPIRATION RATE: 17 BRPM | BODY MASS INDEX: 42.3 KG/M2 | OXYGEN SATURATION: 93 % | TEMPERATURE: 97.4 F

## 2021-08-09 PROCEDURE — 99204 OFFICE O/P NEW MOD 45 MIN: CPT | Performed by: SURGERY

## 2021-08-09 RX ORDER — CELECOXIB 200 MG/1
200 CAPSULE ORAL 2 TIMES DAILY
COMMUNITY
End: 2022-01-12

## 2021-08-09 ASSESSMENT — PAIN SCALES - GENERAL: PAINLEVEL: 7

## 2021-08-09 NOTE — H&P
General Surgical History and Physical  Dr. Paul Ferris  [facility]    Patients name: Isabell Harper  Patients : 1975  Medical record number: [id number]      Patient Active Problem List   Diagnosis   • Lower GI bleed   • Right lower quadrant abdominal pain   • Fibromyalgia   • Chronic back pain   • Other specified hypothyroidism   • Osteoarthritis of spine with radiculopathy, lumbar region   • Postlaminectomy syndrome of lumbar region   • Sacroiliitis (CMS/HCC) (HCC)   • Other chronic pain   • Fall   • Coccygeal pain         Consultation for bariatric surgery    Subjective     Patient is a 46 y.o. female presents with BMI of 42.5, complications of chronic pain status post lumbar laminectomy x4, obstructive sleep apnea, hypothyroidism, hypercholesterolemia, prediabetes.  She is status post laparoscopic cholecystectomy, hysterectomy, hemorrhoid treatment.  She is also status post fecal transplant for C. difficile colitis with good results.  At that time of endoscopy she was also found to have diverticulosis without diverticulitis.    Patient is very motivated to lose weight because she feels like her chronic pain is made worse by her obesity.  At the same time her pain makes it difficult to exercise complicating her weight loss.  She has tried several dietary approaches on her own without medical supervision and without success.  She has known several people who underwent weight loss surgery with good results and has been thinking about the process herself for some years.  She has done a lot of reading online, and is interested in starting the process.  She presents today for first contact with the bariatric system.    Regarding her sleep apnea patient says she does not wear her mask because it irritates her.  She has not been evaluated by the sleep center recently nor tried multiple types of masks.  She states that she has been diagnosed with hiatal hernia in the past but denies any symptoms of acid reflux,  dysphagia, or odynophagia.  She does wake up gasping in the night but thinks this is related to sleep apnea not reflux disease..     The following portions of the patient's history were reviewed and updated as appropriate: allergies, current medications, past family history, past medical history, past social history, past surgical history and problem list.    Review of Systems  Constitutional: Positive inability to lose weight  Eyes: negative  Ears, nose, mouth, throat, and face: negative  Respiratory: negative shortness of breath  Cardiovascular: negative  Gastrointestinal: negative acid reflux, nausea vomiting diarrhea  Genitourinary:negative dysuria  Integument/breast: negative  Hematologic/lymphatic: negative  Musculoskeletal: Positive chronic back pain with lower extremity weakness and numbness  Neurological: negative  Behavioral/Psych: negative  Endocrine: negative  Allergic/Immunologic: negative    Patient History:  Medical History:   Past Medical History:   Diagnosis Date   • Chronic pain    • Complication of anesthesia    • Dental disease    • Depression    • Diverticulitis    • Endocrine disorder    • Extremity pain    • Fibromyalgia    • Hashimoto's thyroiditis    • Hypothyroidism    • Injury of back    • Low back pain    • Neurologic disorder    • Respiratory disease    • Sleep apnea     does not use cpap   • Wears dentures      Surgical History:   Past Surgical History:   Procedure Laterality Date   • BACK SURGERY     • CHOLECYSTECTOMY     • COLONOSCOPY N/A 12/27/2019    Procedure: COLONOSCOPY WITH ASCENDING COLON BIOPSIES;  Surgeon: Kelvin Cordova MD PhD;  Location: [facility];  Service: Endoscopy;  Laterality: N/A;   • COLONOSCOPY N/A 4/6/2021    Procedure: COLONOSCOPY WITH FECAL MICROBIOTA TRANSPLANTATION;  Surgeon: Maria Dolores Richardson MD;  Location: [facility];  Service: Endoscopy;  Laterality: N/A;   • ESOPHAGOGASTRODUODENOSCOPY N/A 12/27/2019    Procedure: ESOPHAGOGASTRODUODENOSCOPY WITH  DUODENAL BIOPSIES;  Surgeon: Kelvin Cordova MD PhD;  Location: [facility];  Service: Endoscopy;  Laterality: N/A;   • EXAMINATION UNDER ANESTHESIA N/A 1/17/2020    Procedure: EXAM UNDER ANESTHESIA;  Surgeon: Steve Michelle MD;  Location: [facility] OR;  Service: General;  Laterality: N/A;   • HEMORRHOID SURGERY N/A 1/17/2020    Procedure: HEMORRHOIDECTOMY TIMES ONE with BANDING TIMES ONE;  Surgeon: Steve Michelle MD;  Location: [facility] OR;  Service: General;  Laterality: N/A;   • HYSTERECTOMY     • REDUCTION MAMMAPLASTY     • TUBAL LIGATION       Family History:   Family History   Problem Relation Age of Onset   • No Known Problems Mother      Social History:   Social History     Socioeconomic History   • Marital status: Single     Spouse name: Not on file   • Number of children: Not on file   • Years of education: Not on file   • Highest education level: Not on file   Occupational History   • Not on file   Tobacco Use   • Smoking status: Former Smoker     Packs/day: 0.00   • Smokeless tobacco: Never Used   • Tobacco comment: 'VAPES'   Vaping Use   • Vaping Use: Former   • Substances: Nicotine   • Devices: Pre-filled or refillable cartridge, Refillable tank   Substance and Sexual Activity   • Alcohol use: No   • Drug use: No   • Sexual activity: Defer   Other Topics Concern   • Not on file   Social History Narrative   • Not on file     Social Determinants of Health     Financial Resource Strain:    • Difficulty of Paying Living Expenses:    Food Insecurity:    • Worried About Running Out of Food in the Last Year:    • Ran Out of Food in the Last Year:    Transportation Needs:    • Lack of Transportation (Medical):    • Lack of Transportation (Non-Medical):    Physical Activity:    • Days of Exercise per Week:    • Minutes of Exercise per Session:    Stress:    • Feeling of Stress :    Social Connections:    • Frequency of Communication with Friends and Family:    • Frequency of Social Gatherings with Friends and Family:     • Attends Jewish Services:    • Active Member of Clubs or Organizations:    • Attends Club or Organization Meetings:    • Marital Status:    Intimate Partner Violence:    • Fear of Current or Ex-Partner:    • Emotionally Abused:    • Physically Abused:    • Sexually Abused:      Allergies:   Allergies   Allergen Reactions   • Cephalexin      hives, throat swelling   • Morphine Other (see comments)     Chest Pain; pt reported to this RN that she does not have an allergy to Morphine and was okay w/Dr. Conroy ordering Morphine for her pain.     PT STATES SHE IS NOT ALLERGIC        Medications:   Current Outpatient Medications:   •  celecoxib (CeleBREX) 200 mg capsule, Take 200 mg by mouth 2 (two) times a day, Disp: , Rfl:   •  diclofenac sodium (VOLTAREN) 1 % gel, Apply 4 g topically 4 (four) times a day as needed (pain), Disp: 100 g, Rfl: 3  •  ALPRAZolam (XANAX) 1 mg tablet, Take 1 mg by mouth daily as needed, Disp: , Rfl: 5  •  gabapentin (NEURONTIN) 600 mg tablet, Take 1,200 mg by mouth 3 (three) times a day, Disp: , Rfl: 3  •  FLUoxetine (PROzac) 40 mg capsule, Take 40 mg by mouth every evening Take along with 20mg, total daily dose of 60mg , Disp: , Rfl: 6  •  traZODone (DESYREL) 100 mg tablet, Take 150 mg by mouth nightly, Disp: , Rfl: 2  •  carisoprodol (SOMA) 350 mg tablet, Take 350 mg by mouth 4 (four) times a day, Disp: , Rfl: 0  •  FLUoxetine (PROzac) 20 mg capsule, Take 20 mg by mouth every evening Take along with 40mg, total daily dose of 60mg , Disp: 30, Rfl: 3  •  levothyroxine (SYNTHROID, LEVOTHROID) 175 mcg tablet, Take 175 mcg by mouth every morning  , Disp: 30, Rfl: 5    Current Facility-Administered Medications:   •  lidocaine PF (XYLOCAINE) 10 mg/mL (1 %) vial 30 mL, 30 mL, injection, Once, Stephen Alonso MD    Objective:  Objective     Temp:  [36.3 °C (97.4 °F)] 36.3 °C (97.4 °F)  Heart Rate:  [65] 65  Resp:  [17] 17  BP: (129)/(74) 129/74  [unfilled]  [unfilled]  Wt Readings from Last 3  Encounters:   08/09/21 112.4 kg (247 lb 12.8 oz)   04/06/21 105.4 kg (232 lb 5.8 oz)   02/08/21 108.5 kg (239 lb 3.2 oz)       Exam:  General Appearance:    Alert, cooperative, no distress, appears stated age    Head:    Normocephalic, without obvious abnormality, atraumatic   Eyes:    PERRL, conjunctiva/corneas clear, EOM's intact, both eyes   Ears:    Normal TM's and external ear canals, both ears   Nose:   Nares normal, septum midline, mucosa normal, no drainage   Throat:   Lips, mucosa, and tongue normal; teeth and gums normal   Neck:   Supple, symmetrical, trachea midline, no adenopathy;     thyroid:  no enlargement/tenderness/nodules   Back:     Symmetric, no curvature, ROM normal, no CVA tenderness   Lungs:     Clear to auscultation bilaterally, respirations unlabored   Chest Wall:    No tenderness or deformity   Heart:    Regular rate and rhythm, S1 and S2 normal, no murmur, rub or gallop   Abdomen:     Soft, non-tender, bowel sounds active,     no masses, no organomegaly   Genitalia:    Normal  without lesion, discharge   Extremities:   Extremities normal, atraumatic, no cyanosis or edema   Pulses:   2+ and symmetric all extremities   Skin:   Skin color, texture, turgor normal, no rashes or lesions   Lymph nodes:   Cervical, supraclavicular, and axillary nodes normal   Neurologic:   CNII-XII intact, normal strength, sensation and reflexes     throughout    Lab and image review:  Data Review CBC:   WBC   Date Value Ref Range Status   12/23/2020 8.6 4.5 - 10.5 10*3/uL Final     RBC   Date Value Ref Range Status   12/23/2020 4.53 3.70 - 5.30 10*6/µL Final     Hemoglobin   Date Value Ref Range Status   12/23/2020 13.1 11.5 - 15.5 g/dL Final     Hematocrit   Date Value Ref Range Status   12/23/2020 39.0 34.0 - 45.0 % Final     Platelets   Date Value Ref Range Status   12/23/2020 278 140 - 350 10*3/uL Final     BMP:   Glucose   Date Value Ref Range Status   12/23/2020 88 70 - 105 mg/dL Final     Sodium   Date  Value Ref Range Status   12/23/2020 136 135 - 145 mmol/L Final     Potassium   Date Value Ref Range Status   12/23/2020 4.1 3.5 - 5.1 mmol/L Final     Chloride   Date Value Ref Range Status   12/23/2020 100 98 - 107 mmol/L Final     CO2   Date Value Ref Range Status   12/23/2020 28 21 - 32 mmol/L Final     BUN   Date Value Ref Range Status   12/23/2020 9 7 - 25 mg/dL Final     Creatinine   Date Value Ref Range Status   12/23/2020 0.68 0.60 - 1.10 mg/dL Final     Calcium   Date Value Ref Range Status   12/23/2020 9.2 8.6 - 10.3 mg/dL Final     Coagulation:   Protime   Date Value Ref Range Status   09/24/2019 12.1 9.4 - 12.5 s Final     INR   Date Value Ref Range Status   09/24/2019 1.1 <=1.1 INR Final     PTT   Date Value Ref Range Status   09/24/2019 31.3 25.1 - 36.5 s Final     Cardiac markers: No results found for: TROPONINT, MYOGLOBIN  ABGs: No results found for: PHART, PHCAP, PHVEN, PO2, PO2ART, PO2CAP, SAD6RWL, VWD7ZWP, PCO2, BASEEXCESS  Radiology review: No recent relevant imaging    Assessment: 46-year-old female BMI of 42.5, comorbidities of chronic pain, sleep apnea, hypothyroidism, hypercholesterolemia, prediabetes, presenting for first contact with the bariatric system.    Overview of her bariatric pathway discussed with patient including the necessity of multidisciplinary evaluation and lifelong follow-up.  Neuroendocrine contributors to obesity discussed in detail.  Risks and benefits of Kya-en-Y gastric bypass and sleeve gastrectomy discussed in detail including the risks of bleeding, blood clots, infection, leaks, ulcer diastases, worsening reflux, internal hernias, and excess skin.    Patient may be a good candidate for weight loss surgery.  At this point I think a sleeve gastrectomy would be appropriate however she will need to undergo formal evaluation with upper endoscopy to rule out hiatal hernia and reflux esophagitis.  Patient had many appropriate questions, and is interested in proceeding.   We will set her up with the appropriate weight management referrals, and see her back for upper endoscopy once she has undergone multidisciplinary counseling and education.      Problems: Isabell was seen today for consult.    Diagnoses and all orders for this visit:    Body mass index 40.0-44.9, adult (CMS/HCC) ([facility])  -     Ambulatory referral to General Surgery        Plan: Multidisciplinary counseling, upper endoscopy, anticipate sleeve gastrectomy unless contraindications are uncovered.    Time Statement:  A total of 45 minutes were spent on this encounter including greater than 50% spent on direct patient counseling regarding risks and benefits of bariatric surgery..    Length of Stay: Based on this patient's comorbidity and risks, its anticipated that this patient will likely stay in hospital for at least 1 medically necessary midnights.    A voice to text program was used to aid in medical record documentation. Sometimes words are printed not exactly as they were spoken. While efforts were made to carefully edit and correct any inaccuracies, some errors may be present. Errors should be taken within the context of the discussion.  Please contact our office if you need assistance interpreting this medical record or notice any mistakes.     Electronically signed by:  Paul Ferris MD

## 2021-08-19 ENCOUNTER — HOSPITAL ENCOUNTER (EMERGENCY)
Facility: HOSPITAL | Age: 46
Discharge: HOME | End: 2021-08-19
Payer: COMMERCIAL

## 2021-08-19 VITALS
TEMPERATURE: 97.6 F | RESPIRATION RATE: 20 BRPM | DIASTOLIC BLOOD PRESSURE: 71 MMHG | SYSTOLIC BLOOD PRESSURE: 122 MMHG | HEART RATE: 61 BPM | OXYGEN SATURATION: 95 %

## 2021-08-19 DIAGNOSIS — G89.4 CHRONIC PAIN SYNDROME: Primary | ICD-10-CM

## 2021-08-19 PROCEDURE — 99284 EMERGENCY DEPT VISIT MOD MDM: CPT | Performed by: STUDENT IN AN ORGANIZED HEALTH CARE EDUCATION/TRAINING PROGRAM

## 2021-08-19 PROCEDURE — 6360000200 HC RX 636 W HCPCS (ALT 250 FOR IP): Performed by: STUDENT IN AN ORGANIZED HEALTH CARE EDUCATION/TRAINING PROGRAM

## 2021-08-19 PROCEDURE — 96372 THER/PROPH/DIAG INJ SC/IM: CPT

## 2021-08-19 PROCEDURE — 99283 EMERGENCY DEPT VISIT LOW MDM: CPT

## 2021-08-19 PROCEDURE — 6370000100 HC RX 637 (ALT 250 FOR IP)

## 2021-08-19 RX ORDER — ONDANSETRON HCL 4 MG
1 TABLET ORAL ONCE
Status: COMPLETED | OUTPATIENT
Start: 2021-08-19 | End: 2021-08-19

## 2021-08-19 RX ORDER — ONDANSETRON 4 MG/1
4 TABLET, ORALLY DISINTEGRATING ORAL ONCE
Status: COMPLETED | OUTPATIENT
Start: 2021-08-19 | End: 2021-08-19

## 2021-08-19 RX ORDER — GABAPENTIN 600 MG/1
TABLET ORAL
Status: COMPLETED
Start: 2021-08-19 | End: 2021-08-19

## 2021-08-19 RX ORDER — HYDROCODONE BITARTRATE AND ACETAMINOPHEN 5; 325 MG/1; MG/1
1 TABLET ORAL ONCE
Status: COMPLETED | OUTPATIENT
Start: 2021-08-19 | End: 2021-08-19

## 2021-08-19 RX ORDER — GABAPENTIN 600 MG/1
900 TABLET ORAL 3 TIMES DAILY
Qty: 135 TABLET | Refills: 0 | Status: SHIPPED | OUTPATIENT
Start: 2021-08-19 | End: 2021-09-18

## 2021-08-19 RX ORDER — HYDROMORPHONE HYDROCHLORIDE 1 MG/ML
1 INJECTION, SOLUTION INTRAMUSCULAR; INTRAVENOUS; SUBCUTANEOUS ONCE
Status: COMPLETED | OUTPATIENT
Start: 2021-08-19 | End: 2021-08-19

## 2021-08-19 RX ORDER — GABAPENTIN 600 MG/1
600 TABLET ORAL ONCE
Status: COMPLETED | OUTPATIENT
Start: 2021-08-19 | End: 2021-08-19

## 2021-08-19 RX ORDER — ONDANSETRON 4 MG/1
TABLET, ORALLY DISINTEGRATING ORAL
Status: COMPLETED
Start: 2021-08-19 | End: 2021-08-19

## 2021-08-19 RX ADMIN — ONDANSETRON 4 MG: 4 TABLET, ORALLY DISINTEGRATING ORAL at 12:25

## 2021-08-19 RX ADMIN — GABAPENTIN 600 MG: 600 TABLET, FILM COATED ORAL at 12:28

## 2021-08-19 RX ADMIN — HYDROMORPHONE HYDROCHLORIDE 1 MG: 1 INJECTION, SOLUTION INTRAMUSCULAR; INTRAVENOUS; SUBCUTANEOUS at 12:16

## 2021-08-19 RX ADMIN — Medication 1 PACKAGE: at 13:04

## 2021-08-19 RX ADMIN — HYDROCODONE BITARTRATE AND ACETAMINOPHEN 1 PACKAGE: 5; 325 TABLET ORAL at 13:04

## 2021-08-19 RX ADMIN — GABAPENTIN 600 MG: 600 TABLET ORAL at 12:28

## 2021-08-19 NOTE — ED PROVIDER NOTES
HPI:    Patient is a 46-year-old female with past medical history of rods in her back, chronic pain of the lower back and hips. Presents today to the emergency department with complaints of worsening lower back and right hip pain. Patient describes her symptoms as a constant 8/10 aching pain across her lower back radiating to her right hip. Patient notes that she is currently seeing a specialist who has performed a steroid injection into the hip 3 weeks ago which was minimally beneficial. Patient has scheduled follow-up appointment with the specialist. Patient also notes that she sees a spine specialist and is scheduled for follow-up on August 30.          Chief Complaint   Patient presents with   • Hip Pain     chronic pain, seeing spine/hip specialist, having trouble bearing wt due to pain today, pain is in right hip across back and into right leg           HISTORY:  Past Medical History:   Diagnosis Date   • Chronic pain    • Complication of anesthesia    • Dental disease    • Depression    • Diverticulitis    • Endocrine disorder    • Extremity pain    • Fibromyalgia    • Hashimoto's thyroiditis    • Hypothyroidism    • Injury of back    • Low back pain    • Neurologic disorder    • Respiratory disease    • Sleep apnea     does not use cpap   • Wears dentures        Past Surgical History:   Procedure Laterality Date   • BACK SURGERY     • CHOLECYSTECTOMY     • COLONOSCOPY N/A 12/27/2019    Procedure: COLONOSCOPY WITH ASCENDING COLON BIOPSIES;  Surgeon: Kelvin Cordova MD PhD;  Location: [facility];  Service: Endoscopy;  Laterality: N/A;   • COLONOSCOPY N/A 4/6/2021    Procedure: COLONOSCOPY WITH FECAL MICROBIOTA TRANSPLANTATION;  Surgeon: Maria Dolores Richardson MD;  Location: [facility];  Service: Endoscopy;  Laterality: N/A;   • ESOPHAGOGASTRODUODENOSCOPY N/A 12/27/2019    Procedure: ESOPHAGOGASTRODUODENOSCOPY WITH DUODENAL BIOPSIES;  Surgeon: Kelvin Cordova MD PhD;  Location: [facility];  Service:  Endoscopy;  Laterality: N/A;   • EXAMINATION UNDER ANESTHESIA N/A 1/17/2020    Procedure: EXAM UNDER ANESTHESIA;  Surgeon: Steve Michelle MD;  Location: [facility] OR;  Service: General;  Laterality: N/A;   • HEMORRHOID SURGERY N/A 1/17/2020    Procedure: HEMORRHOIDECTOMY TIMES ONE with BANDING TIMES ONE;  Surgeon: Steve Michelle MD;  Location: [facility] OR;  Service: General;  Laterality: N/A;   • HYSTERECTOMY     • REDUCTION MAMMAPLASTY     • TUBAL LIGATION         Family History   Problem Relation Age of Onset   • No Known Problems Mother        Social History     Tobacco Use   • Smoking status: Former Smoker     Packs/day: 0.00   • Smokeless tobacco: Never Used   • Tobacco comment: 'VAPES'   Vaping Use   • Vaping Use: Former   • Substances: Nicotine   • Devices: Pre-filled or refillable cartridge, Refillable tank   Substance Use Topics   • Alcohol use: No   • Drug use: No         ROS:  Review of Systems   Constitutional: Positive for fatigue. Negative for chills and fever.   HENT: Negative for congestion and sinus pain.    Gastrointestinal: Positive for nausea. Negative for abdominal pain.   Genitourinary: Negative for dysuria.   Musculoskeletal:        Hip pain and lower back pain as described in HPI   Skin: Negative for color change, rash and wound.           PE:  ED Triage Vitals [08/19/21 1122]   Temp Heart Rate Resp BP SpO2   36.4 °C (97.6 °F) 61 20 122/71 95 %      Temp Source Heart Rate Source Patient Position BP Location FiO2 (%)   Temporal -- Head of bed 30 degrees or higher -- --       Physical Exam  Vitals and nursing note reviewed.   Constitutional:       Comments: Overweight middle-age female in notable discomfort   HENT:      Nose: No rhinorrhea.   Eyes:      General: No scleral icterus.     Extraocular Movements: Extraocular movements intact.   Cardiovascular:      Rate and Rhythm: Normal rate.      Pulses: Normal pulses.   Pulmonary:      Effort: Pulmonary effort is normal. No respiratory distress.    Abdominal:      General: There is no distension.      Tenderness: There is no abdominal tenderness.   Musculoskeletal:         General: No swelling.      Comments: No gross anatomical deformities of the lower back or hip.  Lateral paraspinal tenderness in the lumbar region, posterior hip tenderness to palpation.   Lymphadenopathy:      Cervical: No cervical adenopathy.   Skin:     General: Skin is warm and dry.      Capillary Refill: Capillary refill takes less than 2 seconds.      Findings: No bruising, erythema or rash.   Neurological:      Mental Status: She is oriented to person, place, and time.      Comments: Sensation and motor function of the lower extremities intact               ED LABS:  Labs Reviewed - No data to display      ED IMAGES:  No orders to display       ED PROCEDURES:  Procedures    ED COURSE:       Chronic pain syndrome   -Worsening   -Patient is currently seen by a hip and spine specialist.  She has previously received pain injection in her right hip 3 weeks ago which only provided minimal benefit.  Patient has follow-up scheduled.  Patient is also scheduled to follow-up with spine specialist on August 30, 2021.   -Patient states she does not have a pain contract signed with any of the above specialist.   -Patient is currently taking gabapentin 600 mg 3 times daily.  She states she has previously taken 1200 mg 3 times daily but was tapering herself back.   -Gabapentin 600 mg p.o. once   -Patient advised to increase gabapentin to 900 mg 3 times daily.   -Dilaudid 1 mg IM once   -ED dose pack Norco 5-325 mg tabs.  1 tab 3 times daily as needed for severe pain.   -ED dose pack Zofran 4 mg tabs.  1 tab 3 times daily as needed for nausea vomiting.   -Advised to follow-up with hip and spine specialist as previously scheduled.                     MDM:  Number of Diagnoses or Management Options  Chronic pain syndrome: established and worsening     Amount and/or Complexity of Data  Reviewed  Clinical lab tests: reviewed  Tests in the radiology section of CPT®: reviewed    Risk of Complications, Morbidity, and/or Mortality  Presenting problems: moderate  Diagnostic procedures: moderate  Management options: moderate  General comments: Patient is a 46-year-old female with a past medical history of rods in her back and chronic lower back and right hip pain.  Patient is currently being seen by  hip and back specialist.  She presents today to the emergency department with complaints of progressive worsening lower back and right hip pain.  Patient presentation is an overweight middle-aged female in notable discomfort.  She has no gross physical deformities of her lower back or hip.  Sensation and motor function of the lower extremities are intact.  Imaging was not obtained today because there were no acute episodes to suggest trauma to the hip or lower back.  I suspect this is her baseline chronic pain which has been unresponsive to previous injections.  I do believe patient has legitimate pain which warrants treatment.  Patient was provided some narcotic pain control and advised to increase gabapentin to 900 mg daily.  Patient was discharged home in stable condition with prescription for gabapentin and ED dose pack of hydrocodone 5-325 mg tabs.  12 tabs were dispensed.  Patient advised to follow-up with her hip and back specialists as previously scheduled.    Patient Progress  Patient progress: stable      Final diagnoses:   [G89.4] Chronic pain syndrome          Henry Mcclure MD  08/19/21 1301       Henry Mcclure MD  08/23/21 1359       Henry Mcclure MD  08/23/21 1632       Henry Mcclure MD  08/23/21 1631

## 2021-08-19 NOTE — DISCHARGE INSTRUCTIONS
-Increase gabapentin to 900 mg three times daily.  -Start hydrocodone-acetaminophen 5-325 mg tablets. Take one tab three times daily as needed for severe pain.  -Start Zofran 4 mg tabs. Take one tab every 8 hours as needed for nausea or vomiting.  -Use ice or heat pads as needed for symptomatic control of pain.  -Continue physical activity as tolerated.  -Follow-up with your spine and hip specialist as previously scheduled.

## 2021-08-23 ASSESSMENT — ENCOUNTER SYMPTOMS
DYSURIA: 0
NAUSEA: 1
CHILLS: 0
SINUS PAIN: 0
WOUND: 0
ABDOMINAL PAIN: 0
FATIGUE: 1
FEVER: 0
COLOR CHANGE: 0

## 2021-09-14 ENCOUNTER — TELEPHONE (OUTPATIENT)
Dept: SURGERY | Facility: CLINIC | Age: 46
End: 2021-09-14

## 2021-09-14 NOTE — TELEPHONE ENCOUNTER
----- Message from Micki John sent at 9/13/2021 12:56 PM MDT -----  Regarding: Unable to contact  9/13/2021      Dear Dr. Ferris,       Thank you for the referral of  Isabell Harper  to our department.      Our office has been unable to reach your patient for scheduling on multiple attempts.    If your patient is still interested in coming to [facility], please have them contact our office [phone] to schedule an appointment with one of our providers.         Sincerely,      [facility]

## 2021-10-07 RX ORDER — GABAPENTIN 600 MG/1
TABLET ORAL
Qty: 135 TABLET | OUTPATIENT
Start: 2021-10-07

## 2021-10-15 ENCOUNTER — TRANSFERRED RECORDS (OUTPATIENT)
Dept: HEALTH INFORMATION MANAGEMENT | Facility: CLINIC | Age: 46
End: 2021-10-15

## 2021-10-25 ENCOUNTER — TRANSFERRED RECORDS (OUTPATIENT)
Dept: HEALTH INFORMATION MANAGEMENT | Facility: CLINIC | Age: 46
End: 2021-10-25

## 2021-12-21 ENCOUNTER — TELEPHONE (OUTPATIENT)
Dept: SURGERY | Facility: CLINIC | Age: 46
End: 2021-12-21
Payer: COMMERCIAL

## 2021-12-21 NOTE — TELEPHONE ENCOUNTER
Spoke with patient about her EGD. Patient states she wants to wait. She is having things going on right now and she wants to wait. Informed patient that I can call in February. Patient verbalized understanding and had no additional questions at this time.

## 2022-01-11 ENCOUNTER — APPOINTMENT (OUTPATIENT)
Dept: RADIOLOGY | Facility: HOSPITAL | Age: 47
DRG: 177 | End: 2022-01-11
Payer: COMMERCIAL

## 2022-01-11 ENCOUNTER — HOSPITAL ENCOUNTER (INPATIENT)
Facility: HOSPITAL | Age: 47
LOS: 3 days | Discharge: HOME | DRG: 177 | End: 2022-01-14
Attending: EMERGENCY MEDICINE | Admitting: INTERNAL MEDICINE
Payer: COMMERCIAL

## 2022-01-11 DIAGNOSIS — I10 HYPERTENSION, UNSPECIFIED TYPE: ICD-10-CM

## 2022-01-11 DIAGNOSIS — M46.1 SACROILIITIS (CMS/HCC): ICD-10-CM

## 2022-01-11 DIAGNOSIS — U07.1 COVID-19: Primary | ICD-10-CM

## 2022-01-11 PROBLEM — J96.01 ACUTE HYPOXEMIC RESPIRATORY FAILURE DUE TO COVID-19 (CMS/HCC): Status: ACTIVE | Noted: 2022-01-11

## 2022-01-11 PROBLEM — R00.1 SINUS BRADYCARDIA: Status: ACTIVE | Noted: 2022-01-11

## 2022-01-11 PROBLEM — G47.33 OSA ON CPAP: Status: ACTIVE | Noted: 2022-01-11

## 2022-01-11 LAB
ALBUMIN SERPL-MCNC: 3.7 G/DL (ref 3.5–5.3)
ALP SERPL-CCNC: 110 U/L (ref 39–100)
ALT SERPL-CCNC: 38 U/L (ref 7–52)
ANION GAP SERPL CALC-SCNC: 11 MMOL/L (ref 3–11)
ANION GAP SERPL CALC-SCNC: 8 MMOL/L (ref 3–11)
AST SERPL-CCNC: 41 U/L
BASE EXCESS BLDA CALC-SCNC: 5.9 MMOL/L (ref -2–2)
BASOPHILS # BLD AUTO: 0 10*3/UL
BASOPHILS NFR BLD AUTO: 1 % (ref 0–2)
BILIRUB SERPL-MCNC: 0.46 MG/DL (ref 0.2–1.4)
BUN SERPL-MCNC: 6 MG/DL (ref 7–25)
BUN SERPL-MCNC: 6 MG/DL (ref 7–25)
CALCIUM ALBUM COR SERPL-MCNC: 8.7 MG/DL (ref 8.6–10.3)
CALCIUM SERPL-MCNC: 7.8 MG/DL (ref 8.6–10.3)
CALCIUM SERPL-MCNC: 8.5 MG/DL (ref 8.6–10.3)
CHLORIDE SERPL-SCNC: 104 MMOL/L (ref 98–107)
CHLORIDE SERPL-SCNC: 105 MMOL/L (ref 98–107)
CK SERPL-CCNC: 27 U/L (ref 26–192)
CO2 BLDA-SCNC: 28.5 MMOL/L (ref 19–24)
CO2 SERPL-SCNC: 26 MMOL/L (ref 21–32)
CO2 SERPL-SCNC: 27 MMOL/L (ref 21–32)
CREAT SERPL-MCNC: 0.54 MG/DL (ref 0.6–1.1)
CREAT SERPL-MCNC: 0.6 MG/DL (ref 0.6–1.1)
CRP SERPL-MCNC: 28.5 MG/L
DEVICE (RT): ABNORMAL
EOSINOPHIL # BLD AUTO: 0.1 10*3/UL
EOSINOPHIL NFR BLD AUTO: 2 % (ref 0–3)
ERYTHROCYTE [DISTWIDTH] IN BLOOD BY AUTOMATED COUNT: 13.7 % (ref 11.5–14)
ERYTHROCYTE [SEDIMENTATION RATE] IN BLOOD: 30 MM/HR
FERRITIN SERPL-MCNC: 31.9 NG/ML (ref 11–307)
FIBRIN D-DIMER (NG/ML) IN PLATELET POOR PLASMA: 402 NG/ML FEU
FIBRINOGEN PPP-MCNC: 381 MG/DL (ref 200–393)
FLOW: 5 L/M
GFR SERPL CREATININE-BSD FRML MDRD: 109 ML/MIN/1.73M*2
GFR SERPL CREATININE-BSD FRML MDRD: 113 ML/MIN/1.73M*2
GLUCOSE SERPL-MCNC: 91 MG/DL (ref 70–105)
GLUCOSE SERPL-MCNC: 91 MG/DL (ref 70–105)
HCO3 BLDA-SCNC: 31.4 MMOL/L (ref 23–29)
HCT VFR BLD AUTO: 35.9 % (ref 34–45)
HGB BLD-MCNC: 11.7 G/DL (ref 11.5–15.5)
INR BLD: 1.1
LDH SERPL L TO P-CCNC: 193 U/L (ref 87–246)
LYMPHOCYTES # BLD AUTO: 1.7 10*3/UL
LYMPHOCYTES NFR BLD AUTO: 29 % (ref 11–47)
MAGNESIUM SERPL-MCNC: 1.9 MG/DL (ref 1.8–2.4)
MCH RBC QN AUTO: 28.4 PG (ref 28–33)
MCHC RBC AUTO-ENTMCNC: 32.7 G/DL (ref 32–36)
MCV RBC AUTO: 86.9 FL (ref 81–97)
MONOCYTES # BLD AUTO: 0.4 10*3/UL
MONOCYTES NFR BLD AUTO: 6 % (ref 3–11)
NEUTROPHILS # BLD AUTO: 3.9 10*3/UL
NEUTROPHILS NFR BLD AUTO: 63 % (ref 41–81)
PATIENT POSITION: ABNORMAL
PCO2 BLDA: 48.4 MMHG (ref 35–45)
PH BLDA: 7.42 PH (ref 7.35–7.45)
PHOSPHATE SERPL-MCNC: 3 MG/DL (ref 2.5–4.9)
PLATELET # BLD AUTO: 231 10*3/UL (ref 140–350)
PMV BLD AUTO: 7.8 FL (ref 6.9–10.8)
PO2 BLDA: 57.2 MMHG (ref 60–80)
POCT CTO2, ARTERIAL: 15.6 ML/DL (ref 15–24)
POCT HEMOGLOBIN (MEASURED), ARTERIAL: 12.6 G/DL (ref 11.5–15.5)
POCT OXYHEMOGLOBIN, ARTERIAL: 87.7 %
POTASSIUM SERPL-SCNC: 3 MMOL/L (ref 3.5–5.1)
POTASSIUM SERPL-SCNC: 3.3 MMOL/L (ref 3.5–5.1)
PROCALCITONIN SERPL-MCNC: <0.05 NG/ML
PROT SERPL-MCNC: 7 G/DL (ref 6–8.3)
PROTHROMBIN TIME: 13.1 SECONDS (ref 9.4–12.5)
RBC # BLD AUTO: 4.13 10*6/ΜL (ref 3.7–5.3)
SODIUM SERPL-SCNC: 139 MMOL/L (ref 135–145)
SODIUM SERPL-SCNC: 142 MMOL/L (ref 135–145)
SPO2 (RT): 90 %
TROPONIN I SERPL-MCNC: 5 PG/ML
WBC # BLD AUTO: 6.1 10*3/UL (ref 4.5–10.5)

## 2022-01-11 PROCEDURE — 85384 FIBRINOGEN ACTIVITY: CPT | Performed by: INTERNAL MEDICINE

## 2022-01-11 PROCEDURE — 80053 COMPREHEN METABOLIC PANEL: CPT | Performed by: INTERNAL MEDICINE

## 2022-01-11 PROCEDURE — 80048 BASIC METABOLIC PNL TOTAL CA: CPT | Mod: NCB | Performed by: EMERGENCY MEDICINE

## 2022-01-11 PROCEDURE — 87040 BLOOD CULTURE FOR BACTERIA: CPT | Performed by: INTERNAL MEDICINE

## 2022-01-11 PROCEDURE — 85379 FIBRIN DEGRADATION QUANT: CPT | Performed by: EMERGENCY MEDICINE

## 2022-01-11 PROCEDURE — 85652 RBC SED RATE AUTOMATED: CPT | Performed by: INTERNAL MEDICINE

## 2022-01-11 PROCEDURE — (BLANK) HC ROOM PRIVATE

## 2022-01-11 PROCEDURE — 83735 ASSAY OF MAGNESIUM: CPT | Performed by: INTERNAL MEDICINE

## 2022-01-11 PROCEDURE — 87486 CHLMYD PNEUM DNA AMP PROBE: CPT | Performed by: INTERNAL MEDICINE

## 2022-01-11 PROCEDURE — XW033E5 INTRODUCTION OF REMDESIVIR ANTI-INFECTIVE INTO PERIPHERAL VEIN, PERCUTANEOUS APPROACH, NEW TECHNOLOGY GROUP 5: ICD-10-PCS | Performed by: INTERNAL MEDICINE

## 2022-01-11 PROCEDURE — 84484 ASSAY OF TROPONIN QUANT: CPT | Performed by: EMERGENCY MEDICINE

## 2022-01-11 PROCEDURE — 99223 1ST HOSP IP/OBS HIGH 75: CPT | Mod: AI | Performed by: INTERNAL MEDICINE

## 2022-01-11 PROCEDURE — 6360000200 HC RX 636 W HCPCS (ALT 250 FOR IP): Performed by: INTERNAL MEDICINE

## 2022-01-11 PROCEDURE — 36600 WITHDRAWAL OF ARTERIAL BLOOD: CPT

## 2022-01-11 PROCEDURE — 85610 PROTHROMBIN TIME: CPT | Performed by: INTERNAL MEDICINE

## 2022-01-11 PROCEDURE — 86140 C-REACTIVE PROTEIN: CPT | Performed by: INTERNAL MEDICINE

## 2022-01-11 PROCEDURE — 84100 ASSAY OF PHOSPHORUS: CPT | Performed by: INTERNAL MEDICINE

## 2022-01-11 PROCEDURE — 83615 LACTATE (LD) (LDH) ENZYME: CPT | Performed by: INTERNAL MEDICINE

## 2022-01-11 PROCEDURE — 6360000200 HC RX 636 W HCPCS (ALT 250 FOR IP): Performed by: EMERGENCY MEDICINE

## 2022-01-11 PROCEDURE — 36415 COLL VENOUS BLD VENIPUNCTURE: CPT

## 2022-01-11 PROCEDURE — 82805 BLOOD GASES W/O2 SATURATION: CPT

## 2022-01-11 PROCEDURE — 71045 X-RAY EXAM CHEST 1 VIEW: CPT

## 2022-01-11 PROCEDURE — 82550 ASSAY OF CK (CPK): CPT | Performed by: INTERNAL MEDICINE

## 2022-01-11 PROCEDURE — 84145 PROCALCITONIN (PCT): CPT | Performed by: INTERNAL MEDICINE

## 2022-01-11 PROCEDURE — 99285 EMERGENCY DEPT VISIT HI MDM: CPT | Performed by: EMERGENCY MEDICINE

## 2022-01-11 PROCEDURE — 82728 ASSAY OF FERRITIN: CPT | Performed by: INTERNAL MEDICINE

## 2022-01-11 PROCEDURE — 85025 COMPLETE CBC W/AUTO DIFF WBC: CPT | Performed by: EMERGENCY MEDICINE

## 2022-01-11 RX ORDER — ACETAMINOPHEN 325 MG/1
650 TABLET ORAL EVERY 4 HOURS PRN
Status: DISCONTINUED | OUTPATIENT
Start: 2022-01-11 | End: 2022-01-14 | Stop reason: HOSPADM

## 2022-01-11 RX ORDER — POLYETHYLENE GLYCOL (3350) 17 G/17G
17 POWDER, FOR SOLUTION ORAL DAILY
Status: DISCONTINUED | OUTPATIENT
Start: 2022-01-12 | End: 2022-01-14 | Stop reason: HOSPADM

## 2022-01-11 RX ORDER — ENOXAPARIN SODIUM 100 MG/ML
40 INJECTION SUBCUTANEOUS
Status: DISCONTINUED | OUTPATIENT
Start: 2022-01-11 | End: 2022-01-11

## 2022-01-11 RX ORDER — GUAIFENESIN 600 MG/1
600 TABLET, EXTENDED RELEASE ORAL 2 TIMES DAILY
Status: DISCONTINUED | OUTPATIENT
Start: 2022-01-11 | End: 2022-01-14 | Stop reason: HOSPADM

## 2022-01-11 RX ORDER — DEXAMETHASONE SODIUM PHOSPHATE 4 MG/ML
6 INJECTION, SOLUTION INTRA-ARTICULAR; INTRALESIONAL; INTRAMUSCULAR; INTRAVENOUS; SOFT TISSUE ONCE
Status: COMPLETED | OUTPATIENT
Start: 2022-01-11 | End: 2022-01-11

## 2022-01-11 RX ORDER — OXYCODONE HYDROCHLORIDE 5 MG/1
5 TABLET ORAL EVERY 4 HOURS PRN
Status: DISCONTINUED | OUTPATIENT
Start: 2022-01-11 | End: 2022-01-14 | Stop reason: HOSPADM

## 2022-01-11 RX ORDER — ACETAMINOPHEN 650 MG/1
650 SUPPOSITORY RECTAL EVERY 4 HOURS PRN
Status: DISCONTINUED | OUTPATIENT
Start: 2022-01-11 | End: 2022-01-14 | Stop reason: HOSPADM

## 2022-01-11 RX ORDER — DEXAMETHASONE 6 MG/1
6 TABLET ORAL DAILY
Status: DISCONTINUED | OUTPATIENT
Start: 2022-01-12 | End: 2022-01-14 | Stop reason: HOSPADM

## 2022-01-11 RX ORDER — POTASSIUM CHLORIDE 750 MG/1
40 TABLET, FILM COATED, EXTENDED RELEASE ORAL ONCE
Status: COMPLETED | OUTPATIENT
Start: 2022-01-11 | End: 2022-01-12

## 2022-01-11 RX ORDER — ENOXAPARIN SODIUM 100 MG/ML
40 INJECTION SUBCUTANEOUS EVERY 12 HOURS SCHEDULED
Status: DISCONTINUED | OUTPATIENT
Start: 2022-01-12 | End: 2022-01-14 | Stop reason: HOSPADM

## 2022-01-11 RX ORDER — ONDANSETRON 4 MG/1
4 TABLET, ORALLY DISINTEGRATING ORAL EVERY 6 HOURS PRN
Status: DISCONTINUED | OUTPATIENT
Start: 2022-01-11 | End: 2022-01-14 | Stop reason: HOSPADM

## 2022-01-11 RX ORDER — ACETAMINOPHEN 650 MG/20.3ML
650 LIQUID ORAL EVERY 4 HOURS PRN
Status: DISCONTINUED | OUTPATIENT
Start: 2022-01-11 | End: 2022-01-14 | Stop reason: HOSPADM

## 2022-01-11 RX ORDER — BENZONATATE 100 MG/1
100 CAPSULE ORAL 3 TIMES DAILY PRN
Status: DISCONTINUED | OUTPATIENT
Start: 2022-01-11 | End: 2022-01-14 | Stop reason: HOSPADM

## 2022-01-11 RX ORDER — ALPRAZOLAM 0.5 MG/1
0.5 TABLET ORAL 2 TIMES DAILY PRN
Status: DISCONTINUED | OUTPATIENT
Start: 2022-01-11 | End: 2022-01-14 | Stop reason: HOSPADM

## 2022-01-11 RX ORDER — ONDANSETRON HYDROCHLORIDE 2 MG/ML
4 INJECTION, SOLUTION INTRAVENOUS EVERY 6 HOURS PRN
Status: DISCONTINUED | OUTPATIENT
Start: 2022-01-11 | End: 2022-01-14 | Stop reason: HOSPADM

## 2022-01-11 RX ORDER — FLUOXETINE HYDROCHLORIDE 20 MG/1
40 CAPSULE ORAL EVERY EVENING
Status: DISCONTINUED | OUTPATIENT
Start: 2022-01-12 | End: 2022-01-13

## 2022-01-11 RX ORDER — MORPHINE SULFATE 4 MG/ML
4 INJECTION, SOLUTION INTRAMUSCULAR; INTRAVENOUS
Status: COMPLETED | OUTPATIENT
Start: 2022-01-11 | End: 2022-01-12

## 2022-01-11 RX ORDER — ONDANSETRON 4 MG/1
4 TABLET, FILM COATED ORAL EVERY 6 HOURS PRN
Status: DISCONTINUED | OUTPATIENT
Start: 2022-01-11 | End: 2022-01-14 | Stop reason: HOSPADM

## 2022-01-11 RX ORDER — LEVOTHYROXINE SODIUM 175 UG/1
175 TABLET ORAL NIGHTLY
Status: DISCONTINUED | OUTPATIENT
Start: 2022-01-12 | End: 2022-01-12

## 2022-01-11 RX ADMIN — ENOXAPARIN SODIUM 40 MG: 100 INJECTION SUBCUTANEOUS at 22:42

## 2022-01-11 RX ADMIN — DEXAMETHASONE SODIUM PHOSPHATE 6 MG: 4 INJECTION, SOLUTION INTRA-ARTICULAR; INTRALESIONAL; INTRAMUSCULAR; INTRAVENOUS; SOFT TISSUE at 21:47

## 2022-01-11 RX ADMIN — MORPHINE SULFATE 4 MG: 4 INJECTION INTRAVENOUS at 21:47

## 2022-01-12 ENCOUNTER — APPOINTMENT (OUTPATIENT)
Dept: CARDIOLOGY | Facility: HOSPITAL | Age: 47
DRG: 177 | End: 2022-01-12
Payer: COMMERCIAL

## 2022-01-12 LAB
ANION GAP SERPL CALC-SCNC: 9 MMOL/L (ref 3–11)
B PARAP IS1001 DNA NPH QL NAA+NON-PROBE: NEGATIVE
B PERT.PT PRMT NPH QL NAA+NON-PROBE: NEGATIVE
BACTERIA #/AREA URNS AUTO: ABNORMAL /HPF
BASOPHILS # BLD AUTO: 0 10*3/UL
BASOPHILS NFR BLD AUTO: 0 % (ref 0–2)
BILIRUB UR QL STRIP.AUTO: NEGATIVE
BSA FOR ECHO PROCEDURE: 2.27 M2
BUN SERPL-MCNC: 8 MG/DL (ref 7–25)
C PNEUM DNA NPH QL NAA+NON-PROBE: NEGATIVE
CALCIUM SERPL-MCNC: 8.5 MG/DL (ref 8.6–10.3)
CHLORIDE SERPL-SCNC: 105 MMOL/L (ref 98–107)
CLARITY UR: CLEAR
CO2 SERPL-SCNC: 26 MMOL/L (ref 21–32)
COLOR UR: YELLOW
CREAT SERPL-MCNC: 0.54 MG/DL (ref 0.6–1.1)
E/A RATIO: 1.5
E/E' RATIO (AVERAGE): 13
E/E' RATIO: 14.1
ECHO EF ESTIMATED: 65 %
EJECTION FRACTION: 67 %
EOSINOPHIL # BLD AUTO: 0 10*3/UL
EOSINOPHIL NFR BLD AUTO: 0 % (ref 0–3)
ERAP: 5 MMHG
ERYTHROCYTE [DISTWIDTH] IN BLOOD BY AUTOMATED COUNT: 13.8 % (ref 11.5–14)
FLUAV RNA NPH QL NAA+NON-PROBE: NEGATIVE
FLUBV RNA NPH QL NAA+NON-PROBE: NEGATIVE
GFR SERPL CREATININE-BSD FRML MDRD: 113 ML/MIN/1.73M*2
GLUCOSE SERPL-MCNC: 130 MG/DL (ref 70–105)
GLUCOSE UR STRIP.AUTO-MCNC: NEGATIVE MG/DL
HADV DNA NPH QL NAA+NON-PROBE: NEGATIVE
HCOV 229E RNA NPH QL NAA+NON-PROBE: NEGATIVE
HCOV HKU1 RNA NPH QL NAA+NON-PROBE: NEGATIVE
HCOV NL63 RNA NPH QL NAA+NON-PROBE: NEGATIVE
HCOV OC43 RNA NPH QL NAA+NON-PROBE: NEGATIVE
HCT VFR BLD AUTO: 38.1 % (ref 34–45)
HGB BLD-MCNC: 12.7 G/DL (ref 11.5–15.5)
HGB UR QL STRIP.AUTO: ABNORMAL
HMPV RNA NPH QL NAA+NON-PROBE: NEGATIVE
HPIV1 RNA NPH QL NAA+NON-PROBE: NEGATIVE
HPIV2 RNA NPH QL NAA+NON-PROBE: NEGATIVE
HPIV3 RNA NPH QL NAA+NON-PROBE: NEGATIVE
HPIV4 RNA NPH QL NAA+NON-PROBE: NEGATIVE
INTERVENTRICULAR SEPTUM: 0.8 CM (ref 0.6–1.1)
IVC PROX: 1.7 CM
KETONES UR STRIP.AUTO-MCNC: NEGATIVE MG/DL
LA AREA A4C SYSTOLE: 42.6 CM3
LACTATE BLDV-SCNC: 0.84 MMOL/L (ref 0.5–1.99)
LEFT ATRIUM VOLUME INDEX: 23 ML/M2
LEFT ATRIUM VOLUME: 48.5 CM3
LEFT INTERNAL DIMENSION IN SYSTOLE: 3 CM (ref 3.46–5.23)
LEFT VENTRICLE DIASTOLIC VOLUME: 172 CM3
LEFT VENTRICLE SYSTOLIC VOLUME: 56 CM3
LEFT VENTRICULAR INTERNAL DIMENSION IN DIASTOLE: 4.5 CM (ref 5.92–8.22)
LEUKOCYTE ESTERASE UR QL STRIP: NEGATIVE
LVAD-AP2: 43.6 CM2
LYMPHOCYTES # BLD AUTO: 0.8 10*3/UL
LYMPHOCYTES NFR BLD AUTO: 14 % (ref 11–47)
M PNEUMO DNA NPH QL NAA+NON-PROBE: NEGATIVE
MAGNESIUM SERPL-MCNC: 2 MG/DL (ref 1.8–2.4)
MCH RBC QN AUTO: 28.8 PG (ref 28–33)
MCHC RBC AUTO-ENTMCNC: 33.3 G/DL (ref 32–36)
MCV RBC AUTO: 86.4 FL (ref 81–97)
ML OF DILUTED DEFINITY INJECTED: 2 ML
MONOCYTES # BLD AUTO: 0.1 10*3/UL
MONOCYTES NFR BLD AUTO: 2 % (ref 3–11)
MV DT: 209 MS
MV PEAK A VEL: 81 CM/S
MV PEAK E VEL: 119 CM/S
NEUTROPHILS # BLD AUTO: 5.1 10*3/UL
NEUTROPHILS NFR BLD AUTO: 84 % (ref 41–81)
NITRITE UR QL STRIP.AUTO: NEGATIVE
PH UR STRIP.AUTO: 7 PH
PLATELET # BLD AUTO: 249 10*3/UL (ref 140–350)
PMV BLD AUTO: 7.9 FL (ref 6.9–10.8)
POSTERIOR WALL: 0.9 CM (ref 0.6–1.1)
POTASSIUM SERPL-SCNC: 4.5 MMOL/L (ref 3.5–5.1)
PROT UR STRIP.AUTO-MCNC: NEGATIVE MG/DL
PULM VEIN S/D RATIO: 1.2
PV PEAK D VEL: 49 CM/S
PV PEAK S VEL: 58 CM/S
RA AREA: 11.2 CM2
RBC # BLD AUTO: 4.41 10*6/ΜL (ref 3.7–5.3)
RBC #/AREA URNS AUTO: ABNORMAL /HPF
RIGHT VENTRICULAR INTERNAL DIMENSION IN DIASTOLE: 4.2 CM
RSV RNA NPH QL NAA+NON-PROBE: NEGATIVE
RV AP4 BASE: 2.7 CM
RV+EV RNA NPH QL NAA+NON-PROBE: NEGATIVE
S': 11.7 CM/S
SARS-COV-2 RNA NPH QL NAA+NON-PROBE: POSITIVE
SODIUM SERPL-SCNC: 140 MMOL/L (ref 135–145)
SP GR UR STRIP.AUTO: 1.01 (ref 1–1.03)
SQUAMOUS #/AREA URNS AUTO: ABNORMAL /HPF
TDI: 8.4 CM/S
TDILATERAL: 10.1 CM/S
TRICUSPID ANNULAR PLANE SYSTOLIC EXCURSION: 2.7 CM
TSH SERPL DL<=0.05 MIU/L-ACNC: 0.58 UIU/ML (ref 0.34–4.82)
UROBILINOGEN UR STRIP.AUTO-MCNC: <2 E.U./DL
WBC # BLD AUTO: 6 10*3/UL (ref 4.5–10.5)
WBC #/AREA URNS AUTO: ABNORMAL /HPF
Z-SCORE OF LEFT VENTRICULAR DIMENSION IN END DIASTOLE: -4.39
Z-SCORE OF LEFT VENTRICULAR DIMENSION IN END SYSTOLE: -2.76

## 2022-01-12 PROCEDURE — 93321 DOPPLER ECHO F-UP/LMTD STD: CPT | Mod: 26 | Performed by: INTERNAL MEDICINE

## 2022-01-12 PROCEDURE — C9399 UNCLASSIFIED DRUGS OR BIOLOG: HCPCS | Performed by: INTERNAL MEDICINE

## 2022-01-12 PROCEDURE — 6360000200 HC RX 636 W HCPCS (ALT 250 FOR IP): Performed by: INTERNAL MEDICINE

## 2022-01-12 PROCEDURE — 83605 ASSAY OF LACTIC ACID: CPT | Performed by: INTERNAL MEDICINE

## 2022-01-12 PROCEDURE — 93325 DOPPLER ECHO COLOR FLOW MAPG: CPT | Mod: 26 | Performed by: INTERNAL MEDICINE

## 2022-01-12 PROCEDURE — 6370000100 HC RX 637 (ALT 250 FOR IP): Performed by: INTERNAL MEDICINE

## 2022-01-12 PROCEDURE — 93005 ELECTROCARDIOGRAM TRACING: CPT | Performed by: INTERNAL MEDICINE

## 2022-01-12 PROCEDURE — 1110000100 HC ROOM PRIVATE W TELE

## 2022-01-12 PROCEDURE — 93308 TTE F-UP OR LMTD: CPT

## 2022-01-12 PROCEDURE — 96375 TX/PRO/DX INJ NEW DRUG ADDON: CPT

## 2022-01-12 PROCEDURE — 6360000200 HC RX 636 W HCPCS (ALT 250 FOR IP): Performed by: EMERGENCY MEDICINE

## 2022-01-12 PROCEDURE — 81001 URINALYSIS AUTO W/SCOPE: CPT | Performed by: INTERNAL MEDICINE

## 2022-01-12 PROCEDURE — 36415 COLL VENOUS BLD VENIPUNCTURE: CPT | Performed by: INTERNAL MEDICINE

## 2022-01-12 PROCEDURE — 85025 COMPLETE CBC W/AUTO DIFF WBC: CPT | Performed by: INTERNAL MEDICINE

## 2022-01-12 PROCEDURE — 96374 THER/PROPH/DIAG INJ IV PUSH: CPT

## 2022-01-12 PROCEDURE — 83735 ASSAY OF MAGNESIUM: CPT | Performed by: INTERNAL MEDICINE

## 2022-01-12 PROCEDURE — 96361 HYDRATE IV INFUSION ADD-ON: CPT

## 2022-01-12 PROCEDURE — 99232 SBSQ HOSP IP/OBS MODERATE 35: CPT | Performed by: INTERNAL MEDICINE

## 2022-01-12 PROCEDURE — 80048 BASIC METABOLIC PNL TOTAL CA: CPT | Performed by: INTERNAL MEDICINE

## 2022-01-12 PROCEDURE — 93308 TTE F-UP OR LMTD: CPT | Mod: 26 | Performed by: INTERNAL MEDICINE

## 2022-01-12 PROCEDURE — 2550000100 HC RX 255: Performed by: INTERNAL MEDICINE

## 2022-01-12 PROCEDURE — 84443 ASSAY THYROID STIM HORMONE: CPT | Performed by: INTERNAL MEDICINE

## 2022-01-12 PROCEDURE — 96376 TX/PRO/DX INJ SAME DRUG ADON: CPT

## 2022-01-12 RX ORDER — LEVOTHYROXINE SODIUM 100 UG/1
100 TABLET ORAL
Status: CANCELLED | OUTPATIENT
Start: 2022-01-12

## 2022-01-12 RX ORDER — SODIUM CHLORIDE 0.9 % (FLUSH) 0.9 %
3 SYRINGE (ML) INJECTION AS NEEDED
Status: DISCONTINUED | OUTPATIENT
Start: 2022-01-12 | End: 2022-01-14 | Stop reason: HOSPADM

## 2022-01-12 RX ORDER — KETOCONAZOLE 20 MG/ML
5 SHAMPOO, SUSPENSION TOPICAL DAILY
COMMUNITY
Start: 2022-01-04 | End: 2022-09-28 | Stop reason: ALTCHOICE

## 2022-01-12 RX ORDER — LEVOTHYROXINE SODIUM 100 UG/1
100 TABLET ORAL NIGHTLY
Status: DISCONTINUED | OUTPATIENT
Start: 2022-01-12 | End: 2022-01-14 | Stop reason: HOSPADM

## 2022-01-12 RX ORDER — PREGABALIN 100 MG/1
200 CAPSULE ORAL 3 TIMES DAILY
Status: DISCONTINUED | OUTPATIENT
Start: 2022-01-12 | End: 2022-01-14 | Stop reason: HOSPADM

## 2022-01-12 RX ORDER — LEVOTHYROXINE SODIUM 100 UG/1
100 TABLET ORAL EVERY MORNING
COMMUNITY
Start: 2021-12-20

## 2022-01-12 RX ORDER — ACETAMINOPHEN 500 MG
1000 TABLET ORAL AS NEEDED
COMMUNITY

## 2022-01-12 RX ORDER — LIDOCAINE HYDROCHLORIDE 10 MG/ML
30 INJECTION, SOLUTION EPIDURAL; INFILTRATION; INTRACAUDAL; PERINEURAL ONCE
Status: DISCONTINUED | OUTPATIENT
Start: 2022-01-12 | End: 2022-01-12

## 2022-01-12 RX ORDER — TERBINAFINE HYDROCHLORIDE 250 MG/1
250 TABLET ORAL DAILY
Status: ON HOLD | COMMUNITY
Start: 2021-12-14 | End: 2022-01-12 | Stop reason: ALTCHOICE

## 2022-01-12 RX ORDER — TRIAMTERENE/HYDROCHLOROTHIAZID 37.5-25 MG
1 TABLET ORAL EVERY MORNING
Status: ON HOLD | COMMUNITY
Start: 2021-12-02 | End: 2022-01-12

## 2022-01-12 RX ORDER — METHOCARBAMOL 750 MG/1
750 TABLET, FILM COATED ORAL 3 TIMES DAILY
COMMUNITY
Start: 2021-12-30 | End: 2022-09-28 | Stop reason: ALTCHOICE

## 2022-01-12 RX ORDER — METHOCARBAMOL 500 MG/1
750 TABLET, FILM COATED ORAL 3 TIMES DAILY
Status: DISCONTINUED | OUTPATIENT
Start: 2022-01-12 | End: 2022-01-14 | Stop reason: HOSPADM

## 2022-01-12 RX ORDER — HYDROMORPHONE HYDROCHLORIDE 2 MG/1
2 TABLET ORAL EVERY 6 HOURS PRN
Status: DISCONTINUED | OUTPATIENT
Start: 2022-01-12 | End: 2022-01-14 | Stop reason: HOSPADM

## 2022-01-12 RX ORDER — PREGABALIN 200 MG/1
200 CAPSULE ORAL 3 TIMES DAILY
COMMUNITY

## 2022-01-12 RX ORDER — PREGABALIN 100 MG/1
200 CAPSULE ORAL ONCE
Status: COMPLETED | OUTPATIENT
Start: 2022-01-12 | End: 2022-01-12

## 2022-01-12 RX ORDER — ACETAMINOPHEN 500 MG
1000 TABLET ORAL EVERY 8 HOURS PRN
Status: DISCONTINUED | OUTPATIENT
Start: 2022-01-12 | End: 2022-01-14 | Stop reason: HOSPADM

## 2022-01-12 RX ORDER — HYDROMORPHONE HYDROCHLORIDE 2 MG/1
2 TABLET ORAL EVERY 4 HOURS PRN
COMMUNITY
Start: 2022-01-06 | End: 2025-01-08

## 2022-01-12 RX ORDER — HYDRALAZINE HYDROCHLORIDE 20 MG/ML
10 INJECTION INTRAMUSCULAR; INTRAVENOUS EVERY 4 HOURS PRN
Status: DISCONTINUED | OUTPATIENT
Start: 2022-01-12 | End: 2022-01-14 | Stop reason: HOSPADM

## 2022-01-12 RX ORDER — MORPHINE SULFATE 15 MG/1
15 TABLET, FILM COATED, EXTENDED RELEASE ORAL 2 TIMES DAILY
COMMUNITY
Start: 2021-12-17 | End: 2022-09-28 | Stop reason: ALTCHOICE

## 2022-01-12 RX ORDER — SODIUM CHLORIDE 9 MG/ML
25-50 INJECTION, SOLUTION INTRAVENOUS AS NEEDED
Status: DISCONTINUED | OUTPATIENT
Start: 2022-01-12 | End: 2022-01-14 | Stop reason: HOSPADM

## 2022-01-12 RX ORDER — TRAMADOL HYDROCHLORIDE 50 MG/1
50 TABLET ORAL 3 TIMES DAILY
Status: ON HOLD | COMMUNITY
Start: 2021-10-12 | End: 2022-01-12

## 2022-01-12 RX ORDER — MORPHINE SULFATE 15 MG/1
15 TABLET, FILM COATED, EXTENDED RELEASE ORAL 2 TIMES DAILY
Status: DISCONTINUED | OUTPATIENT
Start: 2022-01-12 | End: 2022-01-14 | Stop reason: HOSPADM

## 2022-01-12 RX ADMIN — ACETAMINOPHEN 650 MG: 325 TABLET ORAL at 09:25

## 2022-01-12 RX ADMIN — REMDESIVIR 100 MG: 100 INJECTION, POWDER, LYOPHILIZED, FOR SOLUTION INTRAVENOUS at 19:32

## 2022-01-12 RX ADMIN — DEXAMETHASONE 6 MG: 6 TABLET ORAL at 09:25

## 2022-01-12 RX ADMIN — ENOXAPARIN SODIUM 40 MG: 100 INJECTION SUBCUTANEOUS at 20:17

## 2022-01-12 RX ADMIN — PREGABALIN 200 MG: 100 CAPSULE ORAL at 15:54

## 2022-01-12 RX ADMIN — GUAIFENESIN 600 MG: 600 TABLET, EXTENDED RELEASE ORAL at 02:12

## 2022-01-12 RX ADMIN — METHOCARBAMOL TABLETS 750 MG: 500 TABLET, COATED ORAL at 15:54

## 2022-01-12 RX ADMIN — GUAIFENESIN 600 MG: 600 TABLET, EXTENDED RELEASE ORAL at 20:16

## 2022-01-12 RX ADMIN — MORPHINE SULFATE 4 MG: 4 INJECTION INTRAVENOUS at 05:08

## 2022-01-12 RX ADMIN — METHOCARBAMOL TABLETS 750 MG: 500 TABLET, COATED ORAL at 20:15

## 2022-01-12 RX ADMIN — PREGABALIN 200 MG: 100 CAPSULE ORAL at 20:17

## 2022-01-12 RX ADMIN — MORPHINE SULFATE 15 MG: 15 TABLET, EXTENDED RELEASE ORAL at 20:16

## 2022-01-12 RX ADMIN — GUAIFENESIN 600 MG: 600 TABLET, EXTENDED RELEASE ORAL at 09:25

## 2022-01-12 RX ADMIN — HYDROMORPHONE HYDROCHLORIDE 2 MG: 2 TABLET ORAL at 11:07

## 2022-01-12 RX ADMIN — OXYCODONE HYDROCHLORIDE 5 MG: 5 TABLET ORAL at 09:25

## 2022-01-12 RX ADMIN — PREGABALIN 200 MG: 100 CAPSULE ORAL at 11:08

## 2022-01-12 RX ADMIN — OXYCODONE HYDROCHLORIDE 5 MG: 5 TABLET ORAL at 02:11

## 2022-01-12 RX ADMIN — HYDRALAZINE HYDROCHLORIDE 10 MG: 20 INJECTION, SOLUTION INTRAMUSCULAR; INTRAVENOUS at 20:02

## 2022-01-12 RX ADMIN — Medication 1000 MG: at 17:17

## 2022-01-12 RX ADMIN — HYDROMORPHONE HYDROCHLORIDE 2 MG: 2 TABLET ORAL at 17:17

## 2022-01-12 RX ADMIN — PREGABALIN 200 MG: 100 CAPSULE ORAL at 02:41

## 2022-01-12 RX ADMIN — MORPHINE SULFATE 15 MG: 15 TABLET, EXTENDED RELEASE ORAL at 11:07

## 2022-01-12 RX ADMIN — TRAZODONE HYDROCHLORIDE 150 MG: 150 TABLET ORAL at 02:41

## 2022-01-12 RX ADMIN — ALPRAZOLAM 0.5 MG: 0.5 TABLET ORAL at 20:39

## 2022-01-12 RX ADMIN — ENOXAPARIN SODIUM 40 MG: 100 INJECTION SUBCUTANEOUS at 09:26

## 2022-01-12 RX ADMIN — REMDESIVIR 200 MG: 100 INJECTION, POWDER, LYOPHILIZED, FOR SOLUTION INTRAVENOUS at 02:11

## 2022-01-12 RX ADMIN — FLUOXETINE 40 MG: 20 CAPSULE ORAL at 11:08

## 2022-01-12 RX ADMIN — POTASSIUM CHLORIDE 40 MEQ: 750 TABLET, FILM COATED, EXTENDED RELEASE ORAL at 01:43

## 2022-01-12 RX ADMIN — MORPHINE SULFATE 4 MG: 4 INJECTION INTRAVENOUS at 01:39

## 2022-01-12 RX ADMIN — TRAZODONE HYDROCHLORIDE 150 MG: 150 TABLET ORAL at 20:16

## 2022-01-12 RX ADMIN — PERFLUTREN 2 ML: 6.52 INJECTION, SUSPENSION INTRAVENOUS at 15:49

## 2022-01-12 ASSESSMENT — ACTIVITIES OF DAILY LIVING (ADL)
ADEQUATE_TO_COMPLETE_ADL: YES
ASSISTIVE_DEVICES: WALKER

## 2022-01-12 NOTE — MEDICATION HISTORY SPECIALIST NOTES
[facility] 10-01    CSN: [id number]  : [id number]    Information sources:  EPIC  Complete Dispense Report  Go Reconcile    Allergies verified.    Patient interviewed via phone who provided all history. Patient verified medications and provided last doses. Verified with Complete Dispense Report.    New medications added:  Morphine  Methocarbamol  Ketoconazole Shampoo  Hydromorphone  Tylenol    Discontinued medications:  Voltaren  Tramadol  Triamterene/HCTZ  Terbinafine    Dose changes:  Synthroid  Trazodone

## 2022-01-12 NOTE — ED PROVIDER NOTES
HPI:  Chief Complaint   Patient presents with   • Shortness of Breath     pt states she has been short of breath and did a home covid test last friday that was positive      HPI   47-year-old female who tested positive for COVID 4 days ago and with 1 week history of cough and congestion and malaise consistent with COVID now comes to the emergency department because of hypoxia.  She noted her oxygen saturation was in the 70%'s at home.  On arrival here her oxygen saturation was 84%.  She is scheduled to have back surgery in 2 days which will have to be rescheduled.  She describes fever.  Headache has been present.  No specific exacerbating or alleviating factors.    HISTORY:  Past Medical History:   Diagnosis Date   • Chronic pain    • Complication of anesthesia    • Dental disease    • Depression    • Diverticulitis    • Endocrine disorder    • Extremity pain    • Fibromyalgia    • Hashimoto's thyroiditis    • Hypothyroidism    • Injury of back    • Low back pain    • Neurologic disorder    • Respiratory disease    • Sleep apnea     does not use cpap   • Wears dentures        Past Surgical History:   Procedure Laterality Date   • BACK SURGERY     • CHOLECYSTECTOMY     • COLONOSCOPY N/A 12/27/2019    Procedure: COLONOSCOPY WITH ASCENDING COLON BIOPSIES;  Surgeon: Kelvin Cordova MD PhD;  Location: [facility];  Service: Endoscopy;  Laterality: N/A;   • COLONOSCOPY N/A 4/6/2021    Procedure: COLONOSCOPY WITH FECAL MICROBIOTA TRANSPLANTATION;  Surgeon: Maria Dolores Richardson MD;  Location: [facility];  Service: Endoscopy;  Laterality: N/A;   • ESOPHAGOGASTRODUODENOSCOPY N/A 12/27/2019    Procedure: ESOPHAGOGASTRODUODENOSCOPY WITH DUODENAL BIOPSIES;  Surgeon: Kelvin Cordova MD PhD;  Location: [facility];  Service: Endoscopy;  Laterality: N/A;   • EXAMINATION UNDER ANESTHESIA N/A 1/17/2020    Procedure: EXAM UNDER ANESTHESIA;  Surgeon: Steve Michelle MD;  Location: [facility] OR;  Service: General;  Laterality: N/A;   •  HEMORRHOID SURGERY N/A 1/17/2020    Procedure: HEMORRHOIDECTOMY TIMES ONE with BANDING TIMES ONE;  Surgeon: Steve Michelle MD;  Location: [facility] OR;  Service: General;  Laterality: N/A;   • HYSTERECTOMY     • REDUCTION MAMMAPLASTY     • TUBAL LIGATION         Family History   Problem Relation Age of Onset   • No Known Problems Mother        Social History     Tobacco Use   • Smoking status: Former Smoker     Packs/day: 0.00   • Smokeless tobacco: Never Used   • Tobacco comment: 'VAPES'   Vaping Use   • Vaping Use: Former   • Substances: Nicotine   • Devices: Pre-filled or refillable cartridge, Refillable tank   Substance Use Topics   • Alcohol use: No   • Drug use: No       ROS:  Constitutional: fever  Eyes: negative for vision change  ENT: sore throat  Cardiovascular: negative for chest pain  Respiratory: cough  GI: negative for vomiting  : negative for hematuria or dysuria  Musculoskeletal: Diffuse myalgia and exacerbation of chronic back pain  Neuro: headache  Hematology: negative for bleeding  Skin: negative for rash    PHYSICAL EXAM:  ED Triage Vitals   Temp Heart Rate Resp BP SpO2   01/11/22 1612 01/11/22 1612 01/11/22 1612 01/11/22 1612 01/11/22 1612   36.7 °C (98.1 °F) 78 17 163/84 (!) 84 %      Temp Source Heart Rate Source Patient Position BP Location FiO2 (%)   01/11/22 1612 -- 01/11/22 2054 -- --   Oral  Head of bed 30 degrees or higher       Nursing note and vitals reviewed.  Constitutional: appears well-developed.   Head: Normocephalic and atraumatic.   Eyes: Conjunctiva normal.  Neck: Supple.  No meningismus  Cardiovascular: Regular rate and rhythm  Pulmonary/Chest: No respiratory distress.  Rhonchi to auscultation bilaterally.  Abdominal: Soft and nontender.  Normal bowel sounds.  Musculoskeletal: No edema  Neurological: Alert. No focal deficits.  Skin: Skin is warm and dry. No rash noted.   Psychiatric: Normal mood and affect.    MDM: Patient has Covid-19 with symptoms present for about 1 week.  She  is hypoxic.  She is given dexamethasone.  Lab and chest x-ray will be obtained.  She has chronic back pain.  She is given morphine for pain.  She will require hospitalization because of hypoxia.    Labs Reviewed   BASIC METABOLIC PANEL - Abnormal       Result Value    Sodium 139      Potassium 3.0 (*)     Chloride 105      CO2 26      BUN 6 (*)     Creatinine 0.54 (*)     Glucose 91      Calcium 7.8 (*)     Anion Gap 8      eGFR 113      Narrative:     Estimated GFR calculated using the 2009 CKD-EPI creatinine equation.   COMPREHENSIVE METABOLIC PANEL - Abnormal    Sodium 142      Potassium 3.3 (*)     Chloride 104      CO2 27      Anion Gap 11      BUN 6 (*)     Creatinine 0.60      Glucose 91      Calcium 8.5 (*)     AST 41 (*)     ALT (SGPT) 38      Alkaline Phosphatase 110 (*)     Total Protein 7.0      Albumin 3.7      Total Bilirubin 0.46      eGFR 109      Corrected Calcium 8.7      Narrative:     Estimated GFR calculated using the 2009 CKD-EPI creatinine equation.   SEDIMENTATION RATE, AUTOMATED - Abnormal    Sed Rate 30 (*)    PROTIME-INR - Abnormal    Protime 13.1 (*)     INR 1.1     C-REACTIVE PROTEIN - Abnormal    CRP 28.5 (*)    RESPIRATORY PATHOGEN PANEL, PCR - Abnormal    Adenovirus Detection by PCR Negative      Coronavirus 229E Detection by PCR Negative      Coronavirus HKU1 Detection by PCR Negative      Coronavirus NL63  Detection by PCR Negative      Coronavirus OC43 Detection by PCR Negative      SARS-COV-2 PCR Positive (*)     Human Metapneumovirus Detection by PCR Negative      Rhinovirus/Enterovirus Detection by PCR Negative      Influenza A  Detection by PCR Negative      Influenza B Detection by PCR Negative      Parainfluenza 1 Detection by PCR Negative      Parainfluenza 2 Detection by PCR Negative      Parainfluenza 3 Detection by PCR Negative      Parainfluenza 4 Detection by PCR Negative      Respiratory Syncytial Virus Detection by PCR Negative      Bordetella Pertussis Detection by  PCR Negative      Chlamydophila Pneumoniae Detection by PCR Negative      Mycoplasma pneumo by PCR Negative      Bordetella Parapertussis Detection by PCR Negative      Narrative:     This assay is performed using the FilmArray Respiratory Panel ([employer] Results from this test must be correlated with the clinical history, epidemiological data, and other data available to the clinician evaluating the patient. A negative FilmArray RP result does not exclude the possibility of viral or bacterial infection. Negative test results may occur from the presence of sequence variants in the region targeted by the assay, the presence of inhibitors or an infection caused by an organism not detected by thepanel.   BLOOD GAS, ARTERIAL (RESPIRATORY THERAPY) - Abnormal    pH, Arterial 7.42      pCO2, Arterial 48.4 (*)     pO2, Arterial 57.2 (*)     HCO3, Arterial 31.4 (*)     Base Excess, Arterial 5.9 (*)     tO2, Arterial 15.6      Hemoglobin (measured), Arterial 12.6      Oxyhemoglobin, Arterial 87.7      Flow 5.00      Device Nasal Cannula      SpO2 90      tCO2 (calculated), Arterial 28.5 (*)     Patient Position Supine     HIGH SENSITIVE TROPONIN I - Normal    hsTnI 0 Hour 5.0     D-DIMER, QUANTITATIVE - Normal    D-Dimer, Quant (ng/mL) 402      Narrative:     D-dimer values < or =500 ng/mL FEU may be used in conjunction with clinical pretest probability to exclude deep vein thrombosis (DVT) and pulmonary embolism (PE).   CK - Normal    Total CK 27     FIBRINOGEN - Normal    Fibrinogen 381     LDH (LACTATE DEHYDROGENASE) - Normal         PROCALCITONIN - Normal    Procalcitonin <0.05      Narrative:     Low risk of severe sepsis and/or septic shock.  This method for procalcitonin is not indicated to be used as an aid in decision making on antibiotic therapy for patients.   FERRITIN - Normal    Ferritin 31.9     MAGNESIUM - Normal    Magnesium 1.9     PHOSPHORUS - Normal    Phosphorus 3.0     BLOOD  CULTURES, 2 SETS    Narrative:     The following orders were created for panel order Blood cultures, 2 sets.  Procedure                               Abnormality         Status                     ---------                               -----------         ------                     Blood culture, 1 set[[id number]]                              In process                 Blood culture, 1 set[[id number]]                              In process                   Please view results for these tests on the individual orders.   BLOOD CULTURE   BLOOD CULTURE   CBC WITH AUTO DIFFERENTIAL    WBC 6.1      RBC 4.13      Hemoglobin 11.7      Hematocrit 35.9      MCV 86.9      MCH 28.4      MCHC 32.7      RDW 13.7      Platelets 231      MPV 7.8      Neutrophils% 63      Lymphocytes% 29      Monocytes% 6      Eosinophils% 2      Basophils% 1      ANC (auto diff) 3.90      Lymphocytes Absolute 1.70      Monocytes Absolute 0.40      Eosinophils Absolute 0.10      Basophils Absolute 0.00     BLOOD GAS, ARTERIAL (RESPIRATORY THERAPY)   URINALYSIS WITH MICROSCOPIC, REFLEX CULTURE    Narrative:     The following orders were created for panel order Urinalysis w/microscopic, reflex culture Urine, Clean Catch.  Procedure                               Abnormality         Status                     ---------                               -----------         ------                     Urinalysis, Dip (part 1 o...[[id number]]                                                 Urinalysis, Micro (part 2...[[id number]]                                                   Please view results for these tests on the individual orders.   URINALYSIS DIPSTICK REFLEX TO CULTURE FOR USE WITH MICROSCOPIC PANEL   URINALYSIS MICROSCOPIC, REFLEX CULTURE   TSH   BASIC METABOLIC PANEL   CBC WITH AUTO DIFFERENTIAL   MAGNESIUM   LAVENDER TOP [place]       RADIOLOGY:  I, Stalin Michelle MD, personally reviewed and evaluated the images as part of my medical decision making as  well as reviewing the written report by the radiologist.    ED MD interpretation: Diffuse interstitial prominence consistent with COVID-19 infection    Official Radiology report(s):  XR chest portable 1 view   Final Result   IMPRESSION:       1. Interstitial prominence the lungs could represent mild edema versus an infection.       ED Medication Administration from 01/11/2022 1611 to 01/12/2022 0351       Date/Time Order Dose Route Action Action by     01/11/2022 2147 dexamethasone (DECADRON) injection 6 mg 6 mg intravenous Given Tibbles, L     01/11/2022 2147 morphine injection 4 mg 4 mg intravenous Given Tibbles, L     01/12/2022 0139 morphine injection 4 mg 4 mg intravenous Given Bortko, A     01/11/2022 2242 enoxaparin (LOVENOX) syringe 40 mg 40 mg subcutaneous Given Tibbles, L     01/12/2022 0211 oxyCODONE (ROXICODONE) immediate release tablet 5 mg 5 mg oral Given Bortko, A     01/12/2022 0143 potassium chloride (KLOR-CON) CR tablet 40 mEq 40 mEq oral Given Bortko, A     01/12/2022 0241 traZODone (DESYREL) tablet 150 mg 150 mg oral Given Bortko, A     01/12/2022 0212 guaiFENesin (MUCINEX) 12 hr tablet 600 mg 600 mg oral Given Bortko, A     01/12/2022 0211 remdesivir (VEKLURY) 200 mg in sodium chloride 0.9 % 290 mL IVPB 200 mg intravenous New Bag/New Syringe Bortko, A     01/12/2022 0310 remdesivir (VEKLURY) 200 mg in sodium chloride 0.9 % 290 mL IVPB   intravenous Paused Bortko, A     01/12/2022 0314 remdesivir (VEKLURY) 200 mg in sodium chloride 0.9 % 290 mL IVPB   intravenous Stopped Bortko, A     01/12/2022 0241 pregabalin (LYRICA) capsule 200 mg 200 mg oral Given Bortko, A             CLINICAL IMPRESSION:  Final diagnoses:   [U07.1] COVID-19   Hypoxia  Chronic pain syndrome         A voice recognition program was used to aid in documentation of this record.  Sometimes words are not printed exactly as they were spoken.  While efforts were made to carefully edit and correct any inaccuracies, some areas may be  present; please take these into context.  Please contact the provider if areas are identified.       Stalin Michelle MD  01/12/22 0353

## 2022-01-12 NOTE — NURSING END OF SHIFT
Nursing End of Shift Summary:    Patient: Isabell Harper  MRN: [id number]  : 1975, Age: 47 y.o.    Location: [address] [place] [address]    Nursing Goals  Clinical Goals for the Shift: monitor vs/resp status, pain management, safety/fall prec    Narrative Summary of Progress Toward Clinical Goals:  Patient w/ severe pain throughout shift, chronic back pain got off of her home schedule w/ medications as werent verified/ordered according to her schedule, improving somewhat in evening, but still irritable and in pain. Titrated from 6lpm to 3 lpm, ambulated in room several times, will send ua once voids, missed cup prior, md aware and okay to wait until void next. md notified several time of patient concerns of pain, medications adjusted. Breathing symmetric/unlabored, only labored w/ coughing fits safety measures in place throughout shift    Barriers to Goals/Nursing Concerns:  resp status/covid treatment    New Patient or Family Concerns/Issues:  pain    Shift Summary:   Significant Events & Communications to Providers (last 12 hours)     Last 5 Values    No documentation.             Oxygen Usage (last 12 hours)     Last 5 Values    No documentation.             Mobility (last 12 hours)     Last 5 Values     Mobility     Row Name 22 0900    Activity Ambulate in room;Bathroom privileges    Level of Assistance Independent after set-up                    Urethral Catheter     Active Urethral Catheter     None            Active Lines     Active Central venous catheter / Peripherally inserted central catheter / Implantable Port / Hemodialysis catheter / Midline Catheter     None              Infusing Medications   Medication Dose Last Rate     PRN Medications   Medication Dose Last Admin   • sodium chloride 0.9% (NS)  25-50 mL      And   • sodium chloride  3 mL     • acetaminophen  1,000 mg     • HYDROmorphone  2 mg 2 mg at 22 1107   • hydrALAZINE  10 mg     • acetaminophen  650 mg 650 mg at 22 0925    Or   •  acetaminophen  650 mg      Or   • acetaminophen  650 mg     • oxyCODONE  5 mg 5 mg at 01/12/22 0925   • ondansetron  4 mg      Or   • ondansetron ODT  4 mg      Or   • ondansetron  4 mg     • ALPRAZolam  0.5 mg     • ipratropium-albuteroL  1 puff     • benzonatate  100 mg       _________________________  VINCENT WEISS RN  01/12/22 4:35 PM

## 2022-01-12 NOTE — PROGRESS NOTES
Hospitalist Progress Note   LOS: 1 day     Subjective      Patient seen, examined, chart reviewed discussed with the patient and patient nurse on patient bedside overnight patient planes of generalized body ache since she is on a specific pain regimen for her back pain  Her home medications were renewed in the morning and then she had a good sleep remained stable.  She continued to feel generalized weakness and continued to require about 3 to 6 L of nasal cannula oxygen to keep oxygen above 92%     Review of Systems    12 systems reviewed positive pertinent mentioned in subjective section of my note    Objective       Vital signs:  Temp:  [36.7 °C (98.1 °F)] 36.7 °C (98.1 °F)  Heart Rate:  [47-80] 62  Resp:  [11-23] 23  BP: (139-163)/(79-95) 141/79  SpO2/FiO2 Ratio Using Approximate FiO2 (%):  [204.5-293.8] 204.5  Estimated P/F Ratio Using Approximate FiO2 (%):  [183.9-256.3] 183.9    Physical Exam:    47-year-old female lying in the bed without any obvious distress at the time of encounter  HEENT atraumatic, no pallor, no JVD  Chest diminished breath sound lower lung bases  CVS S1-S2 audible  Abdominal soft, nontender bowel sound present  Genitourinary no CVA tenderness  Extremities no edema  psychiatric normal mood and affect  CNS alert awake orientation x3 moving both upper and lower extremity    Labs:    Results from last 4 days   Lab Units 01/12/22  0519 01/11/22  2151   WBC AUTO 10*3/uL 6.0 6.1   HEMOGLOBIN g/dL 12.7 11.7   HEMATOCRIT % 38.1 35.9   PLATELETS AUTO 10*3/uL 249 231     Results from last 4 days   Lab Units 01/12/22  0518 01/11/22  2151 01/11/22  2115   SODIUM mmol/L 140 139 142   POTASSIUM MMOL/L 4.5 3.0* 3.3*   CHLORIDE mmol/L 105 105 104   CO2 mmol/L 26 26 27   BUN mg/dL 8 6* 6*   CREATININE mg/dL 0.54* 0.54* 0.60   CALCIUM mg/dL 8.5* 7.8* 8.5*   TOTAL PROTEIN g/dL  --   --  7.0   BILIRUBIN TOTAL mg/dL  --   --  0.46   ALK PHOS U/L  --   --  110*   ALT U/L  --   --  38   AST U/L  --   --   41*   GLUCOSE mg/dL 130* 91 91     Results from last 4 days   Lab Units 01/11/22  2210   POCT PH, ARTERIAL PH 7.42   POCT PCO2, ARTERIAL MMHG 48.4*   POCT PO2, ARTERIAL MMHG 57.2*   POCT HCO3, ARTERIAL mmol/L 31.4*   BEART mmol/L 5.9*             Results from last 4 days   Lab Units 01/11/22 2151   INR  1.1     Results from last 4 days   Lab Units 01/11/22 2115   CK TOTAL U/L 27     Results from last 4 days   Lab Units 01/11/22  2115   CK TOTAL U/L 27             Results from last 4 days   Lab Units 01/11/22 2115   FERRITIN ng/mL 31.9         Results from last 4 days   Lab Units 01/11/22 2115   CRP mg/L 28.5*     Results from last 4 days   Lab Units 01/11/22 2151   SED RATE mm/hr 30*     No results found for: [id number]  Results from last 4 days   Lab Units 01/12/22  0438   TSH uIU/ml 0.583     Lab Results   Component Value Date    DDIMER <0.27 06/03/2018       Assessment/Plan     Principal Problem:    Acute hypoxemic respiratory failure due to COVID-19 (CMS/[facility]) ([facility])  Active Problems:    Fibromyalgia    Sinus bradycardia    LUIS ALBERTO on CPAP    47-year-old female with a PMH of chronic back pain s/p lumbar laminectomy x4, hypothyroidism, HLD, fibromyalgia, C. difficile infection in the past s/p fecal transplant 4/21, LUIS ALBERTO on CPAP, anxiety/depression, morbid obesity with a BMI of 43, COVID positive for 4 days presented to ED due to shortness of breath, cough, generalized body ache and sore throat, from home oxygen saturation was in 70s report loss of taste and smell.  Upon arrival hypoxemic oxygen saturation 84%    -Acute hypoxic respiratory failure due to COVID-19 pneumonia  -Chronic back pain s/p lumbar laminectomy x4  -Sinus bradycardia  -Hypothyroidism  -HLD  -Fibromyalgia  -C. difficile infection in the past s/p fecal transplant  -LUIS ALBERTO on CPAP(CPAP has been recalled does not currently using)  -Anxiety/depression  -Morbid obesity BMI 43    Plan  COVID-19 isolation protocol  Nasal cannula oxygen to keep oxygen above  92%, incentive spirometry, antitussive, dexamethasone and IV remdesivir  Telemetry for any tachyarrhythmias  Resume home medication for chronic back pain, hypothyroidism, HLD, fibromyalgia, anxiety and depression  PT/OT supportive care  Lifestyle modification exercise for morbid obesity    DVT Prophylaxis: Subcu Lovenox  Central Lines:   Other lines/tubes:   Wounds:   Code Status: Full Code  Anticipated date of discharge:    Total time spent around 35 minutes, more than 50% time spent with the patient face-to-face and care coordination  A voice recognition program was used to aid in documentation of this record.  Sometime words are not presented exactly as they were spoken while efforts were made to carefully edit and correct any inaccuracies, some errors may be present .  please take this into context.  Please contact the provider if errors are identified

## 2022-01-12 NOTE — H&P
Chief complaint: Shortness of breath    HPI  Isabell Harper is a 47 y.o. female, with known PMHx of Chronic back pain s/p lumbar laminectomy x4, LUIS ALBERTO on CPAP, Hypothyroidism, hyperlipidemia, C. difficile colitis s/p fecal transplant 04/21, Anxiety / Depression, fibromyalgia, and morbid obesity with BMI 42 presents to ER with shortness of breath which started 4 days ago.  Patient did a home COVID test which was +4 days ago.  Does have a 1 week history of cough, congestion malaise and sore throat.  Over the past few days noted dyspnea on exertion and breathing worsening.  Home oxygenation 70% on room air.  Reports subjective fevers and chills.  Reports loss of taste and smell.  Does report occasional diarrhea.  Denies chest pains or palpitations.  Patient is scheduled to have back surgery in 2 days.  Patient did have COVID-vaccine x2 so far.     At [facility] ER, patient is hypoxemic at 84% requiring 5 L of oxygen initially.  Afebrile 98.1 °F with blood pressure 141/79.  Labs with potassium 3.0, creatinine 0.54, normal LFTs.  Troponin 5.0.  WBC 6.1, hemoglobin 11.7 platelets 231.  ESR is 30, fibrinogen 381 and CRP of 28.5.  ABG with pH of 7.42, PCO2 48 and PO2 of 57 on 5 L nasal cannula.  Patient received dexamethasone 6 mg IV in the ER.       Past Medical History:   Diagnosis Date   • Chronic pain    • Complication of anesthesia    • Dental disease    • Depression    • Diverticulitis    • Endocrine disorder    • Extremity pain    • Fibromyalgia    • Hashimoto's thyroiditis    • Hypothyroidism    • Injury of back    • Low back pain    • Neurologic disorder    • Respiratory disease    • Sleep apnea     does not use cpap   • Wears dentures           Past Surgical History:   Procedure Laterality Date   • BACK SURGERY     • CHOLECYSTECTOMY     • COLONOSCOPY N/A 12/27/2019    Procedure: COLONOSCOPY WITH ASCENDING COLON BIOPSIES;  Surgeon: Kelvin Cordova MD PhD;  Location: [facility];  Service: Endoscopy;  Laterality:  N/A;   • COLONOSCOPY N/A 4/6/2021    Procedure: COLONOSCOPY WITH FECAL MICROBIOTA TRANSPLANTATION;  Surgeon: Maria Dolores Richardson MD;  Location: [facility];  Service: Endoscopy;  Laterality: N/A;   • ESOPHAGOGASTRODUODENOSCOPY N/A 12/27/2019    Procedure: ESOPHAGOGASTRODUODENOSCOPY WITH DUODENAL BIOPSIES;  Surgeon: Kelvin Cordova MD PhD;  Location: [facility];  Service: Endoscopy;  Laterality: N/A;   • EXAMINATION UNDER ANESTHESIA N/A 1/17/2020    Procedure: EXAM UNDER ANESTHESIA;  Surgeon: Steve Michelle MD;  Location: [facility] OR;  Service: General;  Laterality: N/A;   • HEMORRHOID SURGERY N/A 1/17/2020    Procedure: HEMORRHOIDECTOMY TIMES ONE with BANDING TIMES ONE;  Surgeon: Steve Michelel MD;  Location: [facility] OR;  Service: General;  Laterality: N/A;   • HYSTERECTOMY     • REDUCTION MAMMAPLASTY     • TUBAL LIGATION           Social History:  Isabell Harper  reports that she has quit smoking. She smoked 0.00 packs per day. She has never used smokeless tobacco. She reports that she does not drink alcohol and does not use drugs.    Family History:  family history includes No Known Problems in her mother.      Allergies:  Allergies   Allergen Reactions   • Cephalexin      hives, throat swelling       Medications:   Prior to Admission medications    Medication Sig Start Date End Date Taking? Authorizing Provider   celecoxib (CeleBREX) 200 mg capsule Take 200 mg by mouth 2 (two) times a day    Historical Provider, MD   diclofenac sodium (VOLTAREN) 1 % gel Apply 4 g topically 4 (four) times a day as needed (pain) 12/2/19   Oneil Valdez CNP   ALPRAZolam (XANAX) 1 mg tablet Take 1 mg by mouth daily as needed 9/17/19   Historical Provider, MD   FLUoxetine (PROzac) 40 mg capsule Take 40 mg by mouth every evening Take along with 20mg, total daily dose of 60mg  9/12/19   Historical Provider, MD   traZODone (DESYREL) 100 mg tablet Take 150 mg by mouth nightly 9/8/19   Historical Provider, MD   carisoprodol (SOMA) 350 mg tablet  Take 350 mg by mouth 4 (four) times a day 8/19/19   Historical Provider, MD   FLUoxetine (PROzac) 20 mg capsule Take 20 mg by mouth every evening Take along with 40mg, total daily dose of 60mg  2/13/17   Conversion Provider   levothyroxine (SYNTHROID, LEVOTHROID) 175 mcg tablet Take 175 mcg by mouth every morning   2/23/17   Conversion Provider       Review of Systems:  10 Point Review of System was discussed with patient & family, Essential negative except for Pertinent Positive reported in HPI.     Objective     Vital signs:    Temp:  [36.7 °C (98.1 °F)] 36.7 °C (98.1 °F)  Heart Rate:  [50-80] 50  Resp:  [12-20] 20  BP: (141-163)/(79-89) 141/79  SpO2/FiO2 Ratio Using Approximate FiO2 (%):  [225-293.8] 232.5  Estimated P/F Ratio Using Approximate FiO2 (%):  [200.5-256.3] 206.6      Exam:  General appearance: alert, appears stated age, cooperative, fatigued, no distress and morbidly obese  Throat: Dry oral mucosa  Lungs: diminished breath sounds  Heart: S1, S2 normal  Abdomen: soft, non-tender; bowel sounds normal; no masses, no organomegaly  Extremities: extremities normal, warm and well-perfused; no cyanosis, clubbing, or edema  Skin: Skin color, texture, turgor normal. No rashes or lesions  Neurologic: Mental status: Alert, oriented, thought content appropriate    EKG: Sinus bradycardia at 54 bpm.  QTc is 460.  T wave inversions lead V1 to V2.  No significant change from EKG in 2018             Labs:  A1c: No results found for: HGBA1C  Adult ABG:   Lab Results   Component Value Date    PHART 7.42 01/11/2022    UBR7BTV 48.4 (H) 01/11/2022    PO2ART 57.2 (L) 01/11/2022    VGB9PTJ 31.4 (H) 01/11/2022    BEART 5.9 (H) 01/11/2022    and Adult VBG: No results found for: PHVEN, TYO2QKP, PO2VEN, RLA7JRP, CPU5GGV, SO2VEN, K4JAHUVB, BEVEN  CBC with Platelet:    Lab Results   Component Value Date    WBC 6.1 01/11/2022    HGB 11.7 01/11/2022    HCT 35.9 01/11/2022     01/11/2022    RBC 4.13 01/11/2022    MCV 86.9  01/11/2022    MCH 28.4 01/11/2022    MCHC 32.7 01/11/2022    RDW 13.7 01/11/2022    MPV 7.8 01/11/2022     Comp:   Lab Results   Component Value Date     01/11/2022    K 3.0 (L) 01/11/2022     01/11/2022    CO2 26 01/11/2022    BUN 6 (L) 01/11/2022    CREATININE 0.54 (L) 01/11/2022    GLUCOSE 91 01/11/2022    CALCIUM 7.8 (L) 01/11/2022    PROT 7.0 01/11/2022    ALBUMIN 3.7 01/11/2022    AST 41 (H) 01/11/2022    ALT 38 01/11/2022    ALKPHOS 110 (H) 01/11/2022    BILITOT 0.46 01/11/2022     C-Reactive Protein Screen:   Lab Results   Component Value Date    CRP 28.5 (H) 01/11/2022     Magnesium:   Lab Results   Component Value Date    MG 1.9 01/11/2022     Protime-INR:   Lab Results   Component Value Date    PT 13.1 (H) 01/11/2022    INR 1.1 01/11/2022           Imaging:  XR chest portable 1 view    Result Date: 1/11/2022  Exam: Chest x-ray - AP portable upright view 01/11/2022 2128 hours Clinical History:  Shortness of breath Comparison/s:  7/12/2019 Findings:  Interstitial prominence is increased since 10/12/2018. Heart size is within normal limits. Findings could represent mild interstitial edema versus infection. No pleural effusion is present. Posterior spinal stabilization rods are seen in the thoracic and lumbar spine.     IMPRESSION:  1. Interstitial prominence the lungs could represent mild edema versus an infection.        Assessment/Plan      Principal Problem:    Acute hypoxemic respiratory failure due to COVID-19 (CMS/HCC) ([facility])  Active Problems:    Sinus bradycardia    Fibromyalgia    LUIS ALBERTO on CPAP     48 y/o female with known PMHx of Chronic back pain s/p lumbar laminectomy x4, LUIS ALBERTO on CPAP, Hypothyroidism, hyperlipidemia, C. difficile colitis s/p fecal transplant 04/21, Anxiety / Depression, fibromyalgia, and morbid obesity with BMI 42 is being admitted with COVID-19.    # Acute hypoxemic respiratory failure due to COVID-19  -Hypoxemia requiring 5 L of oxygen and early infiltrate on chest x-ray  noted  -COVID-19 protocol order stabilized  -Ordered CMP, CBC, ABG, CRP and D-dimer  -Ordered blood cultures x2 and ABG  -Started on dexamethasone 6 mg p.o. daily  -Started on IV Remdesivir per protocol  -As needed Tessalon Perles, Mucinex and Combivent.  -De-escalate oxygen therapy as possible  -Discussed worse case scenario ventilator with the patient, she is in agreement.    # Sinus bradycardia  -Suspect likely secondary to untreated LUIS ALBERTO  -Check TSH and EKG  -Obtain limited 2D echo in a.m.  -Monitor on telemetry    # LUIS ALBERTO noton CPAP   -Patient CPAP has been recalled, does not currently using  -Order CPAP for now due to hypercapnia    # Morbid obesity with BMI 42    # Fibromyalgia  -Resume home medications      DVT Prophylaxis: pharmacologic prophylaxis (with any of the following: Lovenox 40 mg subcu twice daily)      Catheters & Lines: Peripheral    Wounds: None    Code Status: Full Code    Expected Length of Stay: Hospitalized for 2-3 midnights      Discussed with Provider: Dr. Michelle in ER      Time Spent:   Total 70 Min, of which more than 50% on care coordination and counseling with patient or family.  Discussed with the ER physician along with any necessary consultants.  Case will be discussed with daytime hospitalist in the morning for assumption of care.      TIERNEY BURGER MD      Please note:  Dragon Voice recognition program was used to aid in documentation of this record.  Sometimes words are not printed exactly as they were spoken.  While efforts were made to carefully edit and correct any inaccuracies, some areas may be present; please take these into context.  Please contact the provider if areas are identified.

## 2022-01-13 LAB
ALBUMIN SERPL-MCNC: 3.8 G/DL (ref 3.5–5.3)
ANION GAP SERPL CALC-SCNC: 8 MMOL/L (ref 3–11)
BASOPHILS # BLD AUTO: 0 10*3/UL
BASOPHILS NFR BLD AUTO: 0 % (ref 0–2)
BUN SERPL-MCNC: 8 MG/DL (ref 7–25)
CALCIUM ALBUM COR SERPL-MCNC: 8.8 MG/DL (ref 8.6–10.3)
CALCIUM SERPL-MCNC: 8.6 MG/DL (ref 8.6–10.3)
CHLORIDE SERPL-SCNC: 105 MMOL/L (ref 98–107)
CO2 SERPL-SCNC: 29 MMOL/L (ref 21–32)
CREAT SERPL-MCNC: 0.61 MG/DL (ref 0.6–1.1)
EOSINOPHIL # BLD AUTO: 0 10*3/UL
EOSINOPHIL NFR BLD AUTO: 0 % (ref 0–3)
ERYTHROCYTE [DISTWIDTH] IN BLOOD BY AUTOMATED COUNT: 13.6 % (ref 11.5–14)
GFR SERPL CREATININE-BSD FRML MDRD: 109 ML/MIN/1.73M*2
GLUCOSE SERPL-MCNC: 106 MG/DL (ref 70–105)
HCT VFR BLD AUTO: 40 % (ref 34–45)
HGB BLD-MCNC: 13.3 G/DL (ref 11.5–15.5)
LYMPHOCYTES # BLD AUTO: 2 10*3/UL
LYMPHOCYTES NFR BLD AUTO: 26 % (ref 11–47)
MAGNESIUM SERPL-MCNC: 1.9 MG/DL (ref 1.8–2.4)
MCH RBC QN AUTO: 28.6 PG (ref 28–33)
MCHC RBC AUTO-ENTMCNC: 33.2 G/DL (ref 32–36)
MCV RBC AUTO: 86.2 FL (ref 81–97)
MONOCYTES # BLD AUTO: 0.4 10*3/UL
MONOCYTES NFR BLD AUTO: 6 % (ref 3–11)
NEUTROPHILS # BLD AUTO: 5.1 10*3/UL
NEUTROPHILS NFR BLD AUTO: 67 % (ref 41–81)
PHOSPHATE SERPL-MCNC: 2.8 MG/DL (ref 2.5–4.9)
PLATELET # BLD AUTO: 293 10*3/UL (ref 140–350)
PMV BLD AUTO: 8 FL (ref 6.9–10.8)
POTASSIUM SERPL-SCNC: 3.7 MMOL/L (ref 3.5–5.1)
RBC # BLD AUTO: 4.64 10*6/ΜL (ref 3.7–5.3)
SODIUM SERPL-SCNC: 142 MMOL/L (ref 135–145)
WBC # BLD AUTO: 7.6 10*3/UL (ref 4.5–10.5)

## 2022-01-13 PROCEDURE — 6360000200 HC RX 636 W HCPCS (ALT 250 FOR IP): Performed by: INTERNAL MEDICINE

## 2022-01-13 PROCEDURE — 85025 COMPLETE CBC W/AUTO DIFF WBC: CPT | Performed by: INTERNAL MEDICINE

## 2022-01-13 PROCEDURE — 1110000100 HC ROOM PRIVATE W TELE

## 2022-01-13 PROCEDURE — 83735 ASSAY OF MAGNESIUM: CPT | Performed by: INTERNAL MEDICINE

## 2022-01-13 PROCEDURE — 80069 RENAL FUNCTION PANEL: CPT | Performed by: INTERNAL MEDICINE

## 2022-01-13 PROCEDURE — 99232 SBSQ HOSP IP/OBS MODERATE 35: CPT | Performed by: INTERNAL MEDICINE

## 2022-01-13 PROCEDURE — 6370000100 HC RX 637 (ALT 250 FOR IP): Performed by: INTERNAL MEDICINE

## 2022-01-13 PROCEDURE — C9399 UNCLASSIFIED DRUGS OR BIOLOG: HCPCS | Performed by: INTERNAL MEDICINE

## 2022-01-13 PROCEDURE — 36415 COLL VENOUS BLD VENIPUNCTURE: CPT | Performed by: INTERNAL MEDICINE

## 2022-01-13 RX ORDER — GUAIFENESIN/DEXTROMETHORPHAN 100-10MG/5
10 SYRUP ORAL EVERY 4 HOURS PRN
Status: DISCONTINUED | OUTPATIENT
Start: 2022-01-13 | End: 2022-01-14 | Stop reason: HOSPADM

## 2022-01-13 RX ORDER — HYDRALAZINE HYDROCHLORIDE 25 MG/1
25 TABLET, FILM COATED ORAL 2 TIMES DAILY
Status: DISCONTINUED | OUTPATIENT
Start: 2022-01-13 | End: 2022-01-14 | Stop reason: HOSPADM

## 2022-01-13 RX ORDER — FLUOXETINE HYDROCHLORIDE 20 MG/1
20 CAPSULE ORAL ONCE
Status: COMPLETED | OUTPATIENT
Start: 2022-01-13 | End: 2022-01-13

## 2022-01-13 RX ORDER — MAGNESIUM SULFATE HEPTAHYDRATE 40 MG/ML
2 INJECTION, SOLUTION INTRAVENOUS ONCE
Status: COMPLETED | OUTPATIENT
Start: 2022-01-13 | End: 2022-01-13

## 2022-01-13 RX ORDER — FLUOXETINE HYDROCHLORIDE 20 MG/1
60 CAPSULE ORAL EVERY MORNING
Status: DISCONTINUED | OUTPATIENT
Start: 2022-01-14 | End: 2022-01-14 | Stop reason: HOSPADM

## 2022-01-13 RX ORDER — CLOTRIMAZOLE 1 %
1 CREAM (GRAM) TOPICAL 2 TIMES DAILY
Status: DISCONTINUED | OUTPATIENT
Start: 2022-01-13 | End: 2022-01-14 | Stop reason: HOSPADM

## 2022-01-13 RX ORDER — HYDROCHLOROTHIAZIDE 12.5 MG/1
12.5 TABLET ORAL DAILY
Status: DISCONTINUED | OUTPATIENT
Start: 2022-01-13 | End: 2022-01-14 | Stop reason: HOSPADM

## 2022-01-13 RX ADMIN — ENOXAPARIN SODIUM 40 MG: 100 INJECTION SUBCUTANEOUS at 21:12

## 2022-01-13 RX ADMIN — TRAZODONE HYDROCHLORIDE 150 MG: 150 TABLET ORAL at 21:12

## 2022-01-13 RX ADMIN — HYDRALAZINE HYDROCHLORIDE 10 MG: 20 INJECTION, SOLUTION INTRAMUSCULAR; INTRAVENOUS at 04:06

## 2022-01-13 RX ADMIN — HYDROCHLOROTHIAZIDE 12.5 MG: 12.5 TABLET ORAL at 10:19

## 2022-01-13 RX ADMIN — CLOTRIMAZOLE 1 APPLICATION: 0.01 CREAM TOPICAL at 09:45

## 2022-01-13 RX ADMIN — REMDESIVIR 100 MG: 100 INJECTION, POWDER, LYOPHILIZED, FOR SOLUTION INTRAVENOUS at 15:34

## 2022-01-13 RX ADMIN — BENZONATATE 100 MG: 100 CAPSULE ORAL at 10:19

## 2022-01-13 RX ADMIN — HYDRALAZINE HYDROCHLORIDE 25 MG: 25 TABLET, FILM COATED ORAL at 21:13

## 2022-01-13 RX ADMIN — PREGABALIN 200 MG: 100 CAPSULE ORAL at 14:34

## 2022-01-13 RX ADMIN — PREGABALIN 200 MG: 100 CAPSULE ORAL at 21:13

## 2022-01-13 RX ADMIN — FLUOXETINE 40 MG: 20 CAPSULE ORAL at 08:07

## 2022-01-13 RX ADMIN — MORPHINE SULFATE 15 MG: 15 TABLET, EXTENDED RELEASE ORAL at 08:07

## 2022-01-13 RX ADMIN — OXYCODONE HYDROCHLORIDE 5 MG: 5 TABLET ORAL at 17:12

## 2022-01-13 RX ADMIN — GUAIFENESIN AND DEXTROMETHORPHAN 10 ML: 100; 10 SYRUP ORAL at 10:19

## 2022-01-13 RX ADMIN — OXYCODONE HYDROCHLORIDE 5 MG: 5 TABLET ORAL at 10:20

## 2022-01-13 RX ADMIN — Medication 1 SPRAY: at 08:08

## 2022-01-13 RX ADMIN — OXYCODONE HYDROCHLORIDE 5 MG: 5 TABLET ORAL at 03:16

## 2022-01-13 RX ADMIN — METHOCARBAMOL TABLETS 750 MG: 500 TABLET, COATED ORAL at 08:08

## 2022-01-13 RX ADMIN — MORPHINE SULFATE 15 MG: 15 TABLET, EXTENDED RELEASE ORAL at 21:13

## 2022-01-13 RX ADMIN — ALPRAZOLAM 0.5 MG: 0.5 TABLET ORAL at 21:13

## 2022-01-13 RX ADMIN — MAGNESIUM SULFATE HEPTAHYDRATE 2 G: 2 INJECTION, SOLUTION INTRAVENOUS at 08:13

## 2022-01-13 RX ADMIN — DEXAMETHASONE 6 MG: 6 TABLET ORAL at 08:07

## 2022-01-13 RX ADMIN — FLUOXETINE 20 MG: 20 CAPSULE ORAL at 10:19

## 2022-01-13 RX ADMIN — LEVOTHYROXINE SODIUM 100 MCG: 0.1 TABLET ORAL at 05:58

## 2022-01-13 RX ADMIN — METHOCARBAMOL TABLETS 750 MG: 500 TABLET, COATED ORAL at 14:34

## 2022-01-13 RX ADMIN — ALPRAZOLAM 0.5 MG: 0.5 TABLET ORAL at 14:34

## 2022-01-13 RX ADMIN — CLOTRIMAZOLE 1 APPLICATION: 0.01 CREAM TOPICAL at 21:14

## 2022-01-13 RX ADMIN — GUAIFENESIN 600 MG: 600 TABLET, EXTENDED RELEASE ORAL at 21:13

## 2022-01-13 RX ADMIN — Medication 1 APPLICATION: at 08:12

## 2022-01-13 RX ADMIN — HYDRALAZINE HYDROCHLORIDE 25 MG: 25 TABLET, FILM COATED ORAL at 10:19

## 2022-01-13 RX ADMIN — ACETAMINOPHEN 650 MG: 325 TABLET ORAL at 16:47

## 2022-01-13 RX ADMIN — Medication 1000 MG: at 04:13

## 2022-01-13 RX ADMIN — METHOCARBAMOL TABLETS 750 MG: 500 TABLET, COATED ORAL at 21:12

## 2022-01-13 RX ADMIN — GUAIFENESIN 600 MG: 600 TABLET, EXTENDED RELEASE ORAL at 08:07

## 2022-01-13 RX ADMIN — PREGABALIN 200 MG: 100 CAPSULE ORAL at 08:07

## 2022-01-13 RX ADMIN — ENOXAPARIN SODIUM 40 MG: 100 INJECTION SUBCUTANEOUS at 08:07

## 2022-01-13 NOTE — INTERDISCIPLINARY/THERAPY
Case Management Admission Note    Phone # [phone]    Living Situation: Family members Private residence            ADLs: Independent  Stairs: Yes 4 front entryway  HME/CPAP: 4-Wheeled [employer] Equipment Vendor: Ordered off [employer]    Oxygen: No      Home Health:No     Current Resources: None      Diabetes/supplies: Do you have Diabetes?: No  PCP: Shelby Bateman MD  Funding: Medicaid  Pharmacy:[id number] - [place], [place] - [address]  AT [facility] [address]    Support Person: Primary Emergency Contact: Diane Car, Home Phone: [phone], Mobile Phone: [phone], Relation: Mother  Needs transportation assistance at DC: No     Discharge Needs/Barriers:  CM will follow for discharge needs.     Narrative: Pt admitted for COVID. NC at 3lpm. Dexamethasone and remdisivir. CM called pt over the phone in room, introduced self and explained role. Pt provided above information. CM will follow for any discharge needs.     Dispo: [facility]

## 2022-01-13 NOTE — NURSING END OF SHIFT
"Nursing End of Shift Summary:    Patient: Isabell Harper  MRN: [id number]  : 1975, Age: 47 y.o.    Location: [address] [place] [address]    Nursing Goals  Clinical Goals for the Shift: maintain safety and comfort; monitor O2 demand and saturation    Narrative Summary of Progress Toward Clinical Goals:  BP running 170's with the pulse in the lower 50's. Pt was given hydralalzine IV 10mg twice this shift for a parameter of sbp>160. Telemetry reviewed with pt SB running 41-58bpm. Round, red skin area noticed by Javan PHILLIP could be \"ringworm\" by her right arm IV. Will have Day nurse assess with MD this AM on rounds.   Barriers to Goals/Nursing Concerns:  Yes - BP still elevated after PRN hydralazine. Check skin patch site to see if might need a fungal medication.    New Patient or Family Concerns/Issues:  No    Shift Summary:   Significant Events & Communications to Providers (last 12 hours)     Last 5 Values    No documentation.             Oxygen Usage (last 12 hours)     Last 5 Values     Row Name 22 1937 22 0425                Oxygen Weaning Trial by Nursing    Is Patient on Room Air OR on the Same Amount of O2 as at Home? No No       Are You Performing the QShift O2 Weaning Trial? No No               Mobility (last 12 hours)     Last 5 Values     Row Name 22                   Mobility    Activity Bathroom privileges        Level of Assistance Independent after set-up                  Urethral Catheter     Active Urethral Catheter     None            Active Lines     Active Central venous catheter / Peripherally inserted central catheter / Implantable Port / Hemodialysis catheter / Midline Catheter     None              Infusing Medications   Medication Dose Last Rate     PRN Medications   Medication Dose Last Admin   • sodium chloride  1 spray     • sodium chloride-aloe vera  1 application     • sodium chloride 0.9% (NS)  25-50 mL      And   • sodium chloride  3 mL     • acetaminophen  1,000 mg 1,000 mg " at 01/13/22 0413   • HYDROmorphone  2 mg 2 mg at 01/12/22 1717   • hydrALAZINE  10 mg 10 mg at 01/13/22 0406   • acetaminophen  650 mg 650 mg at 01/12/22 0925    Or   • acetaminophen  650 mg      Or   • acetaminophen  650 mg     • oxyCODONE  5 mg 5 mg at 01/13/22 0316   • ondansetron  4 mg      Or   • ondansetron ODT  4 mg      Or   • ondansetron  4 mg     • ALPRAZolam  0.5 mg 0.5 mg at 01/12/22 2039   • ipratropium-albuteroL  1 puff     • benzonatate  100 mg       _________________________  Lyssa Spear LPN  01/13/22 4:30 AM

## 2022-01-13 NOTE — PROGRESS NOTES
Hospitalist Progress Note   LOS: 2 days     Subjective      Patient seen, examined chart reviewed overnight hemodynamically stable but continued to have on and off elevated blood pressure and he still has cough yes cough  Evaluated on Robitussin-AC, hydrochlorothiazide 12.5 mg daily with hydralazine 25 mg twice daily and closely monitor patient blood pressure  Patient denies any chest pain, abdominal pain, changes in her bowel habits    She continued to require oxygen currently he is on 3 L     Review of Systems    12 systems reviewed positive pertinent mentioned in subjective section of my note    Objective       Vital signs:  Temp:  [36.3 °C (97.3 °F)-36.9 °C (98.4 °F)] 36.3 °C (97.4 °F)  Heart Rate:  [51-69] 60  Resp:  [16-19] 18  BP: (133-180)/() 133/76  SpO2/FiO2 Ratio Using Approximate FiO2 (%):  [271.9-300] 300  Estimated P/F Ratio Using Approximate FiO2 (%):  [238.5-261.3] 261.3    Physical Exam:    47-year-old female sitting in the bed without any obvious distress was coughing on and off at the time of my encounter  HEENT head atraumatic, no pallor, no JVD  Chest diminished breath sound lower lung bases currently on 3 L nasal oxygen  S1-S2 audible  Abdomen soft, nontender, BS present  Genitourinary no CVA tenderness   Extremities no edema seen  psychiatric normal mood and affect  CNS alert awake orientation x3      Labs:    Results from last 4 days   Lab Units 01/13/22  0614 01/12/22  0519 01/11/22  2151   WBC AUTO 10*3/uL 7.6 6.0 6.1   HEMOGLOBIN g/dL 13.3 12.7 11.7   HEMATOCRIT % 40.0 38.1 35.9   PLATELETS AUTO 10*3/uL 293 249 231     Results from last 4 days   Lab Units 01/13/22  0614 01/12/22  0518 01/11/22  2151 01/11/22  2115   SODIUM mmol/L 142 140 139 142   POTASSIUM MMOL/L 3.7 4.5 3.0* 3.3*   CHLORIDE mmol/L 105 105 105 104   CO2 mmol/L 29 26 26 27   BUN mg/dL 8 8 6* 6*   CREATININE mg/dL 0.61 0.54* 0.54* 0.60   CALCIUM mg/dL 8.6 8.5* 7.8* 8.5*   TOTAL PROTEIN g/dL  --   --   --  7.0    BILIRUBIN TOTAL mg/dL  --   --   --  0.46   ALK PHOS U/L  --   --   --  110*   ALT U/L  --   --   --  38   AST U/L  --   --   --  41*   GLUCOSE mg/dL 106* 130* 91 91     Results from last 4 days   Lab Units 01/11/22  2210   POCT PH, ARTERIAL PH 7.42   POCT PCO2, ARTERIAL MMHG 48.4*   POCT PO2, ARTERIAL MMHG 57.2*   POCT HCO3, ARTERIAL mmol/L 31.4*   BEART mmol/L 5.9*             Results from last 4 days   Lab Units 01/11/22 2151   INR  1.1     Results from last 4 days   Lab Units 01/11/22 2115   CK TOTAL U/L 27     Results from last 4 days   Lab Units 01/11/22 2115   CK TOTAL U/L 27             Results from last 4 days   Lab Units 01/11/22 2115   FERRITIN ng/mL 31.9         Results from last 4 days   Lab Units 01/11/22 2115   CRP mg/L 28.5*     Results from last 4 days   Lab Units 01/11/22 2151   SED RATE mm/hr 30*     No results found for: [id number]  Results from last 4 days   Lab Units 01/12/22  0438   TSH uIU/ml 0.583     Lab Results   Component Value Date    DDIMER <0.27 06/03/2018       Assessment/Plan     Principal Problem:    Acute hypoxemic respiratory failure due to COVID-19 (CMS/[facility]) ([facility])  Active Problems:    Fibromyalgia    Sinus bradycardia    LUIS ALBERTO on CPAP    47-year-old female with a PMH of chronic back pain s/p lumbar laminectomy x4, hypothyroidism, HLD, fibromyalgia, C. difficile infection in the past s/p fecal transplant 4/21, LUIS ALBERTO on CPAP, anxiety/depression, morbid obesity with a BMI of 43, COVID positive for 4 days presented to ED due to shortness of breath, cough, generalized body ache and sore throat, from home oxygen saturation was in 70s report loss of taste and smell.  Upon arrival hypoxemic oxygen saturation 84%    -Acute hypoxic respiratory failure due to COVID-19 pneumonia  -Chronic back pain s/p lumbar laminectomy x4  -Sinus bradycardia  -Hypothyroidism  -HLD  -Fibromyalgia  -C. difficile infection in the past s/p fecal transplant  -LUIS ALBERTO on CPAP(CPAP has been recalled does not  currently using)  -Anxiety/depression  -Morbid obesity BMI 43    Plan  Continue COVID-19 isolation protocol  Nasal cannula oxygen to keep oxygen above 92%, incentive spirometry, antitussive, dexamethasone and IV remdesivir  Telemetry for any tachyarrhythmia  Continue home medication for back pain, hypothyroidism, HLD, fibromyalgia, anxiety depression  Lifestyle modification exercise for morbid obesity    DVT Prophylaxis: Subcu Lovenox  Central Lines:   Other lines/tubes:   Wounds:   Code Status: Full Code  Anticipated date of discharge:    Total time spent around 25 minutes, more than 50% time spent with the patient face-to-face and care coordination  A voice recognition program was used to aid in documentation of this record.  Sometime words are not presented exactly as they were spoken while efforts were made to carefully edit and correct any inaccuracies, some errors may be present .  please take this into context.  Please contact the provider if errors are identified

## 2022-01-13 NOTE — PLAN OF CARE
Problem: Infection Control  Goal: MINIMIZE THE ACQUISITION AND TRANSMISSION OF INFECTIOUS AGENTS  Description: INTERVENTIONS:  1. Isolate patient with suspected/diagnosed communicable disease  2. Place on designated isolation precautions  3. Maintain isolation techniques  4. Perform hand hygiene before and after each patient care activity  5. [place] universal precautions  6. Wear PPE as directed for type of isolation  7. Administer antibiotic therapy as ordered  8. Clean the environment appropriately after each patient use  9. Clean patient care equipment after each patient use as it leaves the room  10. Limit number of visitors, as appropriate  Outcome: Progressing  Flowsheets (Taken 1/13/2022 1046)  MINIMIZE THE ACQUISITION AND TRANSMISSION OF INFECT AGENTS:   Isolate patient with suspected/diagnosed communicable disease   Perform hand hygiene before and after each patient care activity   [place] universal precautions   Clean the environment appropriately after each patient use   Clean patient care equipment after each patient use as it leaves the room   Educate the patient/visitors on how to avoid the spread of infection     Problem: Safety Adult - Fall  Goal: Free from fall injury  Description: INTERVENTIONS:    Inpatient - Please reference Cares/Safety Flowsheet under [place] Fall Risk for interventions.  Pediatrics - Please reference Peds Daily Cares/Safety Flowsheet under Pace Pediatric Fall Assessment Fall Bundle for interventions  LD/OB - Please reference OB Shift Screening Flowsheet under OB Fall Risk for interventions.  Outcome: Progressing      s/p dose of meropenem in ED  consult ID re: further treatment No evidence for sepsis with a normal lactate = 1.2  s/p dose of meropenem in ED  consult ID re: further abx treatment

## 2022-01-14 VITALS
HEART RATE: 60 BPM | RESPIRATION RATE: 18 BRPM | DIASTOLIC BLOOD PRESSURE: 67 MMHG | BODY MASS INDEX: 43.4 KG/M2 | OXYGEN SATURATION: 95 % | WEIGHT: 254.19 LBS | TEMPERATURE: 98.7 F | HEIGHT: 64 IN | SYSTOLIC BLOOD PRESSURE: 158 MMHG

## 2022-01-14 LAB
ALBUMIN SERPL-MCNC: 3.6 G/DL (ref 3.5–5.3)
ANION GAP SERPL CALC-SCNC: 9 MMOL/L (ref 3–11)
BASOPHILS # BLD AUTO: 0 10*3/UL
BASOPHILS NFR BLD AUTO: 1 % (ref 0–2)
BUN SERPL-MCNC: 10 MG/DL (ref 7–25)
CALCIUM ALBUM COR SERPL-MCNC: 8.9 MG/DL (ref 8.6–10.3)
CALCIUM SERPL-MCNC: 8.6 MG/DL (ref 8.6–10.3)
CHLORIDE SERPL-SCNC: 105 MMOL/L (ref 98–107)
CO2 SERPL-SCNC: 26 MMOL/L (ref 21–32)
CREAT SERPL-MCNC: 0.54 MG/DL (ref 0.6–1.1)
EOSINOPHIL # BLD AUTO: 0 10*3/UL
EOSINOPHIL NFR BLD AUTO: 0 % (ref 0–3)
ERYTHROCYTE [DISTWIDTH] IN BLOOD BY AUTOMATED COUNT: 13.7 % (ref 11.5–14)
GFR SERPL CREATININE-BSD FRML MDRD: 115 ML/MIN/1.73M*2
GLUCOSE SERPL-MCNC: 94 MG/DL (ref 70–105)
HCT VFR BLD AUTO: 39.1 % (ref 34–45)
HGB BLD-MCNC: 12.8 G/DL (ref 11.5–15.5)
LYMPHOCYTES # BLD AUTO: 2.1 10*3/UL
LYMPHOCYTES NFR BLD AUTO: 27 % (ref 11–47)
MAGNESIUM SERPL-MCNC: 2.2 MG/DL (ref 1.8–2.4)
MCH RBC QN AUTO: 28.4 PG (ref 28–33)
MCHC RBC AUTO-ENTMCNC: 32.7 G/DL (ref 32–36)
MCV RBC AUTO: 86.9 FL (ref 81–97)
MONOCYTES # BLD AUTO: 0.5 10*3/UL
MONOCYTES NFR BLD AUTO: 7 % (ref 3–11)
NEUTROPHILS # BLD AUTO: 4.9 10*3/UL
NEUTROPHILS NFR BLD AUTO: 65 % (ref 41–81)
PHOSPHATE SERPL-MCNC: 3.8 MG/DL (ref 2.5–4.9)
PLATELET # BLD AUTO: 302 10*3/UL (ref 140–350)
PMV BLD AUTO: 8.1 FL (ref 6.9–10.8)
POTASSIUM SERPL-SCNC: 3.7 MMOL/L (ref 3.5–5.1)
RBC # BLD AUTO: 4.5 10*6/ΜL (ref 3.7–5.3)
SODIUM SERPL-SCNC: 140 MMOL/L (ref 135–145)
WBC # BLD AUTO: 7.5 10*3/UL (ref 4.5–10.5)

## 2022-01-14 PROCEDURE — 6360000200 HC RX 636 W HCPCS (ALT 250 FOR IP): Performed by: INTERNAL MEDICINE

## 2022-01-14 PROCEDURE — 99239 HOSP IP/OBS DSCHRG MGMT >30: CPT | Performed by: INTERNAL MEDICINE

## 2022-01-14 PROCEDURE — 83735 ASSAY OF MAGNESIUM: CPT | Performed by: INTERNAL MEDICINE

## 2022-01-14 PROCEDURE — 6370000100 HC RX 637 (ALT 250 FOR IP): Performed by: INTERNAL MEDICINE

## 2022-01-14 PROCEDURE — 36415 COLL VENOUS BLD VENIPUNCTURE: CPT | Performed by: INTERNAL MEDICINE

## 2022-01-14 PROCEDURE — 2590000100 HC RX 259: Performed by: INTERNAL MEDICINE

## 2022-01-14 PROCEDURE — 80069 RENAL FUNCTION PANEL: CPT | Performed by: INTERNAL MEDICINE

## 2022-01-14 PROCEDURE — 85025 COMPLETE CBC W/AUTO DIFF WBC: CPT | Performed by: INTERNAL MEDICINE

## 2022-01-14 RX ORDER — BENZONATATE 100 MG/1
100 CAPSULE ORAL 3 TIMES DAILY PRN
Qty: 21 CAPSULE | Refills: 0 | Status: SHIPPED | OUTPATIENT
Start: 2022-01-14 | End: 2022-01-21

## 2022-01-14 RX ORDER — DEXAMETHASONE 6 MG/1
6 TABLET ORAL DAILY
Qty: 7 TABLET | Refills: 0 | Status: SHIPPED | OUTPATIENT
Start: 2022-01-15 | End: 2022-01-28 | Stop reason: ALTCHOICE

## 2022-01-14 RX ORDER — HYDROCHLOROTHIAZIDE 25 MG/1
25 TABLET ORAL DAILY
Qty: 30 TABLET | Refills: 0 | Status: SHIPPED | OUTPATIENT
Start: 2022-01-14 | End: 2022-09-28 | Stop reason: ALTCHOICE

## 2022-01-14 RX ORDER — GUAIFENESIN 600 MG/1
600 TABLET, EXTENDED RELEASE ORAL 2 TIMES DAILY
Qty: 10 TABLET | Refills: 0 | Status: SHIPPED | OUTPATIENT
Start: 2022-01-14 | End: 2022-01-19

## 2022-01-14 RX ADMIN — Medication 1000 MG: at 11:18

## 2022-01-14 RX ADMIN — POLYETHYLENE GLYCOL 3350 17 G: 17 POWDER, FOR SOLUTION ORAL at 08:50

## 2022-01-14 RX ADMIN — DEXAMETHASONE 6 MG: 6 TABLET ORAL at 08:45

## 2022-01-14 RX ADMIN — CLOTRIMAZOLE 1 APPLICATION: 0.01 CREAM TOPICAL at 08:43

## 2022-01-14 RX ADMIN — HYDROCHLOROTHIAZIDE 12.5 MG: 12.5 TABLET ORAL at 08:44

## 2022-01-14 RX ADMIN — ENOXAPARIN SODIUM 40 MG: 100 INJECTION SUBCUTANEOUS at 08:46

## 2022-01-14 RX ADMIN — OXYCODONE HYDROCHLORIDE 5 MG: 5 TABLET ORAL at 11:17

## 2022-01-14 RX ADMIN — PREGABALIN 200 MG: 100 CAPSULE ORAL at 08:44

## 2022-01-14 RX ADMIN — ACETAMINOPHEN 650 MG: 325 TABLET ORAL at 03:20

## 2022-01-14 RX ADMIN — HYDRALAZINE HYDROCHLORIDE 25 MG: 25 TABLET, FILM COATED ORAL at 08:45

## 2022-01-14 RX ADMIN — GUAIFENESIN 600 MG: 600 TABLET, EXTENDED RELEASE ORAL at 08:45

## 2022-01-14 RX ADMIN — OXYCODONE HYDROCHLORIDE 5 MG: 5 TABLET ORAL at 03:20

## 2022-01-14 RX ADMIN — MORPHINE SULFATE 15 MG: 15 TABLET, EXTENDED RELEASE ORAL at 08:44

## 2022-01-14 RX ADMIN — FLUOXETINE 60 MG: 20 CAPSULE ORAL at 08:45

## 2022-01-14 RX ADMIN — METHOCARBAMOL TABLETS 750 MG: 500 TABLET, COATED ORAL at 08:44

## 2022-01-14 RX ADMIN — LEVOTHYROXINE SODIUM 100 MCG: 0.1 TABLET ORAL at 07:32

## 2022-01-14 RX ADMIN — REMDESIVIR 100 MG: 100 INJECTION, POWDER, LYOPHILIZED, FOR SOLUTION INTRAVENOUS at 11:59

## 2022-01-14 NOTE — DISCHARGE SUMMARY
Inpatient Discharge Summary    BRIEF OVERVIEW    Admitting Provider: Hi Leonardo MD  Discharge Provider: No att. providers found  Consultations:   Primary Care Physician at Discharge: Shelby Bateman [phone]     Admission Date: 1/11/2022     Discharge Date: 1/14/2022    Primary Discharge Diagnosis  Acute hypoxemic respiratory failure due to COVID-19 (CMS/HCC) ([facility])  Chronic back pain s/p lumbar laminectomy x4  Sinus bradycardia during hospitalization resolved    Secondary Discharge Diagnosis  -Hypothyroidism  -HLD  -Fibromyalgia  -C. difficile infection in the past s/p fecal transplant  -LUIS ALBERTO on CPAP(CPAP has been recalled does not currently using)  -Anxiety/depression  -Morbid obesity BMI 43  Problem List Items Addressed This Visit        Musculoskeletal    Sacroiliitis (CMS/HCC) ([facility])      Other Visit Diagnoses     COVID-19    -  Primary    Relevant Medications    benzonatate (TESSALON) 100 mg capsule    dexAMETHasone (DECADRON) 6 mg tablet (Start on 1/15/2022)    guaiFENesin (MUCINEX) 600 mg 12 hr tablet    Other Relevant Orders    Ambulatory referral to Family Practice    Telephone COVID Home Monitoring    MyChart COVID-19 Home Monitoring Program    COVID-19 Home Monitoring Calls    Pulse Oximeter DME    Thermometer DME    Hypertension, unspecified type        Relevant Medications    hydroCHLOROthiazide (HYDRODIURIL) 25 mg tablet          Discharge Disposition  01 - Home or Self-Care  Code Status at Discharge: Full Code     Discharge medication list      START taking these medications      Instructions   benzonatate 100 mg capsule  Commonly known as: TESSALON   Take 1 capsule (100 mg total) by mouth 3 (three) times a day as needed for cough for up to 7 days     dexAMETHasone 6 mg tablet  Commonly known as: DECADRON  Start taking on: January 15, 2022   Take 1 tablet (6 mg total) by mouth daily for 7 doses     guaiFENesin 600 mg 12 hr tablet  Commonly known as: MUCINEX   Take 1 tablet (600 mg  total) by mouth 2 (two) times a day for 5 days     hydroCHLOROthiazide 25 mg tablet  Commonly known as: HYDRODIURIL   Take 1 tablet (25 mg total) by mouth daily        CONTINUE taking these medications      Instructions   ALPRAZolam 1 mg tablet  Commonly known as: XANAX      FLUoxetine 20 mg capsule  Commonly known as: PROzac      FLUoxetine 40 mg capsule  Commonly known as: PROzac      HYDROmorphone 2 mg tablet  Commonly known as: DILAUDID      ketoconazole 2 % shampoo  Commonly known as: NIZORAL      levothyroxine 100 mcg tablet  Commonly known as: SYNTHROID, LEVOTHROID      Lyrica 200 mg capsule  Generic drug: pregabalin      methocarbamoL 750 mg tablet  Commonly known as: ROBAXIN      morphine 15 mg 12 hr tablet  Commonly known as: MS CONTIN      traZODone 100 mg tablet  Commonly known as: DESYREL      Tylenol Extra Strength 500 mg tablet  Generic drug: acetaminophen            Where to Get Your Medications      These medications were sent to [id number] - [place] [id number]  AT [facility]  [address] [facility] [address]    Phone: [phone]   · benzonatate 100 mg capsule  · dexAMETHasone 6 mg tablet  · guaiFENesin 600 mg 12 hr tablet  · hydroCHLOROthiazide 25 mg tablet         Outpatient Follow-Up  Future Appointments   Date Time Provider Department Center   2/7/2022 10:00 AM Shahriar Cota PsyD [facility] NEUROPSY    3/16/2022  1:00 PM Corky German MD [facility] LIFE    3/16/2022  2:00 PM Kaye Edward RD [facility]         Referrals and Follow-ups to Schedule     Ambulatory referral to Family Practice          Test Results Pending at Discharge  Pending Labs     Order Current Status    Blood culture, 1 set Preliminary result    Blood culture, 1 set Preliminary result    Blood cultures, 2 sets Preliminary result          Important tests on this admission:  [facility]   Final Result      XR chest portable 1 view   Final Result   IMPRESSION:        1. Interstitial prominence the lungs could represent mild edema versus an infection.        [facility]    Result Date: 1/12/2022  Narrative: Normal left ventricular size and systolic function, normal wall motion. Ejection fraction 67% by biplane measurement. Normal left ventricular wall thickness. Normal diastolic function and LV filling pressure. Normal right ventricular size and function. Normal biatrial size. Normal appearing mitral and tricuspid valve on 2D ultrasound. Limited valvular assessment. Insufficient TR for estimation of pulmonary pressure. No pericardial effusion. No prior studies for comparison.    XR chest portable 1 view    Result Date: 1/11/2022  Narrative: Exam: Chest x-ray - AP portable upright view 01/11/2022 2128 hours Clinical History:  Shortness of breath Comparison/s:  7/12/2019 Findings:  Interstitial prominence is increased since 10/12/2018. Heart size is within normal limits. Findings could represent mild interstitial edema versus infection. No pleural effusion is present. Posterior spinal stabilization rods are seen in the thoracic and lumbar spine.     Impression: IMPRESSION:  1. Interstitial prominence the lungs could represent mild edema versus an infection.        Results from last 4 days   Lab Units 01/14/22  0359 01/13/22  0614 01/12/22  0519   WBC AUTO 10*3/uL 7.5 7.6 6.0   HEMOGLOBIN g/dL 12.8 13.3 12.7   HEMATOCRIT % 39.1 40.0 38.1   PLATELETS AUTO 10*3/uL 302 293 249     Results from last 4 days   Lab Units 01/14/22  0359 01/13/22  0614 01/12/22  0518 01/11/22  2151 01/11/22  2115   SODIUM mmol/L 140 142 140   < > 142   POTASSIUM MMOL/L 3.7 3.7 4.5   < > 3.3*   CHLORIDE mmol/L 105 105 105   < > 104   CO2 mmol/L 26 29 26   < > 27   BUN mg/dL 10 8 8   < > 6*   CREATININE mg/dL 0.54* 0.61 0.54*   < > 0.60   CALCIUM mg/dL 8.6 8.6 8.5*   < > 8.5*   TOTAL PROTEIN g/dL  --   --   --   --  7.0   BILIRUBIN TOTAL mg/dL  --   --   --   --  0.46   ALK PHOS U/L  --   --   --   --  110*    ALT U/L  --   --   --   --  38   AST U/L  --   --   --   --  41*   GLUCOSE mg/dL 94 106* 130*   < > 91    < > = values in this interval not displayed.     Results from last 4 days   Lab Units 01/11/22  2210   POCT PH, ARTERIAL PH 7.42   POCT PCO2, ARTERIAL MMHG 48.4*   POCT PO2, ARTERIAL MMHG 57.2*   POCT HCO3, ARTERIAL mmol/L 31.4*   BEART mmol/L 5.9*             Results from last 4 days   Lab Units 01/11/22  2151   INR  1.1     Results from last 4 days   Lab Units 01/11/22 2115   CK TOTAL U/L 27     Results from last 4 days   Lab Units 01/11/22 2115   CK TOTAL U/L 27             Results from last 4 days   Lab Units 01/11/22 2115   FERRITIN ng/mL 31.9         Results from last 4 days   Lab Units 01/11/22 2115   CRP mg/L 28.5*     Results from last 4 days   Lab Units 01/11/22 2151   SED RATE mm/hr 30*     No results found for: [id number]  Results from last 4 days   Lab Units 01/12/22  0438   TSH uIU/ml 0.583         DETAILS OF HOSPITAL STAY    Presenting Problem/History of Present Illness  Hospital Course  47-year-old female with a PMH of chronic back pain s/p lumbar laminectomy x4, hypothyroidism, HLD, fibromyalgia, C. difficile infection in the past s/p fecal transplant 4/21, LUIS ALBERTO on CPAP, anxiety/depression, morbid obesity with a BMI of 43, COVID positive for 4 days presented to ED due to shortness of breath, cough, generalized body ache and sore throat, from home oxygen saturation was in 70s report loss of taste and smell.  Upon arrival hypoxemic oxygen saturation 84%     -Acute hypoxic respiratory failure due to COVID-19 pneumonia resolved  During hospitalization initially she required 2 to 3 L of nasal cannula oxygen but slowly and gradually she was weaned off during hospitalization she was on IV remdesivir and dexamethasone she responded very well to the treatment she will be discharged home on oral dexamethasone to finish the duration of treatment she did not needed for 5 days of IV  remdesivir    -Chronic back pain s/p lumbar laminectomy x4  Home pain medications were continued    -Sinus bradycardia during hospitalization resolved    -Hypertension during hospitalization  Patient will be discharged on oral hydrochlorothiazide 25 mg daily she will document blood pressure every day and will follow-up with the primary care physician for adjustment of the medication    Chronic medical problems  Patient to continue home medications for below mentioned problems no changes during or after hospitalization  -Hypothyroidism  -HLD  -Fibromyalgia  -C. difficile infection in the past s/p fecal transplant  -LUIS ALBERTO on CPAP(CPAP has been recalled does not currently using)  -Anxiety/depression  -Morbid obesity BMI 43     Plan  Continue COVID-19 isolation protocol  Nasal cannula oxygen to keep oxygen above 92%, incentive spirometry, antitussive, dexamethasone and IV remdesivir  Telemetry for any tachyarrhythmia  Continue home medication for back pain, hypothyroidism, HLD, fibromyalgia, anxiety depression  Lifestyle modification exercise for morbid obesity    Discharge recommendations  Follow-up with a primary care physician post hospital discharge follow-up  Will be discharged on oral dexamethasone to finish the duration of treatment  You will also be discharged on oral antihypertensives you need to check your blood pressure every day regularly for a week or so and then you need to see your primary care physician and adjust medication accordingly  Physical Exam at Discharge  Discharge Condition: fair  Heart Rate: 60  Resp: 18  BP: 158/67  Temp: 37.1 °C (98.7 °F)  Weight: 115.3 kg (254 lb 3.1 oz)    Physical exam  47-year-old lady lying in the bed comfortably without any obvious distress  HEENT head atraumatic, no pallor, no JVD  Chest clear to auscultation  CVS S1-S2 audible  Abdomen soft, nontender BS present  Genitourinary no CVA tenderness   extremities no edema   psychiatric normal mood and affect  CNS alert  awake orientation x3    Time spent coordinating discharge: 37 minutes  A voice recognition program was used to aid in documentation of this record.  Sometime words are not presented exactly as they were spoken while efforts were made to carefully edit and correct any inaccuracies, some errors may be present .  please take this into context.  Please contact the provider if errors are identified    ROLLY LESLIE MD

## 2022-01-14 NOTE — DISCHARGE INSTRUCTIONS
Follow-up with a primary care physician post hospital discharge follow-up  Will be discharged on oral dexamethasone to finish the duration of treatment  You will also be discharged on oral antihypertensives you need to check your blood pressure every day regularly for a week or so and then you need to see your primary care physician and adjust medication accordingly

## 2022-01-14 NOTE — NURSING END OF SHIFT
Nursing End of Shift Summary:    Patient: Isabell Harper  MRN: [id number]  : 1975, Age: 47 y.o.    Location: [address] [place] [address]    Nursing Goals  Clinical Goals for the Shift: Good pulmonary toiletry, good pain control, BP WNL    Narrative Summary of Progress Toward Clinical Goals:  Pain kept between 5-7/10. BP was never levated enough to warrant use of PRN hydralazine. Pt educated to TCDB 5 times every hour.     Barriers to Goals/Nursing Concerns:  Pt has anxiety and pain that remains tenuously under control.     New Patient or Family Concerns/Issues:  Will she go for surgery in two days?    Shift Summary:   Significant Events & Communications to Providers (last 12 hours)     Last 5 Values    No documentation.             Oxygen Usage (last 12 hours)     Last 5 Values    No documentation.             Mobility (last 12 hours)     Last 5 Values    No documentation.               Urethral Catheter     Active Urethral Catheter     None            Active Lines     Active Central venous catheter / Peripherally inserted central catheter / Implantable Port / Hemodialysis catheter / Midline Catheter     None              Infusing Medications   Medication Dose Last Rate     PRN Medications   Medication Dose Last Admin   • sodium chloride  1 spray 1 spray at 22 0808   • sodium chloride-aloe vera  1 application 1 application at 22 0812   • dextromethorphan-guaifenesin  10 mL 10 mL at 22 1019   • sodium chloride 0.9% (NS)  25-50 mL      And   • sodium chloride  3 mL     • acetaminophen  1,000 mg 1,000 mg at 22 0413   • HYDROmorphone  2 mg 2 mg at 22 1717   • hydrALAZINE  10 mg 10 mg at 22 0406   • acetaminophen  650 mg 650 mg at 22 1647    Or   • acetaminophen  650 mg      Or   • acetaminophen  650 mg     • oxyCODONE  5 mg 5 mg at 22 1712   • ondansetron  4 mg      Or   • ondansetron ODT  4 mg      Or   • ondansetron  4 mg     • ALPRAZolam  0.5 mg 0.5 mg at 22 1434   •  ipratropium-albuteroL  1 puff     • benzonatate  100 mg 100 mg at 01/13/22 1019     _________________________  Shawn Long RN  01/13/22 6:04 PM

## 2022-01-14 NOTE — NURSING END OF SHIFT
Nursing End of Shift Summary:    Patient: Isabell Harper  MRN: [id number]  : 1975, Age: 47 y.o.    Location: [address] [place] [address]    Nursing Goals  Clinical Goals for the Shift: Good pulmonary toiletry, good pain control, BP WNL    Narrative Summary of Progress Toward Clinical Goals:  Pain controlled well, PRN oxy/tylenol given once overnight. O2 saturations maintained with 3L NC.    Barriers to Goals/Nursing Concerns:  No    New Patient or Family Concerns/Issues:  Yes - Patient wondering if entire course of remdesivir to be completed, does not really want to stay til  stop time.    Shift Summary: Uneventful shift, patient slept well, PRN oxy/tylenol given once.   Significant Events & Communications to Providers (last 12 hours)     Last 5 Values    No documentation.             Oxygen Usage (last 12 hours)     Last 5 Values    No documentation.             Mobility (last 12 hours)     Last 5 Values    No documentation.               Urethral Catheter     Active Urethral Catheter     None            Active Lines     Active Central venous catheter / Peripherally inserted central catheter / Implantable Port / Hemodialysis catheter / Midline Catheter     None              Infusing Medications   Medication Dose Last Rate     PRN Medications   Medication Dose Last Admin   • sodium chloride  1 spray 1 spray at 22 0808   • sodium chloride-aloe vera  1 application 1 application at 22 0812   • dextromethorphan-guaifenesin  10 mL 10 mL at 22 1019   • sodium chloride 0.9% (NS)  25-50 mL      And   • sodium chloride  3 mL     • acetaminophen  1,000 mg 1,000 mg at 22 0413   • HYDROmorphone  2 mg 2 mg at 22 1717   • hydrALAZINE  10 mg 10 mg at 22 0406   • acetaminophen  650 mg 650 mg at 22 0320    Or   • acetaminophen  650 mg      Or   • acetaminophen  650 mg     • oxyCODONE  5 mg 5 mg at 22 0320   • ondansetron  4 mg      Or   • ondansetron ODT  4 mg      Or   • ondansetron  4 mg     •  ALPRAZolam  0.5 mg 0.5 mg at 01/13/22 2113   • ipratropium-albuteroL  1 puff     • benzonatate  100 mg 100 mg at 01/13/22 1019     _________________________  Teddy Kumar RN  01/14/22 5:59 AM

## 2022-01-15 ENCOUNTER — HOME MONITORING (OUTPATIENT)
Dept: FAMILY MEDICINE | Facility: CLINIC | Age: 47
End: 2022-01-15
Payer: COMMERCIAL

## 2022-01-15 ENCOUNTER — PATIENT OUTREACH (OUTPATIENT)
Dept: FAMILY MEDICINE | Facility: CLINIC | Age: 47
End: 2022-01-15
Payer: COMMERCIAL

## 2022-01-15 DIAGNOSIS — U07.1 INFECTION DUE TO 2019-NCOV: Primary | ICD-10-CM

## 2022-01-15 NOTE — PROGRESS NOTES
Isabell CHAVEZ Dombarbara was contacted following recent hospitalization at [facility]. The patient was discharged from the hospital on 1/14/2022 .The Discharge Summary and After Visit Summary were reviewed. The patient's main diagnosis during the hospitalization was Acute hypoxemic respiratory failure due to COVID-19.  Followup appointment: 1/21/22. Any additional testing and labs will be discussed at the patient's upcoming post-hospital follow up appointment.    Transitional Care Management Follow Up (most recent)     Transitional Care Management - 01/15/22 1225        OTHER    Date of post hospital outreach 01/15/22     Contact Status Contact done     Speaking with the Patient or Patient's Caregiver? Patient     Is the patient on the Diabetes registry? N     Were patient's home medications changed or did they have any new medications added during the hospitalization? Y     Did someone go over the discharge summary with the patient or the patient's caregiver and discuss the medications listed on it? Y     Does the patient or patient's caregiver have any questions about the medication changes? no     Patient verbalized understanding of when to contact health care provider or when to get help right away? Y     Discharge instructions reviewed with patient or patient's caregiver and all questions answered? Y     Is there a Home Health/DME Plan being enacted? Please note name of HH agency, DME vendor, contacted/received N     Does patient have psychosocial issues that might impact their health status? None     Does patient have financial barriers to care? None                  Glenny Oneill RN  January 15, 2022 12:26 PM

## 2022-01-15 NOTE — PROGRESS NOTES
Call placed to Isabell Harper and offered COVID-19 Care Companion program.     Patient verbalized understanding of the program and has selected the MyChart Questionnaire option (the order has been placed).     The need for a thermometer and pulse oximeter was discussed with the patient. The patient has these items available for monitoring their vitals.    Education Provided: How to Take Your Temperature  • Read the instructions that come with the thermometer before use.   • Record the reading.  A temperature of 100.4F or higher indicates a fever.  Education Provided: How to Use a Portable Oxygen Sensor  • A portable oxygen sensor is a tool that measures the oxygen level in the blood. The goal is to have your oxygen level 92% or higher.  This tool helps monitor your oxygen level and breathing.  • Read the instructions that come with the portable oxygen sensor before use.     Reviewed self-isolation information and when to seek emergent care. Verbalized understanding of safe home management of COVID-19 symptoms.     Date of symptom onset: [id number]  Date of lab confirmed test: [id number]  Today's symptoms and vitals shown below.          Care Advice    Symptom Management: Fever n/a    Symptom Management: Shortness of Breath Advised that if symptoms become severe or uncontrollable to present to ED or call 911. Suggested cough and deep breathe exercises    Symptom Management: Cough Suggested use of humidifier.     Symptom Management: Fatigue Get plenty of rest and sleep. Balance rest with activity as tolerated. Try to stay hydrated with sips of water, sports drinks, electrolyte replacements like Pedialyte, 1/2 strength flat lemon-lime soda or ginger ale.      Symptom Management: Appetite Try to stay hydrated with sips of water, sports drinks, electrolyte replacements like Pedialyte, 1/2 strength flat lemon-lime soda or ginger ale.   Eat small frequent meals.  Start with crackers, white bread, rice, mashed potatoes, cereal,  apple sauce, bananas, etc.    Symptom Management: Vomiting n/a    Symptom Management: Diarrhea n/a    Symptom Management: Abdominal Pain n/a    Symptom Management: Headache n/a    Symptom Management: Sore Throat n/a    Symptom Management: Sinus Congestion n/a    Follow up questionnaire tomorrow.    Time Spent 15 Minutes    Lenore Vasquez RN

## 2022-01-16 LAB
BACTERIA BLD CULT: NORMAL
BACTERIA BLD CULT: NORMAL

## 2022-01-19 ENCOUNTER — PATIENT MESSAGE (OUTPATIENT)
Dept: FAMILY MEDICINE | Facility: CLINIC | Age: 47
End: 2022-01-19
Payer: COMMERCIAL

## 2022-01-20 ENCOUNTER — PATIENT MESSAGE (OUTPATIENT)
Dept: FAMILY MEDICINE | Facility: CLINIC | Age: 47
End: 2022-01-20
Payer: COMMERCIAL

## 2022-01-24 ENCOUNTER — HOME MONITORING (OUTPATIENT)
Dept: FAMILY MEDICINE | Facility: CLINIC | Age: 47
End: 2022-01-24
Payer: COMMERCIAL

## 2022-01-24 NOTE — PROGRESS NOTES
Called pt to follow up on the my chart message.  She is currently at Urgent care.  [facility] will follow up tomorrow with a phone call.

## 2022-01-25 ENCOUNTER — HOME MONITORING (OUTPATIENT)
Dept: FAMILY MEDICINE | Facility: CLINIC | Age: 47
End: 2022-01-25
Payer: COMMERCIAL

## 2022-01-25 NOTE — PROGRESS NOTES
Called Isabell KATHY Harper as part of the COVID-19 daily home monitoring program.     Today's symptoms and vitals shown below.     Temperature: (P) 97.7 °C (207.9 °F)  Are you taking any fever reducing medications?: (P) Yes (motrin this am for body aches)  Oxygen Saturation: (P) 90  Shortness of Breath: (P) Yes (with activity)  Status of Shortness of Breath: (P) Unchanged  Cough: (P) Yes  Status of Cough: (P) Unchanged  Are you producing phlegm?: (P) Yes  Fatigue: (P) Yes  Status of Fatigue: (P) Unchanged  Have you been able to perform your daily activities without feeling fatigued?: (P) Yes  Lack of Appetite: (P) Yes  Status of Lack of Appetite: (P) Unchanged  Diarrhea: (P) No  Vomiting: (P) No  Abdominal Pain: (P) No  Loss of Taste: (P) No  Loss of Smell: (P) No  Sinus congestion/runny nose: (P) No  Sore throat: (P) Yes     Pt seen at a  yesterday and was dx with Influenza.     Care Advice    Symptom Management: Fever n/a    Symptom Management: Shortness of Breath Go to UC if SpO2 90-92% Advised that if symptoms become severe or uncontrollable to present to ED or call 911. Suggested cough and deep breathe exercises    Symptom Management: Cough Suggested use of humidifier.     Symptom Management: Fatigue Get plenty of rest and sleep. Balance rest with activity as tolerated. Try to stay hydrated with sips of water, sports drinks, electrolyte replacements like Pedialyte, 1/2 strength flat lemon-lime soda or ginger ale.      Symptom Management: Appetite n/a    Symptom Management: Vomiting n/a    Symptom Management: Diarrhea n/a    Symptom Management: Abdominal Pain n/a    Symptom Management: Headache Try to stay hydrated with sips of water, sports drinks, electrolyte replacements like Pedialyte, 1/2 strength flat lemon-lime soda or ginger ale.    and Acetaminophen (i.e. Tylenol): Take 650 mg (two 325 mg pills) by mouth every 4-6 hours as needed. Each Regular Strength Tylenol pill has 325 mg of acetaminophen. Max dose each  day is 3,250 mg (10 Regular Strength pills a day). Another choice is to take 1,000 mg (two 500 mg pills) every 8 hours as needed. Each Extra Strength Tylenol pill has 500 mg of acetaminophen. Max dose each day is 3,000 mg (6 Extra Strength pills a day). Patient instructed to Read the package instructions for dosage, contraindications, and other important information. Problem list reviewed for contraindications.    Symptom Management: Sore Throat Warm or cold liquids or foods for comfort.    Symptom Management: Sinus Congestion n/a    Follow up call tomorrow.    Time Spent 15 Minutes    Lenore Vasquez RN    90

## 2022-01-26 ENCOUNTER — HOME MONITORING (OUTPATIENT)
Dept: FAMILY MEDICINE | Facility: CLINIC | Age: 47
End: 2022-01-26
Payer: COMMERCIAL

## 2022-01-27 ENCOUNTER — HOME MONITORING (OUTPATIENT)
Dept: FAMILY MEDICINE | Facility: CLINIC | Age: 47
End: 2022-01-27
Payer: COMMERCIAL

## 2022-01-28 ENCOUNTER — HOME MONITORING (OUTPATIENT)
Dept: FAMILY MEDICINE | Facility: CLINIC | Age: 47
End: 2022-01-28
Payer: COMMERCIAL

## 2022-01-28 ENCOUNTER — HOSPITAL ENCOUNTER (OUTPATIENT)
Facility: HOSPITAL | Age: 47
Setting detail: OBSERVATION
Discharge: HOME | End: 2022-01-29
Attending: EMERGENCY MEDICINE | Admitting: INTERNAL MEDICINE
Payer: COMMERCIAL

## 2022-01-28 ENCOUNTER — APPOINTMENT (OUTPATIENT)
Dept: RADIOLOGY | Facility: HOSPITAL | Age: 47
End: 2022-01-28
Payer: COMMERCIAL

## 2022-01-28 ENCOUNTER — APPOINTMENT (OUTPATIENT)
Dept: ULTRASOUND IMAGING | Facility: HOSPITAL | Age: 47
End: 2022-01-28
Payer: COMMERCIAL

## 2022-01-28 ENCOUNTER — APPOINTMENT (OUTPATIENT)
Dept: CT IMAGING | Facility: HOSPITAL | Age: 47
End: 2022-01-28
Payer: COMMERCIAL

## 2022-01-28 DIAGNOSIS — R06.02 SHORTNESS OF BREATH: Primary | ICD-10-CM

## 2022-01-28 DIAGNOSIS — J96.21 ACUTE ON CHRONIC RESPIRATORY FAILURE WITH HYPOXIA (CMS/HCC): ICD-10-CM

## 2022-01-28 DIAGNOSIS — G47.33 OSA ON CPAP: ICD-10-CM

## 2022-01-28 LAB
ANION GAP SERPL CALC-SCNC: 5 MMOL/L (ref 3–11)
BASOPHILS # BLD AUTO: 0 10*3/UL
BASOPHILS NFR BLD AUTO: 1 % (ref 0–2)
BNP SERPL-MCNC: 25 PG/ML (ref 0–100)
BUN SERPL-MCNC: 9 MG/DL (ref 7–25)
CALCIUM SERPL-MCNC: 8.3 MG/DL (ref 8.6–10.3)
CHLORIDE SERPL-SCNC: 103 MMOL/L (ref 98–107)
CO2 SERPL-SCNC: 29 MMOL/L (ref 21–32)
CREAT SERPL-MCNC: 0.65 MG/DL (ref 0.6–1.1)
EOSINOPHIL # BLD AUTO: 0.2 10*3/UL
EOSINOPHIL NFR BLD AUTO: 4 % (ref 0–3)
ERYTHROCYTE [DISTWIDTH] IN BLOOD BY AUTOMATED COUNT: 14.2 % (ref 11.5–14)
GFR SERPL CREATININE-BSD FRML MDRD: 110 ML/MIN/1.73M*2
GLUCOSE SERPL-MCNC: 105 MG/DL (ref 70–105)
HCT VFR BLD AUTO: 32.3 % (ref 34–45)
HGB BLD-MCNC: 10.8 G/DL (ref 11.5–15.5)
LYMPHOCYTES # BLD AUTO: 1.3 10*3/UL
LYMPHOCYTES NFR BLD AUTO: 21 % (ref 11–47)
MCH RBC QN AUTO: 28.7 PG (ref 28–33)
MCHC RBC AUTO-ENTMCNC: 33.2 G/DL (ref 32–36)
MCV RBC AUTO: 86.5 FL (ref 81–97)
MONOCYTES # BLD AUTO: 0.4 10*3/UL
MONOCYTES NFR BLD AUTO: 7 % (ref 3–11)
NEUTROPHILS # BLD AUTO: 4.2 10*3/UL
NEUTROPHILS NFR BLD AUTO: 68 % (ref 41–81)
PLATELET # BLD AUTO: 218 10*3/UL (ref 140–350)
PMV BLD AUTO: 7.7 FL (ref 6.9–10.8)
POTASSIUM SERPL-SCNC: 3.9 MMOL/L (ref 3.5–5.1)
PROCALCITONIN SERPL-MCNC: <0.05 NG/ML
RBC # BLD AUTO: 3.74 10*6/ΜL (ref 3.7–5.3)
SODIUM SERPL-SCNC: 137 MMOL/L (ref 135–145)
TROPONIN I SERPL-MCNC: <2.3 PG/ML
WBC # BLD AUTO: 6.2 10*3/UL (ref 4.5–10.5)

## 2022-01-28 PROCEDURE — 6360000200 HC RX 636 W HCPCS (ALT 250 FOR IP): Performed by: INTERNAL MEDICINE

## 2022-01-28 PROCEDURE — 99284 EMERGENCY DEPT VISIT MOD MDM: CPT | Performed by: EMERGENCY MEDICINE

## 2022-01-28 PROCEDURE — 93005 ELECTROCARDIOGRAM TRACING: CPT | Performed by: EMERGENCY MEDICINE

## 2022-01-28 PROCEDURE — 84145 PROCALCITONIN (PCT): CPT | Performed by: INTERNAL MEDICINE

## 2022-01-28 PROCEDURE — 80048 BASIC METABOLIC PNL TOTAL CA: CPT | Performed by: EMERGENCY MEDICINE

## 2022-01-28 PROCEDURE — 71275 CT ANGIOGRAPHY CHEST: CPT | Mod: ME

## 2022-01-28 PROCEDURE — 83880 ASSAY OF NATRIURETIC PEPTIDE: CPT | Performed by: EMERGENCY MEDICINE

## 2022-01-28 PROCEDURE — G0378 HOSPITAL OBSERVATION PER HR: HCPCS

## 2022-01-28 PROCEDURE — 85025 COMPLETE CBC W/AUTO DIFF WBC: CPT | Performed by: EMERGENCY MEDICINE

## 2022-01-28 PROCEDURE — 2550000100 HC RX 255: Performed by: EMERGENCY MEDICINE

## 2022-01-28 PROCEDURE — 99220 *NO LONGER ACTIVE 2023 DO NOT USE* PR INITIAL OBSERVATION CARE/DAY 70 MINUTES: CPT | Performed by: INTERNAL MEDICINE

## 2022-01-28 PROCEDURE — 6370000100 HC RX 637 (ALT 250 FOR IP): Performed by: INTERNAL MEDICINE

## 2022-01-28 PROCEDURE — 93971 EXTREMITY STUDY: CPT | Mod: LT

## 2022-01-28 PROCEDURE — 36415 COLL VENOUS BLD VENIPUNCTURE: CPT | Performed by: EMERGENCY MEDICINE

## 2022-01-28 PROCEDURE — 96372 THER/PROPH/DIAG INJ SC/IM: CPT

## 2022-01-28 PROCEDURE — 84484 ASSAY OF TROPONIN QUANT: CPT | Performed by: EMERGENCY MEDICINE

## 2022-01-28 PROCEDURE — 71045 X-RAY EXAM CHEST 1 VIEW: CPT

## 2022-01-28 RX ORDER — FLUOXETINE HYDROCHLORIDE 20 MG/1
40 CAPSULE ORAL EVERY EVENING
Status: DISCONTINUED | OUTPATIENT
Start: 2022-01-28 | End: 2022-01-29 | Stop reason: HOSPADM

## 2022-01-28 RX ORDER — ONDANSETRON HYDROCHLORIDE 2 MG/ML
4 INJECTION, SOLUTION INTRAVENOUS EVERY 6 HOURS PRN
Status: DISCONTINUED | OUTPATIENT
Start: 2022-01-28 | End: 2022-01-29 | Stop reason: HOSPADM

## 2022-01-28 RX ORDER — FLUOXETINE HYDROCHLORIDE 20 MG/1
20 CAPSULE ORAL EVERY EVENING
Status: DISCONTINUED | OUTPATIENT
Start: 2022-01-28 | End: 2022-01-29 | Stop reason: HOSPADM

## 2022-01-28 RX ORDER — SODIUM CHLORIDE 0.9 % (FLUSH) 0.9 %
3 SYRINGE (ML) INJECTION AS NEEDED
Status: DISCONTINUED | OUTPATIENT
Start: 2022-01-28 | End: 2022-01-29 | Stop reason: HOSPADM

## 2022-01-28 RX ORDER — IOPAMIDOL 755 MG/ML
67 INJECTION, SOLUTION INTRAVASCULAR ONCE
Status: COMPLETED | OUTPATIENT
Start: 2022-01-28 | End: 2022-01-28

## 2022-01-28 RX ORDER — ENOXAPARIN SODIUM 100 MG/ML
40 INJECTION SUBCUTANEOUS
Status: DISCONTINUED | OUTPATIENT
Start: 2022-01-28 | End: 2022-01-29 | Stop reason: HOSPADM

## 2022-01-28 RX ORDER — ALPRAZOLAM 0.25 MG/1
.5-1 TABLET ORAL 2 TIMES DAILY
Status: DISCONTINUED | OUTPATIENT
Start: 2022-01-28 | End: 2022-01-29 | Stop reason: HOSPADM

## 2022-01-28 RX ORDER — HYDROCHLOROTHIAZIDE 25 MG/1
25 TABLET ORAL DAILY
Status: DISCONTINUED | OUTPATIENT
Start: 2022-01-29 | End: 2022-01-29 | Stop reason: HOSPADM

## 2022-01-28 RX ORDER — ACETAMINOPHEN 325 MG/1
325-650 TABLET ORAL EVERY 4 HOURS PRN
Status: DISCONTINUED | OUTPATIENT
Start: 2022-01-28 | End: 2022-01-29 | Stop reason: HOSPADM

## 2022-01-28 RX ORDER — LEVOTHYROXINE SODIUM 100 UG/1
100 TABLET ORAL
Status: DISCONTINUED | OUTPATIENT
Start: 2022-01-29 | End: 2022-01-29 | Stop reason: HOSPADM

## 2022-01-28 RX ORDER — TRAZODONE HYDROCHLORIDE 150 MG/1
300 TABLET ORAL NIGHTLY
Status: DISCONTINUED | OUTPATIENT
Start: 2022-01-28 | End: 2022-01-29 | Stop reason: HOSPADM

## 2022-01-28 RX ORDER — METHOCARBAMOL 750 MG/1
750 TABLET, FILM COATED ORAL 3 TIMES DAILY
Status: DISCONTINUED | OUTPATIENT
Start: 2022-01-28 | End: 2022-01-29 | Stop reason: HOSPADM

## 2022-01-28 RX ORDER — ONDANSETRON 4 MG/1
4 TABLET, FILM COATED ORAL EVERY 6 HOURS PRN
Status: DISCONTINUED | OUTPATIENT
Start: 2022-01-28 | End: 2022-01-29 | Stop reason: HOSPADM

## 2022-01-28 RX ORDER — DEXAMETHASONE SODIUM PHOSPHATE 4 MG/ML
6 INJECTION, SOLUTION INTRA-ARTICULAR; INTRALESIONAL; INTRAMUSCULAR; INTRAVENOUS; SOFT TISSUE DAILY
Status: DISCONTINUED | OUTPATIENT
Start: 2022-01-28 | End: 2022-01-29 | Stop reason: HOSPADM

## 2022-01-28 RX ORDER — MORPHINE SULFATE 15 MG/1
15 TABLET, FILM COATED, EXTENDED RELEASE ORAL 2 TIMES DAILY
Status: DISCONTINUED | OUTPATIENT
Start: 2022-01-28 | End: 2022-01-29 | Stop reason: HOSPADM

## 2022-01-28 RX ORDER — HYDROMORPHONE HYDROCHLORIDE 2 MG/1
2 TABLET ORAL EVERY 4 HOURS PRN
Status: DISCONTINUED | OUTPATIENT
Start: 2022-01-28 | End: 2022-01-29 | Stop reason: HOSPADM

## 2022-01-28 RX ORDER — OSELTAMIVIR PHOSPHATE 75 MG/1
75 CAPSULE ORAL 2 TIMES DAILY
Status: COMPLETED | OUTPATIENT
Start: 2022-01-28 | End: 2022-01-29

## 2022-01-28 RX ORDER — OSELTAMIVIR PHOSPHATE 75 MG/1
75 CAPSULE ORAL 2 TIMES DAILY
COMMUNITY
Start: 2022-01-24 | End: 2022-09-28 | Stop reason: ALTCHOICE

## 2022-01-28 RX ADMIN — OSELTAMIVIR PHOSPHATE 75 MG: 75 CAPSULE ORAL at 20:43

## 2022-01-28 RX ADMIN — ENOXAPARIN SODIUM 40 MG: 100 INJECTION SUBCUTANEOUS at 19:43

## 2022-01-28 RX ADMIN — IOPAMIDOL 67 ML: 755 INJECTION, SOLUTION INTRAVENOUS at 14:00

## 2022-01-28 RX ADMIN — HYDROMORPHONE HYDROCHLORIDE 2 MG: 2 TABLET ORAL at 16:37

## 2022-01-28 RX ADMIN — DEXAMETHASONE SODIUM PHOSPHATE 6 MG: 4 INJECTION, SOLUTION INTRA-ARTICULAR; INTRALESIONAL; INTRAMUSCULAR; INTRAVENOUS; SOFT TISSUE at 19:42

## 2022-01-28 RX ADMIN — TRAZODONE HYDROCHLORIDE 300 MG: 150 TABLET ORAL at 20:45

## 2022-01-28 RX ADMIN — MORPHINE SULFATE 15 MG: 15 TABLET, FILM COATED, EXTENDED RELEASE ORAL at 20:45

## 2022-01-28 RX ADMIN — PREGABALIN 200 MG: 75 CAPSULE ORAL at 20:44

## 2022-01-28 RX ADMIN — METHOCARBAMOL TABLETS 750 MG: 750 TABLET, COATED ORAL at 20:43

## 2022-01-28 RX ADMIN — ALPRAZOLAM 1 MG: 0.25 TABLET ORAL at 20:43

## 2022-01-28 ASSESSMENT — ACTIVITIES OF DAILY LIVING (ADL)
ASSISTIVE_DEVICES: WALKER
ADEQUATE_TO_COMPLETE_ADL: YES

## 2022-01-28 NOTE — H&P
Chief complaint  Shortness of breath    HPI    The patient is a 47-year-old woman with past history of chronic back pain and a recent history of COVID-19 infection which began around January 8 and then influenza infection diagnosed approximately 4 days ago  She had been doing well after her discharge but about 7 days ago after she finished her steroid she began to feel little bit more short of Breath and it has progressed  She has been taking her Tamiflu and overall just does not feel  She notes a very slight right-sided chest discomfort she notes her ongoing chronic back pain  She notes that sometimes she loses her breath as she tries to speak long sentences  No obvious wheezing no orthopnea and no other types of chest              Review of systems    Constitutional:    No fevers or chills  No weight loss   No significant weight gain  No heat intolerance  No cold intolerance  Positive fatigue    HEENT    No vision changes  No changes in hearing  No sore throat  No nasal drainage    Respiratory    Shortness of breath and a nonproductive cough  Cardiovascular    Chest pain as described  No palpitations  No syncope  No near syncope  No claudication symptoms    Gastrointestinal    No nausea  No vomiting  No heartburn or reflux  No dysphagia  No abdominal pain  No bloating  No change in bowels    Genitourinary    No urinary frequency  No urgency  No dysuria  No incontinence  No hematuria        Musculoskeletal  Chronic back pain  Dermatologic    No rashes    Neurological    No headaches  No dizziness   No balance disturbances  No focal weakness  No dysesthesias  No new tremors    Hematologic/lymphatic    No lymphadenopathy  No significant bruising  No significant bleeding    Psychiatric    No anxiety   No depression  No persistent sleep issues              Past medical history  Past Medical History:   Diagnosis Date   • Chronic pain    • Complication of anesthesia    • Dental disease    • Depression    • Diverticulitis     • Endocrine disorder    • Extremity pain    • Fibromyalgia    • Hashimoto's thyroiditis    • Hypothyroidism    • Injury of back    • Low back pain    • Neurologic disorder    • Respiratory disease    • Sleep apnea     does not use cpap   • Wears dentures    Chronic back pain            Past surgical history  Past Surgical History:   Procedure Laterality Date   • BACK SURGERY     • CHOLECYSTECTOMY     • COLONOSCOPY N/A 12/27/2019    Procedure: COLONOSCOPY WITH ASCENDING COLON BIOPSIES;  Surgeon: Kelvin Cordova MD PhD;  Location: [facility];  Service: Endoscopy;  Laterality: N/A;   • COLONOSCOPY N/A 4/6/2021    Procedure: COLONOSCOPY WITH FECAL MICROBIOTA TRANSPLANTATION;  Surgeon: Maria Dolores Richardson MD;  Location: [facility];  Service: Endoscopy;  Laterality: N/A;   • ESOPHAGOGASTRODUODENOSCOPY N/A 12/27/2019    Procedure: ESOPHAGOGASTRODUODENOSCOPY WITH DUODENAL BIOPSIES;  Surgeon: Kelvin Cordova MD PhD;  Location: [facility];  Service: Endoscopy;  Laterality: N/A;   • EXAMINATION UNDER ANESTHESIA N/A 1/17/2020    Procedure: EXAM UNDER ANESTHESIA;  Surgeon: Steve Michelle MD;  Location: [facility] OR;  Service: General;  Laterality: N/A;   • HEMORRHOID SURGERY N/A 1/17/2020    Procedure: HEMORRHOIDECTOMY TIMES ONE with BANDING TIMES ONE;  Surgeon: Steve Michelle MD;  Location: [facility] OR;  Service: General;  Laterality: N/A;   • HYSTERECTOMY     • REDUCTION MAMMAPLASTY     • TUBAL LIGATION         Family history  Family History   Problem Relation Age of Onset   • No Known Problems Mother        Travel history  None  Medications  Current Facility-Administered Medications   Medication Dose Route Frequency Provider Last Rate Last Admin   • lidocaine PF (XYLOCAINE) 10 mg/mL (1 %) vial 30 mL  30 mL injection Once Stephen Alonso MD         Current Outpatient Medications   Medication Sig Dispense Refill   • oseltamivir (TAMIFLU) 75 mg capsule Take 75 mg by mouth 2 (two) times a day     • hydroCHLOROthiazide (HYDRODIURIL) 25  mg tablet Take 1 tablet (25 mg total) by mouth daily 30 tablet 0   • pregabalin (Lyrica) 200 mg capsule Take 200 mg by mouth 3 (three) times a day     • levothyroxine (SYNTHROID, LEVOTHROID) 100 mcg tablet Take 100 mcg by mouth every morning     • morphine (MS CONTIN) 15 mg 12 hr tablet Take 15 mg by mouth 2 (two) times a day     • methocarbamoL (ROBAXIN) 750 mg tablet Take 750 mg by mouth 3 (three) times a day     • ketoconazole (NIZORAL) 2 % shampoo Apply 5 mL topically daily     • HYDROmorphone (DILAUDID) 2 mg tablet Take 2 mg by mouth every 6 (six) hours as needed     • acetaminophen (Tylenol Extra Strength) 500 mg tablet Take 1,000 mg by mouth as needed     • ALPRAZolam (XANAX) 1 mg tablet Take 0.5-1 mg by mouth 2 (two) times a day    5   • FLUoxetine (PROzac) 40 mg capsule Take 40 mg by mouth every evening Take along with 20mg, total daily dose of 60mg   6   • traZODone (DESYREL) 100 mg tablet Take 300 mg by mouth nightly    2   • FLUoxetine (PROzac) 20 mg capsule Take 20 mg by mouth every evening Take along with 40mg, total daily dose of 60mg  30 3             Allergies  Allergies   Allergen Reactions   • Cephalexin      hives, throat swelling       Habits  Does not drink significant alcohol    Quit smoking in the past she has approximately a 20-pack-year history of smoking          Social History     Occupational History   • Not on file   Tobacco Use   • Smoking status: Former Smoker     Packs/day: 0.00   • Smokeless tobacco: Never Used   • Tobacco comment: 'VAPES'   Vaping Use   • Vaping Use: Former   • Substances: Nicotine   • Devices: Pre-filled or refillable cartridge, Refillable tank   Substance and Sexual Activity   • Alcohol use: No   • Drug use: No   • Sexual activity: Defer   Social History Narrative   • Not on file       CODE STATUS  Full    Physical exam  General    Alert appears fatigued flat affect no acute distress   Cooperative    HEENT    Pupils and iris are sharp  No scleral icterus  Pharynx  is clear and moist  Neck is supple trachea midline  No thyroid nodules or masses  No lymphadenopathy  Tongue and gums are normal  Nose and ear pinnae within normal limits    Lungs    Normal respiratory effort  Good air movement  Clear bilaterally, no wheezes rhonchi or rales    Cardiovascular    S1-S2  Regular rate and rhythm  No murmurs  No gallops or rubs  No carotid bruit  No femoral bruits  Pulses are full and symmetrical    Abdominal exam    Soft  Nontender  Normal bowel sounds  No hepatosplenomegaly  No hernia  No ascites    Extremities    No edema on the left  No edema on the right  Warm bilaterally          Musculoskeletal    No significant joint swelling  No significant joint tenderness    Skin exam    No rashes  No ulcerations    Neurologic exam    Alert and oriented to person place and date  Cranial nerves II through XII are intact  Motor: Strength symmetrical              Normal tone              No tremor              Coordination   Sensation intact on left  Sensation intact on right  Reflexes 1+ and symmetrical throughout  Babinski normal              Assessment/plan  Active Problems:    Acute on chronic respiratory failure with hypoxia (CMS/HCC) (HCC)  Acute hypoxic respiratory failure:  Patient has evidence of ongoing infiltrates from Covid pneumonia but also recently was diagnosed with influenza  She is outside the window of typical treatments however she did feel that her symptoms seem to worsen when she was taken off the Decadron I am going to reinitiate Decadron for now if she seems to worsen then may consider high-dose Decadron as could possibly be developing ARDS  There is no evidence of infection on chest x-ray or exam or by history however going to check a procalcitonin  proBNP is normal and no history of heart disease  CT PE protocol negative    Influenza: Continue with Tamiflu and complete the course    Chronic back pain: We will continue with her usual medications i.e. MS Contin and  Dilaudid for breakthrough    Anemia: Hemoglobin at 10.8 we will plan on rechecking this tomorrow no reported history of acute bleeding but this is lower than her labs which she left last time    DVT prophylaxis: Placed on Lovenox

## 2022-01-28 NOTE — MEDICATION HISTORY SPECIALIST NOTES
[facility] ED-[id number]    Patient is a recent discharge Patient provides all history from memory verified with recent epic update. Tamiflu added, patient states she has 2 more doses remaining at home. Patient denies compliance issues.

## 2022-01-28 NOTE — ED PROVIDER NOTES
Chief Complaint: Shortness of Breath (reports shortness of breath today. monitors oxygen at home, reports she has been as low as 75% on room air today.)        HPI:    Patient arrives from home with a 2-day history of worsening shortness of breath.  She states she was significantly hypoxic at home.  She was recently admitted here for Covid.  She denies any fever productive cough abdominal pain or vomiting.  She does report some right-sided pleuritic chest pain and leg swelling left greater than right.  She denies any other complaints or abnormalities or exacerbating factors and feels better on supplemental oxygen.    Past Medical History:   Diagnosis Date   • Chronic pain    • Complication of anesthesia    • Dental disease    • Depression    • Diverticulitis    • Endocrine disorder    • Extremity pain    • Fibromyalgia    • Hashimoto's thyroiditis    • Hypothyroidism    • Injury of back    • Low back pain    • Neurologic disorder    • Respiratory disease    • Sleep apnea     does not use cpap   • Wears dentures        Past Surgical History:   Procedure Laterality Date   • BACK SURGERY     • CHOLECYSTECTOMY     • COLONOSCOPY N/A 12/27/2019    Procedure: COLONOSCOPY WITH ASCENDING COLON BIOPSIES;  Surgeon: Kelvin Cordova MD PhD;  Location: [facility];  Service: Endoscopy;  Laterality: N/A;   • COLONOSCOPY N/A 4/6/2021    Procedure: COLONOSCOPY WITH FECAL MICROBIOTA TRANSPLANTATION;  Surgeon: Maria Dolores Richardson MD;  Location: [facility];  Service: Endoscopy;  Laterality: N/A;   • ESOPHAGOGASTRODUODENOSCOPY N/A 12/27/2019    Procedure: ESOPHAGOGASTRODUODENOSCOPY WITH DUODENAL BIOPSIES;  Surgeon: Kelvin Cordova MD PhD;  Location: [facility];  Service: Endoscopy;  Laterality: N/A;   • EXAMINATION UNDER ANESTHESIA N/A 1/17/2020    Procedure: EXAM UNDER ANESTHESIA;  Surgeon: Steve Michelle MD;  Location: [facility] OR;  Service: General;  Laterality: N/A;   • HEMORRHOID SURGERY N/A 1/17/2020    Procedure: HEMORRHOIDECTOMY  TIMES ONE with BANDING TIMES ONE;  Surgeon: Steve Michelle MD;  Location: [facility];  Service: General;  Laterality: N/A;   • HYSTERECTOMY     • REDUCTION MAMMAPLASTY     • TUBAL LIGATION         Social History     Socioeconomic History   • Marital status: Single     Spouse name: Not on file   • Number of children: Not on file   • Years of education: Not on file   • Highest education level: Not on file   Occupational History   • Not on file   Tobacco Use   • Smoking status: Former Smoker     Packs/day: 0.00   • Smokeless tobacco: Never Used   • Tobacco comment: 'VAPES'   Vaping Use   • Vaping Use: Former   • Substances: Nicotine   • Devices: Pre-filled or refillable cartridge, Refillable tank   Substance and Sexual Activity   • Alcohol use: No   • Drug use: No   • Sexual activity: Defer   Other Topics Concern   • Not on file   Social History Narrative   • Not on file     Social Determinants of Health     Financial Resource Strain: Not on file   Food Insecurity: Not on file   Transportation Needs: Not on file   Physical Activity: Not on file   Stress: Not on file   Social Connections: Not on file   Intimate Partner Violence: Not on file   Housing Stability: Not on file       Family History   Problem Relation Age of Onset   • No Known Problems Mother        Allergies   Allergen Reactions   • Cephalexin      hives, throat swelling         Current Outpatient Medications:   •  dexAMETHasone (DECADRON) 6 mg tablet, Take 1 tablet (6 mg total) by mouth daily for 7 doses, Disp: 7 tablet, Rfl: 0  •  hydroCHLOROthiazide (HYDRODIURIL) 25 mg tablet, Take 1 tablet (25 mg total) by mouth daily, Disp: 30 tablet, Rfl: 0  •  pregabalin (Lyrica) 200 mg capsule, Take 200 mg by mouth 3 (three) times a day, Disp: , Rfl:   •  levothyroxine (SYNTHROID, LEVOTHROID) 100 mcg tablet, Take 100 mcg by mouth every morning, Disp: , Rfl:   •  morphine (MS CONTIN) 15 mg 12 hr tablet, Take 15 mg by mouth 2 (two) times a day, Disp: , Rfl:   •   methocarbamoL (ROBAXIN) 750 mg tablet, Take 750 mg by mouth 3 (three) times a day, Disp: , Rfl:   •  ketoconazole (NIZORAL) 2 % shampoo, Apply 5 mL topically daily, Disp: , Rfl:   •  HYDROmorphone (DILAUDID) 2 mg tablet, Take 2 mg by mouth every 6 (six) hours as needed, Disp: , Rfl:   •  acetaminophen (Tylenol Extra Strength) 500 mg tablet, Take 1,000 mg by mouth as needed, Disp: , Rfl:   •  ALPRAZolam (XANAX) 1 mg tablet, Take 0.5-1 mg by mouth 2 (two) times a day  , Disp: , Rfl: 5  •  FLUoxetine (PROzac) 40 mg capsule, Take 40 mg by mouth every evening Take along with 20mg, total daily dose of 60mg , Disp: , Rfl: 6  •  traZODone (DESYREL) 100 mg tablet, Take 300 mg by mouth nightly  , Disp: , Rfl: 2  •  FLUoxetine (PROzac) 20 mg capsule, Take 20 mg by mouth every evening Take along with 40mg, total daily dose of 60mg , Disp: 30, Rfl: 3      ROS:  Constitutional: negative for fever  Eyes: negative for eye pain  ENT: negative for sore throat  Cardiovascular: negative for chest pain  Respiratory: negative for hemoptysis  GI: negative for abdominal pain  : negative for hematuria  Musculoskeletal: negative for back pain  Neuro: negative for headache  Hematology: negative for bleeding  Immunology: negative for joint pain      ED Triage Vitals [01/28/22 1215]   Temp Heart Rate Resp BP SpO2   36.7 °C (98.1 °F) 86 24 118/67 (S) (!) 82 %      Temp Source Heart Rate Source Patient Position BP Location FiO2 (%)   Oral -- -- -- --         Physical Exam:  Nursing note and vitals reviewed.  Constitutional: appears well-developed.   HENT: No facial abnormalities  Head: Normocephalic and atraumatic.   Eyes: Pupils are equal, round, and reactive to light.   Neck: Supple, no lymphadenopathy  Cardiovascular: Regular rate and rhythm   Pulmonary/Chest: No respiratory distress.  Decreased breath sounds bilaterally.  Abdominal: Soft and nontender.    Back: No CVA tenderness.  Musculoskeletal: Bilateral lower extremity edema left  greater than right  Neurological: Alert.   Skin: Skin is warm and dry. No rash noted.   Psychiatric: Normal mood and affect.           Labs:  Labs Reviewed   CBC WITH AUTO DIFFERENTIAL - Abnormal       Result Value    WBC 6.2      RBC 3.74      Hemoglobin 10.8 (*)     Hematocrit 32.3 (*)     MCV 86.5      MCH 28.7      MCHC 33.2      RDW 14.2 (*)     Platelets 218      MPV 7.7      Neutrophils% 68      Lymphocytes% 21      Monocytes% 7      Eosinophils% 4 (*)     Basophils% 1      ANC (auto diff) 4.20      Lymphocytes Absolute 1.30      Monocytes Absolute 0.40      Eosinophils Absolute 0.20      Basophils Absolute 0.00     BASIC METABOLIC PANEL - Abnormal    Sodium 137      Potassium 3.9      Chloride 103      CO2 29      BUN 9      Creatinine 0.65      Glucose 105      Calcium 8.3 (*)     Anion Gap 5      eGFR 110      Narrative:     Calculation based on the 2021 Chronic Kidney Disease Epidemiology Collaboration (CKD-EPI) equation refit without adjustment for race.   HIGH SENSITIVE TROPONIN I - Normal    hsTnI 0 Hour <2.3     B-TYPE NATRIURETIC PEPTIDE (BNP) - Normal    BNP 25           Imaging:  US Venous duplex lower extremity left   Final Result   IMPRESSION:   Negative for left lower extremity DVT.      CT angiogram chest PE with IV contrast   Final Result   IMPRESSION:   1.  No pulmonary embolism.   2.  Moderate diffuse patchy bilateral groundglass airspace opacities with interlobular septal thickening. Findings are in keeping with COVID-19 pneumonia.   3.  Dilatation of the main pulmonary artery, as can be seen in the setting of pulmonary arterial hypertension.   4.  Mild cardiomegaly.      XR chest portable 1 view   Final Result   IMPRESSION:    No change from the prior study                MDM and ED COURSE  CAT scan report reviewed no pulmonary embolism no sign of DVT patient remains significantly hypoxic requiring supplemental oxygen but no sign of respiratory failure no sign of cardiac etiology CHF  bacterial infection no elevation of white count no anemia electrolyte disturbance or acidosis discussed hospitalist will admit for further care.  ED Course as of 01/28/22 1459   Fri Jan 28, 2022   1311 EKG normal sinus rhythm rate 74 normal ST segments [NL]   1346 Chest x-ray images reviewed by me showed no acute cardiopulmonary abnormalities [NL]      ED Course User Index  [NL] Fransisco Riley MD       Given   Medications   iopamidoL (ISOVUE-370) 76 % injection 67 mL (67 mL intravenous Given 1/28/22 1400)           Procedures        Clinical Impression:    Final diagnoses:   [R06.02] Shortness of breath           A voice recognition program was used to aid in documentation of this record.  Sometimes words are not printed exactly as they were spoken.  While efforts were made to carefully edit and correct any inaccuracies, some areas may be present; please take these into context.  Please contact the provider if areas are identified.       Fransisco Riley MD  01/28/22 1453

## 2022-01-28 NOTE — PROGRESS NOTES
Called Isabell Harper as part of the COVID-19 daily home monitoring program.     Today's symptoms and vitals shown below.     Temperature: (P)  (denies fever)  Are you taking any fever reducing medications?: (P) No  Oxygen Saturation: (P) 84  Shortness of Breath: (P) No  Status of Shortness of Breath: (P) Worse  Cough: (P) Yes  Status of Cough: (P) Unchanged  Are you producing phlegm?: (P) Yes  Fatigue: (P) Yes  Status of Fatigue: (P) Worse  Have you been able to perform your daily activities without feeling fatigued?: (P) No  Lack of Appetite: (P) Yes    Care Advice  PT REPORTED HER PULSE OX WAS 85% AT THIS CONVERSATION. PT REPORTED SHE HAD CALLED HER PCP EARLIER WHO DIRECTED PT TO THE ED MOW. WRITER REINFORCED NEED TO GO TO THE ED.       Symptom Management: Fever n/a    Symptom Management: Shortness of Breath Go to ED if sat is 89% or less Go to ED if severe dyspnea    Symptom Management: Cough Suggested use of humidifier.     Symptom Management: Fatigue Get plenty of rest and sleep. Balance rest with activity as tolerated. Try to stay hydrated with sips of water, sports drinks, electrolyte replacements like Pedialyte, 1/2 strength flat lemon-lime soda or ginger ale.      Symptom Management: Appetite Try to stay hydrated with sips of water, sports drinks, electrolyte replacements like Pedialyte, 1/2 strength flat lemon-lime soda or ginger ale.   Eat small frequent meals.  Start with crackers, white bread, rice, mashed potatoes, cereal, apple sauce, bananas, etc.    Symptom Management: Vomiting n/a    Symptom Management: Diarrhea n/a    Symptom Management: Abdominal Pain n/a    Symptom Management: Headache n/a    Symptom Management: Sore Throat n/a    Symptom Management: Sinus Congestion n/a    Follow up call tomorrow.    Time Spent 18 Minutes    Lenore Vasquez RN

## 2022-01-29 ENCOUNTER — HOME MONITORING (OUTPATIENT)
Dept: FAMILY MEDICINE | Facility: CLINIC | Age: 47
End: 2022-01-29
Payer: COMMERCIAL

## 2022-01-29 VITALS
HEART RATE: 72 BPM | SYSTOLIC BLOOD PRESSURE: 156 MMHG | TEMPERATURE: 98 F | DIASTOLIC BLOOD PRESSURE: 98 MMHG | WEIGHT: 260.58 LBS | HEIGHT: 64 IN | BODY MASS INDEX: 44.49 KG/M2 | RESPIRATION RATE: 18 BRPM | OXYGEN SATURATION: 91 %

## 2022-01-29 LAB
ANION GAP SERPL CALC-SCNC: 9 MMOL/L (ref 3–11)
BASOPHILS # BLD AUTO: 0 10*3/UL
BASOPHILS NFR BLD AUTO: 0 % (ref 0–2)
BUN SERPL-MCNC: 10 MG/DL (ref 7–25)
CALCIUM SERPL-MCNC: 9 MG/DL (ref 8.6–10.3)
CHLORIDE SERPL-SCNC: 104 MMOL/L (ref 98–107)
CO2 SERPL-SCNC: 28 MMOL/L (ref 21–32)
CREAT SERPL-MCNC: 0.58 MG/DL (ref 0.6–1.1)
EOSINOPHIL # BLD AUTO: 0 10*3/UL
EOSINOPHIL NFR BLD AUTO: 0 % (ref 0–3)
ERYTHROCYTE [DISTWIDTH] IN BLOOD BY AUTOMATED COUNT: 14.1 % (ref 11.5–14)
GFR SERPL CREATININE-BSD FRML MDRD: 113 ML/MIN/1.73M*2
GLUCOSE SERPL-MCNC: 140 MG/DL (ref 70–105)
HCT VFR BLD AUTO: 37.4 % (ref 34–45)
HGB BLD-MCNC: 12.2 G/DL (ref 11.5–15.5)
LYMPHOCYTES # BLD AUTO: 0.6 10*3/UL
LYMPHOCYTES NFR BLD AUTO: 8 % (ref 11–47)
MCH RBC QN AUTO: 28.2 PG (ref 28–33)
MCHC RBC AUTO-ENTMCNC: 32.7 G/DL (ref 32–36)
MCV RBC AUTO: 86.3 FL (ref 81–97)
MONOCYTES # BLD AUTO: 0.1 10*3/UL
MONOCYTES NFR BLD AUTO: 1 % (ref 3–11)
NEUTROPHILS # BLD AUTO: 6.4 10*3/UL
NEUTROPHILS NFR BLD AUTO: 90 % (ref 41–81)
PLATELET # BLD AUTO: 238 10*3/UL (ref 140–350)
PMV BLD AUTO: 8.3 FL (ref 6.9–10.8)
POTASSIUM SERPL-SCNC: 4.6 MMOL/L (ref 3.5–5.1)
RBC # BLD AUTO: 4.34 10*6/ΜL (ref 3.7–5.3)
SODIUM SERPL-SCNC: 141 MMOL/L (ref 135–145)
WBC # BLD AUTO: 7.1 10*3/UL (ref 4.5–10.5)

## 2022-01-29 PROCEDURE — 99217 *NO LONGER ACTIVE 2023 DO NOT USE* PR OBSERVATION CARE DISCHARGE MANAGEMENT: CPT | Performed by: INTERNAL MEDICINE

## 2022-01-29 PROCEDURE — 6370000100 HC RX 637 (ALT 250 FOR IP): Performed by: INTERNAL MEDICINE

## 2022-01-29 PROCEDURE — 80048 BASIC METABOLIC PNL TOTAL CA: CPT | Performed by: INTERNAL MEDICINE

## 2022-01-29 PROCEDURE — G0378 HOSPITAL OBSERVATION PER HR: HCPCS

## 2022-01-29 PROCEDURE — 94761 N-INVAS EAR/PLS OXIMETRY MLT: CPT

## 2022-01-29 PROCEDURE — 85025 COMPLETE CBC W/AUTO DIFF WBC: CPT | Performed by: INTERNAL MEDICINE

## 2022-01-29 PROCEDURE — 36415 COLL VENOUS BLD VENIPUNCTURE: CPT | Performed by: INTERNAL MEDICINE

## 2022-01-29 PROCEDURE — 6360000200 HC RX 636 W HCPCS (ALT 250 FOR IP): Performed by: INTERNAL MEDICINE

## 2022-01-29 RX ORDER — DEXAMETHASONE 4 MG/1
4 TABLET ORAL
Qty: 3 TABLET | Refills: 0 | Status: SHIPPED | OUTPATIENT
Start: 2022-01-29 | End: 2022-09-28 | Stop reason: ALTCHOICE

## 2022-01-29 RX ADMIN — PREGABALIN 200 MG: 75 CAPSULE ORAL at 10:01

## 2022-01-29 RX ADMIN — ACETAMINOPHEN 650 MG: 325 TABLET ORAL at 03:04

## 2022-01-29 RX ADMIN — HYDROCHLOROTHIAZIDE 25 MG: 25 TABLET ORAL at 10:00

## 2022-01-29 RX ADMIN — ACETAMINOPHEN 650 MG: 325 TABLET ORAL at 06:36

## 2022-01-29 RX ADMIN — OSELTAMIVIR PHOSPHATE 75 MG: 75 CAPSULE ORAL at 10:00

## 2022-01-29 RX ADMIN — MORPHINE SULFATE 15 MG: 15 TABLET, FILM COATED, EXTENDED RELEASE ORAL at 10:01

## 2022-01-29 RX ADMIN — DEXAMETHASONE SODIUM PHOSPHATE 6 MG: 4 INJECTION, SOLUTION INTRA-ARTICULAR; INTRALESIONAL; INTRAMUSCULAR; INTRAVENOUS; SOFT TISSUE at 10:01

## 2022-01-29 RX ADMIN — HYDROMORPHONE HYDROCHLORIDE 2 MG: 2 TABLET ORAL at 03:02

## 2022-01-29 RX ADMIN — METHOCARBAMOL TABLETS 750 MG: 750 TABLET, COATED ORAL at 10:01

## 2022-01-29 RX ADMIN — HYDROMORPHONE HYDROCHLORIDE 2 MG: 2 TABLET ORAL at 06:36

## 2022-01-29 RX ADMIN — ALPRAZOLAM 0.5 MG: 0.25 TABLET ORAL at 09:56

## 2022-01-29 RX ADMIN — LEVOTHYROXINE SODIUM 100 MCG: 0.1 TABLET ORAL at 06:36

## 2022-01-29 NOTE — INTERDISCIPLINARY/THERAPY
Case Management Admission Note  [phone]    Living Situation: w/mother  ADLs: IADL, waiting for back surgery  Stairs: 5  DME: FWW  Oxygen: None  -  Liters: N/A  -  Company: N/A  CPAP: Non-compliant  Home Health: None  Dialysis: None  Diabetes/supplies: None  PCP:Bhavana  Funding: [facility]  Pharmacy: [facility]  Support Person: mom, kids, boyfriend  Transportation at DC: Yes  Discharge Needs/Barriers: Nothing at this time    Narrative: Spoke w/pt and she reports above information. Pt admits w/Covid. CM to follow for DC planning needs.    Dispo: HO

## 2022-01-29 NOTE — DISCHARGE SUMMARY
Date of admission:  January 28, 2022      Date of discharge:  January 29, 2022      Admission diagnoses:  Acute hypoxic respiratory failure  Recent influenza infection  Recent Covid infection  Chronic low back pain with radiculopathy  Anemia of unclear etiology    Discharge diagnoses:  Acute hypoxic respiratory failure: Stable to improved  Recent influenza infection  Recent Covid infection  Chronic low back pain with radiculopathy  Anemia: Resolved    Procedures:  CT angiogram chest  Bilateral lower extremity Doppler    Hospital course:  The patient is a 47-year-old woman with known history of chronic back pain who recently was diagnosed with Covid was treated and discharged and had been doing reasonably well with an outpatient course of steroids however she finished a course of steroids began to feel more short of breath presented to urgent care was found to have evidence of an influenza infection she was started on Tamiflu however despite this continue to worsen she presented to the emergency department and was found to be hypoxic requiring 2 L she was admitted to the hospital observation the next day patient was feeling much better  O2 determination pending at the time of this dictation but patient feeling stable and ready for discharge to home    She has her chronic ongoing low back pain and plan is for surgery early next month    On admission she was found to have very mild anemia however this resolved and was not evident on her morning labs    Physical exam on discharge:  General: Alert pleasant no distress    HEENT exam: No scleral icterus pharynx is clear and moist neck is supple trachea              midline no lymphadenopathy    Lungs: Clear bilaterally no wheezes rhonchi or rales normal respiratory effort    Cardiovascular exam: S1-S2 regular rate and rhythm,                                         No murmurs    Abdominal exam: Soft positive bowel sounds nontender    Extremities: Warm no palpable cords no  edema    Neurological exam: Alert and oriented ×3                                       Cranial nerves II through XII are grossly intact                                        Grossly nonfocal exam    Skin exam: No significant rashes, warm                                               Discharge medications:     Discharge medication list      START taking these medications      Instructions   dexAMETHasone 4 mg tablet  Commonly known as: DECADRON   Take 1 tablet (4 mg total) by mouth daily with breakfast for 3 days        CONTINUE taking these medications      Instructions   ALPRAZolam 1 mg tablet  Commonly known as: XANAX      FLUoxetine 20 mg capsule  Commonly known as: PROzac      FLUoxetine 40 mg capsule  Commonly known as: PROzac      hydroCHLOROthiazide 25 mg tablet  Commonly known as: HYDRODIURIL   Take 1 tablet (25 mg total) by mouth daily     HYDROmorphone 2 mg tablet  Commonly known as: DILAUDID      ketoconazole 2 % shampoo  Commonly known as: NIZORAL      levothyroxine 100 mcg tablet  Commonly known as: SYNTHROID, LEVOTHROID      Lyrica 200 mg capsule  Generic drug: pregabalin      methocarbamoL 750 mg tablet  Commonly known as: ROBAXIN      morphine 15 mg 12 hr tablet  Commonly known as: MS CONTIN      oseltamivir 75 mg capsule  Commonly known as: TAMIFLU      traZODone 100 mg tablet  Commonly known as: DESYREL      Tylenol Extra Strength 500 mg tablet  Generic drug: acetaminophen            Where to Get Your Medications      These medications were sent to [id number] - [place] [address] [facility] [place] [facility] [address]  [address] [facility] [address]    Phone: [phone]   · dexAMETHasone 4 mg tablet          Follow-up:  As planned

## 2022-01-29 NOTE — PLAN OF CARE
Problem: Infection Control  Goal: MINIMIZE THE ACQUISITION AND TRANSMISSION OF INFECTIOUS AGENTS  Description: INTERVENTIONS:  1. Isolate patient with suspected/diagnosed communicable disease  2. Place on designated isolation precautions  3. Maintain isolation techniques  4. Perform hand hygiene before and after each patient care activity  5. [place] universal precautions  6. Wear PPE as directed for type of isolation  7. Administer antibiotic therapy as ordered  8. Clean the environment appropriately after each patient use  9. Clean patient care equipment after each patient use as it leaves the room  10. Limit number of visitors, as appropriate  Outcome: Progressing     Problem: Safety Adult - Fall  Goal: Free from fall injury  Description: INTERVENTIONS:    Inpatient - Please reference Cares/Safety Flowsheet under Aparicio Fall Risk for interventions.  Pediatrics - Please reference Peds Daily Cares/Safety Flowsheet under Pace Pediatric Fall Assessment Fall Bundle for interventions  LD/OB - Please reference OB Shift Screening Flowsheet under OB Fall Risk for interventions.  Outcome: Progressing     Problem: Pain - Adult  Goal: Verbalizes/displays adequate comfort level or baseline comfort level  Description: INTERVENTIONS:  1. Encourage patient to monitor pain and request interventions  2. Assess pain using the appropriate pain scale  3. Administer analgesics based on type and severity of pain and evaluate response  4. Educate/Implement non-pharmacological measures as appropriate and evaluate response  5. Consider cultural, developmental and social influences on pain and pain management  6. Notify Provider if interventions unsuccessful or patient reports new pain  Outcome: Progressing     Problem: Infection - Adult  Goal: Absence of infection during hospitalization  Description: INTERVENTIONS:  1. Assess and monitor for signs and symptoms of infection  2. Monitor lab/diagnostic results  3. Monitor all  insertion sites/wounds/incisions  4. Monitor secretions for changes in amount and color  5. Administer medications as ordered  6. Educate and encourage patient and family to use good hand hygiene technique  7. Identify and educate in appropriate isolation precautions for identified infection/condition  Outcome: Progressing     Problem: Safety Adult  Goal: Patient will remain safe during hospitalization  Description: INTERVENTIONS    1. Assess patient for fall risk and implement interventions if needed  2. Use safe transport techniques  3. Assess patient using the appropriate Joss skin assessment scale  4. Assess patient for risk of aspiration  5. Assess patient for risk of elopement  6. Assess patient for risk of suicide  Outcome: Progressing     Problem: Daily Care  Goal: Daily care needs are met  Description: INTERVENTIONS:   1. Assess and monitor skin integrity  2. Identify patients at risk for skin breakdown on admission and per policy  3. Assess and monitor ability to perform self care and identify potential discharge needs  4. Assess skin integrity/risk for skin breakdown  5. Assist patient with activities of daily living as needed  6. Encourage independent activity per ability   7. Provide mouth care   8. Include patient/family/caregiver in decisions related to daily care   Outcome: Progressing     Problem: Knowledge Deficit  Goal: Patient/family/caregiver demonstrates understanding of disease process, treatment plan, medications, and discharge instructions  Description: INTERVENTIONS:   1. Complete learning assessment and assess knowledge base  2. Provide teaching at level of understanding   3. Provide teaching via preferred learning methods  Outcome: Progressing     Problem: Discharge Barriers  Goal: Patient's discharge needs are met  Description: INTERVENTIONS:  1. Assess patient for self-management skills  2. Encourage participation in management  3. Identify potential discharge barriers on admission  and throughout hospital stay  4. Involve patient/family/caregiver in discharge planning process  5. Collaborate with case management/ for discharge needs  Outcome: Progressing

## 2022-01-29 NOTE — PLAN OF CARE
Problem: Infection Control  Goal: MINIMIZE THE ACQUISITION AND TRANSMISSION OF INFECTIOUS AGENTS  Description: INTERVENTIONS:  1. Isolate patient with suspected/diagnosed communicable disease  2. Place on designated isolation precautions  3. Maintain isolation techniques  4. Perform hand hygiene before and after each patient care activity  5. [place] universal precautions  6. Wear PPE as directed for type of isolation  7. Administer antibiotic therapy as ordered  8. Clean the environment appropriately after each patient use  9. Clean patient care equipment after each patient use as it leaves the room  10. Limit number of visitors, as appropriate  Outcome: Progressing     Problem: Safety Adult - Fall  Goal: Free from fall injury  Description: INTERVENTIONS:    Inpatient - Please reference Cares/Safety Flowsheet under Aparicio Fall Risk for interventions.  Pediatrics - Please reference Peds Daily Cares/Safety Flowsheet under Pace Pediatric Fall Assessment Fall Bundle for interventions  LD/OB - Please reference OB Shift Screening Flowsheet under OB Fall Risk for interventions.  Outcome: Progressing     Problem: Pain - Adult  Goal: Verbalizes/displays adequate comfort level or baseline comfort level  Description: INTERVENTIONS:  1. Encourage patient to monitor pain and request interventions  2. Assess pain using the appropriate pain scale  3. Administer analgesics based on type and severity of pain and evaluate response  4. Educate/Implement non-pharmacological measures as appropriate and evaluate response  5. Consider cultural, developmental and social influences on pain and pain management  6. Notify Provider if interventions unsuccessful or patient reports new pain  Outcome: Progressing     Problem: Infection - Adult  Goal: Absence of infection during hospitalization  Description: INTERVENTIONS:  1. Assess and monitor for signs and symptoms of infection  2. Monitor lab/diagnostic results  3. Monitor all  insertion sites/wounds/incisions  4. Monitor secretions for changes in amount and color  5. Administer medications as ordered  6. Educate and encourage patient and family to use good hand hygiene technique  7. Identify and educate in appropriate isolation precautions for identified infection/condition  Outcome: Progressing     Problem: Safety Adult  Goal: Patient will remain safe during hospitalization  Description: INTERVENTIONS    1. Assess patient for fall risk and implement interventions if needed  2. Use safe transport techniques  3. Assess patient using the appropriate Joss skin assessment scale  4. Assess patient for risk of aspiration  5. Assess patient for risk of elopement  6. Assess patient for risk of suicide  Outcome: Progressing     Problem: Daily Care  Goal: Daily care needs are met  Description: INTERVENTIONS:   1. Assess and monitor skin integrity  2. Identify patients at risk for skin breakdown on admission and per policy  3. Assess and monitor ability to perform self care and identify potential discharge needs  4. Assess skin integrity/risk for skin breakdown  5. Assist patient with activities of daily living as needed  6. Encourage independent activity per ability   7. Provide mouth care   8. Include patient/family/caregiver in decisions related to daily care   Outcome: Progressing     Problem: Knowledge Deficit  Goal: Patient/family/caregiver demonstrates understanding of disease process, treatment plan, medications, and discharge instructions  Description: INTERVENTIONS:   1. Complete learning assessment and assess knowledge base  2. Provide teaching at level of understanding   3. Provide teaching via preferred learning methods  Outcome: Progressing     Problem: Discharge Barriers  Goal: Patient's discharge needs are met  Description: INTERVENTIONS:  1. Assess patient for self-management skills  2. Encourage participation in management  3. Identify potential discharge barriers on admission  and throughout hospital stay  4. Involve patient/family/caregiver in discharge planning process  5. Collaborate with case management/ for discharge needs  Outcome: Progressing

## 2022-01-29 NOTE — INTERDISCIPLINARY/THERAPY
01/29/22 0905   O2 Discharge Determination   Activity Exercise;Resting   $ Multiple SpO2 Levels Obtained? - RT Charge Only Yes   O2 Discharge Determination Activity: Resting   Resting Lowest SPO2 (%) on Room Air 95 %   O2 Discharge Determination Activity: Exercise   Exercise Lowest SPO2 (%) on Room Air 91 %   Exercise Comment pt walked around the room with out Oxygen for 30 mis resting and exercise, NO WOB, no SOB RR 22

## 2022-03-04 ENCOUNTER — TRANSFERRED RECORDS (OUTPATIENT)
Dept: HEALTH INFORMATION MANAGEMENT | Facility: CLINIC | Age: 47
End: 2022-03-04
Payer: MEDICAID

## 2022-05-31 ENCOUNTER — TRANSFERRED RECORDS (OUTPATIENT)
Dept: HEALTH INFORMATION MANAGEMENT | Facility: CLINIC | Age: 47
End: 2022-05-31
Payer: MEDICAID

## 2022-06-08 ENCOUNTER — MEDICAL CORRESPONDENCE (OUTPATIENT)
Dept: HEALTH INFORMATION MANAGEMENT | Facility: CLINIC | Age: 47
End: 2022-06-08

## 2022-06-10 ENCOUNTER — TRANSCRIBE ORDERS (OUTPATIENT)
Dept: OTHER | Age: 47
End: 2022-06-10
Payer: MEDICAID

## 2022-06-10 DIAGNOSIS — M53.2X9 SPINAL INSTABILITIES, SITE UNSPECIFIED: Primary | ICD-10-CM

## 2022-06-13 ENCOUNTER — TELEPHONE (OUTPATIENT)
Dept: NEUROSURGERY | Facility: CLINIC | Age: 47
End: 2022-06-13
Payer: MEDICAID

## 2022-06-14 NOTE — TELEPHONE ENCOUNTER
Writer routed to Ursula Layne MA for Neurosurgery New Patient Record Collection     Elida Saavedra LPN  Neurosurgery

## 2022-06-14 NOTE — TELEPHONE ENCOUNTER
[facility] Call Center    Phone Message    May a detailed message be left on voicemail: yes     Reason for Call: Other: Jaci from [facility] called and stated that the Pt was told while scheduling her neurosurgery appt that [facility] did not have her records from [facility]. Jaci stated that the records were sent over with the order. Jaci would like to speak with Jaci to double check that the records were not received and how best to get them over. Please call back with further information.      Action Taken: Message routed to:  [facility] ([facility]    Travel Screening: Not Applicable

## 2022-06-15 NOTE — TELEPHONE ENCOUNTER
Action 6/15/22 MV 1.53pm   Action Taken 1) called and spoke with pt - confirmed that we received her recs from [facility]. Pt stated she is trying to get her visit approved by her insurance and may need to push back the appt  2) called and LVM with [facility] (Jaci) to confirm we received the recs  3) called and spoke with [facility] to obtain a fax #  Fax # [phone]  Tracking # [id number]    6/20/22 MV 12.22pm  Called [facility] imaging and had images pushed    6/20/22 MV 1.12pm  Called [facility] HIM - pt had preop and post op xrays - images will be emailed via [employer] to my email address. Per HIM, reports are noted in the office notes ; office notes will be faxed shortly  UPDATE: images not obtainable in [employer] (view only - can't be downloaded to PACS) ; called [facility] to overnight disk instead.   Fax # [phone]  Tracking # [id number]     Action 6.21.22 sv    Action Taken Received imaging disc from 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   Date of Appt: 6/24/22   NOTES (FOR ALL VISITS) STATUS DETAILS   OFFICE NOTE from referring provider Received Dr Radhames Treadwell @ [facility]:  5/31/22   OFFICE NOTE from other specialist Received Ciara PAYAN @ [facility]:  4/26/22  3/22/22   DISCHARGE REPORT from ER Received [facility]:  L4-5 surgery  5/4/22  10/15/21   MEDICATION LIST Received    IMAGING  (FOR ALL VISITS)     MRI (HEAD, NECK, SPINE) Received [facility]:  MRI Lumbar Spine 10/25/21  MRI Hip R 9/22/21  MRI Lumbar Spine 7/23/21  MRI Lumbar Spine 5/1/19   CT (HEAD, NECK, SPINE) Received [facility]:  CT Myelogram Lumbar 5/8/19   XRAYS Received [facility]:  XR Thoracic Spine 5/31/22  XR Lumbar Spine 5/31/22  XR Thoracic  Spine 4/26/22  XR Lumbar Spine 4/26/22

## 2022-06-24 ENCOUNTER — PRE VISIT (OUTPATIENT)
Dept: NEUROSURGERY | Facility: CLINIC | Age: 47
End: 2022-06-24

## 2022-06-24 ENCOUNTER — OFFICE VISIT (OUTPATIENT)
Dept: NEUROSURGERY | Facility: CLINIC | Age: 47
End: 2022-06-24
Payer: MEDICAID

## 2022-06-24 VITALS — SYSTOLIC BLOOD PRESSURE: 133 MMHG | DIASTOLIC BLOOD PRESSURE: 87 MMHG | HEART RATE: 80 BPM | OXYGEN SATURATION: 94 %

## 2022-06-24 DIAGNOSIS — M43.9 SPINE DEFORMITY, ACQUIRED: Primary | ICD-10-CM

## 2022-06-24 DIAGNOSIS — M51.369 LUMBAR DEGENERATIVE DISC DISEASE: ICD-10-CM

## 2022-06-24 PROCEDURE — 99204 OFFICE O/P NEW MOD 45 MIN: CPT | Performed by: NEUROLOGICAL SURGERY

## 2022-06-24 RX ORDER — HYDROCHLOROTHIAZIDE 25 MG/1
TABLET ORAL PRN
COMMUNITY
Start: 2022-01-14 | End: 2022-11-09

## 2022-06-24 RX ORDER — FLUCONAZOLE 200 MG/1
TABLET ORAL
COMMUNITY
Start: 2022-06-10 | End: 2022-11-09

## 2022-06-24 RX ORDER — DULOXETIN HYDROCHLORIDE 30 MG/1
30 CAPSULE, DELAYED RELEASE ORAL DAILY
COMMUNITY
Start: 2022-06-21

## 2022-06-24 RX ORDER — TRAZODONE HYDROCHLORIDE 100 MG/1
300 TABLET ORAL AT BEDTIME
COMMUNITY
Start: 2022-05-19

## 2022-06-24 RX ORDER — LEVOTHYROXINE SODIUM 100 UG/1
TABLET ORAL
COMMUNITY
Start: 2022-06-15

## 2022-06-24 RX ORDER — BUSPIRONE HYDROCHLORIDE 15 MG/1
15 TABLET ORAL 3 TIMES DAILY
COMMUNITY
Start: 2022-06-21

## 2022-06-24 RX ORDER — CELECOXIB 200 MG/1
200 CAPSULE ORAL 2 TIMES DAILY
Status: ON HOLD | COMMUNITY
Start: 2021-09-22 | End: 2022-11-23

## 2022-06-24 RX ORDER — FLUOXETINE 40 MG/1
40 CAPSULE ORAL DAILY
COMMUNITY
Start: 2022-05-19 | End: 2022-11-09

## 2022-06-24 RX ORDER — PREGABALIN 200 MG/1
200 CAPSULE ORAL 3 TIMES DAILY
COMMUNITY
Start: 2022-06-14

## 2022-06-24 RX ORDER — ACETAMINOPHEN 500 MG
500-1000 TABLET ORAL EVERY 8 HOURS PRN
COMMUNITY
Start: 2022-03-07

## 2022-06-24 RX ORDER — ALPRAZOLAM 1 MG
TABLET ORAL
COMMUNITY
Start: 2022-06-14 | End: 2022-11-11

## 2022-06-24 RX ORDER — HYDROMORPHONE HYDROCHLORIDE 4 MG/1
6 TABLET ORAL EVERY 4 HOURS PRN
Status: ON HOLD | COMMUNITY
Start: 2022-06-15 | End: 2022-11-23

## 2022-06-24 RX ORDER — METHOCARBAMOL 750 MG/1
TABLET, FILM COATED ORAL
COMMUNITY
Start: 2022-06-21 | End: 2022-11-09

## 2022-06-24 ASSESSMENT — PAIN SCALES - GENERAL: PAINLEVEL: EXTREME PAIN (8)

## 2022-06-24 ASSESSMENT — PATIENT HEALTH QUESTIONNAIRE - PHQ9: SUM OF ALL RESPONSES TO PHQ QUESTIONS 1-9: 11

## 2022-06-24 NOTE — NURSING NOTE
Chief Complaint   Patient presents with     Consult   Depression Response    Patient completed the PHQ-9 assessment for depression and scored >9? Yes  Question 9 on the PHQ-9 was positive for suicidality? No  Does patient have current mental health provider? Yes    Is this a virtual visit? No    I personally notified the following: visit provider    Mckinley Self
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
## 2022-06-30 ENCOUNTER — HOSPITAL ENCOUNTER (EMERGENCY)
Facility: HOSPITAL | Age: 47
Discharge: HOME | End: 2022-06-30
Attending: EMERGENCY MEDICINE
Payer: COMMERCIAL

## 2022-06-30 ENCOUNTER — APPOINTMENT (OUTPATIENT)
Dept: CT IMAGING | Facility: HOSPITAL | Age: 47
End: 2022-06-30
Payer: COMMERCIAL

## 2022-06-30 VITALS
HEART RATE: 91 BPM | OXYGEN SATURATION: 95 % | RESPIRATION RATE: 18 BRPM | BODY MASS INDEX: 40.99 KG/M2 | TEMPERATURE: 98.8 F | DIASTOLIC BLOOD PRESSURE: 79 MMHG | HEIGHT: 64 IN | WEIGHT: 240.08 LBS | SYSTOLIC BLOOD PRESSURE: 118 MMHG

## 2022-06-30 DIAGNOSIS — R10.9 ABDOMINAL PAIN: Primary | ICD-10-CM

## 2022-06-30 LAB
ALBUMIN SERPL-MCNC: 4.4 G/DL (ref 3.5–5.3)
ALP SERPL-CCNC: 115 U/L (ref 39–100)
ALT SERPL-CCNC: 20 U/L (ref 7–52)
ANION GAP SERPL CALC-SCNC: 13 MMOL/L (ref 3–11)
AST SERPL-CCNC: 21 U/L
BACTERIA #/AREA URNS AUTO: NORMAL /HPF
BILIRUB SERPL-MCNC: 0.71 MG/DL (ref 0.2–1.4)
BILIRUB UR QL STRIP.AUTO: NEGATIVE
BUN SERPL-MCNC: 8 MG/DL (ref 7–25)
CALCIUM ALBUM COR SERPL-MCNC: 9.4 MG/DL (ref 8.6–10.3)
CALCIUM SERPL-MCNC: 9.7 MG/DL (ref 8.6–10.3)
CHLORIDE SERPL-SCNC: 97 MMOL/L (ref 98–107)
CLARITY UR: CLEAR
CO2 SERPL-SCNC: 26 MMOL/L (ref 21–32)
COLOR UR: YELLOW
CREAT SERPL-MCNC: 0.74 MG/DL (ref 0.6–1.1)
ERYTHROCYTE [DISTWIDTH] IN BLOOD BY AUTOMATED COUNT: 13.7 % (ref 11.5–14)
GFR SERPL CREATININE-BSD FRML MDRD: 101 ML/MIN/1.73M*2
GLUCOSE SERPL-MCNC: 150 MG/DL (ref 70–105)
GLUCOSE UR STRIP.AUTO-MCNC: 50 MG/DL
HCT VFR BLD AUTO: 38.9 % (ref 34–45)
HGB BLD-MCNC: 12.9 G/DL (ref 11.5–15.5)
HGB UR QL STRIP.AUTO: NEGATIVE
KETONES UR STRIP.AUTO-MCNC: NEGATIVE MG/DL
LEUKOCYTE ESTERASE UR QL STRIP: NEGATIVE
LIPASE SERPL-CCNC: 6 U/L (ref 11–82)
LYMPHOCYTES # BLD MANUAL: 0.2 10*3/UL
LYMPHOCYTES NFR BLD MANUAL: 1 % (ref 11–47)
MCH RBC QN AUTO: 27.5 PG (ref 28–33)
MCHC RBC AUTO-ENTMCNC: 33.3 G/DL (ref 32–36)
MCV RBC AUTO: 82.5 FL (ref 81–97)
MONOCYTES # BLD MANUAL: 0.2 10*3/UL
MONOCYTES NFR BLD MANUAL: 1 % (ref 3–11)
NEUTROPHILS # BLD MANUAL: 17.84 10*3/UL
NEUTS SEG # BLD MANUAL: 17.8 10*3/UL
NEUTS SEG NFR BLD MANUAL: 98 % (ref 41–81)
NITRITE UR QL STRIP.AUTO: NEGATIVE
PH UR STRIP.AUTO: 8 PH
PLATELET # BLD AUTO: 227 10*3/UL (ref 140–350)
PLATELET CLUMP BLD QL SMEAR: ABNORMAL
PLATELET MORPHOLOGY IN BLOOD: NORMAL
PMV BLD AUTO: 8.6 FL (ref 6.9–10.8)
POTASSIUM SERPL-SCNC: 3.2 MMOL/L (ref 3.5–5.1)
PROT SERPL-MCNC: 8.2 G/DL (ref 6–8.3)
PROT UR STRIP.AUTO-MCNC: NEGATIVE MG/DL
RBC # BLD AUTO: 4.71 10*6/ΜL (ref 3.7–5.3)
RBC #/AREA URNS AUTO: NORMAL /HPF
RBC MORPH BLD: NORMAL
SODIUM SERPL-SCNC: 136 MMOL/L (ref 135–145)
SP GR UR STRIP.AUTO: 1.01 (ref 1–1.03)
SQUAMOUS #/AREA URNS AUTO: NORMAL /HPF
TOTAL CELLS COUNTED BLD: 100 CELLS
UROBILINOGEN UR STRIP.AUTO-MCNC: <2 E.U./DL
WBC # BLD AUTO: 18.2 10*3/UL (ref 4.5–10.5)
WBC #/AREA URNS AUTO: NORMAL /HPF
WBC NRBC COR # BLD: 18.2 10*3/UL

## 2022-06-30 PROCEDURE — 96374 THER/PROPH/DIAG INJ IV PUSH: CPT

## 2022-06-30 PROCEDURE — 36415 COLL VENOUS BLD VENIPUNCTURE: CPT | Performed by: EMERGENCY MEDICINE

## 2022-06-30 PROCEDURE — 2580000300 HC RX 258: Performed by: EMERGENCY MEDICINE

## 2022-06-30 PROCEDURE — 96361 HYDRATE IV INFUSION ADD-ON: CPT

## 2022-06-30 PROCEDURE — 80053 COMPREHEN METABOLIC PANEL: CPT | Performed by: EMERGENCY MEDICINE

## 2022-06-30 PROCEDURE — 96375 TX/PRO/DX INJ NEW DRUG ADDON: CPT

## 2022-06-30 PROCEDURE — 85025 COMPLETE CBC W/AUTO DIFF WBC: CPT | Performed by: EMERGENCY MEDICINE

## 2022-06-30 PROCEDURE — 99284 EMERGENCY DEPT VISIT MOD MDM: CPT | Performed by: EMERGENCY MEDICINE

## 2022-06-30 PROCEDURE — 74176 CT ABD & PELVIS W/O CONTRAST: CPT | Mod: MG

## 2022-06-30 PROCEDURE — 96376 TX/PRO/DX INJ SAME DRUG ADON: CPT

## 2022-06-30 PROCEDURE — 83690 ASSAY OF LIPASE: CPT | Performed by: EMERGENCY MEDICINE

## 2022-06-30 PROCEDURE — 81001 URINALYSIS AUTO W/SCOPE: CPT | Performed by: EMERGENCY MEDICINE

## 2022-06-30 PROCEDURE — 6360000200 HC RX 636 W HCPCS (ALT 250 FOR IP): Performed by: EMERGENCY MEDICINE

## 2022-06-30 RX ORDER — SODIUM CHLORIDE 9 MG/ML
100 INJECTION, SOLUTION INTRAVENOUS CONTINUOUS
Status: DISCONTINUED | OUTPATIENT
Start: 2022-06-30 | End: 2022-06-30 | Stop reason: HOSPADM

## 2022-06-30 RX ORDER — SODIUM CHLORIDE 9 MG/ML
1000 INJECTION, SOLUTION INTRAVENOUS ONCE
Status: COMPLETED | OUTPATIENT
Start: 2022-06-30 | End: 2022-06-30

## 2022-06-30 RX ORDER — HYDROCODONE BITARTRATE AND ACETAMINOPHEN 5; 325 MG/1; MG/1
2 TABLET ORAL EVERY 6 HOURS PRN
Qty: 10 TABLET | Refills: 0 | Status: SHIPPED | OUTPATIENT
Start: 2022-06-30 | End: 2022-09-28 | Stop reason: ALTCHOICE

## 2022-06-30 RX ORDER — CIPROFLOXACIN 500 MG/1
500 TABLET ORAL EVERY 12 HOURS
Qty: 20 TABLET | Refills: 0 | Status: SHIPPED | OUTPATIENT
Start: 2022-06-30 | End: 2022-07-10

## 2022-06-30 RX ORDER — MORPHINE SULFATE 4 MG/ML
4 INJECTION, SOLUTION INTRAMUSCULAR; INTRAVENOUS
Status: DISCONTINUED | OUTPATIENT
Start: 2022-06-30 | End: 2022-06-30 | Stop reason: HOSPADM

## 2022-06-30 RX ORDER — METRONIDAZOLE 500 MG/1
500 TABLET ORAL EVERY 8 HOURS
Qty: 30 TABLET | Refills: 0 | Status: SHIPPED | OUTPATIENT
Start: 2022-06-30 | End: 2022-09-28 | Stop reason: ALTCHOICE

## 2022-06-30 RX ORDER — ONDANSETRON HYDROCHLORIDE 2 MG/ML
4 INJECTION, SOLUTION INTRAVENOUS ONCE
Status: COMPLETED | OUTPATIENT
Start: 2022-06-30 | End: 2022-06-30

## 2022-06-30 RX ADMIN — MORPHINE SULFATE 4 MG: 4 INJECTION, SOLUTION INTRAMUSCULAR; INTRAVENOUS at 16:39

## 2022-06-30 RX ADMIN — SODIUM CHLORIDE 1000 ML: 9 INJECTION, SOLUTION INTRAVENOUS at 14:33

## 2022-06-30 RX ADMIN — ONDANSETRON 4 MG: 2 INJECTION INTRAMUSCULAR; INTRAVENOUS at 14:33

## 2022-06-30 RX ADMIN — MORPHINE SULFATE 4 MG: 4 INJECTION, SOLUTION INTRAMUSCULAR; INTRAVENOUS at 14:34

## 2022-06-30 NOTE — ED PROVIDER NOTES
Room: 12    HPI:  Chief Complaint   Patient presents with   • Abdominal Pain     Patient sent here from  for LLQ pain and increased WBC.  Sent for CT     HPI  Patient presents for evaluation of abdominal pain.  She comes here for urgent care.  She woke up this morning and had left lower quadrant abdominal pain.  She went to urgent care where she has leukocytosis and was sent here.  She has had diverticulosis on colonoscopies but has no history of diverticulitis.  She has had previous cholecystectomy.  She denies fever with this but has had nausea vomiting diarrhea that are all nonbloody.  She appears very uncomfortable and does have a history of chronic pain and chronic back pain.  She is having some back pain now.     She had evaluation yesterday with labs that showed creatinine 0.58 with normal electrolytes.  Her white count was 7.1.             HISTORY:  Past Medical History:   Diagnosis Date   • Chronic pain    • Complication of anesthesia    • Dental disease    • Depression    • Diverticulitis    • Endocrine disorder    • Extremity pain    • Fibromyalgia    • Hashimoto's thyroiditis    • Hypothyroidism    • Injury of back    • Low back pain    • Neurologic disorder    • Respiratory disease    • Sleep apnea     does not use cpap   • Wears dentures        Past Surgical History:   Procedure Laterality Date   • BACK SURGERY     • CHOLECYSTECTOMY     • COLONOSCOPY N/A 12/27/2019    Procedure: COLONOSCOPY WITH ASCENDING COLON BIOPSIES;  Surgeon: Kelvin Cordova MD PhD;  Location: [facility];  Service: Endoscopy;  Laterality: N/A;   • COLONOSCOPY N/A 4/6/2021    Procedure: COLONOSCOPY WITH FECAL MICROBIOTA TRANSPLANTATION;  Surgeon: Maria Dolores Richardson MD;  Location: [facility];  Service: Endoscopy;  Laterality: N/A;   • ESOPHAGOGASTRODUODENOSCOPY N/A 12/27/2019    Procedure: ESOPHAGOGASTRODUODENOSCOPY WITH DUODENAL BIOPSIES;  Surgeon: Kelvin Cordova MD PhD;  Location: [facility];  Service: Endoscopy;   Laterality: N/A;   • EXAMINATION UNDER ANESTHESIA N/A 1/17/2020    Procedure: EXAM UNDER ANESTHESIA;  Surgeon: Steve Michelle MD;  Location: [facility] OR;  Service: General;  Laterality: N/A;   • HEMORRHOID SURGERY N/A 1/17/2020    Procedure: HEMORRHOIDECTOMY TIMES ONE with BANDING TIMES ONE;  Surgeon: Steve Michelle MD;  Location: [facility] OR;  Service: General;  Laterality: N/A;   • HYSTERECTOMY     • REDUCTION MAMMAPLASTY     • TUBAL LIGATION         Family History   Problem Relation Age of Onset   • No Known Problems Mother        Social History     Tobacco Use   • Smoking status: Former Smoker     Packs/day: 0.00   • Smokeless tobacco: Never Used   • Tobacco comment: 'VAPES'   Vaping Use   • Vaping Use: Former   • Substances: Nicotine   • Devices: Pre-filled or refillable cartridge, Refillable tank   Substance Use Topics   • Alcohol use: No   • Drug use: No       ROS:  Constitutional: Negative for fever.   HENT: Negative for sore throat.    Eyes: Negative for pain.   Respiratory: Negative for shortness of breath.    Cardiovascular: Negative for chest pain.   Gastrointestinal: Positive nausea, vomiting, and diarrhea, positive for left abdominal pain.   Endocrine: Negative for polyuria.   Genitourinary: Negative for flank pain.   Musculoskeletal: Positive for back pain.   Neurological: Negative for headaches.   Hematological: Negative for bleeding.    PHYSICAL EXAM:  ED Triage Vitals   Temp Heart Rate Resp BP SpO2   06/30/22 1404 06/30/22 1404 06/30/22 1404 06/30/22 1404 06/30/22 1404   36.5 °C (97.7 °F) 105 18 (!) 178/104 96 %      Mean BP (mmHg) Temp Source Heart Rate Source Patient Position BP Location   06/30/22 1445 06/30/22 1404 -- 06/30/22 1643 --   120 Oral  Sitting       FiO2 (%)       --                Nursing note and vitals reviewed.  Constitutional: appears well-developed.   HENT: Oral mucosa moist  Head: Normocephalic and atraumatic.   Eyes: Pupils are equal, round, .   Neck: Supple,   Cardiovascular:  Regular rate and rhythm with no murmur, rub, or gallop.  Normal pulses.  Pulmonary/Chest: No respiratory distress.  Clear to auscultation bilaterally.  Abdominal: Soft and tender to left lower abdomen   back: No CVA tenderness.  Musculoskeletal: No edema  Neurological: Alert.  No focal deficits or weakness  Skin: Skin is warm and dry. No rash noted.   Psychiatric: Normal mood and affect.    Labs Reviewed   CBC WITH AUTO DIFFERENTIAL - Abnormal       Result Value    WBC 18.2 (*)     RBC 4.71      Hemoglobin 12.9      Hematocrit 38.9      MCV 82.5      MCH 27.5 (*)     MCHC 33.3      RDW 13.7      Platelets 227      MPV 8.6     COMPREHENSIVE METABOLIC PANEL - Abnormal    Sodium 136      Potassium 3.2 (*)     Chloride 97 (*)     CO2 26      Anion Gap 13 (*)     BUN 8      Creatinine 0.74      Glucose 150 (*)     Calcium 9.7      AST 21      ALT (SGPT) 20      Alkaline Phosphatase 115 (*)     Total Protein 8.2      Albumin 4.4      Total Bilirubin 0.71      Corrected Calcium 9.4      eGFR 101      Narrative:     Calculation based on the 2021 Chronic Kidney Disease Epidemiology Collaboration (CKD-EPI) equation refit without adjustment for race.   LIPASE - Abnormal    Lipase 6 (*)    URINALYSIS, DIPSTICK ONLY, FOR USE WITH MICROSCOPIC PANEL - Abnormal    Color, Urine Yellow      Clarity, Urine Clear      Specific Gravity, Urine 1.012      Leukocytes, Urine Negative      Nitrite, Urine Negative      Protein, Urine Negative      Ketones, Urine Negative      Urobilinogen, Urine <2.0      Bilirubin, Urine Negative      Blood, Urine Negative      Glucose, Urine 50  (*)     pH, Urine 8.0     MANUAL DIFF PERFORMABLE - Abnormal    WBC 18.20      Neutrophils% 98 (*)     Lymphocytes% 1 (*)     Monocytes% 1 (*)     ANC (manual diff) 17.836      Segs Absolute 17.8      Lymphocytes Absolute 0.2      Monocytes Absolute 0.2      Total Counted 100      RBC Morphology Normal      Platelet Morphology Normal      Clumped Platelets None Seen      URINALYSIS, MICROSCOPIC ONLY - Normal    RBC, Urine 0-2      WBC, Urine 0-4      Squamous Epithelial, Urine 0-4      Bacteria, Urine None seen     URINALYSIS WITH MICROSCOPIC    Narrative:     The following orders were created for panel order Urinalysis w/microscopic Urine, Clean Catch.  Procedure                               Abnormality         Status                     ---------                               -----------         ------                     Urinalysis, microscopic U...[[id number]]  Normal              Final result               Urinalysis, dipstick Urin...[[id number]]  Abnormal            Final result                 Please view results for these tests on the individual orders.         CT ABDOMEN PELVIS WO IV CONTRAST   Final Result   IMPRESSION:   Sigmoid colon diverticulosis is evident. Minimal equivocal hazy opacity in the pericolonic adipose might be compatible with early/very mild diverticulitis though no distinct focal inflamed diverticulum is apparent. No other specific acute abdominal abnormality.                  ED Medication Administration from 06/30/2022 1401 to 06/30/2022 1758       Date/Time Order Dose Route Action Action by     06/30/2022 1433 sodium chloride 0.9 % bolus 1,000 mL 1,000 mL intravenous New Bag/New Syringe TAVO Montalvo     06/30/2022 1750 sodium chloride 0.9 % bolus 1,000 mL 0 mL intravenous Stopped MARY Gaytan     06/30/2022 1434 morphine injection 4 mg 4 mg intravenous Given TAOV Montalvo     06/30/2022 1639 morphine injection 4 mg 4 mg intravenous Given MARY Gaytan     06/30/2022 1433 ondansetron (ZOFRAN) injection 4 mg 4 mg intravenous Given TAVO Montalvo            PROCEDURES:  Procedures      ED COURSE:       Sepsis Quality Bundle      MDM:     Patient presents with left lower quadrant abdominal pain that started today.  She has had nonbloody vomiting and diarrhea with this.  She has some back pain but has chronic back pain.  With this she had comprehensive evaluation.  He had  comprehensive evaluation here including labs that did not show significant l anemia but does show leukocytosis.  There is no renal failure or electrolyte abnormalities.  Liver function test did not show cholecystitis or hepatitis.  Lipase is normal he does not have pancreatitis.  CT scan shows some possible early diverticulitis which fits her clinical picture.  There is no signs of ruptured gastric ulcer or appendicitis or other surgical pathology.  Imaging was reviewed by myself independently as well as the radiology report as part of my evaluation.  She was given IV fluids and nausea and pain medication and feels better on reevaluation.  At this time I will treat her for diverticulitis.  I do not believe she is hospitalized.  She was discharged with antibiotics and symptomatic treatment and follow-up.      CLINICAL IMPRESSION:  Final diagnoses:   [R10.9] Abdominal pain   Early diverticulitis  Nausea vomiting  Diarrhea  Chronic back pain  Chronic pain syndrome  Depression  Fibromyalgia        A voice recognition program was used to aid in documentation of this record.  Sometimes words are not printed exactly as they were spoken.  While efforts were made to carefully edit and correct any inaccuracies, some areas may be present; please take these into context.  Please contact the provider if areas are identified.             Sandra Carballo MD  07/19/22 0054

## 2022-06-30 NOTE — DISCHARGE INSTRUCTIONS
Your CAT scan shows some possible signs of early diverticulitis with no complications.  Your urine does not show infection.  Your kidney function looks good.  Take pain medication antibiotics as directed.  Return if new or worsening symptoms or concerns.  Follow-up with your doctor for close reevaluation.

## 2022-08-12 ENCOUNTER — CARE COORDINATION (OUTPATIENT)
Dept: NEUROSURGERY | Facility: CLINIC | Age: 47
End: 2022-08-12

## 2022-08-12 NOTE — PROGRESS NOTES
Writer brought imaging disc from [facility] to be loaded to patient MRN in Pacs.     Message sent to Dr. Kam to inform of the above.     Elida Saavedra LPN  Neurosurgery

## 2022-09-02 DIAGNOSIS — M43.9 SPINE DEFORMITY, ACQUIRED: Primary | ICD-10-CM

## 2022-09-26 ENCOUNTER — CARE COORDINATION (OUTPATIENT)
Dept: NEUROSURGERY | Facility: CLINIC | Age: 47
End: 2022-09-26

## 2022-09-26 NOTE — PROGRESS NOTES
Writer contacted [facility] for 09/23/2022 CT Thoracic Lumbar Spine to be pushed to Pacs. Imaging resolved to MRN and verified in Care Everywhere     Elida Saavedra LPN  Neurosurgery

## 2022-09-28 RX ORDER — BUSPIRONE HYDROCHLORIDE 15 MG/1
15 TABLET ORAL 3 TIMES DAILY
COMMUNITY
End: 2025-01-08

## 2022-09-28 RX ORDER — CELECOXIB 200 MG/1
200 CAPSULE ORAL 2 TIMES DAILY
COMMUNITY
End: 2025-01-08

## 2022-09-28 RX ORDER — DULOXETIN HYDROCHLORIDE 30 MG/1
30 CAPSULE, DELAYED RELEASE ORAL DAILY
COMMUNITY

## 2022-09-28 RX ORDER — BACLOFEN 10 MG/1
10 TABLET ORAL 3 TIMES DAILY PRN
COMMUNITY

## 2022-09-28 RX ORDER — NALOXONE HYDROCHLORIDE 4 MG/.1ML
4 SPRAY NASAL AS NEEDED
COMMUNITY

## 2022-09-28 RX ORDER — HYDROMORPHONE HYDROCHLORIDE 4 MG/1
4 TABLET ORAL EVERY 4 HOURS PRN
COMMUNITY
End: 2025-01-08

## 2022-09-28 RX ORDER — DULOXETIN HYDROCHLORIDE 60 MG/1
60 CAPSULE, DELAYED RELEASE ORAL DAILY
COMMUNITY

## 2022-10-03 ENCOUNTER — HEALTH MAINTENANCE LETTER (OUTPATIENT)
Age: 47
End: 2022-10-03

## 2022-10-04 ENCOUNTER — ANESTHESIA EVENT (OUTPATIENT)
Dept: MRI IMAGING | Facility: HOSPITAL | Age: 47
End: 2022-10-04
Payer: COMMERCIAL

## 2022-10-04 ENCOUNTER — HOSPITAL ENCOUNTER (OUTPATIENT)
Dept: MRI IMAGING | Facility: HOSPITAL | Age: 47
Discharge: HOME | End: 2022-10-04
Payer: COMMERCIAL

## 2022-10-04 ENCOUNTER — ANESTHESIA (OUTPATIENT)
Dept: MRI IMAGING | Facility: HOSPITAL | Age: 47
End: 2022-10-04
Payer: COMMERCIAL

## 2022-10-04 VITALS
TEMPERATURE: 97.3 F | WEIGHT: 225.97 LBS | HEART RATE: 84 BPM | RESPIRATION RATE: 22 BRPM | DIASTOLIC BLOOD PRESSURE: 69 MMHG | OXYGEN SATURATION: 98 % | SYSTOLIC BLOOD PRESSURE: 121 MMHG | HEIGHT: 64 IN | BODY MASS INDEX: 38.58 KG/M2

## 2022-10-04 DIAGNOSIS — M47.816 SPONDYLOSIS OF LUMBAR REGION WITHOUT MYELOPATHY OR RADICULOPATHY: ICD-10-CM

## 2022-10-04 DIAGNOSIS — M51.35 DDD (DEGENERATIVE DISC DISEASE), THORACOLUMBAR: ICD-10-CM

## 2022-10-04 PROCEDURE — A9579 GAD-BASE MR CONTRAST NOS,1ML: HCPCS | Performed by: FAMILY MEDICINE

## 2022-10-04 PROCEDURE — 2550000100 HC RX 255: Performed by: FAMILY MEDICINE

## 2022-10-04 PROCEDURE — 6360000200 HC RX 636 W HCPCS (ALT 250 FOR IP): Performed by: REGISTERED NURSE

## 2022-10-04 PROCEDURE — 01922 ANES N-INVAS IMG/RADJ THER: CPT | Mod: P3 | Performed by: REGISTERED NURSE

## 2022-10-04 PROCEDURE — 6360000200 HC RX 636 W HCPCS (ALT 250 FOR IP): Performed by: ANESTHESIOLOGY

## 2022-10-04 PROCEDURE — G1004 CDSM NDSC: HCPCS

## 2022-10-04 PROCEDURE — 2580000300 HC RX 258: Performed by: ANESTHESIOLOGY

## 2022-10-04 PROCEDURE — (BLANK) HC GENERAL ANESTHESIA FACILITY CHARGE 1ST 15 MIN

## 2022-10-04 PROCEDURE — 2500000200 HC RX 250 WO HCPCS: Performed by: REGISTERED NURSE

## 2022-10-04 PROCEDURE — (BLANK) HC RECOVERY PHASE-2 1ST 1/2 HOUR ACUITY LEVEL 2

## 2022-10-04 PROCEDURE — (BLANK) HC GENERAL ANESTHESIA FACILITY CHARGE EACH ADDITIONAL MIN

## 2022-10-04 PROCEDURE — (BLANK) HC RECOVERY PHASE-1 1ST  HOUR ACUITY LEVEL 2

## 2022-10-04 RX ORDER — SODIUM CHLORIDE 0.9 % (FLUSH) 0.9 %
2 SYRINGE (ML) INJECTION AS NEEDED
Status: DISCONTINUED | OUTPATIENT
Start: 2022-10-04 | End: 2022-10-04 | Stop reason: HOSPADM

## 2022-10-04 RX ORDER — FENTANYL CITRATE/PF 50 MCG/ML
50 PLASTIC BAG, INJECTION (ML) INTRAVENOUS EVERY 5 MIN PRN
Status: DISCONTINUED | OUTPATIENT
Start: 2022-10-04 | End: 2022-10-04 | Stop reason: HOSPADM

## 2022-10-04 RX ORDER — LABETALOL HCL 20 MG/4 ML
5 SYRINGE (ML) INTRAVENOUS EVERY 5 MIN PRN
Status: DISCONTINUED | OUTPATIENT
Start: 2022-10-04 | End: 2022-10-04 | Stop reason: HOSPADM

## 2022-10-04 RX ORDER — SEMAGLUTIDE 1.34 MG/ML
1 INJECTION, SOLUTION SUBCUTANEOUS
COMMUNITY

## 2022-10-04 RX ORDER — SODIUM CHLORIDE 0.9 % (FLUSH) 0.9 %
2 SYRINGE (ML) INJECTION EVERY 8 HOURS SCHEDULED
Status: DISCONTINUED | OUTPATIENT
Start: 2022-10-04 | End: 2022-10-04 | Stop reason: HOSPADM

## 2022-10-04 RX ORDER — NORETHINDRONE AND ETHINYL ESTRADIOL 0.5-0.035
KIT ORAL AS NEEDED
Status: DISCONTINUED | OUTPATIENT
Start: 2022-10-04 | End: 2022-10-04 | Stop reason: SURG

## 2022-10-04 RX ORDER — ONDANSETRON HYDROCHLORIDE 2 MG/ML
4 INJECTION, SOLUTION INTRAVENOUS ONCE AS NEEDED
Status: DISCONTINUED | OUTPATIENT
Start: 2022-10-04 | End: 2022-10-04 | Stop reason: HOSPADM

## 2022-10-04 RX ORDER — SODIUM CHLORIDE, SODIUM LACTATE, POTASSIUM CHLORIDE, CALCIUM CHLORIDE 600; 310; 30; 20 MG/100ML; MG/100ML; MG/100ML; MG/100ML
100 INJECTION, SOLUTION INTRAVENOUS CONTINUOUS
Status: DISCONTINUED | OUTPATIENT
Start: 2022-10-04 | End: 2022-10-05 | Stop reason: HOSPADM

## 2022-10-04 RX ORDER — FENTANYL CITRATE/PF 50 MCG/ML
PLASTIC BAG, INJECTION (ML) INTRAVENOUS AS NEEDED
Status: DISCONTINUED | OUTPATIENT
Start: 2022-10-04 | End: 2022-10-04 | Stop reason: SURG

## 2022-10-04 RX ORDER — MIDAZOLAM HYDROCHLORIDE 1 MG/ML
1 INJECTION INTRAMUSCULAR; INTRAVENOUS EVERY 5 MIN PRN
Status: DISCONTINUED | OUTPATIENT
Start: 2022-10-04 | End: 2022-10-04 | Stop reason: HOSPADM

## 2022-10-04 RX ORDER — PROPOFOL 10 MG/ML
INJECTION, EMULSION INTRAVENOUS AS NEEDED
Status: DISCONTINUED | OUTPATIENT
Start: 2022-10-04 | End: 2022-10-04 | Stop reason: SURG

## 2022-10-04 RX ORDER — ONDANSETRON HYDROCHLORIDE 2 MG/ML
4 INJECTION, SOLUTION INTRAVENOUS ONCE
Status: COMPLETED | OUTPATIENT
Start: 2022-10-04 | End: 2022-10-04

## 2022-10-04 RX ORDER — DEXAMETHASONE SODIUM PHOSPHATE 4 MG/ML
4 INJECTION, SOLUTION INTRA-ARTICULAR; INTRALESIONAL; INTRAMUSCULAR; INTRAVENOUS; SOFT TISSUE ONCE
Status: COMPLETED | OUTPATIENT
Start: 2022-10-04 | End: 2022-10-04

## 2022-10-04 RX ORDER — HYDROMORPHONE HYDROCHLORIDE 1 MG/ML
0.5 INJECTION, SOLUTION INTRAMUSCULAR; INTRAVENOUS; SUBCUTANEOUS ONCE
Status: COMPLETED | OUTPATIENT
Start: 2022-10-04 | End: 2022-10-04

## 2022-10-04 RX ADMIN — GADOTERIDOL 20 ML: 279.3 INJECTION, SOLUTION INTRAVENOUS at 12:34

## 2022-10-04 RX ADMIN — FENTANYL CITRATE 200 MCG: 50 INJECTION, SOLUTION INTRAMUSCULAR; INTRAVENOUS at 11:00

## 2022-10-04 RX ADMIN — EPHEDRINE SULFATE 50 MG: 50 INJECTION, SOLUTION INTRAVENOUS at 11:00

## 2022-10-04 RX ADMIN — SODIUM CHLORIDE, POTASSIUM CHLORIDE, SODIUM LACTATE AND CALCIUM CHLORIDE: 600; 310; 30; 20 INJECTION, SOLUTION INTRAVENOUS at 10:55

## 2022-10-04 RX ADMIN — DEXAMETHASONE SODIUM PHOSPHATE 4 MG: 4 INJECTION, SOLUTION INTRA-ARTICULAR; INTRALESIONAL; INTRAMUSCULAR; INTRAVENOUS; SOFT TISSUE at 10:53

## 2022-10-04 RX ADMIN — FENTANYL CITRATE 50 MCG: 0.05 INJECTION, SOLUTION INTRAMUSCULAR; INTRAVENOUS at 12:56

## 2022-10-04 RX ADMIN — SODIUM CHLORIDE, POTASSIUM CHLORIDE, SODIUM LACTATE AND CALCIUM CHLORIDE 100 ML/HR: 600; 310; 30; 20 INJECTION, SOLUTION INTRAVENOUS at 10:53

## 2022-10-04 RX ADMIN — HYDROMORPHONE HYDROCHLORIDE 0.5 MG: 1 INJECTION, SOLUTION INTRAMUSCULAR; INTRAVENOUS; SUBCUTANEOUS at 10:53

## 2022-10-04 RX ADMIN — ONDANSETRON 4 MG: 2 INJECTION INTRAMUSCULAR; INTRAVENOUS at 10:53

## 2022-10-04 RX ADMIN — PROPOFOL 140 MG: 10 INJECTION, EMULSION INTRAVENOUS at 11:00

## 2022-10-04 ASSESSMENT — PAIN DESCRIPTION - DESCRIPTORS: DESCRIPTORS: ACHING

## 2022-10-04 NOTE — ANESTHESIA POSTPROCEDURE EVALUATION
Patient: Isabell Harper    Procedure Summary     Date: 10/04/22 Room / Location: [facility] Imaging    Anesthesia Start: 1055 Anesthesia Stop: 1245    Procedure: MR LUMBAR SPINE W AND WO CONTRAST Diagnosis:       Spondylosis of lumbar region without myelopathy or radiculopathy      DDD (degenerative disc disease), thoracolumbar    Scheduled Providers: Solomon Hensley MD; John Arnold CRNA Responsible Provider: Solomon Hensley MD    Anesthesia Type: general ASA Status: 3          Anesthesia Type: general    Last vitals  Vitals Value Taken Time   /72 10/04/22 1300   Temp 36.2 °C (97.2 °F) 10/04/22 1241   Pulse 82 10/04/22 1300   Resp 20 10/04/22 1300   SpO2 98 % 10/04/22 1300   0-10 Pain Score 6 10/04/22 1300       Anesthesia Post Evaluation    Patient location during evaluation: PACU  Patient participation: complete - patient participated  Level of consciousness: awake and alert  Pain management: adequate  Airway patency: patent  Anesthetic complications: no  Cardiovascular status: acceptable  Respiratory status: acceptable  Hydration status: acceptable  May dismiss recovered patient based on consultation with the appropriate physicians and/or meeting appropriate discharge criteria      Cosmetic?  This procedure is not cosmetic.

## 2022-10-04 NOTE — ANESTHESIA PREPROCEDURE EVALUATION
"Pre-Procedure Assessment    Patient: Isabell Harper, female, 47 y.o.    Ht Readings from Last 1 Encounters:   06/30/22 1.626 m (5' 4\")     Wt Readings from Last 1 Encounters:   06/30/22 108.9 kg (240 lb 1.3 oz)       Last Vitals  BP      Temp      Pulse     Resp      SpO2      Pain Score         Problem list reviewed and Medical history reviewed           Airway   Mallampati: II  TM distance: >3 FB  Neck ROM: full      Dental    (+) upper dentures and lower dentures    Comment: Small mouth  Opening limited    Pulmonary     breath sounds clear to auscultation  (+) sleep apnea,   Cardiovascular     Rhythm: regular  Rate: normal  ROS comment: 1/2022 ECHO  Normal left ventricular size and systolic function, normal wall motion.  Ejection fraction 67% by biplane measurement.  Normal left ventricular wall thickness.  Normal diastolic function and LV filling pressure.  Normal right ventricular size and function.  Normal biatrial size.  Normal appearing mitral and tricuspid valve on 2D ultrasound.  Limited valvular assessment.  Insufficient TR for estimation of pulmonary pressure.  No pericardial effusion.     No prior studies for comparison.      Mental Status/Neuro/Psych    Pt is alert.      (+) arthritis, back injury, peripheral neuropathy,     Comments: Unable to lie flat secondary to back pain      GI/Hepatic/Renal      Endo/Other    (+) hypothyroidism,   Abdominal           Social History     Tobacco Use   • Smoking status: Former     Packs/day: 0.00     Types: Cigarettes   • Smokeless tobacco: Never   • Tobacco comments:     'VAPES'   Substance Use Topics   • Alcohol use: No      Hematology   WBC   Date Value Ref Range Status   06/30/2022 18.2 (H) 4.5 - 10.5 10*3/uL Final     RBC   Date Value Ref Range Status   06/30/2022 4.71 3.70 - 5.30 10*6/µL Final     MCV   Date Value Ref Range Status   06/30/2022 82.5 81.0 - 97.0 fL Final     Hemoglobin   Date Value Ref Range Status   06/30/2022 12.9 11.5 - 15.5 g/dL Final "     Hematocrit   Date Value Ref Range Status   06/30/2022 38.9 34.0 - 45.0 % Final     Platelets   Date Value Ref Range Status   06/30/2022 227 140 - 350 10*3/uL Final      Coagulation   Protime   Date Value Ref Range Status   01/11/2022 13.1 (H) 9.4 - 12.5 seconds Final     PTT   Date Value Ref Range Status   09/24/2019 31.3 25.1 - 36.5 s Final     INR   Date Value Ref Range Status   01/11/2022 1.1 <=1.1 Final      General Chemistry   Calcium   Date Value Ref Range Status   06/30/2022 9.7 8.6 - 10.3 mg/dL Final     BUN   Date Value Ref Range Status   06/30/2022 8 7 - 25 mg/dL Final     Creatinine   Date Value Ref Range Status   06/30/2022 0.74 0.60 - 1.10 mg/dL Final     Glucose   Date Value Ref Range Status   06/30/2022 150 (H) 70 - 105 mg/dL Final     Sodium   Date Value Ref Range Status   06/30/2022 136 135 - 145 mmol/L Final     Potassium   Date Value Ref Range Status   06/30/2022 3.2 (L) 3.5 - 5.1 MMOL/L Final     Magnesium   Date Value Ref Range Status   01/14/2022 2.2 1.8 - 2.4 mg/dL Final     CO2   Date Value Ref Range Status   06/30/2022 26 21 - 32 mmol/L Final     Chloride   Date Value Ref Range Status   06/30/2022 97 (L) 98 - 107 mmol/L Final     Anesthesia Plan    ASA 3   NPO status reviewed: > 8 hours    General         Induction: intravenous   Airway Planning: LMA              Anesthetic plan and risks discussed with patient.      Plan discussed with CRNA.

## 2022-10-04 NOTE — PERIOPERATIVE NURSING NOTE
Patient discharged to home. General anesthesia instructions printed, all questions answered. Vitals stable. IV removed, tip intact. Pt escorted to vehicle via wheelchair.

## 2022-10-04 NOTE — ANESTHESIA PROCEDURE NOTES
Airway  Date/Time: 10/4/2022 11:02 AM  Urgency: elective    Airway not difficult    General Information and Staff    Patient location during procedure: OR  CRNA: John Arnold CRNA  Performed: CRNA     Indications and Patient Condition  Indications for airway management: anesthesia  Spontaneous Ventilation: absent  Sedation level: deep  Preoxygenated: yes  Patient position: sniffing  Mask difficulty assessment: 0 - not attempted    Final Airway Details  Final airway type: supraglottic airway      Successful airway: unique  Size 4     Placement verified by: chest auscultation, capnometry and palpation of cuff   Number of attempts at approach: 1

## 2022-10-20 ENCOUNTER — TELEPHONE (OUTPATIENT)
Dept: NEUROSURGERY | Facility: CLINIC | Age: 47
End: 2022-10-20

## 2022-10-20 DIAGNOSIS — M53.3 SACROILIAC PAIN: ICD-10-CM

## 2022-10-20 DIAGNOSIS — M43.9 SPINE DEFORMITY, ACQUIRED: Primary | ICD-10-CM

## 2022-10-20 NOTE — TELEPHONE ENCOUNTER
Telephone Note    Date: 10/20/2022    Discussed with the patient regarding the upcoming surgery.    She had Velez rods placed some 30 years ago.  Recently in 3/2022, she underwent L4-5 TLIF but she has not fared well since.  She presented in  with worsened low back pain and right leg pain, unable to straighten out her back due to deformity.    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    Given these findings, she will need the followin. Removal of Velez rods/screws (T6-L3)  2. L5-S1 ALIF to restore lordosis at L4-S1  3. Re-do L4-5 TLIF (Transforaminal Lumbar Interbody Fusion)  4. L3-4 TLIF with interbody cages  5. L3 PSO to further correct the hypolordosis (currently PI-LL mismatch of 30)  6. Bilateral S2AI screws (granite) for sacroiliac joint fusion for pelvic anchor and SI joint fusion for sacroiliac pain.  7. T6-S1 instrumented fusion    The nature of the surgery was explained to the patient as well as its risks which include but not limited to infection, bleeding, nerve injury, spinal cord injury, weakness, numb,ess, bowel/bladder dysfunction, pseudo-arthrodesis, no improvement of symptoms, possible re-operation, heart attack, stroke, blood clots (DVT/PE) and complications from anesthesia.

## 2022-10-21 ENCOUNTER — TELEPHONE (OUTPATIENT)
Dept: NEUROSURGERY | Facility: CLINIC | Age: 47
End: 2022-10-21

## 2022-10-21 DIAGNOSIS — M43.9 SPINE DEFORMITY, ACQUIRED: Primary | ICD-10-CM

## 2022-10-21 NOTE — TELEPHONE ENCOUNTER
I called the patient in regards to scheduling surgery with Dr. Kam. Patient has a covid test scheduled at [facility] and pre op has been taken care of with PAC in person. Patient is aware that the nursing team will be reaching out within the next few days. I did tell patient that a nurse will reach out within 2-3 days prior to surgery with arrival time and instructions.    Surgeon: Dr. Kam  Date of Surgery: 11/15/2022  Location of surgery: [facility]  Pre-Op H&P: 11//14/2022  Post-Op Appt Date: 12/2/2022   Imaging needed:  Yes  Discussed COVID-19 testing:  Yes  Pre-cert/Authorization completed:  Yes  Patient aware that pre-op RN will call 2-3 days prior to surgery with arrival time and instructions Yes  Packet sent out: No 10/21/22  Patient was instructed to review packet and call back with any questions or concerns.         Mark Griffin on 10/21/2022 at 8:38 AM

## 2022-10-24 NOTE — TELEPHONE ENCOUNTER
FUTURE VISIT INFORMATION      SURGERY INFORMATION:    Date: 11/15/22    Location: u or    Surgeon:  Darien Kam MD Reed, Amy B, MD    Anesthesia Type:  general    Procedure: Stage 1 procedure o-arm/stealth assisted Lumbar 5-Sacral 1 Anterior Lumbar Interbody Fusion with Titanium interbody cage (exposure by Dr. Santos) Stage 1 Stage 2 procedure o-arm/stealth assisted Removal of Velez rods, Lumbar 3-4 and Lumbar 4-5 transforaminal lumbar interbody fusion with interbody cages and bone morphogenic protein, Lumbar 3 Pedicle subtraction osteotomy (PSO), Bilateral stacked S2 Alar-Iliac screws ([place]) for Sacroiliac joint fusion, Thoracic 6-Sacral 1 instrumented fusion    RECORDS REQUESTED FROM:        Primary Care Provider: [facility]

## 2022-10-31 ENCOUNTER — TELEPHONE (OUTPATIENT)
Dept: NEUROSURGERY | Facility: CLINIC | Age: 47
End: 2022-10-31

## 2022-10-31 NOTE — TELEPHONE ENCOUNTER
Health Call Center    Phone Message    May a detailed message be left on voicemail: yes     Reason for Call: Pt is requesting a call back to let her know what the status of prior authorization on her surgery is.  She said that her insurance company hasn't heard anything from us yet.  Thanks.

## 2022-11-01 NOTE — TELEPHONE ENCOUNTER
[facility] Call Center    Phone Message    May a detailed message be left on voicemail: yes     Reason for Call: Other: Patient is calling on the status of her prior authorization being sent to insurance she needs it sent right away because it can take up to 2 weeks for them to make a decision.      Action Taken: Other: Neurosurgery    Travel Screening: Not Applicable

## 2022-11-01 NOTE — TELEPHONE ENCOUNTER
Attn: Lesly Welch.   *Please contact patient regarding prior authorization.       Elida Saavedra LPN  Neurosurgery

## 2022-11-07 NOTE — TELEPHONE ENCOUNTER
Writer routed to Trinity Welch.    Please reach out to patient regarding surgery authorization.     Elida Saavedra LPN  Neurosurgery

## 2022-11-07 NOTE — TELEPHONE ENCOUNTER
[facility] Call Center    Phone Message    May a detailed message be left on voicemail: yes     Reason for Call: Other: Patient called requesting update on Prior Authorization for imaging and pre-op appointment 11/14/2022 and 12/2/2022. Patient stated she is wanting to discuss this as soon as possible.    Action Taken: Message routed to:  [facility]surgery    Travel Screening: Not Applicable

## 2022-11-08 NOTE — TELEPHONE ENCOUNTER
[facility] Call Center    Phone Message    May a detailed message be left on voicemail: yes     Reason for Call: Other: Pt mother called and stated that they still have not received a prior auth for the imaging that needs to be done prior to the procedure. Pt and mother are extremely upset that they have not been contacted about this. Please call Mary back with further information.       The Pt stated that the date is just needing to be updated to add 11/14 as well as 11/15.    Action Taken: Message routed to:  [facility]    Travel Screening: Not Applicable

## 2022-11-09 ENCOUNTER — VIRTUAL VISIT (OUTPATIENT)
Dept: VASCULAR SURGERY | Facility: CLINIC | Age: 47
End: 2022-11-09
Payer: MEDICAID

## 2022-11-09 DIAGNOSIS — M43.9 SPINE DEFORMITY, ACQUIRED: Primary | ICD-10-CM

## 2022-11-09 PROCEDURE — 99205 OFFICE O/P NEW HI 60 MIN: CPT | Mod: 95 | Performed by: SURGERY

## 2022-11-09 RX ORDER — SEMAGLUTIDE 1.34 MG/ML
2 INJECTION, SOLUTION SUBCUTANEOUS WEEKLY
COMMUNITY

## 2022-11-09 RX ORDER — DULOXETIN HYDROCHLORIDE 60 MG/1
60 CAPSULE, DELAYED RELEASE ORAL DAILY
COMMUNITY

## 2022-11-09 ASSESSMENT — PATIENT HEALTH QUESTIONNAIRE - PHQ9: SUM OF ALL RESPONSES TO PHQ QUESTIONS 1-9: 7

## 2022-11-09 NOTE — NURSING NOTE
Chief Complaint   Patient presents with     Consult       Patient confirms medications and allergies are accurate via patients echeck in completion, and or denies any changes since last reviewed/verified.   Jada Mistry, Virtual Facilitator

## 2022-11-09 NOTE — LETTER
11/9/2022       RE: Louise Tyler  [address] [place] [address] [address] [place] [id number]     Dear Colleague,    Thank you for referring your patient, Louise Tyler, to the [facility] [place] at [facility]. Please see a copy of my visit note below.      Vascular Surgery Consultation Note     Patient:  Louise Tyler   Date of birth 1975, Medical record number [id number]  Date of Visit:  11/09/2022  Consult Requester:No att. providers found            Assessment and Recommendations:   ASSESSMENT / RECOMMENDATION:    Very pleasant 47-year-old female with plans for an L5-S1 anterior exposure for Dr. Kam on November 15 with hardware placement posteriorly as well.  I discussed my role in providing safe passage for Dr. Kam.  She has a prior thoracoabdominal incision from her previous spine surgery approximately 30 years ago.  I do not feel this will be in the way of our left lower quadrant paramedian incision.  She had an opportunity to ask questions.  I explained the risks of bleeding, venous thromboembolism and possible injury to adjacent structures.  I look forward to participating in her care.    Many thanks for involving me in the care of this very pleasant patient. Should any questions or concerns arise, please don't hesitate to contact me.    Warm Regards,    Jyothi Santos MD, DFSVS, RPVI  Director, [facility]  Professor and Chief, Vascular and Endovascular Surgery  [facility]  Pager: 1. Send message or 10 digit call back number Securely via [employer] with the [employer] Web Console (learn more here)              2. Outside of [facility]? Call [phone]      60 minutes spent on the date of the encounter doing chart review, review of outside records, review of test results, interpretation of tests, patient visit and documentation       HPI: Louise is a very pleasant 47-year-old female seen today in video virtual visit for  evaluation prior to her spine surgery with Dr. Kam on November 15.  Approximately 30 years ago she had significant hardware placed in her spine in the thoracolumbar region with a thoracoabdominal incision from the left chest to just above the umbilicus.  She had done well but recently presented with spine deformity and symptoms necessitating replacement of her hardware and an anterior exposure proposed for her L5-S1 disc space.  In addition to her prior anterior lateral thoracotomy she has had a vaginal hysterectomy and a gallbladder surgery.  Denies any scars or incisions in her left lower quadrant.  Denies history of deep vein thrombosis or arterial disease.      Review of Systems   Constitutional, HEENT, cardiovascular, pulmonary, GI, , musculoskeletal, neuro, skin, endocrine and psych systems are negative, except as otherwise noted.    Physical Exam   GENERAL: Healthy, alert and no distress  EYES: Eyes grossly normal to inspection.  No discharge or erythema, or obvious scleral/conjunctival abnormalities.  RESP: No audible wheeze, cough, or visible cyanosis.  No visible retractions or increased work of breathing.    SKIN: Visible skin clear. No significant rash, abnormal pigmentation or lesions.  NEURO: Cranial nerves grossly intact.  Mentation and speech appropriate for age.  PSYCH: Mentation appears normal, affect normal/bright, judgement and insight intact, normal speech and appearance well-groomed.    Imaging: Mild wall atherosclerosis appreciated in the left common iliac artery without occlusive disease.    Video Start Time: 2:30 PM  Video End Time: 3 PM      Sincerely,    Jyothi Santos MD

## 2022-11-09 NOTE — PROGRESS NOTES
Vascular Surgery Consultation Note     Patient:  Louise Tyler   Date of birth 1975, Medical record number [id number]  Date of Visit:  11/09/2022  Consult Requester:No att. providers found            Assessment and Recommendations:   ASSESSMENT / RECOMMENDATION:    Very pleasant 47-year-old female with plans for an L5-S1 anterior exposure for Dr. Kam on November 15 with hardware placement posteriorly as well.  I discussed my role in providing safe passage for Dr. Kam.  She has a prior thoracoabdominal incision from her previous spine surgery approximately 30 years ago.  I do not feel this will be in the way of our left lower quadrant paramedian incision.  She had an opportunity to ask questions.  I explained the risks of bleeding, venous thromboembolism and possible injury to adjacent structures.  I look forward to participating in her care.    Many thanks for involving me in the care of this very pleasant patient. Should any questions or concerns arise, please don't hesitate to contact me.    Warm Regards,    Jyothi Santos MD, DFSVS, RPVI  Director, [facility] Services  Professor and Chief, Vascular and Endovascular Surgery  [facility]  Pager: 1. Send message or 10 digit call back number Securely via [employer] with the Voc[employer] Web Console (learn more here)              2. Outside of [facility]? Call [phone]        60 minutes spent on the date of the encounter doing chart review, review of outside records, review of test results, interpretation of tests, patient visit and documentation           HPI: Louise is a very pleasant 47-year-old female seen today in video virtual visit for evaluation prior to her spine surgery with Dr. Kam on November 15.  Approximately 30 years ago she had significant hardware placed in her spine in the thoracolumbar region with a thoracoabdominal incision from the left chest to just above the umbilicus.  She had done well but recently presented with  spine deformity and symptoms necessitating replacement of her hardware and an anterior exposure proposed for her L5-S1 disc space.  In addition to her prior anterior lateral thoracotomy she has had a vaginal hysterectomy and a gallbladder surgery.  Denies any scars or incisions in her left lower quadrant.  Denies history of deep vein thrombosis or arterial disease.      Review of Systems   Constitutional, HEENT, cardiovascular, pulmonary, GI, , musculoskeletal, neuro, skin, endocrine and psych systems are negative, except as otherwise noted.    Physical Exam   GENERAL: Healthy, alert and no distress  EYES: Eyes grossly normal to inspection.  No discharge or erythema, or obvious scleral/conjunctival abnormalities.  RESP: No audible wheeze, cough, or visible cyanosis.  No visible retractions or increased work of breathing.    SKIN: Visible skin clear. No significant rash, abnormal pigmentation or lesions.  NEURO: Cranial nerves grossly intact.  Mentation and speech appropriate for age.  PSYCH: Mentation appears normal, affect normal/bright, judgement and insight intact, normal speech and appearance well-groomed.    Imaging: Mild wall atherosclerosis appreciated in the left common iliac artery without occlusive disease.    Video Start Time: 2:30 PM  Video End Time: 3 PM        Louise is a 47 year old who is being evaluated via a billable video visit.      How would you like to obtain your AVS? MyChart  If the video visit is dropped, the invitation should be resent by: Text to cell phone: [phone]  Will anyone else be joining your video visit? No   Patient states is currently in the [facility]: No [place]        Video-Visit Details        Originating Location (pt. Location): Home      Distant Location (provider location):  Off-site    Platform used for Video Visit: [employer]

## 2022-11-11 ENCOUNTER — ANESTHESIA EVENT (OUTPATIENT)
Dept: SURGERY | Facility: CLINIC | Age: 47
DRG: 453 | End: 2022-11-11
Payer: MEDICAID

## 2022-11-11 RX ORDER — BACLOFEN 10 MG/1
10 TABLET ORAL 3 TIMES DAILY
Status: ON HOLD | COMMUNITY
End: 2022-11-23

## 2022-11-11 RX ORDER — POLYETHYLENE GLYCOL 3350 17 G/17G
1 POWDER, FOR SOLUTION ORAL DAILY
Status: ON HOLD | COMMUNITY
End: 2022-11-23

## 2022-11-11 NOTE — TELEPHONE ENCOUNTER
Writer routed to Prior Authorization for [facility].     Sent [employer] message to patient to inform authorization sent.     Elida Saavedra LPN  Neurosurgery

## 2022-11-11 NOTE — PHARMACY - PREOPERATIVE ASSESSMENT CENTER
Preoperative Assessment Center Medication History Note    Medication history completed on November 11, 2022 by this writer. See Epic admission navigator for prior to admission medications. Operating room staff will still need to confirm medications and last dose information on day of surgery.     Medication history interview sources  Patient interview: Yes  Care Everywhere records: Yes  Surescripts pharmacy refill records: Yes    Changes made to PTA medication list  Added:   -baclofen  -Miralax  -docusate  Deleted:   -alprazolam - pt's provider stopped this, last dose several weeks ago  Changed:   -hydromorphone 6 mg q8h prn > 6 mg q4h prn - lately pt has been taking only 3 doses/day  -celecoxib - marked as Not Taking - pt holding this perioperatively, last dose was 11/6  -buspirone 10 mg 2-3x/day > 15 mg TID    Additional medication history information (including reliability of information, actions taken by pharmacist):  -Pt manages her own meds and is a reliable historian   -Allergies reviewed, clarified that cephalexin breathing difficulty was a life-threatening reaction that required hospitalization  -semaglutide (OZEMPIC) last dose was 11/9, pt takes Wednesdays    -- No recent (within 30 days) course of antibiotics  -- No recent (within 30 days) course of systemic steroids  -- Reports not being on blood thinning medications    -- Declines being on any other prescription or over-the-counter medications    Prior to Admission medications    Medication Sig Last Dose Taking? Auth Provider Long Term End Date   acetaminophen (TYLENOL) 500 MG tablet Take 500-1,000 mg by mouth every 8 hours as needed for pain Taking Yes Reported, Patient     baclofen (LIORESAL) 10 MG tablet Take 10 mg by mouth 3 times daily Taking Yes Unknown, Entered By History     busPIRone (BUSPAR) 15 MG tablet Take 15 mg by mouth 3 times daily Taking Yes Reported, Patient Yes    docusate sodium (COLACE) 50 MG capsule Take 50 mg by mouth daily as  needed for constipation Taking Yes Unknown, Entered By History     DULoxetine (CYMBALTA) 30 MG capsule Take 30 mg by mouth daily 30+60=90 mg qam Taking Yes Reported, Patient Yes    DULoxetine (CYMBALTA) 60 MG capsule Take 60 mg by mouth daily 30+60=90 mg qam Taking Yes Reported, Patient Yes    HYDROmorphone (DILAUDID) 4 MG tablet Take 6 mg by mouth every 4 hours as needed for moderate to severe pain Taking Yes Reported, Patient     levothyroxine (SYNTHROID/LEVOTHROID) 100 MCG tablet TAKE 1 TABLET BY MOUTH EVERY DAY IN THE MORNING ON AN EMPTY STOMACH Taking Yes Reported, Patient Yes    naloxone (NARCAN) 4 MG/0.1ML nasal spray CALL 911. SPR CONTENTS OF ONE SPRAYER (0.1ML) INTO ONE NOSTRIL. REPEAT IN 2-3 MIN IF SYMPTOMS OF OPIOID EMERGENCY PERSIST, ALTERNATE NOSTRILS Taking Yes Reported, Patient Yes    polyethylene glycol (MIRALAX) 17 g packet Take 1 packet by mouth daily Taking Yes Unknown, Entered By History     Pregabalin (LYRICA) 200 MG capsule Take 200 mg by mouth 3 times daily Taking Yes Reported, Patient Yes    Probiotic Product (PROBIOTIC PO) Take 1 tablet by mouth daily Taking Yes Reported, Patient     Semaglutide, 1 MG/DOSE, (OZEMPIC, 1 MG/DOSE,) 4 MG/3ML SOPN Inject 2 mg Subcutaneous once a week Wednesdays Taking Yes Reported, Patient     traZODone (DESYREL) 100 MG tablet Take 300 mg by mouth At Bedtime Taking Yes Reported, Patient Yes    celecoxib (CELEBREX) 200 MG capsule Take 200 mg by mouth 2 times daily  Patient not taking: Reported on 11/11/2022 Not Taking  Reported, Patient Yes         Medication history completed by:   Vish Thornton, PharmD  PAC Pharmacist  [phone]

## 2022-11-13 LAB
ABO/RH(D): NORMAL
ANTIBODY SCREEN: NEGATIVE
SPECIMEN EXPIRATION DATE: NORMAL

## 2022-11-14 ENCOUNTER — ANCILLARY PROCEDURE (OUTPATIENT)
Dept: GENERAL RADIOLOGY | Facility: CLINIC | Age: 47
End: 2022-11-14
Attending: NEUROLOGICAL SURGERY
Payer: MEDICAID

## 2022-11-14 ENCOUNTER — PRE VISIT (OUTPATIENT)
Dept: SURGERY | Facility: CLINIC | Age: 47
End: 2022-11-14

## 2022-11-14 ENCOUNTER — HOSPITAL ENCOUNTER (OUTPATIENT)
Dept: CARDIOLOGY | Facility: CLINIC | Age: 47
Discharge: HOME | End: 2022-11-14
Attending: PHYSICIAN ASSISTANT | Admitting: PHYSICIAN ASSISTANT
Payer: MEDICAID

## 2022-11-14 ENCOUNTER — OFFICE VISIT (OUTPATIENT)
Dept: SURGERY | Facility: CLINIC | Age: 47
End: 2022-11-14
Payer: MEDICAID

## 2022-11-14 ENCOUNTER — APPOINTMENT (OUTPATIENT)
Dept: LAB | Facility: CLINIC | Age: 47
End: 2022-11-14
Payer: MEDICAID

## 2022-11-14 ENCOUNTER — LAB (OUTPATIENT)
Dept: LAB | Facility: CLINIC | Age: 47
End: 2022-11-14
Payer: MEDICAID

## 2022-11-14 VITALS
BODY MASS INDEX: 38.12 KG/M2 | RESPIRATION RATE: 16 BRPM | OXYGEN SATURATION: 90 % | SYSTOLIC BLOOD PRESSURE: 110 MMHG | HEIGHT: 64 IN | HEART RATE: 84 BPM | TEMPERATURE: 98.1 F | DIASTOLIC BLOOD PRESSURE: 74 MMHG | WEIGHT: 223.3 LBS

## 2022-11-14 DIAGNOSIS — Z01.818 PREOP EXAMINATION: ICD-10-CM

## 2022-11-14 DIAGNOSIS — M43.9 ACQUIRED DEFORMITY OF SPINE: ICD-10-CM

## 2022-11-14 DIAGNOSIS — Z91.89 POTENTIAL FOR DEFICIENT KNOWLEDGE OF CONGESTIVE HEART FAILURE: Primary | ICD-10-CM

## 2022-11-14 DIAGNOSIS — M43.9 SPINE DEFORMITY, ACQUIRED: ICD-10-CM

## 2022-11-14 DIAGNOSIS — M53.3 SACROILIAC PAIN: ICD-10-CM

## 2022-11-14 LAB
ANION GAP SERPL CALCULATED.3IONS-SCNC: 9 MMOL/L (ref 7–15)
BUN SERPL-MCNC: 11.4 MG/DL (ref 6–20)
CALCIUM SERPL-MCNC: 9.5 MG/DL (ref 8.6–10)
CHLORIDE SERPL-SCNC: 99 MMOL/L (ref 98–107)
CREAT SERPL-MCNC: 0.7 MG/DL (ref 0.51–0.95)
DEPRECATED HCO3 PLAS-SCNC: 27 MMOL/L (ref 22–29)
ERYTHROCYTE [DISTWIDTH] IN BLOOD BY AUTOMATED COUNT: 12.3 % (ref 10–15)
GFR SERPL CREATININE-BSD FRML MDRD: >90 ML/MIN/1.73M2
GLUCOSE SERPL-MCNC: 106 MG/DL (ref 70–99)
HCT VFR BLD AUTO: 38 % (ref 35–47)
HGB BLD-MCNC: 12.6 G/DL (ref 11.7–15.7)
LVEF ECHO: NORMAL
MCH RBC QN AUTO: 28.3 PG (ref 26.5–33)
MCHC RBC AUTO-ENTMCNC: 33.2 G/DL (ref 31.5–36.5)
MCV RBC AUTO: 85 FL (ref 78–100)
PLATELET # BLD AUTO: 223 10E3/UL (ref 150–450)
POTASSIUM SERPL-SCNC: 4.3 MMOL/L (ref 3.4–5.3)
RBC # BLD AUTO: 4.46 10E6/UL (ref 3.8–5.2)
SARS-COV-2 RNA RESP QL NAA+PROBE: NEGATIVE
SODIUM SERPL-SCNC: 135 MMOL/L (ref 136–145)
WBC # BLD AUTO: 11.7 10E3/UL (ref 4–11)

## 2022-11-14 PROCEDURE — 72082 X-RAY EXAM ENTIRE SPI 2/3 VW: CPT | Performed by: STUDENT IN AN ORGANIZED HEALTH CARE EDUCATION/TRAINING PROGRAM

## 2022-11-14 PROCEDURE — 255N000002 HC RX 255 OP 636: Performed by: INTERNAL MEDICINE

## 2022-11-14 PROCEDURE — 86850 RBC ANTIBODY SCREEN: CPT | Performed by: PATHOLOGY

## 2022-11-14 PROCEDURE — U0003 INFECTIOUS AGENT DETECTION BY NUCLEIC ACID (DNA OR RNA); SEVERE ACUTE RESPIRATORY SYNDROME CORONAVIRUS 2 (SARS-COV-2) (CORONAVIRUS DISEASE [COVID-19]), AMPLIFIED PROBE TECHNIQUE, MAKING USE OF HIGH THROUGHPUT TECHNOLOGIES AS DESCRIBED BY CMS-2020-01-R: HCPCS | Performed by: PATHOLOGY

## 2022-11-14 PROCEDURE — 93306 TTE W/DOPPLER COMPLETE: CPT | Mod: 26 | Performed by: INTERNAL MEDICINE

## 2022-11-14 PROCEDURE — 86900 BLOOD TYPING SEROLOGIC ABO: CPT | Performed by: PATHOLOGY

## 2022-11-14 PROCEDURE — U0005 INFEC AGEN DETEC AMPLI PROBE: HCPCS | Performed by: PATHOLOGY

## 2022-11-14 PROCEDURE — 80048 BASIC METABOLIC PNL TOTAL CA: CPT | Performed by: PATHOLOGY

## 2022-11-14 PROCEDURE — 86901 BLOOD TYPING SEROLOGIC RH(D): CPT | Performed by: PATHOLOGY

## 2022-11-14 PROCEDURE — 36415 COLL VENOUS BLD VENIPUNCTURE: CPT | Performed by: PATHOLOGY

## 2022-11-14 PROCEDURE — 85027 COMPLETE CBC AUTOMATED: CPT | Performed by: PATHOLOGY

## 2022-11-14 PROCEDURE — 99204 OFFICE O/P NEW MOD 45 MIN: CPT | Performed by: PHYSICIAN ASSISTANT

## 2022-11-14 PROCEDURE — 999N000208 ECHOCARDIOGRAM COMPLETE

## 2022-11-14 RX ORDER — ACETAMINOPHEN 325 MG/1
975 TABLET ORAL ONCE
Status: CANCELLED | OUTPATIENT
Start: 2022-11-14 | End: 2022-11-14

## 2022-11-14 RX ORDER — LIDOCAINE 40 MG/G
CREAM TOPICAL
Status: CANCELLED | OUTPATIENT
Start: 2022-11-14

## 2022-11-14 RX ORDER — SODIUM CHLORIDE, SODIUM LACTATE, POTASSIUM CHLORIDE, CALCIUM CHLORIDE 600; 310; 30; 20 MG/100ML; MG/100ML; MG/100ML; MG/100ML
INJECTION, SOLUTION INTRAVENOUS CONTINUOUS
Status: CANCELLED | OUTPATIENT
Start: 2022-11-14

## 2022-11-14 RX ADMIN — HUMAN ALBUMIN MICROSPHERES AND PERFLUTREN 5 ML: 10; .22 INJECTION, SOLUTION INTRAVENOUS at 14:19

## 2022-11-14 ASSESSMENT — LIFESTYLE VARIABLES
TOBACCO_USE: 1
TOBACCO_USE: 1

## 2022-11-14 ASSESSMENT — PAIN SCALES - GENERAL: PAINLEVEL: SEVERE PAIN (6)

## 2022-11-14 ASSESSMENT — ENCOUNTER SYMPTOMS
SEIZURES: 0
SEIZURES: 0

## 2022-11-14 ASSESSMENT — COPD QUESTIONNAIRES
COPD: 0
COPD: 0
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
## 2022-11-14 NOTE — PATIENT INSTRUCTIONS
Preparing for Your Surgery      Name:  Louise Tyler   MRN:  [id number]   :  1975   Today's Date:  2022       Arriving for surgery:  Surgery date:  11/15/22  Arrival time:  6AM     Surgeries and procedures: Adult patients can have 2 visitors all through the surgery process.     Visiting hours: 8 a.m. to 8:30 p.m.     Hospital: Adult patients and children under age 18 can have 4 visitor at a time     No visitors under the age of 5 are allowed for hospital patients.  Double occupancy rooms: Patients can have only two visitors at a time.     Patients with disabilities: Can have a support person with them (family member, service provider     Or someone well informed about their needs) plus the allowed number of visitors     Patients confirmed or suspected to have symptoms of COVID 19 or flu:     No visitors allowed for adult patients.   Children (under age 18) can have 1 named visitor.     People who are sick or showing symptoms of COVID 19 or flu:    Are not allowed to visit patients--we can only make exceptions in special situations.       Please follow these guidelines for your visit:   Arrive wearing a mask over your mouth and nose; we will give you a medical mask to wear    If you arrive wearing a cloth mask.   Keep it on during your entire visit, even when in patient's room.   If you don't wear a mask we'll ask you to leave.     Clean your hands with alcohol hand . Do this when you arrive at and leave the building and patient room,    And again after you touch your mask or anything in the room.     You can t visit if you have a fever, cough, shortness of breath, muscle aches, headaches, sore throat    Or diarrhea      Stay 6 feet away from others during your visit and between visits     Go directly to and from the room you are visiting.     Stay in the patient s room during your visit. Limit going to other places in the hospital as much as possible     Leave bags and jackets at home or in the  car.     For everyone s health, please don t come and go during your visit. That includes for smoking   during your visit.     Please come to:     [facility] [address]  [address] [place]  [address]    - ? parking is available in front of the hospital      -   Parking is available in the Patient Visitor Ramp on [address] and [facility].     -   When entering the hospital you will be asked COVID screening questions, you will then be directed to Registration.  Registration will direct you to the 3rd floor Surgery waiting room.     -   Please ask if you need an escort or a wheelchair to the Surgery Waiting Room.  Preop number- [phone] ?     What can I eat or drink?  -  You may eat and drink normally up to 8 hours prior to arrival time. (Until 11/14/22, 10PM)  -  You may have clear liquids until 2 hours prior to arrival time. (Until 11/15/22, 4AM)      OPTIMAL RECOVERY AFTER SURGERY        Begin hydrating yourself by drinking at least 8-10 glasses of clear liquids for 24 hours before surgery:      Suggested clear liquids:   Water    Clear Juices   Clear Broth   Non- carbonated beverages    (Crystal Light or Hugh Aid)   Sodas    (Sprite, 7 up, ginger ale, seltzer)   Gatorade              Drink clear liquids up until 4 hours before your surgery.       We would like you to purchase a drink such as Gatorade or Ensure Clear (not the milkshake type).  Drink this before bedtime and the morning of surgery drink between 8-10 ounces or until you feel hydrated.        Keeping well hydrated leads to your veins being plump, you wake up faster, and you are less likely to be nauseated. Start drinking water as soon as you can after surgery and advance to clear liquids and food as tolerated.  IV fluids contain salt, drinking fluids will minimize the amount of IV fluids you need and decrease the amount of salt you get.                 The most common reason  for the patient to be readmitted is dehydration. Staying hydrated after you go home from the hospital is very important.  Ensure or Ensure Clear are good options to keep you hydrated.       -  No Alcohol for at least 24 hours before surgery.     Which medicines can I take?    Hold Aspirin for 7 days before surgery.   Hold Multivitamins for 7 days before surgery.  Hold Supplements for 7 days before surgery.       Hold all NSAIDS for 7 days before spine surgery. (Ibuprofen, Naproxen, Celebrex, Indocin, Diclofenac.)    -  DO NOT take these medications the day of surgery:    Docusate(Colace)  Miralax    Probiotic    -  PLEASE TAKE these medications the day of surgery:    Acetaminophen(Tylenol) as needed    Baclofen(Lioresal)   Buspirone(Buspar)    Duloxetine(Cymbalta)   Hydromorphone(Dilaudid) as needed    Levothyroxine    Pregabalin(Lyrica)        How do I prepare myself?  - Please take 2 showers before surgery using Scrubcare or Hibiclens soap.    Use this soap only from the neck to your toes.     Leave the soap on your skin for one minute--then rinse thoroughly.      You may use your own shampoo and conditioner. No other hair products.   - Please remove all jewelry and body piercings.  - No lotions, deodorants or fragrance.  - No makeup or fingernail polish.   - Bring your ID and insurance card.    -If you have a Deep Brain Stimulator, Spinal Cord Stimulator, or any Neuro Stimulator device---you must bring the remote control to the hospital.        Questions or Concerns:    - For any questions regarding the day of surgery or your hospital stay, please contact the Pre Admission Nursing Office at [phone].       - If you have health changes between today and your surgery, please call your surgeon.       - For questions after surgery, please call your surgeons office.
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
## 2022-11-15 ENCOUNTER — ANESTHESIA (OUTPATIENT)
Dept: SURGERY | Facility: CLINIC | Age: 47
DRG: 453 | End: 2022-11-15
Payer: MEDICAID

## 2022-11-15 ENCOUNTER — HOSPITAL ENCOUNTER (INPATIENT)
Facility: CLINIC | Age: 47
LOS: 8 days | Discharge: HOME | DRG: 453 | End: 2022-11-23
Attending: NEUROLOGICAL SURGERY | Admitting: NEUROLOGICAL SURGERY
Payer: MEDICAID

## 2022-11-15 ENCOUNTER — APPOINTMENT (OUTPATIENT)
Dept: GENERAL RADIOLOGY | Facility: CLINIC | Age: 47
DRG: 453 | End: 2022-11-15
Attending: NEUROLOGICAL SURGERY
Payer: MEDICAID

## 2022-11-15 DIAGNOSIS — Q76.49 SPINAL DEFORMITY: Primary | ICD-10-CM

## 2022-11-15 LAB
APTT PPP: 27 SECONDS (ref 22–38)
APTT PPP: 30 SECONDS (ref 22–38)
BASE EXCESS BLDA CALC-SCNC: -0.1 MMOL/L (ref -9.6–2)
BASE EXCESS BLDA CALC-SCNC: -1 MMOL/L (ref -9.6–2)
BASE EXCESS BLDA CALC-SCNC: -2.3 MMOL/L (ref -9.6–2)
BASE EXCESS BLDA CALC-SCNC: -2.8 MMOL/L (ref -9.6–2)
BASE EXCESS BLDA CALC-SCNC: -3.2 MMOL/L (ref -9.6–2)
BASE EXCESS BLDA CALC-SCNC: -3.7 MMOL/L (ref -9.6–2)
BASE EXCESS BLDA CALC-SCNC: -4 MMOL/L (ref -9.6–2)
BASE EXCESS BLDA CALC-SCNC: -5.6 MMOL/L (ref -9.6–2)
BASE EXCESS BLDA CALC-SCNC: -5.7 MMOL/L (ref -9.6–2)
BASE EXCESS BLDA CALC-SCNC: 0.8 MMOL/L (ref -9.6–2)
BLD PROD TYP BPU: NORMAL
BLOOD COMPONENT TYPE: NORMAL
CA-I BLD-MCNC: 3.8 MG/DL (ref 4.4–5.2)
CA-I BLD-MCNC: 4 MG/DL (ref 4.4–5.2)
CA-I BLD-MCNC: 4.1 MG/DL (ref 4.4–5.2)
CA-I BLD-MCNC: 4.2 MG/DL (ref 4.4–5.2)
CA-I BLD-MCNC: 4.3 MG/DL (ref 4.4–5.2)
CA-I BLD-MCNC: 4.3 MG/DL (ref 4.4–5.2)
CA-I BLD-MCNC: 4.4 MG/DL (ref 4.4–5.2)
CA-I BLD-MCNC: 4.6 MG/DL (ref 4.4–5.2)
CA-I BLD-MCNC: 4.9 MG/DL (ref 4.4–5.2)
CA-I BLD-MCNC: 5.4 MG/DL (ref 4.4–5.2)
CF REDUC 60M P MA LENFR BLD TEG: 2.3 % (ref 0–15)
CFT BLD TEG: 1 MINUTE (ref 1–3)
CI (COAGULATION INDEX)(Z) NON NATIVE: 4.6 (ref -3–3)
CLOT ANGLE BLD TEG: 75.9 DEGREES (ref 53–72)
CLOT INIT BLD TEG: 3.2 MINUTE (ref 5–10)
CLOT LYSIS 30M P MA LENFR BLD TEG: 0.3 % (ref 0–8)
CLOT STRENGTH BLD TEG: 14.3 KD/SC (ref 4.5–11)
CODING SYSTEM: NORMAL
CROSSMATCH: NORMAL
ERYTHROCYTE [DISTWIDTH] IN BLOOD BY AUTOMATED COUNT: 13.2 % (ref 10–15)
ERYTHROCYTE [DISTWIDTH] IN BLOOD BY AUTOMATED COUNT: 13.6 % (ref 10–15)
FIBRINOGEN PPP-MCNC: 239 MG/DL (ref 170–490)
FIBRINOGEN PPP-MCNC: 242 MG/DL (ref 170–490)
GLUCOSE BLD-MCNC: 108 MG/DL (ref 70–99)
GLUCOSE BLD-MCNC: 117 MG/DL (ref 70–99)
GLUCOSE BLD-MCNC: 138 MG/DL (ref 70–99)
GLUCOSE BLD-MCNC: 140 MG/DL (ref 70–99)
GLUCOSE BLD-MCNC: 162 MG/DL (ref 70–99)
GLUCOSE BLD-MCNC: 163 MG/DL (ref 70–99)
GLUCOSE BLD-MCNC: 165 MG/DL (ref 70–99)
GLUCOSE BLD-MCNC: 189 MG/DL (ref 70–99)
GLUCOSE BLD-MCNC: 234 MG/DL (ref 70–99)
GLUCOSE BLD-MCNC: 272 MG/DL (ref 70–99)
GLUCOSE BLDC GLUCOMTR-MCNC: 107 MG/DL (ref 70–99)
HCO3 BLDA-SCNC: 20 MMOL/L (ref 21–28)
HCO3 BLDA-SCNC: 21 MMOL/L (ref 21–28)
HCO3 BLDA-SCNC: 21 MMOL/L (ref 21–28)
HCO3 BLDA-SCNC: 22 MMOL/L (ref 21–28)
HCO3 BLDA-SCNC: 23 MMOL/L (ref 21–28)
HCO3 BLDA-SCNC: 23 MMOL/L (ref 21–28)
HCO3 BLDA-SCNC: 24 MMOL/L (ref 21–28)
HCO3 BLDA-SCNC: 25 MMOL/L (ref 21–28)
HCT VFR BLD AUTO: 23.4 % (ref 35–47)
HCT VFR BLD AUTO: 28.4 % (ref 35–47)
HGB BLD-MCNC: 10.6 G/DL (ref 11.7–15.7)
HGB BLD-MCNC: 10.9 G/DL (ref 11.7–15.7)
HGB BLD-MCNC: 7.9 G/DL (ref 11.7–15.7)
HGB BLD-MCNC: 8 G/DL (ref 11.7–15.7)
HGB BLD-MCNC: 8.1 G/DL (ref 11.7–15.7)
HGB BLD-MCNC: 8.3 G/DL (ref 11.7–15.7)
HGB BLD-MCNC: 8.9 G/DL (ref 11.7–15.7)
HGB BLD-MCNC: 8.9 G/DL (ref 11.7–15.7)
HGB BLD-MCNC: 9.2 G/DL (ref 11.7–15.7)
HGB BLD-MCNC: 9.3 G/DL (ref 11.7–15.7)
HGB BLD-MCNC: 9.3 G/DL (ref 11.7–15.7)
HGB BLD-MCNC: 9.7 G/DL (ref 11.7–15.7)
INR PPP: 1.44 (ref 0.85–1.15)
INR PPP: 1.59 (ref 0.85–1.15)
ISSUE DATE AND TIME: NORMAL
LACTATE BLD-SCNC: 0.6 MMOL/L
LACTATE BLD-SCNC: 0.6 MMOL/L
LACTATE BLD-SCNC: 1.2 MMOL/L
LACTATE BLD-SCNC: 1.3 MMOL/L
LACTATE BLD-SCNC: 1.7 MMOL/L
LACTATE BLD-SCNC: 1.8 MMOL/L
LACTATE BLD-SCNC: 2.4 MMOL/L
LACTATE BLD-SCNC: 2.6 MMOL/L
LACTATE BLD-SCNC: 3.9 MMOL/L
LACTATE BLD-SCNC: 4.4 MMOL/L
MCF BLD TEG: 74.1 MM (ref 50–70)
MCH RBC QN AUTO: 28.8 PG (ref 26.5–33)
MCH RBC QN AUTO: 29 PG (ref 26.5–33)
MCHC RBC AUTO-ENTMCNC: 32.7 G/DL (ref 31.5–36.5)
MCHC RBC AUTO-ENTMCNC: 33.8 G/DL (ref 31.5–36.5)
MCV RBC AUTO: 86 FL (ref 78–100)
MCV RBC AUTO: 88 FL (ref 78–100)
O2/TOTAL GAS SETTING VFR VENT: 30 %
O2/TOTAL GAS SETTING VFR VENT: 38 %
O2/TOTAL GAS SETTING VFR VENT: 41 %
O2/TOTAL GAS SETTING VFR VENT: 50 %
O2/TOTAL GAS SETTING VFR VENT: 54 %
O2/TOTAL GAS SETTING VFR VENT: 60 %
PCO2 BLDA: 35 MM HG (ref 35–45)
PCO2 BLDA: 36 MM HG (ref 35–45)
PCO2 BLDA: 37 MM HG (ref 35–45)
PCO2 BLDA: 38 MM HG (ref 35–45)
PCO2 BLDA: 39 MM HG (ref 35–45)
PCO2 BLDA: 39 MM HG (ref 35–45)
PCO2 BLDA: 40 MM HG (ref 35–45)
PCO2 BLDA: 41 MM HG (ref 35–45)
PCO2 BLDA: 41 MM HG (ref 35–45)
PCO2 BLDA: 44 MM HG (ref 35–45)
PH BLDA: 7.28 [PH] (ref 7.35–7.45)
PH BLDA: 7.31 [PH] (ref 7.35–7.45)
PH BLDA: 7.35 [PH] (ref 7.35–7.45)
PH BLDA: 7.39 [PH] (ref 7.35–7.45)
PH BLDA: 7.42 [PH] (ref 7.35–7.45)
PH BLDA: 7.43 [PH] (ref 7.35–7.45)
PH BLDA: 7.44 [PH] (ref 7.35–7.45)
PLATELET # BLD AUTO: 142 10E3/UL (ref 150–450)
PLATELET # BLD AUTO: 181 10E3/UL (ref 150–450)
PO2 BLDA: 103 MM HG (ref 80–105)
PO2 BLDA: 140 MM HG (ref 80–105)
PO2 BLDA: 144 MM HG (ref 80–105)
PO2 BLDA: 162 MM HG (ref 80–105)
PO2 BLDA: 174 MM HG (ref 80–105)
PO2 BLDA: 176 MM HG (ref 80–105)
PO2 BLDA: 197 MM HG (ref 80–105)
PO2 BLDA: 203 MM HG (ref 80–105)
PO2 BLDA: 261 MM HG (ref 80–105)
PO2 BLDA: 98 MM HG (ref 80–105)
POTASSIUM BLD-SCNC: 3.3 MMOL/L (ref 3.5–5)
POTASSIUM BLD-SCNC: 3.5 MMOL/L (ref 3.5–5)
POTASSIUM BLD-SCNC: 3.5 MMOL/L (ref 3.5–5)
POTASSIUM BLD-SCNC: 3.7 MMOL/L (ref 3.5–5)
POTASSIUM BLD-SCNC: 3.8 MMOL/L (ref 3.5–5)
POTASSIUM BLD-SCNC: 4.4 MMOL/L (ref 3.5–5)
RBC # BLD AUTO: 2.72 10E6/UL (ref 3.8–5.2)
RBC # BLD AUTO: 3.23 10E6/UL (ref 3.8–5.2)
SODIUM BLD-SCNC: 136 MMOL/L (ref 133–144)
SODIUM BLD-SCNC: 137 MMOL/L (ref 133–144)
SODIUM BLD-SCNC: 138 MMOL/L (ref 133–144)
SODIUM BLD-SCNC: 138 MMOL/L (ref 133–144)
SODIUM BLD-SCNC: 139 MMOL/L (ref 133–144)
SODIUM BLD-SCNC: 139 MMOL/L (ref 133–144)
UNIT ABO/RH: NORMAL
UNIT NUMBER: NORMAL
UNIT STATUS: NORMAL
UNIT TYPE ISBT: 5100
UNIT TYPE ISBT: 8400
WBC # BLD AUTO: 13.9 10E3/UL (ref 4–11)
WBC # BLD AUTO: 14 10E3/UL (ref 4–11)

## 2022-11-15 PROCEDURE — 61783 SCAN PROC SPINAL: CPT | Performed by: NEUROLOGICAL SURGERY

## 2022-11-15 PROCEDURE — 250N000013 HC RX MED GY IP 250 OP 250 PS 637: Performed by: PHYSICIAN ASSISTANT

## 2022-11-15 PROCEDURE — C1713 ANCHOR/SCREW BN/BN,TIS/BN: HCPCS | Performed by: NEUROLOGICAL SURGERY

## 2022-11-15 PROCEDURE — 82330 ASSAY OF CALCIUM: CPT

## 2022-11-15 PROCEDURE — 999N000141 HC STATISTIC PRE-PROCEDURE NURSING ASSESSMENT: Performed by: NEUROLOGICAL SURGERY

## 2022-11-15 PROCEDURE — 250N000011 HC RX IP 250 OP 636

## 2022-11-15 PROCEDURE — 250N000011 HC RX IP 250 OP 636: Performed by: NEUROLOGICAL SURGERY

## 2022-11-15 PROCEDURE — 0RG7071 FUSION OF 2 TO 7 THORACIC VERTEBRAL JOINTS WITH AUTOLOGOUS TISSUE SUBSTITUTE, POSTERIOR APPROACH, POSTERIOR COLUMN, OPEN APPROACH: ICD-10-PCS | Performed by: NEUROLOGICAL SURGERY

## 2022-11-15 PROCEDURE — 22843 INSERT SPINE FIXATION DEVICE: CPT | Performed by: NEUROLOGICAL SURGERY

## 2022-11-15 PROCEDURE — 250N000009 HC RX 250: Performed by: SURGERY

## 2022-11-15 PROCEDURE — 22214 INCIS 1 VERTEBRAL SEG LUMBAR: CPT | Mod: 51 | Performed by: NEUROLOGICAL SURGERY

## 2022-11-15 PROCEDURE — 22633 ARTHRD CMBN 1NTRSPC LUMBAR: CPT | Mod: GC | Performed by: NEUROLOGICAL SURGERY

## 2022-11-15 PROCEDURE — 250N000011 HC RX IP 250 OP 636: Performed by: NURSE ANESTHETIST, CERTIFIED REGISTERED

## 2022-11-15 PROCEDURE — 250N000009 HC RX 250

## 2022-11-15 PROCEDURE — 0ST40ZZ RESECTION OF LUMBOSACRAL DISC, OPEN APPROACH: ICD-10-PCS | Performed by: NEUROLOGICAL SURGERY

## 2022-11-15 PROCEDURE — 0RGA071 FUSION OF THORACOLUMBAR VERTEBRAL JOINT WITH AUTOLOGOUS TISSUE SUBSTITUTE, POSTERIOR APPROACH, POSTERIOR COLUMN, OPEN APPROACH: ICD-10-PCS | Performed by: NEUROLOGICAL SURGERY

## 2022-11-15 PROCEDURE — 85384 FIBRINOGEN ACTIVITY: CPT | Performed by: NEUROLOGICAL SURGERY

## 2022-11-15 PROCEDURE — 250N000025 HC SEVOFLURANE, PER MIN: Performed by: NEUROLOGICAL SURGERY

## 2022-11-15 PROCEDURE — 250N000009 HC RX 250: Performed by: NEUROLOGICAL SURGERY

## 2022-11-15 PROCEDURE — P9047 ALBUMIN (HUMAN), 25%, 50ML: HCPCS | Performed by: NURSE ANESTHETIST, CERTIFIED REGISTERED

## 2022-11-15 PROCEDURE — 20930 SP BONE ALGRFT MORSEL ADD-ON: CPT | Performed by: NEUROLOGICAL SURGERY

## 2022-11-15 PROCEDURE — P9016 RBC LEUKOCYTES REDUCED: HCPCS

## 2022-11-15 PROCEDURE — 85396 CLOTTING ASSAY WHOLE BLOOD: CPT

## 2022-11-15 PROCEDURE — C1762 CONN TISS, HUMAN(INC FASCIA): HCPCS | Performed by: NEUROLOGICAL SURGERY

## 2022-11-15 PROCEDURE — 85027 COMPLETE CBC AUTOMATED: CPT | Performed by: NEUROLOGICAL SURGERY

## 2022-11-15 PROCEDURE — P9059 PLASMA, FRZ BETWEEN 8-24HOUR: HCPCS | Performed by: ANESTHESIOLOGY

## 2022-11-15 PROCEDURE — 200N000002 HC R&B ICU UMMC

## 2022-11-15 PROCEDURE — 258N000003 HC RX IP 258 OP 636: Performed by: NEUROLOGICAL SURGERY

## 2022-11-15 PROCEDURE — P9016 RBC LEUKOCYTES REDUCED: HCPCS | Performed by: ANESTHESIOLOGY

## 2022-11-15 PROCEDURE — 0SG30A0 FUSION OF LUMBOSACRAL JOINT WITH INTERBODY FUSION DEVICE, ANTERIOR APPROACH, ANTERIOR COLUMN, OPEN APPROACH: ICD-10-PCS | Performed by: NEUROLOGICAL SURGERY

## 2022-11-15 PROCEDURE — 88300 SURGICAL PATH GROSS: CPT | Mod: TC | Performed by: NEUROLOGICAL SURGERY

## 2022-11-15 PROCEDURE — 272N000002 HC OR SUPPLY OTHER OPNP: Performed by: NEUROLOGICAL SURGERY

## 2022-11-15 PROCEDURE — 22216 INCIS ADDL SPINE SEGMENT: CPT | Performed by: NEUROLOGICAL SURGERY

## 2022-11-15 PROCEDURE — 0SG3071 FUSION OF LUMBOSACRAL JOINT WITH AUTOLOGOUS TISSUE SUBSTITUTE, POSTERIOR APPROACH, POSTERIOR COLUMN, OPEN APPROACH: ICD-10-PCS | Performed by: NEUROLOGICAL SURGERY

## 2022-11-15 PROCEDURE — 84132 ASSAY OF SERUM POTASSIUM: CPT

## 2022-11-15 PROCEDURE — 258N000003 HC RX IP 258 OP 636

## 2022-11-15 PROCEDURE — 22558 ARTHRD ANT NTRBD MIN DSC LUM: CPT | Mod: 62 | Performed by: NEUROLOGICAL SURGERY

## 2022-11-15 PROCEDURE — 85730 THROMBOPLASTIN TIME PARTIAL: CPT | Performed by: NEUROLOGICAL SURGERY

## 2022-11-15 PROCEDURE — XRGE058 FUSION OF RIGHT SACROILIAC JOINT USING INTERNAL FIXATION DEVICE WITH TULIP CONNECTOR, OPEN APPROACH, NEW TECHNOLOGY GROUP 8: ICD-10-PCS | Performed by: NEUROLOGICAL SURGERY

## 2022-11-15 PROCEDURE — 0QS00ZZ REPOSITION LUMBAR VERTEBRA, OPEN APPROACH: ICD-10-PCS | Performed by: NEUROLOGICAL SURGERY

## 2022-11-15 PROCEDURE — 22853 INSJ BIOMECHANICAL DEVICE: CPT | Performed by: NEUROLOGICAL SURGERY

## 2022-11-15 PROCEDURE — 360N000086 HC SURGERY LEVEL 6 W/ FLUORO, PER MIN: Performed by: NEUROLOGICAL SURGERY

## 2022-11-15 PROCEDURE — 0SG10A0 FUSION OF 2 OR MORE LUMBAR VERTEBRAL JOINTS WITH INTERBODY FUSION DEVICE, ANTERIOR APPROACH, ANTERIOR COLUMN, OPEN APPROACH: ICD-10-PCS | Performed by: NEUROLOGICAL SURGERY

## 2022-11-15 PROCEDURE — 85610 PROTHROMBIN TIME: CPT | Performed by: NEUROLOGICAL SURGERY

## 2022-11-15 PROCEDURE — XRGF058 FUSION OF LEFT SACROILIAC JOINT USING INTERNAL FIXATION DEVICE WITH TULIP CONNECTOR, OPEN APPROACH, NEW TECHNOLOGY GROUP 8: ICD-10-PCS | Performed by: NEUROLOGICAL SURGERY

## 2022-11-15 PROCEDURE — 0SG10K1 FUSION OF 2 OR MORE LUMBAR VERTEBRAL JOINTS WITH NONAUTOLOGOUS TISSUE SUBSTITUTE, POSTERIOR APPROACH, POSTERIOR COLUMN, OPEN APPROACH: ICD-10-PCS | Performed by: NEUROLOGICAL SURGERY

## 2022-11-15 PROCEDURE — 0SB20ZZ EXCISION OF LUMBAR VERTEBRAL DISC, OPEN APPROACH: ICD-10-PCS | Performed by: NEUROLOGICAL SURGERY

## 2022-11-15 PROCEDURE — 4A1034Z MONITORING OF CENTRAL NERVOUS ELECTRICAL ACTIVITY, PERCUTANEOUS APPROACH: ICD-10-PCS | Performed by: NEUROLOGICAL SURGERY

## 2022-11-15 PROCEDURE — 999N000179 XR SURGERY CARM FLUORO LESS THAN 5 MIN W STILLS: Mod: TC

## 2022-11-15 PROCEDURE — 250N000013 HC RX MED GY IP 250 OP 250 PS 637: Performed by: NEUROLOGICAL SURGERY

## 2022-11-15 PROCEDURE — 272N000001 HC OR GENERAL SUPPLY STERILE: Performed by: NEUROLOGICAL SURGERY

## 2022-11-15 PROCEDURE — 22614 ARTHRD PST TQ 1NTRSPC EA ADD: CPT | Performed by: NEUROLOGICAL SURGERY

## 2022-11-15 PROCEDURE — 258N000003 HC RX IP 258 OP 636: Performed by: NURSE ANESTHETIST, CERTIFIED REGISTERED

## 2022-11-15 PROCEDURE — 86923 COMPATIBILITY TEST ELECTRIC: CPT

## 2022-11-15 PROCEDURE — 8E0WXBF COMPUTER ASSISTED PROCEDURE OF TRUNK REGION, WITH FLUOROSCOPY: ICD-10-PCS | Performed by: NEUROLOGICAL SURGERY

## 2022-11-15 PROCEDURE — 27280 ARTHR SI JT OPN B1GRF INSTRM: CPT | Mod: 50 | Performed by: NEUROLOGICAL SURGERY

## 2022-11-15 PROCEDURE — 20936 SP BONE AGRFT LOCAL ADD-ON: CPT | Performed by: NEUROLOGICAL SURGERY

## 2022-11-15 PROCEDURE — P9059 PLASMA, FRZ BETWEEN 8-24HOUR: HCPCS

## 2022-11-15 PROCEDURE — L8699 PROSTHETIC IMPLANT NOS: HCPCS | Performed by: NEUROLOGICAL SURGERY

## 2022-11-15 PROCEDURE — 370N000017 HC ANESTHESIA TECHNICAL FEE, PER MIN: Performed by: NEUROLOGICAL SURGERY

## 2022-11-15 PROCEDURE — 22634 ARTHRD CMBN 1NTRSPC EA ADDL: CPT | Performed by: NEUROLOGICAL SURGERY

## 2022-11-15 DEVICE — GRAFT BONE FIBERS GRAFTON DBM DBF 6ML T50106: Type: IMPLANTABLE DEVICE | Site: SPINE LUMBAR | Status: FUNCTIONAL

## 2022-11-15 DEVICE — IMP SCR MEDT 5.5/6.0MM SOLERA 7.5X50MM MA 55840007550: Type: IMPLANTABLE DEVICE | Site: SPINE LUMBAR | Status: FUNCTIONAL

## 2022-11-15 DEVICE — IMPLANTABLE DEVICE: Type: IMPLANTABLE DEVICE | Site: SPINE LUMBAR | Status: FUNCTIONAL

## 2022-11-15 DEVICE — IMP SCR MEDT 5.5/6.0MM SOLERA 7.5X45MM MA 55840007545: Type: IMPLANTABLE DEVICE | Site: SPINE LUMBAR | Status: FUNCTIONAL

## 2022-11-15 DEVICE — IMP SCR MEDT 5.5/6.0MM SOLERA 6.5X50MM MA 55840006550: Type: IMPLANTABLE DEVICE | Site: SPINE LUMBAR | Status: FUNCTIONAL

## 2022-11-15 DEVICE — IMP SCR MEDT 5.5/6.0MM SOLERA 7.5X40MM MA 55840007540: Type: IMPLANTABLE DEVICE | Site: SPINE LUMBAR | Status: FUNCTIONAL

## 2022-11-15 DEVICE — GRAFT BONE INFUSE BMP LG 7510600: Type: IMPLANTABLE DEVICE | Site: SPINE LUMBAR | Status: FUNCTIONAL

## 2022-11-15 DEVICE — IMP SCR SET MEDT SOLERA BREAK OFF 5.5MM TI 5540030: Type: IMPLANTABLE DEVICE | Site: SPINE LUMBAR | Status: FUNCTIONAL

## 2022-11-15 RX ORDER — CALCIUM CHLORIDE 100 MG/ML
INJECTION INTRAVENOUS; INTRAVENTRICULAR PRN
Status: DISCONTINUED | OUTPATIENT
Start: 2022-11-15 | End: 2022-11-16

## 2022-11-15 RX ORDER — LIDOCAINE 40 MG/G
CREAM TOPICAL
Status: DISCONTINUED | OUTPATIENT
Start: 2022-11-15 | End: 2022-11-16 | Stop reason: HOSPADM

## 2022-11-15 RX ORDER — SODIUM CHLORIDE, SODIUM LACTATE, POTASSIUM CHLORIDE, CALCIUM CHLORIDE 600; 310; 30; 20 MG/100ML; MG/100ML; MG/100ML; MG/100ML
INJECTION, SOLUTION INTRAVENOUS CONTINUOUS PRN
Status: DISCONTINUED | OUTPATIENT
Start: 2022-11-15 | End: 2022-11-16

## 2022-11-15 RX ORDER — ACETAMINOPHEN 325 MG/1
975 TABLET ORAL ONCE
Status: COMPLETED | OUTPATIENT
Start: 2022-11-15 | End: 2022-11-15

## 2022-11-15 RX ORDER — ALBUMIN (HUMAN) 12.5 G/50ML
SOLUTION INTRAVENOUS CONTINUOUS PRN
Status: DISCONTINUED | OUTPATIENT
Start: 2022-11-15 | End: 2022-11-16

## 2022-11-15 RX ORDER — LIDOCAINE HYDROCHLORIDE 20 MG/ML
INJECTION, SOLUTION INFILTRATION; PERINEURAL PRN
Status: DISCONTINUED | OUTPATIENT
Start: 2022-11-15 | End: 2022-11-16

## 2022-11-15 RX ORDER — SODIUM CHLORIDE, SODIUM GLUCONATE, SODIUM ACETATE, POTASSIUM CHLORIDE AND MAGNESIUM CHLORIDE 526; 502; 368; 37; 30 MG/100ML; MG/100ML; MG/100ML; MG/100ML; MG/100ML
INJECTION, SOLUTION INTRAVENOUS CONTINUOUS PRN
Status: DISCONTINUED | OUTPATIENT
Start: 2022-11-15 | End: 2022-11-16

## 2022-11-15 RX ORDER — PROPOFOL 10 MG/ML
INJECTION, EMULSION INTRAVENOUS PRN
Status: DISCONTINUED | OUTPATIENT
Start: 2022-11-15 | End: 2022-11-16

## 2022-11-15 RX ORDER — BUPIVACAINE HYDROCHLORIDE AND EPINEPHRINE 2.5; 5 MG/ML; UG/ML
INJECTION, SOLUTION INFILTRATION; PERINEURAL PRN
Status: DISCONTINUED | OUTPATIENT
Start: 2022-11-15 | End: 2022-11-16 | Stop reason: HOSPADM

## 2022-11-15 RX ORDER — SODIUM CHLORIDE, SODIUM LACTATE, POTASSIUM CHLORIDE, CALCIUM CHLORIDE 600; 310; 30; 20 MG/100ML; MG/100ML; MG/100ML; MG/100ML
INJECTION, SOLUTION INTRAVENOUS CONTINUOUS
Status: DISCONTINUED | OUTPATIENT
Start: 2022-11-15 | End: 2022-11-16 | Stop reason: HOSPADM

## 2022-11-15 RX ORDER — NOREPINEPHRINE BITARTRATE 0.02 MG/ML
INJECTION, SOLUTION INTRAVENOUS CONTINUOUS PRN
Status: DISCONTINUED | OUTPATIENT
Start: 2022-11-15 | End: 2022-11-16

## 2022-11-15 RX ORDER — TRANEXAMIC ACID 10 MG/ML
5 INJECTION, SOLUTION INTRAVENOUS CONTINUOUS
Status: DISCONTINUED | OUTPATIENT
Start: 2022-11-15 | End: 2022-11-16 | Stop reason: HOSPADM

## 2022-11-15 RX ORDER — TRANEXAMIC ACID 10 MG/ML
1000 INJECTION, SOLUTION INTRAVENOUS ONCE
Status: COMPLETED | OUTPATIENT
Start: 2022-11-15 | End: 2022-11-15

## 2022-11-15 RX ORDER — KETAMINE HYDROCHLORIDE 10 MG/ML
INJECTION INTRAMUSCULAR; INTRAVENOUS PRN
Status: DISCONTINUED | OUTPATIENT
Start: 2022-11-15 | End: 2022-11-16

## 2022-11-15 RX ORDER — POTASSIUM CHLORIDE 29.8 MG/ML
INJECTION INTRAVENOUS PRN
Status: DISCONTINUED | OUTPATIENT
Start: 2022-11-15 | End: 2022-11-16

## 2022-11-15 RX ORDER — GABAPENTIN 300 MG/1
300 CAPSULE ORAL
Status: COMPLETED | OUTPATIENT
Start: 2022-11-15 | End: 2022-11-15

## 2022-11-15 RX ORDER — FENTANYL CITRATE 50 UG/ML
INJECTION, SOLUTION INTRAMUSCULAR; INTRAVENOUS PRN
Status: DISCONTINUED | OUTPATIENT
Start: 2022-11-15 | End: 2022-11-16

## 2022-11-15 RX ORDER — ACETAMINOPHEN 325 MG/1
975 TABLET ORAL ONCE
Status: DISCONTINUED | OUTPATIENT
Start: 2022-11-15 | End: 2022-11-16 | Stop reason: HOSPADM

## 2022-11-15 RX ADMIN — PHENYLEPHRINE HYDROCHLORIDE 200 MCG: 10 INJECTION INTRAVENOUS at 19:21

## 2022-11-15 RX ADMIN — Medication 30 MG: at 22:43

## 2022-11-15 RX ADMIN — TRANEXAMIC ACID 5 MG/KG/HR: 10 INJECTION, SOLUTION INTRAVENOUS at 10:43

## 2022-11-15 RX ADMIN — Medication 5 MG: at 09:01

## 2022-11-15 RX ADMIN — FENTANYL CITRATE 100 MCG: 50 INJECTION, SOLUTION INTRAMUSCULAR; INTRAVENOUS at 09:00

## 2022-11-15 RX ADMIN — Medication 0.03 MCG/KG/MIN: at 20:05

## 2022-11-15 RX ADMIN — Medication 5 MG: at 09:02

## 2022-11-15 RX ADMIN — Medication 10 MG: at 10:44

## 2022-11-15 RX ADMIN — SODIUM CHLORIDE, SODIUM GLUCONATE, SODIUM ACETATE, POTASSIUM CHLORIDE AND MAGNESIUM CHLORIDE: 526; 502; 368; 37; 30 INJECTION, SOLUTION INTRAVENOUS at 17:17

## 2022-11-15 RX ADMIN — CALCIUM CHLORIDE 1 G: 100 INJECTION INTRAVENOUS; INTRAVENTRICULAR at 22:51

## 2022-11-15 RX ADMIN — Medication 20 MG: at 14:52

## 2022-11-15 RX ADMIN — POTASSIUM CHLORIDE 20 MEQ: 29.8 INJECTION, SOLUTION INTRAVENOUS at 13:01

## 2022-11-15 RX ADMIN — PROPOFOL 50 MG: 10 INJECTION, EMULSION INTRAVENOUS at 09:01

## 2022-11-15 RX ADMIN — PROPOFOL 100 MG: 10 INJECTION, EMULSION INTRAVENOUS at 08:47

## 2022-11-15 RX ADMIN — HUMAN INSULIN 5 UNITS/HR: 100 INJECTION, SOLUTION SUBCUTANEOUS at 21:30

## 2022-11-15 RX ADMIN — FENTANYL CITRATE 100 MCG: 50 INJECTION, SOLUTION INTRAMUSCULAR; INTRAVENOUS at 10:12

## 2022-11-15 RX ADMIN — Medication 20 MG: at 23:49

## 2022-11-15 RX ADMIN — ACETAMINOPHEN 975 MG: 325 TABLET, FILM COATED ORAL at 07:27

## 2022-11-15 RX ADMIN — FENTANYL CITRATE 100 MCG: 50 INJECTION, SOLUTION INTRAMUSCULAR; INTRAVENOUS at 08:52

## 2022-11-15 RX ADMIN — GABAPENTIN 300 MG: 300 CAPSULE ORAL at 07:27

## 2022-11-15 RX ADMIN — LIDOCAINE HYDROCHLORIDE 100 MG: 20 INJECTION, SOLUTION INFILTRATION; PERINEURAL at 08:43

## 2022-11-15 RX ADMIN — PROPOFOL 30 MG: 10 INJECTION, EMULSION INTRAVENOUS at 11:43

## 2022-11-15 RX ADMIN — SODIUM CHLORIDE, SODIUM GLUCONATE, SODIUM ACETATE, POTASSIUM CHLORIDE AND MAGNESIUM CHLORIDE: 526; 502; 368; 37; 30 INJECTION, SOLUTION INTRAVENOUS at 09:09

## 2022-11-15 RX ADMIN — SUGAMMADEX 50 MG: 100 INJECTION, SOLUTION INTRAVENOUS at 12:21

## 2022-11-15 RX ADMIN — Medication 10 MG: at 10:25

## 2022-11-15 RX ADMIN — FENTANYL CITRATE 100 MCG: 50 INJECTION, SOLUTION INTRAMUSCULAR; INTRAVENOUS at 08:43

## 2022-11-15 RX ADMIN — PHENYLEPHRINE HYDROCHLORIDE 100 MCG: 10 INJECTION INTRAVENOUS at 17:09

## 2022-11-15 RX ADMIN — Medication 20 MG: at 08:49

## 2022-11-15 RX ADMIN — TRANEXAMIC ACID 5 MG/KG/HR: 10 INJECTION, SOLUTION INTRAVENOUS at 18:15

## 2022-11-15 RX ADMIN — SODIUM CHLORIDE, SODIUM GLUCONATE, SODIUM ACETATE, POTASSIUM CHLORIDE AND MAGNESIUM CHLORIDE: 526; 502; 368; 37; 30 INJECTION, SOLUTION INTRAVENOUS at 18:43

## 2022-11-15 RX ADMIN — SUGAMMADEX 50 MG: 100 INJECTION, SOLUTION INTRAVENOUS at 12:12

## 2022-11-15 RX ADMIN — SODIUM CHLORIDE, SODIUM GLUCONATE, SODIUM ACETATE, POTASSIUM CHLORIDE AND MAGNESIUM CHLORIDE: 526; 502; 368; 37; 30 INJECTION, SOLUTION INTRAVENOUS at 16:08

## 2022-11-15 RX ADMIN — PHENYLEPHRINE HYDROCHLORIDE 0.2 MCG/KG/MIN: 10 INJECTION INTRAVENOUS at 10:50

## 2022-11-15 RX ADMIN — CALCIUM CHLORIDE 500 MG: 100 INJECTION INTRAVENOUS; INTRAVENTRICULAR at 18:08

## 2022-11-15 RX ADMIN — ALBUMIN HUMAN: 0.25 SOLUTION INTRAVENOUS at 18:10

## 2022-11-15 RX ADMIN — Medication 150 MCG/HR: at 10:23

## 2022-11-15 RX ADMIN — CALCIUM CHLORIDE 500 MG: 100 INJECTION INTRAVENOUS; INTRAVENTRICULAR at 18:11

## 2022-11-15 RX ADMIN — TRANEXAMIC ACID 1000 MG: 10 INJECTION, SOLUTION INTRAVENOUS at 10:06

## 2022-11-15 RX ADMIN — SODIUM CHLORIDE, POTASSIUM CHLORIDE, SODIUM LACTATE AND CALCIUM CHLORIDE: 600; 310; 30; 20 INJECTION, SOLUTION INTRAVENOUS at 09:37

## 2022-11-15 RX ADMIN — PHENYLEPHRINE HYDROCHLORIDE 100 MCG: 10 INJECTION INTRAVENOUS at 16:43

## 2022-11-15 RX ADMIN — FENTANYL CITRATE 100 MCG: 50 INJECTION, SOLUTION INTRAMUSCULAR; INTRAVENOUS at 08:56

## 2022-11-15 RX ADMIN — Medication 30 MG: at 11:08

## 2022-11-15 RX ADMIN — Medication 10 MG: at 10:29

## 2022-11-15 RX ADMIN — NOREPINEPHRINE BITARTRATE 6.4 MCG: 1 INJECTION, SOLUTION, CONCENTRATE INTRAVENOUS at 19:32

## 2022-11-15 RX ADMIN — CALCIUM CHLORIDE 1 G: 100 INJECTION INTRAVENOUS; INTRAVENTRICULAR at 20:29

## 2022-11-15 RX ADMIN — FENTANYL CITRATE 100 MCG: 50 INJECTION, SOLUTION INTRAMUSCULAR; INTRAVENOUS at 10:43

## 2022-11-15 RX ADMIN — VANCOMYCIN HYDROCHLORIDE 1500 MG: 10 INJECTION, POWDER, LYOPHILIZED, FOR SOLUTION INTRAVENOUS at 10:00

## 2022-11-15 ASSESSMENT — VISUAL ACUITY: OU: NORMAL ACUITY

## 2022-11-15 ASSESSMENT — ACTIVITIES OF DAILY LIVING (ADL)
ADLS_ACUITY_SCORE: 31

## 2022-11-15 NOTE — PROGRESS NOTES
"Neurosurgery Pre-Op Exam    Blood pressure 118/81, pulse 70, temperature 98  F (36.7  C), temperature source Oral, resp. rate 16, height 1.626 m (5' 4\"), weight 99.7 kg (219 lb 12.8 oz), SpO2 92 %, not currently breastfeeding.  -- Awake; Alert; oriented x 3  -- Follows commands briskly  -- +repetition and naming  -- Speech fluent, spontaneous. No aphasia or dysarthria.  -- No gaze preference. No apparent hemineglect.    Motor:  Normal bulk / tone.  No muscle wasting or fasciculations  No Pronator Drift     Delt Bi Tri FE IP Quad Hamst TibAnt EHL Gastroc    C5 C6 C7 C8/T1 L2 L3 L4-S1 L4 L5 S1   R 5 5 5 5 5 5 5 5 5 5   L 5 5 5 5 5 5 5 5 5 5     Sensory:  decreased sensation along right lateral thigh, otherwise intact        Adolfo Dean MD  Neurosurgery PGY7    "

## 2022-11-15 NOTE — ANESTHESIA PROCEDURE NOTES
Airway       Patient location during procedure: OR       Procedure Start/Stop Times: 11/15/2022 9:04 AM  Staff -        Anesthesiologist:  Kyler Jeffers MD       Resident/Fellow: Javan Waldron MD       Performed By: other anesthesia staff  Consent for Airway        Urgency: elective  Indications and Patient Condition       Indications for airway management: tana-procedural       Induction type:intravenous       Mask difficulty assessment: 2 - vent by mask + OA or adjuvant +/- NMBA    Final Airway Details       Final airway type: endotracheal airway       Successful airway: ETT - single  Endotracheal Airway Details        ETT size (mm): 7.0       Cuffed: yes       Successful intubation technique: video laryngoscopy       VL Blade Size: MAC 4       Grade View of Cords: 1       Adjucts: stylet       Position: Right       Measured from: gums/teeth       Secured at (cm): 21       Bite block used: Soft (Bilateral soft bite blocks placed.)    Post intubation assessment        Placement verified by: capnometry, equal breath sounds and chest rise        Number of attempts at approach: 1       Number of other approaches attempted: 0       Secured with: cloth tape       Ease of procedure: easy       Dentition: Intact and Unchanged    Medication(s) Administered   Medication Administration Time: 11/15/2022 9:04 AM

## 2022-11-15 NOTE — OP NOTE
Procedure Date: November 15, 2022      DATE OF OPERATION: November 15, 2022      CO-SURGEONS:  Dr Darien Kam; Jyothi Santos MD.       ASSISTANT: Kyler Aguilar MD      ANESTHESIA:  General endotracheal anesthesia.       PREOPERATIVE DIAGNOSIS:  L5-S1 Spine Deformity    POSTOPERATIVE DIAGNOSIS:  Same    PROCEDURE:      L5-S1 anterior lumbar intradiscal fusion.         ESTIMATED BLOOD LOSS:  50 mL.       COMPLICATIONS:  None apparent.       INDICATIONS:  This 47 year old female saw Dr. Kam of Neurosurgery Spine Surgery for evaluation.  Treatment was recommended, which included anterior exposure for the L5-S1 disk space.  I was consulted for exposure via retroperitoneal exposure.  I met the patient preoperatively.  We discussed the risks and benefits.  She was willing to proceed.       DESCRIPTION OF PROCEDURE:  The patient was brought to the operating room, placed in the supine position.  After monitors were placed general anesthesia was achieved.  A left paramedian incision was created and the anterior rectus sheath incised.  The left rectus muscle was then retracted laterally and the posterior sheath entered in a vertical fashion.  The retroperitoneal space was entered and reflected toward the right.  A self-retaining radiolucent retractor was then placed.  The L5-S1 disk space was then encountered and the middle sacral artery divided between Hemoclips.  The disk space between L5 and S1, the L5 vertebral body, and S1 vertebral body were then cleared cephalad and caudally as well as laterally.  The disk and fusion portion as well as the instrumentation for the L5-S1 disk space will then be dictated by Dr. Kam.   The retractors were released and hemostasis appeared adequate.  The area was again inspected for hemostasis and when this was assured, the anterior sheath was closed with looped #0 PDS from the cephalad and caudal positions.  Interrupted 3-0 Vicryl sutures were then used for deep dermal followed by 4-0  subcuticular Monocryl followed by skin glue.  Prior to closure, 30 mL of 0.25% Marcaine with epinephrine was injected into the incision.  At this point, the remainder of the operation and fusion will be dictated by Dr. Kam.  When I left the operating room, the patient was hemodynamically stable with palpable left DP pulse.         Jyothi Santos MD, DFSVS, RPVI  Director, [facility]  Chief, Vascular and Endovascular Surgery  [facility]  [email or web address]  Pager: 1. Send message or 10 digit call back number Securely via So1 with the Vocera Web Console (learn more here)              2. Outside of [facility]? Call [phone]

## 2022-11-15 NOTE — ANESTHESIA PROCEDURE NOTES
Central Line/PA Catheter Placement    Pre-Procedure   Staff -        Anesthesiologist:  Kyler Jeffers MD       Resident/Fellow: Javan Waldron MD       Performed By: resident       Location: OR       Pre-Anesthestic Checklist: patient identified, IV checked, site marked, risks and benefits discussed, informed consent, monitors and equipment checked, pre-op evaluation and at physician/surgeon's request  Timeout:       Correct Patient: Yes        Correct Procedure: Yes        Correct Site: Yes        Correct Position: Yes        Correct Laterality: Yes   Line Placement:   This line was placed Post Induction starting at 11/15/2022 9:03 AM and ending at 11/15/2022 9:13 AM    Procedure   Procedure: central line       Diagnosis: Hemodynamic monitoring       Laterality: right       Insertion Site: internal jugular.       Patient Position: Trendelenburg  Sterile Prep        All elements of maximal sterile barrier technique followed       Patient Prep/Sterile Barriers: draped, hand hygiene, gloves , hat , mask , draped, gown, sterile gel and probe cover       Skin prep: Chloraprep  Insertion/Injection        Technique: ultrasound guided        1. Ultrasound was used to evaluate the access site.       2. Vein evaluated via ultrasound for patency/adequacy.       3. Using real-time ultrasound the needle/catheter was observed entering the artery/vein.       Introducer Type: 9 Fr, 2-lumen MAC        Type: CVC       Number of Lumens: double lumen  Narrative         Secured by: suture       Biopatch and Tegaderm dressing used.       Complications: None apparent,        blood aspirated from all lumens,        All lumens flushed: Yes       Verification method: Placement to be verified post-op and Ultrasound

## 2022-11-15 NOTE — ANESTHESIA PROCEDURE NOTES
Arterial Line Procedure Note    Pre-Procedure   Staff -        Anesthesiologist:  Kyler Jeffers MD       Performed By: anesthesiologist       Location: OR       Pre-Anesthestic Checklist: patient identified  Timeout:       Correct Patient: Yes        Correct Site: Yes   Line Placement:   This line was placed Post Induction  Procedure   Procedure: arterial line       Laterality: right       Insertion Site: femoral.  Sterile Prep        Standard elements of sterile barrier followed       Skin prep: Chloraprep  Insertion/Injection        1. Ultrasound was used to evaluate the access site.       Catheter Type/Size: 20 G, 12 cm  Narrative         Secured by: suture       Tegaderm dressing used.       Complications: None apparent,        Arterial waveform: Yes    Comments:  Initial attempt left radial - resulted in functioning IV!!!   Very small arteries on right (radial up to brachial) - unable to cannulate distally and unwilling to cannulate small brachial.  Hence moved to right femoral.  Successful placement.

## 2022-11-16 ENCOUNTER — APPOINTMENT (OUTPATIENT)
Dept: GENERAL RADIOLOGY | Facility: CLINIC | Age: 47
DRG: 453 | End: 2022-11-16
Attending: STUDENT IN AN ORGANIZED HEALTH CARE EDUCATION/TRAINING PROGRAM
Payer: MEDICAID

## 2022-11-16 ENCOUNTER — APPOINTMENT (OUTPATIENT)
Dept: GENERAL RADIOLOGY | Facility: CLINIC | Age: 47
DRG: 453 | End: 2022-11-16
Attending: NURSE PRACTITIONER
Payer: MEDICAID

## 2022-11-16 ENCOUNTER — DOCUMENTATION ONLY (OUTPATIENT)
Dept: ORTHOPEDICS | Facility: CLINIC | Age: 47
End: 2022-11-16

## 2022-11-16 ENCOUNTER — APPOINTMENT (OUTPATIENT)
Dept: CT IMAGING | Facility: CLINIC | Age: 47
DRG: 453 | End: 2022-11-16
Attending: STUDENT IN AN ORGANIZED HEALTH CARE EDUCATION/TRAINING PROGRAM
Payer: MEDICAID

## 2022-11-16 ENCOUNTER — APPOINTMENT (OUTPATIENT)
Dept: PHYSICAL THERAPY | Facility: CLINIC | Age: 47
DRG: 453 | End: 2022-11-16
Attending: STUDENT IN AN ORGANIZED HEALTH CARE EDUCATION/TRAINING PROGRAM
Payer: MEDICAID

## 2022-11-16 PROBLEM — Q76.49 SPINAL DEFORMITY: Status: ACTIVE | Noted: 2022-11-16

## 2022-11-16 LAB
ANION GAP SERPL CALCULATED.3IONS-SCNC: 10 MMOL/L (ref 7–15)
APTT PPP: 29 SECONDS (ref 22–38)
ATRIAL RATE - MUSE: 98 BPM
BASE EXCESS BLDA CALC-SCNC: -0.9 MMOL/L (ref -9–1.8)
BASE EXCESS BLDA CALC-SCNC: -3.1 MMOL/L (ref -9.6–2)
BASE EXCESS BLDA CALC-SCNC: -3.2 MMOL/L (ref -9.6–2)
BASE EXCESS BLDA CALC-SCNC: -3.3 MMOL/L (ref -9.6–2)
BASE EXCESS BLDA CALC-SCNC: -4.1 MMOL/L (ref -9.6–2)
BASE EXCESS BLDA CALC-SCNC: -4.2 MMOL/L (ref -9.6–2)
BASE EXCESS BLDA CALC-SCNC: -5.1 MMOL/L (ref -9.6–2)
BASOPHILS # BLD AUTO: 0 10E3/UL (ref 0–0.2)
BASOPHILS # BLD AUTO: 0 10E3/UL (ref 0–0.2)
BASOPHILS NFR BLD AUTO: 0 %
BASOPHILS NFR BLD AUTO: 0 %
BLD PROD TYP BPU: NORMAL
BLOOD COMPONENT TYPE: NORMAL
BUN SERPL-MCNC: 5.4 MG/DL (ref 6–20)
BURR CELLS BLD QL SMEAR: SLIGHT
CA-I BLD-MCNC: 4.4 MG/DL (ref 4.4–5.2)
CA-I BLD-MCNC: 4.5 MG/DL (ref 4.4–5.2)
CA-I BLD-MCNC: 4.6 MG/DL (ref 4.4–5.2)
CA-I BLD-MCNC: 4.8 MG/DL (ref 4.4–5.2)
CA-I BLD-MCNC: 4.9 MG/DL (ref 4.4–5.2)
CA-I BLD-MCNC: 5.1 MG/DL (ref 4.4–5.2)
CA-I BLD-MCNC: 5.2 MG/DL (ref 4.4–5.2)
CALCIUM SERPL-MCNC: 8.4 MG/DL (ref 8.6–10)
CHLORIDE SERPL-SCNC: 107 MMOL/L (ref 98–107)
CODING SYSTEM: NORMAL
CREAT SERPL-MCNC: 0.48 MG/DL (ref 0.51–0.95)
CROSSMATCH: NORMAL
DEPRECATED HCO3 PLAS-SCNC: 20 MMOL/L (ref 22–29)
DIASTOLIC BLOOD PRESSURE - MUSE: NORMAL MMHG
EOSINOPHIL # BLD AUTO: 0 10E3/UL (ref 0–0.7)
EOSINOPHIL # BLD AUTO: 0 10E3/UL (ref 0–0.7)
EOSINOPHIL NFR BLD AUTO: 0 %
EOSINOPHIL NFR BLD AUTO: 0 %
ERYTHROCYTE [DISTWIDTH] IN BLOOD BY AUTOMATED COUNT: 14 % (ref 10–15)
ERYTHROCYTE [DISTWIDTH] IN BLOOD BY AUTOMATED COUNT: 14.1 % (ref 10–15)
ERYTHROCYTE [DISTWIDTH] IN BLOOD BY AUTOMATED COUNT: 14.1 % (ref 10–15)
GFR SERPL CREATININE-BSD FRML MDRD: >90 ML/MIN/1.73M2
GLUCOSE BLD-MCNC: 111 MG/DL (ref 70–99)
GLUCOSE BLD-MCNC: 116 MG/DL (ref 70–99)
GLUCOSE BLD-MCNC: 117 MG/DL (ref 70–99)
GLUCOSE BLD-MCNC: 123 MG/DL (ref 70–99)
GLUCOSE BLD-MCNC: 126 MG/DL (ref 70–99)
GLUCOSE BLD-MCNC: 129 MG/DL (ref 70–99)
GLUCOSE BLDC GLUCOMTR-MCNC: 127 MG/DL (ref 70–99)
GLUCOSE BLDC GLUCOMTR-MCNC: 134 MG/DL (ref 70–99)
GLUCOSE BLDC GLUCOMTR-MCNC: 136 MG/DL (ref 70–99)
GLUCOSE BLDC GLUCOMTR-MCNC: 137 MG/DL (ref 70–99)
GLUCOSE BLDC GLUCOMTR-MCNC: 138 MG/DL (ref 70–99)
GLUCOSE BLDC GLUCOMTR-MCNC: 98 MG/DL (ref 70–99)
GLUCOSE SERPL-MCNC: 140 MG/DL (ref 70–99)
HBA1C MFR BLD: 5.7 %
HBV SURFACE AG SERPL QL IA: NONREACTIVE
HCO3 BLD-SCNC: 23 MMOL/L (ref 21–28)
HCO3 BLDA-SCNC: 21 MMOL/L (ref 21–28)
HCO3 BLDA-SCNC: 22 MMOL/L (ref 21–28)
HCT VFR BLD AUTO: 23.9 % (ref 35–47)
HCT VFR BLD AUTO: 24.7 % (ref 35–47)
HCT VFR BLD AUTO: 27.7 % (ref 35–47)
HGB BLD-MCNC: 7.9 G/DL (ref 11.7–15.7)
HGB BLD-MCNC: 8.1 G/DL (ref 11.7–15.7)
HGB BLD-MCNC: 8.3 G/DL (ref 11.7–15.7)
HGB BLD-MCNC: 8.4 G/DL (ref 11.7–15.7)
HGB BLD-MCNC: 8.7 G/DL (ref 11.7–15.7)
HGB BLD-MCNC: 8.9 G/DL (ref 11.7–15.7)
HGB BLD-MCNC: 9.1 G/DL (ref 11.7–15.7)
HGB BLD-MCNC: 9.1 G/DL (ref 11.7–15.7)
HGB BLD-MCNC: 9.7 G/DL (ref 11.7–15.7)
HIV 1+2 AB+HIV1 P24 AG SERPL QL IA: NONREACTIVE
HIV 1+2 AB+HIV1P24 AG SERPLBLD IA.RAPID: NON REACTIVE
HIV 1+2 AB+HIV1P24 AG SERPLBLD IA.RAPID: NON REACTIVE
HIV INTERPRETATION: NORMAL
IMM GRANULOCYTES # BLD: 0.1 10E3/UL
IMM GRANULOCYTES # BLD: 0.2 10E3/UL
IMM GRANULOCYTES NFR BLD: 2 %
IMM GRANULOCYTES NFR BLD: 2 %
INR PPP: 1.36 (ref 0.85–1.15)
INTERPRETATION ECG - MUSE: NORMAL
ISSUE DATE AND TIME: NORMAL
LACTATE BLD-SCNC: 3.3 MMOL/L
LACTATE BLD-SCNC: 3.4 MMOL/L
LACTATE BLD-SCNC: 3.5 MMOL/L
LACTATE BLD-SCNC: 3.5 MMOL/L
LACTATE BLD-SCNC: 3.7 MMOL/L
LACTATE BLD-SCNC: 4 MMOL/L
LACTATE SERPL-SCNC: 2 MMOL/L (ref 0.7–2)
LACTATE SERPL-SCNC: 2.4 MMOL/L (ref 0.7–2)
LYMPHOCYTES # BLD AUTO: 0.7 10E3/UL (ref 0.8–5.3)
LYMPHOCYTES # BLD AUTO: 0.9 10E3/UL (ref 0.8–5.3)
LYMPHOCYTES NFR BLD AUTO: 10 %
LYMPHOCYTES NFR BLD AUTO: 10 %
MAGNESIUM SERPL-MCNC: 1.9 MG/DL (ref 1.7–2.3)
MCH RBC QN AUTO: 28.8 PG (ref 26.5–33)
MCH RBC QN AUTO: 28.9 PG (ref 26.5–33)
MCH RBC QN AUTO: 29.7 PG (ref 26.5–33)
MCHC RBC AUTO-ENTMCNC: 33.6 G/DL (ref 31.5–36.5)
MCHC RBC AUTO-ENTMCNC: 35 G/DL (ref 31.5–36.5)
MCHC RBC AUTO-ENTMCNC: 35.1 G/DL (ref 31.5–36.5)
MCV RBC AUTO: 82 FL (ref 78–100)
MCV RBC AUTO: 85 FL (ref 78–100)
MCV RBC AUTO: 86 FL (ref 78–100)
MONOCYTES # BLD AUTO: 0.4 10E3/UL (ref 0–1.3)
MONOCYTES # BLD AUTO: 0.4 10E3/UL (ref 0–1.3)
MONOCYTES NFR BLD AUTO: 5 %
MONOCYTES NFR BLD AUTO: 6 %
NEUTROPHILS # BLD AUTO: 5.5 10E3/UL (ref 1.6–8.3)
NEUTROPHILS # BLD AUTO: 7.4 10E3/UL (ref 1.6–8.3)
NEUTROPHILS NFR BLD AUTO: 82 %
NEUTROPHILS NFR BLD AUTO: 83 %
NRBC # BLD AUTO: 0 10E3/UL
NRBC # BLD AUTO: 0 10E3/UL
NRBC BLD AUTO-RTO: 0 /100
NRBC BLD AUTO-RTO: 0 /100
O2/TOTAL GAS SETTING VFR VENT: 50 %
O2/TOTAL GAS SETTING VFR VENT: 60 %
P AXIS - MUSE: 27 DEGREES
PCO2 BLD: 36 MM HG (ref 35–45)
PCO2 BLDA: 37 MM HG (ref 35–45)
PCO2 BLDA: 37 MM HG (ref 35–45)
PCO2 BLDA: 38 MM HG (ref 35–45)
PCO2 BLDA: 39 MM HG (ref 35–45)
PCO2 BLDA: 40 MM HG (ref 35–45)
PCO2 BLDA: 40 MM HG (ref 35–45)
PH BLD: 7.42 [PH] (ref 7.35–7.45)
PH BLDA: 7.32 [PH] (ref 7.35–7.45)
PH BLDA: 7.34 [PH] (ref 7.35–7.45)
PH BLDA: 7.35 [PH] (ref 7.35–7.45)
PH BLDA: 7.36 [PH] (ref 7.35–7.45)
PH BLDA: 7.37 [PH] (ref 7.35–7.45)
PH BLDA: 7.38 [PH] (ref 7.35–7.45)
PHOSPHATE SERPL-MCNC: 3.7 MG/DL (ref 2.5–4.5)
PLAT MORPH BLD: ABNORMAL
PLAT MORPH BLD: NORMAL
PLATELET # BLD AUTO: 102 10E3/UL (ref 150–450)
PLATELET # BLD AUTO: 111 10E3/UL (ref 150–450)
PLATELET # BLD AUTO: 68 10E3/UL (ref 150–450)
PO2 BLD: 109 MM HG (ref 80–105)
PO2 BLDA: 179 MM HG (ref 80–105)
PO2 BLDA: 186 MM HG (ref 80–105)
PO2 BLDA: 190 MM HG (ref 80–105)
PO2 BLDA: 193 MM HG (ref 80–105)
PO2 BLDA: 194 MM HG (ref 80–105)
PO2 BLDA: 195 MM HG (ref 80–105)
POTASSIUM BLD-SCNC: 3.8 MMOL/L (ref 3.5–5)
POTASSIUM BLD-SCNC: 4 MMOL/L (ref 3.5–5)
POTASSIUM BLD-SCNC: 4 MMOL/L (ref 3.5–5)
POTASSIUM BLD-SCNC: 4.1 MMOL/L (ref 3.5–5)
POTASSIUM BLD-SCNC: 4.2 MMOL/L (ref 3.5–5)
POTASSIUM BLD-SCNC: 4.2 MMOL/L (ref 3.5–5)
POTASSIUM SERPL-SCNC: 4.2 MMOL/L (ref 3.4–5.3)
PR INTERVAL - MUSE: 154 MS
QRS DURATION - MUSE: 76 MS
QT - MUSE: 358 MS
QTC - MUSE: 457 MS
R AXIS - MUSE: 48 DEGREES
RBC # BLD AUTO: 2.87 10E6/UL (ref 3.8–5.2)
RBC # BLD AUTO: 2.92 10E6/UL (ref 3.8–5.2)
RBC # BLD AUTO: 3.27 10E6/UL (ref 3.8–5.2)
RBC MORPH BLD: ABNORMAL
RBC MORPH BLD: NORMAL
SODIUM BLD-SCNC: 137 MMOL/L (ref 133–144)
SODIUM BLD-SCNC: 137 MMOL/L (ref 133–144)
SODIUM BLD-SCNC: 138 MMOL/L (ref 133–144)
SODIUM SERPL-SCNC: 137 MMOL/L (ref 136–145)
SYSTOLIC BLOOD PRESSURE - MUSE: NORMAL MMHG
T AXIS - MUSE: 52 DEGREES
T4 FREE SERPL-MCNC: 0.78 NG/DL (ref 0.9–1.7)
TRIGL SERPL-MCNC: 189 MG/DL
TROPONIN T SERPL HS-MCNC: 18 NG/L
TROPONIN T SERPL HS-MCNC: 18 NG/L
TROPONIN T SERPL HS-MCNC: 22 NG/L
TSH SERPL DL<=0.005 MIU/L-ACNC: 0.17 UIU/ML (ref 0.3–4.2)
UNIT ABO/RH: NORMAL
UNIT NUMBER: NORMAL
UNIT STATUS: NORMAL
UNIT TYPE ISBT: 5100
UNIT TYPE ISBT: 6200
UNIT TYPE ISBT: 8400
VENTRICULAR RATE- MUSE: 98 BPM
WBC # BLD AUTO: 6.7 10E3/UL (ref 4–11)
WBC # BLD AUTO: 8.5 10E3/UL (ref 4–11)
WBC # BLD AUTO: 8.9 10E3/UL (ref 4–11)

## 2022-11-16 PROCEDURE — 84439 ASSAY OF FREE THYROXINE: CPT | Performed by: NURSE PRACTITIONER

## 2022-11-16 PROCEDURE — 72100 X-RAY EXAM L-S SPINE 2/3 VWS: CPT | Mod: 26 | Performed by: RADIOLOGY

## 2022-11-16 PROCEDURE — 999N000007 HC SITE CHECK

## 2022-11-16 PROCEDURE — 83605 ASSAY OF LACTIC ACID: CPT | Performed by: STUDENT IN AN ORGANIZED HEALTH CARE EDUCATION/TRAINING PROGRAM

## 2022-11-16 PROCEDURE — 250N000011 HC RX IP 250 OP 636: Performed by: STUDENT IN AN ORGANIZED HEALTH CARE EDUCATION/TRAINING PROGRAM

## 2022-11-16 PROCEDURE — 250N000009 HC RX 250: Performed by: NURSE PRACTITIONER

## 2022-11-16 PROCEDURE — 85610 PROTHROMBIN TIME: CPT | Performed by: STUDENT IN AN ORGANIZED HEALTH CARE EDUCATION/TRAINING PROGRAM

## 2022-11-16 PROCEDURE — 250N000009 HC RX 250: Performed by: STUDENT IN AN ORGANIZED HEALTH CARE EDUCATION/TRAINING PROGRAM

## 2022-11-16 PROCEDURE — 97161 PT EVAL LOW COMPLEX 20 MIN: CPT | Mod: GP

## 2022-11-16 PROCEDURE — 250N000011 HC RX IP 250 OP 636: Performed by: NEUROLOGICAL SURGERY

## 2022-11-16 PROCEDURE — 250N000013 HC RX MED GY IP 250 OP 250 PS 637: Performed by: STUDENT IN AN ORGANIZED HEALTH CARE EDUCATION/TRAINING PROGRAM

## 2022-11-16 PROCEDURE — 999N000104 HIV RAPID ANTIBODY SCREEN: Performed by: INTERNAL MEDICINE

## 2022-11-16 PROCEDURE — 85018 HEMOGLOBIN: CPT | Performed by: STUDENT IN AN ORGANIZED HEALTH CARE EDUCATION/TRAINING PROGRAM

## 2022-11-16 PROCEDURE — P9059 PLASMA, FRZ BETWEEN 8-24HOUR: HCPCS

## 2022-11-16 PROCEDURE — 258N000003 HC RX IP 258 OP 636: Performed by: STUDENT IN AN ORGANIZED HEALTH CARE EDUCATION/TRAINING PROGRAM

## 2022-11-16 PROCEDURE — 97530 THERAPEUTIC ACTIVITIES: CPT | Mod: GP

## 2022-11-16 PROCEDURE — 250N000011 HC RX IP 250 OP 636: Performed by: NURSE PRACTITIONER

## 2022-11-16 PROCEDURE — 84443 ASSAY THYROID STIM HORMONE: CPT | Performed by: NURSE PRACTITIONER

## 2022-11-16 PROCEDURE — L0457 TLSO FLEX TRNK SJ-SS PRE OTS: HCPCS

## 2022-11-16 PROCEDURE — 84132 ASSAY OF SERUM POTASSIUM: CPT

## 2022-11-16 PROCEDURE — 71045 X-RAY EXAM CHEST 1 VIEW: CPT | Mod: 26 | Performed by: RADIOLOGY

## 2022-11-16 PROCEDURE — 94002 VENT MGMT INPAT INIT DAY: CPT

## 2022-11-16 PROCEDURE — 250N000009 HC RX 250

## 2022-11-16 PROCEDURE — P9016 RBC LEUKOCYTES REDUCED: HCPCS

## 2022-11-16 PROCEDURE — 99207 PR SC NO CHARGE VISIT: CPT | Performed by: PSYCHIATRY & NEUROLOGY

## 2022-11-16 PROCEDURE — 93010 ELECTROCARDIOGRAM REPORT: CPT | Performed by: INTERNAL MEDICINE

## 2022-11-16 PROCEDURE — P9037 PLATE PHERES LEUKOREDU IRRAD: HCPCS | Performed by: STUDENT IN AN ORGANIZED HEALTH CARE EDUCATION/TRAINING PROGRAM

## 2022-11-16 PROCEDURE — 85730 THROMBOPLASTIN TIME PARTIAL: CPT | Performed by: STUDENT IN AN ORGANIZED HEALTH CARE EDUCATION/TRAINING PROGRAM

## 2022-11-16 PROCEDURE — 84484 ASSAY OF TROPONIN QUANT: CPT | Performed by: STUDENT IN AN ORGANIZED HEALTH CARE EDUCATION/TRAINING PROGRAM

## 2022-11-16 PROCEDURE — 71045 X-RAY EXAM CHEST 1 VIEW: CPT

## 2022-11-16 PROCEDURE — 82330 ASSAY OF CALCIUM: CPT | Performed by: STUDENT IN AN ORGANIZED HEALTH CARE EDUCATION/TRAINING PROGRAM

## 2022-11-16 PROCEDURE — 74176 CT ABD & PELVIS W/O CONTRAST: CPT | Mod: 26 | Performed by: RADIOLOGY

## 2022-11-16 PROCEDURE — 93005 ELECTROCARDIOGRAM TRACING: CPT

## 2022-11-16 PROCEDURE — 999N000065 XR CHEST PORT 1 VIEW

## 2022-11-16 PROCEDURE — 83036 HEMOGLOBIN GLYCOSYLATED A1C: CPT | Performed by: STUDENT IN AN ORGANIZED HEALTH CARE EDUCATION/TRAINING PROGRAM

## 2022-11-16 PROCEDURE — C9113 INJ PANTOPRAZOLE SODIUM, VIA: HCPCS | Performed by: STUDENT IN AN ORGANIZED HEALTH CARE EDUCATION/TRAINING PROGRAM

## 2022-11-16 PROCEDURE — 999N000157 HC STATISTIC RCP TIME EA 10 MIN

## 2022-11-16 PROCEDURE — 99292 CRITICAL CARE ADDL 30 MIN: CPT | Performed by: PSYCHIATRY & NEUROLOGY

## 2022-11-16 PROCEDURE — 82803 BLOOD GASES ANY COMBINATION: CPT | Performed by: STUDENT IN AN ORGANIZED HEALTH CARE EDUCATION/TRAINING PROGRAM

## 2022-11-16 PROCEDURE — 999N000065 XR ABDOMEN PORT 1 VIEW

## 2022-11-16 PROCEDURE — 99291 CRITICAL CARE FIRST HOUR: CPT | Mod: 24 | Performed by: PSYCHIATRY & NEUROLOGY

## 2022-11-16 PROCEDURE — 84132 ASSAY OF SERUM POTASSIUM: CPT | Performed by: STUDENT IN AN ORGANIZED HEALTH CARE EDUCATION/TRAINING PROGRAM

## 2022-11-16 PROCEDURE — 82330 ASSAY OF CALCIUM: CPT

## 2022-11-16 PROCEDURE — 85027 COMPLETE CBC AUTOMATED: CPT | Performed by: NURSE PRACTITIONER

## 2022-11-16 PROCEDURE — 200N000002 HC R&B ICU UMMC

## 2022-11-16 PROCEDURE — 74176 CT ABD & PELVIS W/O CONTRAST: CPT

## 2022-11-16 PROCEDURE — 84100 ASSAY OF PHOSPHORUS: CPT | Performed by: STUDENT IN AN ORGANIZED HEALTH CARE EDUCATION/TRAINING PROGRAM

## 2022-11-16 PROCEDURE — 74018 RADEX ABDOMEN 1 VIEW: CPT | Mod: 26 | Performed by: RADIOLOGY

## 2022-11-16 PROCEDURE — 83735 ASSAY OF MAGNESIUM: CPT | Performed by: STUDENT IN AN ORGANIZED HEALTH CARE EDUCATION/TRAINING PROGRAM

## 2022-11-16 PROCEDURE — 84478 ASSAY OF TRIGLYCERIDES: CPT | Performed by: STUDENT IN AN ORGANIZED HEALTH CARE EDUCATION/TRAINING PROGRAM

## 2022-11-16 PROCEDURE — 250N000009 HC RX 250: Performed by: NURSE ANESTHETIST, CERTIFIED REGISTERED

## 2022-11-16 PROCEDURE — 999N000127 HC STATISTIC PERIPHERAL IV START W US GUIDANCE

## 2022-11-16 PROCEDURE — 72100 X-RAY EXAM L-S SPINE 2/3 VWS: CPT

## 2022-11-16 DEVICE — IMP SCR SET DOMINO MEDT 5.5 TO 5.5MM 779170005: Type: IMPLANTABLE DEVICE | Site: SPINE LUMBAR | Status: FUNCTIONAL

## 2022-11-16 DEVICE — GRAFT BONE MAGNIFUSE 1CMX20CM 7509212: Type: IMPLANTABLE DEVICE | Site: SPINE LUMBAR | Status: FUNCTIONAL

## 2022-11-16 RX ORDER — BACLOFEN 10 MG/1
10 TABLET ORAL 3 TIMES DAILY
Status: DISCONTINUED | OUTPATIENT
Start: 2022-11-16 | End: 2022-11-23 | Stop reason: HOSPADM

## 2022-11-16 RX ORDER — NALOXONE HYDROCHLORIDE 0.4 MG/ML
0.4 INJECTION, SOLUTION INTRAMUSCULAR; INTRAVENOUS; SUBCUTANEOUS
Status: DISCONTINUED | OUTPATIENT
Start: 2022-11-16 | End: 2022-11-23 | Stop reason: HOSPADM

## 2022-11-16 RX ORDER — TRAZODONE HYDROCHLORIDE 150 MG/1
300 TABLET ORAL AT BEDTIME
Status: DISCONTINUED | OUTPATIENT
Start: 2022-11-16 | End: 2022-11-23 | Stop reason: HOSPADM

## 2022-11-16 RX ORDER — DULOXETIN HYDROCHLORIDE 60 MG/1
60 CAPSULE, DELAYED RELEASE ORAL DAILY
Status: DISCONTINUED | OUTPATIENT
Start: 2022-11-16 | End: 2022-11-16

## 2022-11-16 RX ORDER — DEXTROSE MONOHYDRATE 25 G/50ML
25-50 INJECTION, SOLUTION INTRAVENOUS
Status: DISCONTINUED | OUTPATIENT
Start: 2022-11-16 | End: 2022-11-16

## 2022-11-16 RX ORDER — CLINDAMYCIN PHOSPHATE 900 MG/50ML
900 INJECTION, SOLUTION INTRAVENOUS EVERY 8 HOURS
Status: DISCONTINUED | OUTPATIENT
Start: 2022-11-16 | End: 2022-11-21

## 2022-11-16 RX ORDER — SODIUM CHLORIDE 9 MG/ML
INJECTION, SOLUTION INTRAVENOUS CONTINUOUS
Status: DISCONTINUED | OUTPATIENT
Start: 2022-11-16 | End: 2022-11-16

## 2022-11-16 RX ORDER — PROPOFOL 10 MG/ML
INJECTION, EMULSION INTRAVENOUS CONTINUOUS PRN
Status: DISCONTINUED | OUTPATIENT
Start: 2022-11-16 | End: 2022-11-16

## 2022-11-16 RX ORDER — FUROSEMIDE 10 MG/ML
10 INJECTION INTRAMUSCULAR; INTRAVENOUS ONCE
Status: COMPLETED | OUTPATIENT
Start: 2022-11-16 | End: 2022-11-16

## 2022-11-16 RX ORDER — PREGABALIN 100 MG/1
200 CAPSULE ORAL 3 TIMES DAILY
Status: DISCONTINUED | OUTPATIENT
Start: 2022-11-16 | End: 2022-11-23 | Stop reason: HOSPADM

## 2022-11-16 RX ORDER — ZINC SULFATE 50(220)MG
220 CAPSULE ORAL DAILY
Status: DISCONTINUED | OUTPATIENT
Start: 2022-11-16 | End: 2022-11-23 | Stop reason: HOSPADM

## 2022-11-16 RX ORDER — PROCHLORPERAZINE MALEATE 10 MG
10 TABLET ORAL EVERY 6 HOURS PRN
Status: DISCONTINUED | OUTPATIENT
Start: 2022-11-16 | End: 2022-11-23 | Stop reason: HOSPADM

## 2022-11-16 RX ORDER — NICOTINE POLACRILEX 4 MG
15-30 LOZENGE BUCCAL
Status: DISCONTINUED | OUTPATIENT
Start: 2022-11-16 | End: 2022-11-17

## 2022-11-16 RX ORDER — DEXMEDETOMIDINE HYDROCHLORIDE 4 UG/ML
.1-1.2 INJECTION, SOLUTION INTRAVENOUS CONTINUOUS
Status: DISCONTINUED | OUTPATIENT
Start: 2022-11-16 | End: 2022-11-16

## 2022-11-16 RX ORDER — BISACODYL 10 MG
10 SUPPOSITORY, RECTAL RECTAL DAILY PRN
Status: DISCONTINUED | OUTPATIENT
Start: 2022-11-16 | End: 2022-11-23 | Stop reason: HOSPADM

## 2022-11-16 RX ORDER — LIDOCAINE 40 MG/G
CREAM TOPICAL
Status: DISCONTINUED | OUTPATIENT
Start: 2022-11-16 | End: 2022-11-23 | Stop reason: HOSPADM

## 2022-11-16 RX ORDER — PROPOFOL 10 MG/ML
5-75 INJECTION, EMULSION INTRAVENOUS CONTINUOUS
Status: DISCONTINUED | OUTPATIENT
Start: 2022-11-16 | End: 2022-11-16

## 2022-11-16 RX ORDER — METOCLOPRAMIDE 10 MG/1
10 TABLET ORAL
Status: COMPLETED | OUTPATIENT
Start: 2022-11-16 | End: 2022-11-18

## 2022-11-16 RX ORDER — ACETAMINOPHEN 325 MG/1
650 TABLET ORAL EVERY 4 HOURS PRN
Status: DISCONTINUED | OUTPATIENT
Start: 2022-11-19 | End: 2022-11-18 | Stop reason: ALTCHOICE

## 2022-11-16 RX ORDER — NALOXONE HYDROCHLORIDE 0.4 MG/ML
0.2 INJECTION, SOLUTION INTRAMUSCULAR; INTRAVENOUS; SUBCUTANEOUS
Status: DISCONTINUED | OUTPATIENT
Start: 2022-11-16 | End: 2022-11-23 | Stop reason: HOSPADM

## 2022-11-16 RX ORDER — NOREPINEPHRINE BITARTRATE 0.06 MG/ML
.01-.6 INJECTION, SOLUTION INTRAVENOUS CONTINUOUS
Status: DISCONTINUED | OUTPATIENT
Start: 2022-11-16 | End: 2022-11-16

## 2022-11-16 RX ORDER — ACETAMINOPHEN 325 MG/1
975 TABLET ORAL EVERY 8 HOURS
Status: DISCONTINUED | OUTPATIENT
Start: 2022-11-16 | End: 2022-11-18 | Stop reason: ALTCHOICE

## 2022-11-16 RX ORDER — NOREPINEPHRINE BITARTRATE 0.06 MG/ML
INJECTION, SOLUTION INTRAVENOUS
Status: COMPLETED
Start: 2022-11-16 | End: 2022-11-16

## 2022-11-16 RX ORDER — POLYETHYLENE GLYCOL 3350 17 G/17G
17 POWDER, FOR SOLUTION ORAL DAILY
Status: DISCONTINUED | OUTPATIENT
Start: 2022-11-17 | End: 2022-11-16

## 2022-11-16 RX ORDER — OXYCODONE HYDROCHLORIDE 5 MG/1
5 TABLET ORAL EVERY 4 HOURS PRN
Status: DISCONTINUED | OUTPATIENT
Start: 2022-11-16 | End: 2022-11-17

## 2022-11-16 RX ORDER — LEVOTHYROXINE SODIUM 100 UG/1
100 TABLET ORAL
Status: DISCONTINUED | OUTPATIENT
Start: 2022-11-16 | End: 2022-11-23 | Stop reason: HOSPADM

## 2022-11-16 RX ORDER — AMOXICILLIN 250 MG
1 CAPSULE ORAL 2 TIMES DAILY
Status: DISCONTINUED | OUTPATIENT
Start: 2022-11-16 | End: 2022-11-16

## 2022-11-16 RX ORDER — MORPHINE SULFATE 1 MG/ML
0.5 INJECTION, SOLUTION EPIDURAL; INTRATHECAL; INTRAVENOUS ONCE
Status: COMPLETED | OUTPATIENT
Start: 2022-11-16 | End: 2022-11-16

## 2022-11-16 RX ORDER — POLYETHYLENE GLYCOL 3350 17 G/17G
17 POWDER, FOR SOLUTION ORAL 2 TIMES DAILY
Status: DISCONTINUED | OUTPATIENT
Start: 2022-11-16 | End: 2022-11-23 | Stop reason: HOSPADM

## 2022-11-16 RX ORDER — NICOTINE POLACRILEX 4 MG
15-30 LOZENGE BUCCAL
Status: DISCONTINUED | OUTPATIENT
Start: 2022-11-16 | End: 2022-11-16

## 2022-11-16 RX ORDER — DULOXETIN HYDROCHLORIDE 30 MG/1
30 CAPSULE, DELAYED RELEASE ORAL DAILY
Status: DISCONTINUED | OUTPATIENT
Start: 2022-11-16 | End: 2022-11-16

## 2022-11-16 RX ORDER — AMOXICILLIN 250 MG
2 CAPSULE ORAL 2 TIMES DAILY
Status: DISCONTINUED | OUTPATIENT
Start: 2022-11-16 | End: 2022-11-23 | Stop reason: HOSPADM

## 2022-11-16 RX ORDER — ONDANSETRON 4 MG/1
4 TABLET, ORALLY DISINTEGRATING ORAL EVERY 6 HOURS PRN
Status: DISCONTINUED | OUTPATIENT
Start: 2022-11-16 | End: 2022-11-23 | Stop reason: HOSPADM

## 2022-11-16 RX ORDER — DEXTROSE MONOHYDRATE 25 G/50ML
25-50 INJECTION, SOLUTION INTRAVENOUS
Status: DISCONTINUED | OUTPATIENT
Start: 2022-11-16 | End: 2022-11-17

## 2022-11-16 RX ORDER — BUSPIRONE HYDROCHLORIDE 15 MG/1
15 TABLET ORAL 3 TIMES DAILY
Status: DISCONTINUED | OUTPATIENT
Start: 2022-11-16 | End: 2022-11-23 | Stop reason: HOSPADM

## 2022-11-16 RX ORDER — ONDANSETRON 2 MG/ML
4 INJECTION INTRAMUSCULAR; INTRAVENOUS EVERY 6 HOURS PRN
Status: DISCONTINUED | OUTPATIENT
Start: 2022-11-16 | End: 2022-11-23 | Stop reason: HOSPADM

## 2022-11-16 RX ORDER — OXYCODONE HYDROCHLORIDE 10 MG/1
10 TABLET ORAL EVERY 4 HOURS PRN
Status: DISCONTINUED | OUTPATIENT
Start: 2022-11-16 | End: 2022-11-17

## 2022-11-16 RX ORDER — ASCORBIC ACID 250 MG
500 TABLET,CHEWABLE ORAL DAILY
Status: DISCONTINUED | OUTPATIENT
Start: 2022-11-16 | End: 2022-11-23 | Stop reason: HOSPADM

## 2022-11-16 RX ORDER — MAGNESIUM SULFATE HEPTAHYDRATE 40 MG/ML
2 INJECTION, SOLUTION INTRAVENOUS ONCE
Status: COMPLETED | OUTPATIENT
Start: 2022-11-16 | End: 2022-11-16

## 2022-11-16 RX ORDER — DIAZEPAM 5 MG
5 TABLET ORAL EVERY 6 HOURS PRN
Status: DISCONTINUED | OUTPATIENT
Start: 2022-11-16 | End: 2022-11-23 | Stop reason: HOSPADM

## 2022-11-16 RX ORDER — CHOLECALCIFEROL (VITAMIN D3) 125 MCG
20000 CAPSULE ORAL DAILY
Status: DISCONTINUED | OUTPATIENT
Start: 2022-11-16 | End: 2022-11-23 | Stop reason: HOSPADM

## 2022-11-16 RX ADMIN — SODIUM CHLORIDE: 9 INJECTION, SOLUTION INTRAVENOUS at 03:58

## 2022-11-16 RX ADMIN — FUROSEMIDE 10 MG: 10 INJECTION, SOLUTION INTRAVENOUS at 10:06

## 2022-11-16 RX ADMIN — Medication 25 MCG: at 13:04

## 2022-11-16 RX ADMIN — BACLOFEN 10 MG: 10 TABLET ORAL at 20:40

## 2022-11-16 RX ADMIN — PREGABALIN 200 MG: 100 CAPSULE ORAL at 15:04

## 2022-11-16 RX ADMIN — PROPOFOL 50 MCG/KG/MIN: 10 INJECTION, EMULSION INTRAVENOUS at 08:45

## 2022-11-16 RX ADMIN — ACETAMINOPHEN 975 MG: 325 TABLET, FILM COATED ORAL at 20:38

## 2022-11-16 RX ADMIN — Medication: at 17:00

## 2022-11-16 RX ADMIN — PROPOFOL 60 MCG/KG/MIN: 10 INJECTION, EMULSION INTRAVENOUS at 04:01

## 2022-11-16 RX ADMIN — SENNOSIDES AND DOCUSATE SODIUM 1 TABLET: 50; 8.6 TABLET ORAL at 08:17

## 2022-11-16 RX ADMIN — TRAZODONE HYDROCHLORIDE 300 MG: 150 TABLET ORAL at 22:18

## 2022-11-16 RX ADMIN — LEVOTHYROXINE SODIUM 100 MCG: 100 TABLET ORAL at 08:17

## 2022-11-16 RX ADMIN — SODIUM CHLORIDE 500 ML: 9 INJECTION, SOLUTION INTRAVENOUS at 05:18

## 2022-11-16 RX ADMIN — DIAZEPAM 5 MG: 5 TABLET ORAL at 22:51

## 2022-11-16 RX ADMIN — KETAMINE HYDROCHLORIDE 5 MG/HR: 100 INJECTION, SOLUTION, CONCENTRATE INTRAMUSCULAR; INTRAVENOUS at 06:31

## 2022-11-16 RX ADMIN — MAGNESIUM SULFATE IN WATER 2 G: 40 INJECTION, SOLUTION INTRAVENOUS at 05:17

## 2022-11-16 RX ADMIN — Medication: at 15:55

## 2022-11-16 RX ADMIN — PANTOPRAZOLE SODIUM 40 MG: 40 INJECTION, POWDER, FOR SOLUTION INTRAVENOUS at 08:24

## 2022-11-16 RX ADMIN — CALCIUM CHLORIDE 1 G: 100 INJECTION INTRAVENOUS; INTRAVENTRICULAR at 01:37

## 2022-11-16 RX ADMIN — Medication 50 MCG/HR: at 08:51

## 2022-11-16 RX ADMIN — PREGABALIN 200 MG: 100 CAPSULE ORAL at 20:39

## 2022-11-16 RX ADMIN — OXYCODONE HYDROCHLORIDE 10 MG: 10 TABLET ORAL at 08:17

## 2022-11-16 RX ADMIN — Medication 500 MG: at 08:17

## 2022-11-16 RX ADMIN — OXYCODONE HYDROCHLORIDE 5 MG: 5 TABLET ORAL at 11:51

## 2022-11-16 RX ADMIN — CLINDAMYCIN IN 5 PERCENT DEXTROSE 900 MG: 18 INJECTION, SOLUTION INTRAVENOUS at 10:25

## 2022-11-16 RX ADMIN — ACETAMINOPHEN 975 MG: 325 TABLET, FILM COATED ORAL at 11:51

## 2022-11-16 RX ADMIN — Medication 25 MCG: at 10:20

## 2022-11-16 RX ADMIN — ACETAMINOPHEN 975 MG: 325 TABLET, FILM COATED ORAL at 03:47

## 2022-11-16 RX ADMIN — BACLOFEN 10 MG: 10 TABLET ORAL at 08:19

## 2022-11-16 RX ADMIN — POLYETHYLENE GLYCOL 3350 17 G: 17 POWDER, FOR SOLUTION ORAL at 20:39

## 2022-11-16 RX ADMIN — METOCLOPRAMIDE 10 MG: 10 TABLET ORAL at 17:03

## 2022-11-16 RX ADMIN — BUSPIRONE HYDROCHLORIDE 15 MG: 15 TABLET ORAL at 08:18

## 2022-11-16 RX ADMIN — NOREPINEPHRINE BITARTRATE 0.03 MCG/KG/MIN: 0.06 INJECTION, SOLUTION INTRAVENOUS at 05:37

## 2022-11-16 RX ADMIN — SENNOSIDES AND DOCUSATE SODIUM 2 TABLET: 50; 8.6 TABLET ORAL at 20:39

## 2022-11-16 RX ADMIN — Medication 0.03 MCG/KG/MIN: at 05:37

## 2022-11-16 RX ADMIN — Medication 20000 UNITS: at 08:18

## 2022-11-16 RX ADMIN — Medication 50 MCG/HR: at 03:19

## 2022-11-16 RX ADMIN — BACLOFEN 10 MG: 10 TABLET ORAL at 15:08

## 2022-11-16 RX ADMIN — DEXMEDETOMIDINE HYDROCHLORIDE 0.6 MCG/KG/HR: 400 INJECTION INTRAVENOUS at 09:56

## 2022-11-16 RX ADMIN — CLINDAMYCIN IN 5 PERCENT DEXTROSE 900 MG: 18 INJECTION, SOLUTION INTRAVENOUS at 04:36

## 2022-11-16 RX ADMIN — KETAMINE HYDROCHLORIDE 10 MG/HR: 100 INJECTION, SOLUTION, CONCENTRATE INTRAMUSCULAR; INTRAVENOUS at 20:44

## 2022-11-16 RX ADMIN — BUSPIRONE HYDROCHLORIDE 15 MG: 15 TABLET ORAL at 20:40

## 2022-11-16 RX ADMIN — PROPOFOL 60 MCG/KG/MIN: 10 INJECTION, EMULSION INTRAVENOUS at 06:02

## 2022-11-16 RX ADMIN — BUSPIRONE HYDROCHLORIDE 15 MG: 15 TABLET ORAL at 15:08

## 2022-11-16 RX ADMIN — PROPOFOL 50 MCG/KG/MIN: 10 INJECTION, EMULSION INTRAVENOUS at 02:02

## 2022-11-16 RX ADMIN — OXYCODONE HYDROCHLORIDE 10 MG: 10 TABLET ORAL at 22:51

## 2022-11-16 RX ADMIN — PREGABALIN 200 MG: 100 CAPSULE ORAL at 08:17

## 2022-11-16 RX ADMIN — ZINC SULFATE 220 MG (50 MG) CAPSULE 220 MG: CAPSULE at 08:17

## 2022-11-16 RX ADMIN — CLINDAMYCIN IN 5 PERCENT DEXTROSE 900 MG: 18 INJECTION, SOLUTION INTRAVENOUS at 21:26

## 2022-11-16 ASSESSMENT — ACTIVITIES OF DAILY LIVING (ADL)
ADLS_ACUITY_SCORE: 43
ADLS_ACUITY_SCORE: 43
ADLS_ACUITY_SCORE: 31
ADLS_ACUITY_SCORE: 43
ADLS_ACUITY_SCORE: 39
ADLS_ACUITY_SCORE: 43
ADLS_ACUITY_SCORE: 31
ADLS_ACUITY_SCORE: 31
ADLS_ACUITY_SCORE: 43
ADLS_ACUITY_SCORE: 31

## 2022-11-16 ASSESSMENT — VISUAL ACUITY
OU: NORMAL ACUITY;BASELINE;GLASSES
OU: BASELINE;GLASSES;NORMAL ACUITY
OU: NORMAL ACUITY
OU: NORMAL ACUITY
OU: NORMAL ACUITY;BASELINE;GLASSES
OU: NORMAL ACUITY

## 2022-11-16 NOTE — PROGRESS NOTES
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 (L) 21 - 28 mmol/L    Sodium POCT 139 133 - 144 mmol/L    Potassium POCT 3.5 3.5 - 5.0 mmol/L    Hemoglobin POCT 9.3 (L) 11.7 - 15.7 g/dL    Glucose Whole Blood POCT 140 (H) 70 - 99 mg/dL    Calcium, Ionized Whole Blood POCT 5.4 (H) 4.4 - 5.2 mg/dL    Base Excess/Deficit (+/-) POCT -5.6 -9.6 - 2.0 mmol/L    FIO2 POCT 50.0 %    Lactic Acid POCT 3.9 (H) <=2.0 mmol/L   Arterial Panel POCT     Status: Abnormal   Result Value Ref Range    pH Arterial POCT 7.34 (L) 7.35 - 7.45    pCO2 Arterial POCT 40 35 - 45 mm Hg    pO2 Arterial POCT 193 (H) 80 - 105 mm Hg    Bicarbonate Arterial POCT 21 21 - 28 mmol/L    Sodium POCT 138 133 - 144 mmol/L    Potassium POCT 4.0 3.5 - 5.0 mmol/L    Hemoglobin POCT 7.9 (L) 11.7 - 15.7 g/dL    Glucose Whole Blood POCT 117 (H) 70 - 99 mg/dL    Calcium, Ionized Whole Blood POCT 4.8 4.4 - 5.2 mg/dL    Base Excess/Deficit (+/-) POCT -4.2 -9.6 - 2.0 mmol/L    FIO2 POCT 50.0 %    Lactic Acid POCT 4.0 (HH) <=2.0 mmol/L   Arterial Panel POCT     Status: Abnormal   Result Value Ref Range    pH Arterial POCT 7.32 (L) 7.35 - 7.45    pCO2 Arterial POCT 40 35 - 45 mm Hg    pO2 Arterial POCT 190 (H) 80 - 105 mm Hg    Bicarbonate Arterial POCT 21 21 - 28 mmol/L    Sodium POCT 138 133 - 144 mmol/L    Potassium POCT 3.8 3.5 - 5.0 mmol/L    Hemoglobin POCT 8.7 (L)  11.7 - 15.7 g/dL    Glucose Whole Blood POCT 116 (H) 70 - 99 mg/dL    Calcium, Ionized Whole Blood POCT 4.5 4.4 - 5.2 mg/dL    Base Excess/Deficit (+/-) POCT -5.1 -9.6 - 2.0 mmol/L    FIO2 POCT 50.0 %    Lactic Acid POCT 3.7 (H) <=2.0 mmol/L   Arterial Panel POCT     Status: Abnormal   Result Value Ref Range    pH Arterial POCT 7.36 7.35 - 7.45    pCO2 Arterial POCT 39 35 - 45 mm Hg    pO2 Arterial POCT 186 (H) 80 - 105 mm Hg    Bicarbonate Arterial POCT 22 21 - 28 mmol/L    Sodium POCT 137 133 - 144 mmol/L    Potassium POCT 4.1 3.5 - 5.0 mmol/L    Hemoglobin POCT 9.1 (L) 11.7 - 15.7 g/dL    Glucose Whole Blood POCT 111 (H) 70 - 99 mg/dL    Calcium, Ionized Whole Blood POCT 4.6 4.4 - 5.2 mg/dL    Base Excess/Deficit (+/-) POCT -3.2 -9.6 - 2.0 mmol/L    FIO2 POCT 50.0 %    Lactic Acid POCT 3.5 (H) <=2.0 mmol/L   Arterial Panel POCT     Status: Abnormal   Result Value Ref Range    pH Arterial POCT 7.37 7.35 - 7.45    pCO2 Arterial POCT 37 35 - 45 mm Hg    pO2 Arterial POCT 195 (H) 80 - 105 mm Hg    Bicarbonate Arterial POCT 22 21 - 28 mmol/L    Sodium POCT 137 133 - 144 mmol/L    Potassium POCT 4.2 3.5 - 5.0 mmol/L    Hemoglobin POCT 8.9 (L) 11.7 - 15.7 g/dL    Glucose Whole Blood POCT 123 (H) 70 - 99 mg/dL    Calcium, Ionized Whole Blood POCT 4.4 4.4 - 5.2 mg/dL    Base Excess/Deficit (+/-) POCT -3.3 -9.6 - 2.0 mmol/L    FIO2 POCT 50.0 %    Lactic Acid POCT 3.3 (H) <=2.0 mmol/L   Arterial Panel POCT     Status: Abnormal   Result Value Ref Range    pH Arterial POCT 7.38 7.35 - 7.45    pCO2 Arterial POCT 37 35 - 45 mm Hg    pO2 Arterial POCT 194 (H) 80 - 105 mm Hg    Bicarbonate Arterial POCT 22 21 - 28 mmol/L    Sodium POCT 138 133 - 144 mmol/L    Potassium POCT 4.0 3.5 - 5.0 mmol/L    Hemoglobin POCT 8.1 (L) 11.7 - 15.7 g/dL    Glucose Whole Blood POCT 126 (H) 70 - 99 mg/dL    Calcium, Ionized Whole Blood POCT 5.2 4.4 - 5.2 mg/dL    Base Excess/Deficit (+/-) POCT -3.1 -9.6 - 2.0 mmol/L    FIO2 POCT 50.0 %    Lactic  Acid POCT 3.4 (H) <=2.0 mmol/L   Arterial Panel POCT     Status: Abnormal   Result Value Ref Range    pH Arterial POCT 7.35 7.35 - 7.45    pCO2 Arterial POCT 38 35 - 45 mm Hg    pO2 Arterial POCT 179 (H) 80 - 105 mm Hg    Bicarbonate Arterial POCT 21 21 - 28 mmol/L    Sodium POCT 138 133 - 144 mmol/L    Potassium POCT 4.2 3.5 - 5.0 mmol/L    Hemoglobin POCT 9.1 (L) 11.7 - 15.7 g/dL    Glucose Whole Blood POCT 129 (H) 70 - 99 mg/dL    Calcium, Ionized Whole Blood POCT 4.9 4.4 - 5.2 mg/dL    Base Excess/Deficit (+/-) POCT -4.1 -9.6 - 2.0 mmol/L    FIO2 POCT 50.0 %    Lactic Acid POCT 3.5 (H) <=2.0 mmol/L   Glucose by meter     Status: Abnormal   Result Value Ref Range    GLUCOSE BY METER POCT 138 (H) 70 - 99 mg/dL   Hemoglobin A1c     Status: Abnormal   Result Value Ref Range    Hemoglobin A1C 5.7 (H) <5.7 %   Basic metabolic panel     Status: Abnormal   Result Value Ref Range    Sodium 137 136 - 145 mmol/L    Potassium 4.2 3.4 - 5.3 mmol/L    Chloride 107 98 - 107 mmol/L    Carbon Dioxide (CO2) 20 (L) 22 - 29 mmol/L    Anion Gap 10 7 - 15 mmol/L    Urea Nitrogen 5.4 (L) 6.0 - 20.0 mg/dL    Creatinine 0.48 (L) 0.51 - 0.95 mg/dL    Calcium 8.4 (L) 8.6 - 10.0 mg/dL    Glucose 140 (H) 70 - 99 mg/dL    GFR Estimate >90 >60 mL/min/1.73m2   Lactic acid whole blood     Status: Normal   Result Value Ref Range    Lactic Acid 2.0 0.7 - 2.0 mmol/L   Ionized Calcium     Status: Normal   Result Value Ref Range    Calcium Ionized 5.1 4.4 - 5.2 mg/dL   Magnesium     Status: Normal   Result Value Ref Range    Magnesium 1.9 1.7 - 2.3 mg/dL   Phosphorus     Status: Normal   Result Value Ref Range    Phosphorus 3.7 2.5 - 4.5 mg/dL   INR     Status: Abnormal   Result Value Ref Range    INR 1.36 (H) 0.85 - 1.15   Partial thromboplastin time     Status: Normal   Result Value Ref Range    aPTT 29 22 - 38 Seconds   Blood gas arterial     Status: Abnormal   Result Value Ref Range    pH Arterial 7.42 7.35 - 7.45    pCO2 Arterial 36 35 - 45 mm  Hg    pO2 Arterial 109 (H) 80 - 105 mm Hg    FIO2 60     Bicarbonate Arterial 23 21 - 28 mmol/L    Base Excess/Deficit (+/-) -0.9 -9.0 - 1.8 mmol/L   CBC with platelets and differential     Status: Abnormal   Result Value Ref Range    WBC Count 6.7 4.0 - 11.0 10e3/uL    RBC Count 3.27 (L) 3.80 - 5.20 10e6/uL    Hemoglobin 9.7 (L) 11.7 - 15.7 g/dL    Hematocrit 27.7 (L) 35.0 - 47.0 %    MCV 85 78 - 100 fL    MCH 29.7 26.5 - 33.0 pg    MCHC 35.0 31.5 - 36.5 g/dL    RDW 14.0 10.0 - 15.0 %    Platelet Count 68 (L) 150 - 450 10e3/uL    % Neutrophils 82 %    % Lymphocytes 10 %    % Monocytes 6 %    % Eosinophils 0 %    % Basophils 0 %    % Immature Granulocytes 2 %    NRBCs per 100 WBC 0 <1 /100    Absolute Neutrophils 5.5 1.6 - 8.3 10e3/uL    Absolute Lymphocytes 0.7 (L) 0.8 - 5.3 10e3/uL    Absolute Monocytes 0.4 0.0 - 1.3 10e3/uL    Absolute Eosinophils 0.0 0.0 - 0.7 10e3/uL    Absolute Basophils 0.0 0.0 - 0.2 10e3/uL    Absolute Immature Granulocytes 0.1 <=0.4 10e3/uL    Absolute NRBCs 0.0 10e3/uL   RBC and Platelet Morphology     Status: Abnormal   Result Value Ref Range    Platelet Assessment  Automated Count Confirmed. Platelet morphology is normal.     Automated Count Confirmed. Platelet morphology is normal.    [place] Cells Slight (A) None Seen    RBC Morphology Confirmed RBC Indices    Troponin T, High Sensitivity     Status: Abnormal   Result Value Ref Range    Troponin T, High Sensitivity 18 (H) <=14 ng/L   Troponin T, High Sensitivity     Status: Abnormal   Result Value Ref Range    Troponin T, High Sensitivity 22 (H) <=14 ng/L   Lactic acid whole blood     Status: Abnormal   Result Value Ref Range    Lactic Acid 2.4 (H) 0.7 - 2.0 mmol/L   CBC with platelets and differential     Status: Abnormal   Result Value Ref Range    WBC Count 8.9 4.0 - 11.0 10e3/uL    RBC Count 2.87 (L) 3.80 - 5.20 10e6/uL    Hemoglobin 8.3 (L) 11.7 - 15.7 g/dL    Hematocrit 24.7 (L) 35.0 - 47.0 %    MCV 86 78 - 100 fL    MCH 28.9 26.5 -  33.0 pg    MCHC 33.6 31.5 - 36.5 g/dL    RDW 14.1 10.0 - 15.0 %    Platelet Count 102 (L) 150 - 450 10e3/uL    % Neutrophils 83 %    % Lymphocytes 10 %    % Monocytes 5 %    % Eosinophils 0 %    % Basophils 0 %    % Immature Granulocytes 2 %    NRBCs per 100 WBC 0 <1 /100    Absolute Neutrophils 7.4 1.6 - 8.3 10e3/uL    Absolute Lymphocytes 0.9 0.8 - 5.3 10e3/uL    Absolute Monocytes 0.4 0.0 - 1.3 10e3/uL    Absolute Eosinophils 0.0 0.0 - 0.7 10e3/uL    Absolute Basophils 0.0 0.0 - 0.2 10e3/uL    Absolute Immature Granulocytes 0.2 <=0.4 10e3/uL    Absolute NRBCs 0.0 10e3/uL   Glucose by meter     Status: Abnormal   Result Value Ref Range    GLUCOSE BY METER POCT 136 (H) 70 - 99 mg/dL   RBC and Platelet Morphology     Status: None   Result Value Ref Range    Platelet Assessment  Automated Count Confirmed. Platelet morphology is normal.     Automated Count Confirmed. Platelet morphology is normal.    RBC Morphology Confirmed RBC Indices    Triglycerides     Status: Abnormal   Result Value Ref Range    Triglycerides 189 (H) <150 mg/dL   Glucose by meter     Status: Abnormal   Result Value Ref Range    GLUCOSE BY METER POCT 137 (H) 70 - 99 mg/dL   TSH with free T4 reflex     Status: Abnormal   Result Value Ref Range    TSH 0.17 (L) 0.30 - 4.20 uIU/mL   CBC with platelets     Status: Abnormal   Result Value Ref Range    WBC Count 8.5 4.0 - 11.0 10e3/uL    RBC Count 2.92 (L) 3.80 - 5.20 10e6/uL    Hemoglobin 8.4 (L) 11.7 - 15.7 g/dL    Hematocrit 23.9 (L) 35.0 - 47.0 %    MCV 82 78 - 100 fL    MCH 28.8 26.5 - 33.0 pg    MCHC 35.1 31.5 - 36.5 g/dL    RDW 14.1 10.0 - 15.0 %    Platelet Count 111 (L) 150 - 450 10e3/uL   T4 free     Status: Abnormal   Result Value Ref Range    Free T4 0.78 (L) 0.90 - 1.70 ng/dL   HIV Rapid Antibody Screen     Status: None   Result Value Ref Range    HIV-1/HIV-2 Antibody Non Reactive Non Reactive    HIV1 P24 Antigen Non Reactive Non Reactive    HIV Interpretation      Narrative    All positive  rapid HIV 1/2 Ag/Ab samples need to be confirmed by a second HIV 1/2 Ag/Ab Combination test.   HIV Antigen Antibody Combo     Status: Normal   Result Value Ref Range    HIV Antigen Antibody Combo Nonreactive Nonreactive   Hepatitis B surface antigen     Status: Normal   Result Value Ref Range    Hepatitis B Surface Antigen Nonreactive Nonreactive   Glucose by meter     Status: Abnormal   Result Value Ref Range    GLUCOSE BY METER POCT 127 (H) 70 - 99 mg/dL   EKG 12-lead, complete     Status: None   Result Value Ref Range    Systolic Blood Pressure  mmHg    Diastolic Blood Pressure  mmHg    Ventricular Rate 98 BPM    Atrial Rate 98 BPM    KS Interval 154 ms    QRS Duration 76 ms     ms    QTc 457 ms    P Axis 27 degrees    R AXIS 48 degrees    T Axis 52 degrees    Interpretation ECG       Sinus rhythm  Inferior infarct , age undetermined  Cannot rule out Anterior infarct , age undetermined  Abnormal ECG  No previous ECGs available  Confirmed by MD SAMPSON, ALMA ([id number]) on 11/16/2022 12:33:34 PM     Prepare red blood cells (unit)     Status: None (Preliminary result)   Result Value Ref Range    Blood Component Type Red Blood Cells     Product Code W7100A58     Unit Status Ready for issue     Unit Number [id number]     CROSSMATCH Compatible     CODING SYSTEM [id number]    Prepare red blood cells (unit)     Status: None   Result Value Ref Range    Blood Component Type Red Blood Cells     Product Code H2923B48     Unit Status Transfused     Unit Number [id number]     CROSSMATCH Compatible     CODING SYSTEM [id number]     ISSUE DATE AND TIME [id number]     UNIT ABO/RH O+     UNIT TYPE ISBT 5100    Prepare red blood cells (unit)     Status: None   Result Value Ref Range    Blood Component Type Red Blood Cells     Product Code L1065T32     Unit Status Transfused     Unit Number [id number]     CROSSMATCH Compatible     CODING SYSTEM [id number]     ISSUE DATE AND TIME [id number]     UNIT ABO/RH O+     UNIT TYPE  ISBT 5100    Prepare plasma (unit)     Status: None   Result Value Ref Range    Blood Component Type Plasma     Product Code O3880N29     Unit Status Transfused     Unit Number [id number]     CODING SYSTEM [id number]     ISSUE DATE AND TIME [id number]     UNIT ABO/RH AB+     UNIT TYPE ISBT 8400    Prepare red blood cells (unit)     Status: None   Result Value Ref Range    Blood Component Type Red Blood Cells     Product Code E4488H92     Unit Status Transfused     Unit Number [id number]     CROSSMATCH Compatible     CODING SYSTEM QHTI382     ISSUE DATE AND TIME 20221115193600     UNIT ABO/RH O+     UNIT TYPE ISBT 5100    Prepare red blood cells (unit)     Status: None   Result Value Ref Range    Blood Component Type Red Blood Cells     Product Code S0194U35     Unit Status Transfused     Unit Number [id number]     CROSSMATCH Compatible     CODING SYSTEM [id number]     ISSUE DATE AND TIME 20221115193600     UNIT ABO/RH O+     UNIT TYPE ISBT 5100    Prepare red blood cells (unit)     Status: None   Result Value Ref Range    Blood Component Type Red Blood Cells     Product Code V4646Y60     Unit Status Transfused     Unit Number [id number]     CROSSMATCH Compatible     CODING SYSTEM [id number]     ISSUE DATE AND TIME 20221115222700     UNIT ABO/RH O+     UNIT TYPE ISBT 5100    Prepare red blood cells (unit)     Status: None   Result Value Ref Range    Blood Component Type Red Blood Cells     Product Code Y0454G51     Unit Status Transfused     Unit Number [id number]     CROSSMATCH Compatible     CODING SYSTEM [id number]     ISSUE DATE AND TIME 20221115222700     UNIT ABO/RH O+     UNIT TYPE ISBT 5100    Prepare plasma (unit)     Status: None   Result Value Ref Range    Blood Component Type Plasma     Product Code R1566A76     Unit Status Transfused     Unit Number [id number]     CODING SYSTEM UBYH030     ISSUE DATE AND TIME 20221115222700     UNIT ABO/RH AB+     UNIT TYPE ISBT 8400    Prepare plasma (unit)      Status: None   Result Value Ref Range    Blood Component Type Plasma     Product Code M9978N01     Unit Status Transfused     Unit Number [id number]     CODING SYSTEM [id number]     ISSUE DATE AND TIME [id number]     UNIT ABO/RH AB+     UNIT TYPE ISBT 8400    Prepare red blood cells (unit)     Status: None (Preliminary result)   Result Value Ref Range    Blood Component Type Red Blood Cells     Product Code K5613A55     Unit Status Issued     Unit Number [id number]     CROSSMATCH Compatible     CODING SYSTEM NEYT618     ISSUE DATE AND TIME [id number]     UNIT ABO/RH O+     UNIT TYPE ISBT 5100    Prepare plasma (unit)     Status: None (Preliminary result)   Result Value Ref Range    Blood Component Type Plasma     Product Code P4133E35     Unit Status Issued     Unit Number [id number]     CODING SYSTEM [id number]     ISSUE DATE AND TIME [id number]     UNIT ABO/RH AB+     UNIT TYPE ISBT 8400    Prepare pheresed platelets (unit)     Status: None   Result Value Ref Range    ISSUE DATE AND TIME [id number]     Blood Component Type Platelets     Product Code M6039X96     Unit Status Transfused     Unit Number [id number]     UNIT ABO/RH A+     CODING SYSTEM TNZN480     UNIT TYPE ISBT 6200    CBC with platelets differential *Canceled*     Status: None ()    Narrative    The following orders were created for panel order CBC with platelets differential.  Procedure                               Abnormality         Status                     ---------                               -----------         ------                     CBC with platelets and d...[[id number]]                                                   Please view results for these tests on the individual orders.   CBC with platelets differential     Status: Abnormal    Narrative    The following orders were created for panel order CBC with platelets differential.  Procedure                               Abnormality         Status                      ---------                               -----------         ------                     CBC with platelets and d...[[id number]]  Abnormal            Final result               RBC and Platelet Morphology[[id number]]                      Final result                 Please view results for these tests on the individual orders.   CBC with platelets differential     Status: Abnormal    Narrative    The following orders were created for panel order CBC with platelets differential.  Procedure                               Abnormality         Status                     ---------                               -----------         ------                     CBC with platelets and d...[[id number]]  Abnormal            Final result               RBC and Platelet Morphology[[id number]]  Abnormal            Final result                 Please view results for these tests on the individual orders.   [facility] [employer] [facility] Panel: Source Employee or Patient     Status: None (In process)    Narrative    The following orders were created for panel order [facility] [employer] [facility] Panel: Source Employee or Patient.  Procedure                               Abnormality         Status                     ---------                               -----------         ------                     HIV Rapid Antibody Screen[[id number]]                        Final result               HIV Antigen Antibody Combo[[id number]]   Normal              Final result               Hepatitis B surface antigen[[id number]]  Normal              Final result               Hepatitis C RNA, Quantit...[[id number]]                      In process                   Please view results for these tests on the individual orders.       All relevant imaging and laboratory values personally reviewed.

## 2022-11-16 NOTE — PLAN OF CARE
Goal Outcome Evaluation:  Problem: Restraint, Violent-Self-Destructive  Goal: Absence of Harm or Injury  Intervention: Protect Skin and Joint Integrity  Recent Flowsheet Documentation  Taken 11/16/2022 1000 by Yuniel Pete RN  Body Position:   turned   right   side-lying   lower extremity elevated   upper extremity elevated   weight shifting  Taken 11/16/2022 0800 by Yuniel Pete RN  Body Position:   turned   side-lying   left   lower extremity elevated   upper extremity elevated   weight shifting     Problem: Restraint, Violent-Self-Destructive  Goal: Absence of Harm or Injury  Outcome: Not Progressing  Intervention: Protect Skin and Joint Integrity  Recent Flowsheet Documentation  Taken 11/16/2022 1000 by Yuniel Pete RN  Body Position:   turned   right   side-lying   lower extremity elevated   upper extremity elevated   weight shifting  Taken 11/16/2022 0800 by Yuniel Pete RN  Body Position:   turned   side-lying   left   lower extremity elevated   upper extremity elevated   weight shifting      11/16/22 0830   Restraint Order   Length of Order 1 Day   Attending Physician Notified Yes   Attending Physician's Name Dr. Rivers   Assessment   Less Restrictive Alternative RP;RE;PM;DE;RO   Justification   Clinical Justification LTE   Education   Discontinuation Criteria Absence   Criteria Explained Yes   Patient's Response NR   Family Notification O   Restraint Q2H Monitoring   Visual Check AG   Circulation NS   Range of Motion P   Fluids N   Food/Meal N   Elimination UC   Restraint Type (NB)   Soft Restraint R Wrist (NB) Start   Soft Restraint L Wrist (NB) Start   Mitt (secured) Right (NB) Continued   Mitt (secured) Left (NB) Continued

## 2022-11-16 NOTE — PROGRESS NOTES
S: Louise Tyler was seen at [facility], Room 4215-01, for the evaluation, fit and delivery of a OTS TLSO.  The patient s Nurses were present.     Dx: S/P Spine Surgery    1. No significant coronal curvature of the spine. Very positive  sagittal balance.   2. Postoperative changes from midthoracic to L3 Velez angelito  placement with pedicle screws at L3. Additional postoperative changes  from L4 to 5 transforaminal anterior and posterior approach fusion. No  evidence for hardware failure.    O: The 47-year-old, 5  4 , 242-pound patient presents sedated and intubated.  The patient s waist measures 54.5-inches in circumference.  An [employer] Horizon TLSO was set to the patient s waist size (setting 4 with short adjustable extension panel).   This brace features a low-profile belt and a back panel that facilitates spinal support with controlled retraction of the patient s shoulders to reduce/prevent spinal flexion.  The brace was not fit to the patient at this time due to the patient being intubated and having several IV lines.      A:  I explained to the patient s Nurses the purpose and function of the brace as well as donning and doffing.  I then provided them with the  s instructions.      P: If there is a need to adjust the brace while the patient still in the hospital, the patient s Nurse should contact the [facility].  Adjustments made outside of the hospital can be performed, at no charge, at any of the [facility].    G: The TLSO increases medial-lateral and anterior-posterior stability of the spine.  The TLSO also applies compression to the patient s soft tissues.  Compression of the patient s soft tissues serves to unload the vertebrae which reduces pain and promotes healing.  This brace also offers controlled retraction of the patient s shoulders to reduce/prevent spinal flexion.  The goal of this orthosis is to provide spinal  stability, promote healing and reduce pain while the patient performs their ADLs.    Please contact the [facility] at [phone] if you have any questions.  Thank you, Lee    Electronically signed by Lee Martell CPO, [email or web address]

## 2022-11-16 NOTE — PROGRESS NOTES
Patient extubated to 2L NC, breath sounds are equal bilaterally. Patient is talking and following commands appropriately. Vitals all within normal limits.       Edgardo Atkins, RRT

## 2022-11-16 NOTE — PROGRESS NOTES
[facility], [place]   11/16/2022  Neurosurgery Progress Note:    Assessment:  Louise Tyler is a 47 year old female with a history of chronic back pain, acquired spinal deformity, now POD-0 for X64-P5VL posterior segmented fusion with L2-3 and L3-4 TLIF, and L5-S1 ALIF with assistance of vascular surgery.     Plan:  - Serial neuro exams  - Hemovacs to full suction  - Ancef while drains in place  - TLSO ordered - to be worn when OOB  - Upright XR before discharge  - Pain control   - Pain consult   - Fentanyl gtt and ketamine gtt while still intubated   - Intraoperative intrathecal morphine administered  - Extubate as able  - Bowel regimen  - PRN antiemetics  - IVF  - PT/OT  - SCDs for DVT proph  - Appreciate neurocritical care assistance    -----------------------------------  Padma Aguiar MD  Neurosurgery PGY3    Please contact neurosurgery resident on call with questions.    Dial * * *[phone], enter [phone] when prompted.  -----------------------------------    Interval History: OR yesterday. Hemodynamically stable immediately postop, postoperative labs within normal limits other than thrombocytopenia, platelets transfused. Hemoglobin stable. At about 5:30 am, patient had been increasingly awake and agitated, at which point fentanyl gtt was increased to 200. Shortly after, patient was persistently hypotensive. Blood pressures improved after 1 L fluid bolus and norepinephrine gtt started, as well as lowering fentanyl gtt to 100. Suspect hypotension is related to opioid overdose given intrathecal morphine dose + fentanyl high doses. In addition, hemorrhage was ruled out with CT CAP and flotrac demonstrated adequate cardiac function.     Objective:   Temp:  [96.1  F (35.6  C)-98.1  F (36.7  C)] 98  F (36.7  C)  Pulse:  [] 82  Resp:  [16-22] 16  BP: ()/() 134/68  MAP:  [56 mmHg-255 mmHg] 73 mmHg  Arterial Line BP: ()/() 102/57  FiO2 (%):  [50 %-60 %] 50 %  SpO2:   [87 %-100 %] 99 %  I/O last 3 completed shifts:  In: 38928.76 [I.V.:9031.76; NG/GT:90; IV Piggyback:1050]  Out: 6610 [Urine:3210; Blood:3400]    Gen: Appears comfortable, NAD  Wound: clean, dry, intact  Neurologic:  - With sedation held, patient opens eyes, tracks, follows commands x4  - PEERL, EOMI, face symmetrical  - Antigravity purposeful movement x4 extremities     LABS:  Recent Labs   Lab 11/16/22  0800 11/16/22  0550 11/16/22  0307 11/16/22  0237 11/16/22  0206 11/16/22  0157 11/15/22  0644 11/14/22  1105   NA  --   --  137  --  138 138   < > 135*   POTASSIUM  --   --  4.2  --  4.2 4.0   < > 4.3   CHLORIDE  --   --  107  --   --   --   --  99   CO2  --   --  20*  --   --   --   --  27   ANIONGAP  --   --  10  --   --   --   --  9   * 136* 140*   < > 129* 126*   < > 106*   BUN  --   --  5.4*  --   --   --   --  11.4   CR  --   --  0.48*  --   --   --   --  0.70   SEBASTIAN  --   --  8.4*  --   --   --   --  9.5    < > = values in this interval not displayed.       Recent Labs   Lab 11/16/22  0550   WBC 8.9   RBC 2.87*   HGB 8.3*   HCT 24.7*   MCV 86   MCH 28.9   MCHC 33.6   RDW 14.1   *       IMAGING:  Recent Results (from the past 24 hour(s))   XR Surgery ADINA Fluoro Less Than 5 Min w Stills    Narrative    This exam was marked as non-reportable because it will not be read by a   radiologist or a [facility] non-radiologist provider.         XR Chest Port 1 View    Narrative    EXAM: XR CHEST PORT 1 VIEW  11/16/2022 3:03 AM      HISTORY: Verify ETT and OGT positioning post-op    COMPARISON: None.    FINDINGS: Portable semiupright AP radiograph of the chest. The tip and  sidehole of the NG/OG tube project over the stomach. The tip of the  endotracheal tube projects over the midthoracic trachea. Right IJ  central venous catheter tip projects over the low SVC. The trachea is  midline. No pleural effusion or pneumothorax. Hazy left perihilar and  basilar opacities. The visualized upper abdomen is  unremarkable.  Spinal fusion hardware of the thoracolumbar spine. Midline skin  staples of the abdomen.      Impression    IMPRESSION:   1. The tip of the endotracheal tube projects over the midthoracic  trachea.  2. The tip and sidehole of the OG tube project over the stomach.  3. Hazy left perihilar and basilar opacities, likely postoperative  atelectasis.    I have personally reviewed the examination and initial interpretation  and I agree with the findings.    RODY JEFFERS MD         SYSTEM ID:  [id number]   XR Chest Port 1 View    Narrative    EXAM: XR CHEST PORT 1 VIEW  11/16/2022 5:43 AM      HISTORY: Follow up left lung opacity    COMPARISON: Chest x-ray 11/16/2022    FINDINGS: Portable semiupright AP radiograph of the chest. Right IJ  central venous catheter tip projects over the lower SVC. The tip of  the endotracheal tube projects over the midthoracic trachea. The tip  of the NG/OG tube projects over the distal stomach. The trachea is  midline. The cardiac silhouette is stable. No pleural effusion or  pneumothorax. Slightly improved retrocardiac opacity. The visualized  upper abdomen is unremarkable. Cholecystectomy clips. Partially  visualized surgical hardware of the thoracolumbar spine.      Impression    IMPRESSION: Slightly improved retrocardiac opacity, likely decreased  postoperative atelectasis.    I have personally reviewed the examination and initial interpretation  and I agree with the findings.    RODY JEFFERS MD         SYSTEM ID:  [id number]   CT Abdomen Pelvis w/o Contrast    Narrative    EXAMINATION: CT ABDOMEN PELVIS W/O CONTRAST, 11/16/2022 6:30 AM    TECHNIQUE:  Helical CT images from the lung bases through the  symphysis pubis were obtained without contrast.  Coronal reformatted  images were generated at a workstation for further assessment.    CONTRAST: None    COMPARISON: None.    HISTORY: Hypotension, postop anterior spinal fusion approach, rule out  peritoneal/retroperitoneal  hematoma    FINDINGS:    Abdomen and pelvis:   Liver: No suspicious liver lesions.   Gallbladder: Cholecystectomy clips.  Spleen: Normal size.  Pancreas: No suspicious pancreatic lesions. The pancreatic duct is not  dilated.  Adrenal glands: No adrenal nodules.  Kidneys: No kidney masses. No hydronephrosis or obstructing renal  stones.  Bladder / Pelvic organs: The urinary bladder is decompressed around a  Khan catheter. Air within the lumen of the urinary bladder, likely  from catheterization. Status post hysterectomy.  Bowel: Enteric tube tip near the pylorus. No bowel wall thickening or  evidence for obstruction. Mild hyperdense material within the appendix  without appendiceal dilation or periappendiceal inflammatory changes  to suggest acute appendicitis. Colonic diverticulosis.  Lymph nodes: No retroperitoneal, mesenteric, or pelvic  lymphadenopathy.  Peritoneum/retroperitoneum: Trace free fluid in the pelvis. No  loculated fluid collection. No pneumoperitoneum. No evidence for acute  hemorrhage.  Vessels: No infrarenal aortic aneurysm. Right femoral artery catheter  tip in the distal external iliac artery. Atherosclerotic calcification  of the abdominal aorta.    Lung bases: Small bilateral pleural effusions. Left greater than right  mild basilar consolidative opacities. Partially visualized catheter  tip in the low SVC.    Bones and soft tissues: Postsurgical changes of extensive  thoracolumbar spinal posterior fusion and bilateral sacroiliac joint  fusion. No suspicious osseous lesions. Air within the soft tissues of  the left inguinal region. Soft tissue nodularity and subcutaneous  emphysema in the subcutaneous fat of the anterior abdominal wall,  likely also from anterior approach. The bilateral right S1 screw  penetrates through the anterior wall of the sacrum. Bilateral L5  intrapedicular screws also penetrate the anterior wall of the  vertebral body. No hematoma is present. The do not penetrate any  major  vessels. Right L5 screw is a few mm away from the right common iliac  vein. Diffuse body wall anasarca.      Impression    IMPRESSION:   1.  No evidence for peritoneal/retroperitoneal hematoma.   2.  Small bilateral pleural effusions with bibasilar decompressive  atelectasis.  3.  Air in the retroperitoneum and in the anterior abdominal wall.  Likely postsurgical from the anterior spinal fusion approach.  4.  L5 and S1 screws penetrate the anterior wall of the vertebral  body. They do not appear to have damaged any major vascular  structures.

## 2022-11-16 NOTE — PROGRESS NOTES
"   11/16/22 2080   Appointment Info   Signing Clinician's Name / Credentials (PT) Monserrat Barboza, PT, DPT   Living Environment   People in Home parent(s);significant other  (Mother & boyfriend)   Current Living Arrangements mobile home   Home Accessibility stairs to enter home   Number of Stairs, Main Entrance 3   Stair Railings, Main Entrance railing on left side (ascending)   Living Environment Comments Bathroom includes step in shower with shower chair and grab bar, raised toilet seat.   Self-Care   Current Activity Tolerance fair   Equipment Currently Used at Home walker, rolling;shower chair;raised toilet seat  (4WW)   Activity/Exercise/Self-Care Comment Pt uses 4WW for ambulation. Pt reports she had very flexed trunk/ could not stand up straight prior to surgery.   General Information   Onset of Illness/Injury or Date of Surgery 11/15/22   Referring Physician Padma Lima MD   Patient/Family Therapy Goals Statement (PT) not stated   Pertinent History of Current Problem (include personal factors and/or comorbidities that impact the POC) Per chart, \"Louise Tyler is a 47 year old female with a history of chronic back pain, acquired spinal deformity, now POD-0 for [id number]-[id number] posterior segmented fusion with L2-3 and L3-4 TLIF, and L5-S1 ALIF with assistance of vascular surgery. \"   Existing Precautions/Restrictions brace worn when out of bed;spinal   General Observations Patient is pleasant and agreeable to PT. Pt's mother present for session & supportive. RN removed femoral art line just prior to PT evaluation.   Cognition   Orientation Status (Cognition) oriented x 4   Pain Assessment   Patient Currently in Pain Yes, see Vital Sign flowsheet  (6/10 hips R more painful than L)   Integumentary/Edema   Integumentary/Edema Comments 2 hemovac drains noted. Dressing appears intact. Did not visualize anterior dressing   Posture    Posture Forward head position   Range of Motion (ROM)   ROM Comment trunk ROM limited " 2/2 post op precautions, LE ROM appears WFL   Strength (Manual Muscle Testing)   Strength Comments Pt demos some functional weakness with bed mobility and transfers   Bed Mobility   Comment, (Bed Mobility) Supine>sidelying with use of rail & SBA; pt needs HOB elevated and Sheron for sidelying>sit   Transfers   Comment, (Transfers) Sit>stand from EOB with B UE support on PT/ Sehron of PT   Gait/Stairs (Locomotion)   Comment, (Gait/Stairs) Patient able to take steps to turn to bedside chair with B UE support on PT, assist for stability; Sheron   Balance   Balance Comments Good sitting balance, able to maintain sitting upright on EOB with feet dangling. Needs UE support for standing balance, fairly steady in static standing with UE support   Clinical Impression   Criteria for Skilled Therapeutic Intervention Yes, treatment indicated   PT Diagnosis (PT) difficulty ambulating   Influenced by the following impairments pain, decreased activity tolerance, impaired strength, impaired balance   Functional limitations due to impairments difficulty with bed mobility, transfers, ambulation, stairs   Clinical Presentation (PT Evaluation Complexity) Stable/Uncomplicated   Clinical Presentation Rationale clinical judgement   Clinical Decision Making (Complexity) low complexity   Planned Therapy Interventions (PT) balance training;bed mobility training;cryotherapy;gait training;home exercise program;neuromuscular re-education;patient/family education;ROM (range of motion);stair training;strengthening;stretching;transfer training;progressive activity/exercise;home program guidelines   Anticipated Equipment Needs at Discharge (PT)   (pt has 4WW, raised toilet seat, shower chair already at home)   Risk & Benefits of therapy have been explained evaluation/treatment results reviewed;care plan/treatment goals reviewed;participants included;patient;mother   Clinical Impression Comments Patient is moving fairly well s/p spine surgery. She will  benefit from continued skilled PT in hospital to progress independence and safety with mobilty toward baseline.   PT Total Evaluation Time   PT Eval, Low Complexity Minutes (69321) 15   Plan of Care Review   Plan of Care Reviewed With patient;mother   Physical Therapy Goals   PT Frequency Daily   PT Predicted Duration/Target Date for Goal Attainment 11/23/22   PT Goals Bed Mobility;Transfers;Gait;Stairs   PT: Bed Mobility Supervision/stand-by assist;Supine to/from sit;Rolling;Within precautions   PT: Transfers Modified independent;Sit to/from stand;Bed to/from chair;Assistive device   PT: Gait Supervision/stand-by assist;Rolling walker;100 feet   PT: Stairs Supervision/stand-by assist;3 stairs;Rail on left   Interventions   Interventions Quick Adds Therapeutic Activity;Gait Training   Therapeutic Activity   Therapeutic Activities: dynamic activities to improve functional performance Minutes (60267) 15   Treatment Detail/Skilled Intervention After eval, pt stood from recliner with Sheron of PT- with pt's hands on PT's arms for support with sit>stand then transitioned UE support to walker in standing. Pt able to maintain standing with SBA while total A provided for pericares for smear BM. ABle to engage pt in steps forward from recliner ~4', then turn and walk a few steps before turning and backing up back to chair. Stand>sit at recliner with Sheron to control descent- pt reaching back to chair for stand>sit with cue. Edu pt on BLT spine precautions. Encouraged pt to sit up in chair ~1 hour or as tolerated & encouraged her to ask to william a few steps at bedside before going back to bed when ready to go back tobed with nursing. Pt had 2L O2 on at start of session but able to reduce to RA & maintain SPO2>90 throughout.   Gait Training   Treatment Detail/Skilled Intervention Pt engaged in ambulation with a few steps away from chair and back, included in TA, see above.   PT Discharge Planning   PT Plan gait in sutton, progress  sit<>stand & bed mobility, standing exercises?   PT Discharge Recommendation (DC Rec) home with assist;home with home care physical therapy;home with outpatient physical therapy   PT Rationale for DC Rec Patient is mobilizing fairly well but needing Ax1 for all mobilty currently. ANticipate she will progress with continued PT while hospitalized in order for discharge home with home PT & assist of mother/boyfriend as needed. Will continued to assess and update recs as needed.   PT Brief overview of current status Sheron supine>sit, Sheron sit<>stand, pt able to take a few steps forward/back with 4WW and CGA-Sheron   Total Session Time   Timed Code Treatment Minutes 15   Total Session Time (sum of timed and untimed services) 30

## 2022-11-16 NOTE — BRIEF OP NOTE
[facility]    Brief Operative Note    Pre-operative diagnosis: Spine deformity, acquired [M43.9]  Sacroiliac pain [M53.3]  Post-operative diagnosis Spine deformity, acquired [M43.9]  Sacroiliac pain [M53.3]    Procedure: Procedure(s):  Stage 1 procedure o-arm/stealth assisted Lumbar 5-Sacral 1 Anterior Lumbar Interbody Fusion with Titanium interbody cage (exposure by Dr. Santos) Stage 1  Stage 2 procedure o-arm/stealth assisted Removal of Velez rods, Lumbar 3-4 and Lumbar 4-5 transforaminal lumbar interbody fusion with interbody cages and bone morphogenic protein, Bilateral stacked S2 Alar-Iliac screws ([place]) for Sacroiliac joint fusion, Thoracic 10-Sacral 1 instrumented fusion  Surgeon: Surgeon(s) and Role:     * Darien Kam MD - Primary     * Jyothi Santos MD - Assisting     * Adolfo Dean MD - Resident - Assisting     * Padma Lima MD - Resident - Assisting  Anesthesia: General   Estimated Blood Loss: 3000    Drains: Hemovac (2x subfascial)  Specimens: * No specimens in log *  Findings:  Good final placement of instrumentation on intraoperative imaging.  Complications: None.  Implants:   Implant Name Type Inv. Item Serial No.  Lot No. LRB No. Used Action   GRAFT BONE FIBERS AMY DBM DBF 6ML [id number] Bone/Tissue/Biologic GRAFT BONE FIBERS AMY DBM DBF 6ML [id number] [employer] INC N/A N/A 1 Implanted   Endoskeleton hyperlordotic implant   N/A MEDTRONIC [id number] N/A 1 Implanted   medtronic 6.5mm x 45mm dual angelito multi-axial screw    MEDTR[employer]IC [id number] N/A 1 Implanted   medtronic 6.5mjb44wa dual angelito multi-axial screw    MED[employer]IC [id number] N/A 2 Implanted   medtronic 8.6iqd20my dual angelito multi-axial screw    MED[employer]IC [id number] N/A 1 Implanted   medtronic 7.3vbv16nj dual angelito multi-axial screw     [id number] N/A 1 Implanted   Sponsify granite implant 10.5 x 90 mm     [id number] N/A 1 Implanted   Sponsify  granite implant 10.5x 90mm     [id number] N/A 1 Implanted   ifuse bedrock granite implant 10.5 x 70mm     [id number] N/A 1 Implanted   GRAFT BONE INFUSE BMP LG [id number] - [id number]  GRAFT BONE INFUSE BMP LG [id number]  [employer] [id number] N/A 1 Implanted   Capstone Control PTC Spinal System      [id number] N/A 1 Implanted   Capstone Control PTC System Spinal Spacer     [id number] N/A 1 Implanted   Capstone Control PTC System Spinal Spacer     [id number] N/A 1 Implanted   GRAFT BONE MAGNIFUSE [id number] - [id number] Bone/Tissue/Biologic GRAFT BONE MAGNIFUSE [id number] [employer]  N/A 1 Implanted   IMP SCR MEDT 5.5/6.0MM SOLERA 6.5X50MM MA [id number] - [id number] Metallic Hardware/[place] IMP SCR MEDT 5.5/6.0MM SOLERA 6.5X50MM MA [id number]  [employer] NONE N/A 1 Implanted   IMP SCR MEDT 5.5/6.0MM SOLERA 7.5X40MM MA [id number] - [id number] Metallic Hardware/[place] IMP SCR MEDT 5.5/6.0MM SOLERA 7.5X40MM MA [id number]  [employer] NONE N/A 2 Implanted   IMP SCR MEDT 5.5/6.0MM SOLERA 7.5X45MM MA [id number] - [id number] Metallic Hardware/[place] IMP SCR MEDT 5.5/6.0MM SOLERA 7.5X45MM MA [id number]  [employer] NONE N/A 1 Implanted   IMP SCR SET KELVIN MEDT 5.5 TO 5.5MM [id number] - [id number] Metallic Hardware/[place] IMP SCR SET KELVIN MEDT 5.5 TO 5.5MM [id number]  [employer] INC NONE N/A 1 Implanted   IMP SCR MEDT 5.5/6.0MM SOLERA 7.5X50MM MA [id number] - [id number] Metallic Hardware/[place] IMP SCR MEDT 5.5/6.0MM SOLERA 7.5X50MM MA [id number]  [employer] NONE N/A 2 Implanted   8.5X70MM  BALLAST  SCREW    MEDTRONIC NONE N/A 1 Implanted   IMP CRISTOPHER MEDT SOLERA STR LINED [id number] CHR [id number] - [id number] Metallic Hardware/[place] IMP CRISTOPHER MEDT SOLERA STR LINED [id number] CHR [id number]  [employer] NONE N/A 3 Implanted   IMP SCR SET MEDT SOLERA BREAK OFF 5.5MM TI [id number] - [id number] Metallic Hardware/[place] IMP SCR SET MEDT SOLERA BREAK OFF 5.5MM TI [id number]  [employer] NONE N/A 24 Implanted    5.0/6-0 LATERAL CONNECTOR    [employer]TRONIC NONE N/A 1 Implanted   TITAN TAS IMPLANT 20DEG 36 X24 16MM    TITAN SPINE NONE N/A 3 Implanted

## 2022-11-16 NOTE — PHARMACY-ADMISSION MEDICATION HISTORY
Admission medication history completed 11/11/22 by pharmacist in pre-operative clinic. Please see Pharmacy - Progress Note from 11/14/22 encounter for more information. Last dose times as updated by pre-op staff.     Marie Soto, PharmD, BCCCP

## 2022-11-16 NOTE — ANESTHESIA CARE TRANSFER NOTE
Patient: Louise Tyler    Procedure: Procedure(s):  Stage 1 procedure o-arm/stealth assisted Lumbar 5-Sacral 1 Anterior Lumbar Interbody Fusion with Titanium interbody cage (exposure by Dr. Santos) Stage 1  Stage 2 procedure o-arm/stealth assisted Removal of Velez rods, Lumbar 3-4 and Lumbar 4-5 transforaminal lumbar interbody fusion with interbody cages and bone morphogenic protein, Bilateral stacked S2 Alar-Iliac screws ([place]) for Sacroiliac joint fusion, Thoracic 10-Sacral 1 instrumented fusion       Diagnosis: Spine deformity, acquired [M43.9]  Sacroiliac pain [M53.3]  Diagnosis Additional Information: No value filed.    Anesthesia Type:   General     Note:    Oropharynx: endotracheal tube in place and ventilatory support  Level of Consciousness: iatrogenic sedation      Independent Airway: airway patency not satisfactory and stable  Dentition: dentition unchanged  Vital Signs Stable: post-procedure vital signs reviewed and stable  Report to RN Given: handoff report given  Patient transferred to: ICU    ICU Handoff: Call for PAUSE to initiate/utilize ICU HANDOFF, Identified Patient, Identified Responsible Provider, Reviewed the Pertinent Medical History, Discussed Surgical Course, Reviewed Intra-OP Anesthesia Management and Issues during Anesthesia, Set Expectations for Post Procedure Period and Allowed Opportunity for Questions and Acknowledgement of Understanding      Vitals:  Vitals Value Taken Time   /93 11/16/22 0234   Temp     Pulse 112 11/16/22 0242   Resp     SpO2 99 % 11/16/22 0242   Vitals shown include unvalidated device data.    Electronically Signed By: Ricardo Fajardo Junior, MD  November 16, 2022  2:42 AM

## 2022-11-16 NOTE — OR NURSING
Navigated with non medtronic tap and  instruments with medtronic navigated navlocks as per Jennifer Salinas

## 2022-11-16 NOTE — PLAN OF CARE
Goal Outcome Evaluation:  Problem: Oral Intake Inadequate  Goal: Improved Oral Intake  Outcome: Unable to Meet -- nutrition POC pending extubation. HOLD EN for now, initiate if unable to extubate vs adv diet

## 2022-11-16 NOTE — ANESTHESIA POSTPROCEDURE EVALUATION
Patient: Louise Tyler    Procedure: Procedure(s):  Stage 1 procedure o-arm/stealth assisted Lumbar 5-Sacral 1 Anterior Lumbar Interbody Fusion with Titanium interbody cage (exposure by Dr. Santos) Stage 1  Stage 2 procedure o-arm/stealth assisted Removal of Velez rods, Lumbar 3-4 and Lumbar 4-5 transforaminal lumbar interbody fusion with interbody cages and bone morphogenic protein, Bilateral stacked S2 Alar-Iliac screws ([place]) for Sacroiliac joint fusion, Thoracic 10-Sacral 1 instrumented fusion       Anesthesia Type:  General    Note:  Disposition: ICU            ICU Sign Out: Anesthesiologist/ICU physician sign out WAS performed   Postop Pain Control: Uneventful            Sign Out: Well controlled pain   PONV: No   Neuro/Psych: Uneventful            Sign Out: Acceptable/Baseline neuro status   Airway/Respiratory: Uneventful            Sign Out: AIRWAY IN SITU/Resp. Support                 Reason: Planned Pre-op (Discussed with surgical team)   CV/Hemodynamics: Uneventful            Sign Out: Acceptable CV status; No obvious hypovolemia; No obvious fluid overload   Other NRE: NONE   DID A NON-ROUTINE EVENT OCCUR? No           Last vitals:  Vitals:    11/16/22 0234 11/16/22 0552 11/16/22 0600   BP: 127/93  134/70   Pulse: 112 54 76   Resp:      Temp:      SpO2: 100% 98% 100%       Electronically Signed By: Vic Ho MD  November 16, 2022  6:20 AM
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
## 2022-11-16 NOTE — PLAN OF CARE
Major Shift Events: Neurologically intact. Dilaudid PCA and continuous Ketamine. NSR with no ectopy. Pressures have been stable. Pt was extubated around 1315 and is on 2L NC, primarily when sleeping. No BM but is passing flatus. Khan pulled at 1815. No changes to skin.     Pt is assist of 2 with a gait belt and back brace.     Plan: Work with PT/OT, potentially transfer to floor.     For vital signs and complete assessments, please see documentation flowsheets.
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
## 2022-11-16 NOTE — PROGRESS NOTES
"Brief Vascular Surgery Progress note;  November 16, 2022    Saw and evaluated Louise this AM - now on very small amount of levophed after increasing her pain medications. Otherwise no acute events overnight.     /68   Pulse 80   Temp 98  F (36.7  C) (Axillary)   Resp 16   Ht 1.626 m (5' 4\")   Wt 109.9 kg (242 lb 4.6 oz)   SpO2 99%   BMI 41.59 kg/m      Removed midline abdominal incision dressing. Wound beneath is clean, dry intact. No erythema/edema/drainage. Some glue came off on the dressing, did place steri strips over the wound.    Vascular surgery to sign off at this time - please reach out if any concerns or questions with regards to the midline wound.     Please page if questions,    Kyler Olivo MD, MS  PGY-2, General Surgery      "

## 2022-11-16 NOTE — PLAN OF CARE
Goal Outcome Evaluation:      Plan of Care Reviewed With: patient    Overall Patient Progress: decliningOverall Patient Progress: declining    Outcome Evaluation: When writer increased Fentanal gtt from 100->150->200 mcg/hr pt became hypotensive. 1 L NaCl 0/9% bolus given. Levo turned on briefly. Flow trackconnected as well CI >2 SVR ~1400. CVP=9. Also gavo one unit FFP. Placed OG. Pt completed Adb CT. Team switched fentanyl gtt to ketamine gtt.

## 2022-11-17 ENCOUNTER — APPOINTMENT (OUTPATIENT)
Dept: OCCUPATIONAL THERAPY | Facility: CLINIC | Age: 47
DRG: 453 | End: 2022-11-17
Attending: STUDENT IN AN ORGANIZED HEALTH CARE EDUCATION/TRAINING PROGRAM
Payer: MEDICAID

## 2022-11-17 ENCOUNTER — APPOINTMENT (OUTPATIENT)
Dept: GENERAL RADIOLOGY | Facility: CLINIC | Age: 47
DRG: 453 | End: 2022-11-17
Attending: NURSE PRACTITIONER
Payer: MEDICAID

## 2022-11-17 LAB
ANION GAP SERPL CALCULATED.3IONS-SCNC: 6 MMOL/L (ref 7–15)
BUN SERPL-MCNC: 7.4 MG/DL (ref 6–20)
CA-I BLD-MCNC: 4.4 MG/DL (ref 4.4–5.2)
CALCIUM SERPL-MCNC: 6.9 MG/DL (ref 8.6–10)
CHLORIDE SERPL-SCNC: 108 MMOL/L (ref 98–107)
CREAT SERPL-MCNC: 0.59 MG/DL (ref 0.51–0.95)
DEPRECATED HCO3 PLAS-SCNC: 24 MMOL/L (ref 22–29)
ERYTHROCYTE [DISTWIDTH] IN BLOOD BY AUTOMATED COUNT: 14.3 % (ref 10–15)
GFR SERPL CREATININE-BSD FRML MDRD: >90 ML/MIN/1.73M2
GLUCOSE BLDC GLUCOMTR-MCNC: 105 MG/DL (ref 70–99)
GLUCOSE BLDC GLUCOMTR-MCNC: 117 MG/DL (ref 70–99)
GLUCOSE BLDC GLUCOMTR-MCNC: 80 MG/DL (ref 70–99)
GLUCOSE SERPL-MCNC: 88 MG/DL (ref 70–99)
HCT VFR BLD AUTO: 19.4 % (ref 35–47)
HCV RNA SERPL NAA+PROBE-ACNC: NOT DETECTED IU/ML
HGB BLD-MCNC: 6.6 G/DL (ref 11.7–15.7)
MAGNESIUM SERPL-MCNC: 1.9 MG/DL (ref 1.7–2.3)
MCH RBC QN AUTO: 29.6 PG (ref 26.5–33)
MCHC RBC AUTO-ENTMCNC: 34 G/DL (ref 31.5–36.5)
MCV RBC AUTO: 87 FL (ref 78–100)
PHOSPHATE SERPL-MCNC: 3.9 MG/DL (ref 2.5–4.5)
PLATELET # BLD AUTO: 115 10E3/UL (ref 150–450)
POTASSIUM SERPL-SCNC: 3.1 MMOL/L (ref 3.4–5.3)
POTASSIUM SERPL-SCNC: 3.1 MMOL/L (ref 3.4–5.3)
RBC # BLD AUTO: 2.23 10E6/UL (ref 3.8–5.2)
SODIUM SERPL-SCNC: 138 MMOL/L (ref 136–145)
TROPONIN T SERPL HS-MCNC: 10 NG/L
TROPONIN T SERPL HS-MCNC: 11 NG/L
TROPONIN T SERPL HS-MCNC: 12 NG/L
TROPONIN T SERPL HS-MCNC: 14 NG/L
WBC # BLD AUTO: 9.2 10E3/UL (ref 4–11)

## 2022-11-17 PROCEDURE — P9016 RBC LEUKOCYTES REDUCED: HCPCS

## 2022-11-17 PROCEDURE — 250N000009 HC RX 250: Performed by: STUDENT IN AN ORGANIZED HEALTH CARE EDUCATION/TRAINING PROGRAM

## 2022-11-17 PROCEDURE — 250N000013 HC RX MED GY IP 250 OP 250 PS 637: Performed by: STUDENT IN AN ORGANIZED HEALTH CARE EDUCATION/TRAINING PROGRAM

## 2022-11-17 PROCEDURE — 97165 OT EVAL LOW COMPLEX 30 MIN: CPT | Mod: GO

## 2022-11-17 PROCEDURE — 250N000011 HC RX IP 250 OP 636: Performed by: STUDENT IN AN ORGANIZED HEALTH CARE EDUCATION/TRAINING PROGRAM

## 2022-11-17 PROCEDURE — 71045 X-RAY EXAM CHEST 1 VIEW: CPT | Mod: 26 | Performed by: RADIOLOGY

## 2022-11-17 PROCEDURE — 84132 ASSAY OF SERUM POTASSIUM: CPT | Performed by: NEUROLOGICAL SURGERY

## 2022-11-17 PROCEDURE — 250N000013 HC RX MED GY IP 250 OP 250 PS 637: Performed by: NEUROLOGICAL SURGERY

## 2022-11-17 PROCEDURE — 97530 THERAPEUTIC ACTIVITIES: CPT | Mod: GO

## 2022-11-17 PROCEDURE — 99233 SBSQ HOSP IP/OBS HIGH 50: CPT | Performed by: NURSE PRACTITIONER

## 2022-11-17 PROCEDURE — 999N000202 HC STATISTICAL VASC ACCESS NURSE TIME, 1-15 MINUTES

## 2022-11-17 PROCEDURE — 84484 ASSAY OF TROPONIN QUANT: CPT | Performed by: STUDENT IN AN ORGANIZED HEALTH CARE EDUCATION/TRAINING PROGRAM

## 2022-11-17 PROCEDURE — 84100 ASSAY OF PHOSPHORUS: CPT | Performed by: STUDENT IN AN ORGANIZED HEALTH CARE EDUCATION/TRAINING PROGRAM

## 2022-11-17 PROCEDURE — 85027 COMPLETE CBC AUTOMATED: CPT | Performed by: NURSE PRACTITIONER

## 2022-11-17 PROCEDURE — 82330 ASSAY OF CALCIUM: CPT | Performed by: NURSE PRACTITIONER

## 2022-11-17 PROCEDURE — 258N000003 HC RX IP 258 OP 636: Performed by: STUDENT IN AN ORGANIZED HEALTH CARE EDUCATION/TRAINING PROGRAM

## 2022-11-17 PROCEDURE — 71045 X-RAY EXAM CHEST 1 VIEW: CPT

## 2022-11-17 PROCEDURE — 97535 SELF CARE MNGMENT TRAINING: CPT | Mod: GO

## 2022-11-17 PROCEDURE — 83735 ASSAY OF MAGNESIUM: CPT | Performed by: STUDENT IN AN ORGANIZED HEALTH CARE EDUCATION/TRAINING PROGRAM

## 2022-11-17 PROCEDURE — 80048 BASIC METABOLIC PNL TOTAL CA: CPT | Performed by: STUDENT IN AN ORGANIZED HEALTH CARE EDUCATION/TRAINING PROGRAM

## 2022-11-17 PROCEDURE — 99254 IP/OBS CNSLTJ NEW/EST MOD 60: CPT | Performed by: PHYSICIAN ASSISTANT

## 2022-11-17 PROCEDURE — 200N000002 HC R&B ICU UMMC

## 2022-11-17 RX ORDER — LIDOCAINE 4 G/G
1-2 PATCH TOPICAL
Status: DISCONTINUED | OUTPATIENT
Start: 2022-11-17 | End: 2022-11-23 | Stop reason: HOSPADM

## 2022-11-17 RX ORDER — POTASSIUM CHLORIDE 750 MG/1
40 TABLET, EXTENDED RELEASE ORAL ONCE
Status: COMPLETED | OUTPATIENT
Start: 2022-11-17 | End: 2022-11-17

## 2022-11-17 RX ORDER — MAGNESIUM OXIDE 400 MG/1
400 TABLET ORAL EVERY 4 HOURS
Status: COMPLETED | OUTPATIENT
Start: 2022-11-17 | End: 2022-11-17

## 2022-11-17 RX ADMIN — KETAMINE HYDROCHLORIDE 10 MG/HR: 100 INJECTION, SOLUTION, CONCENTRATE INTRAMUSCULAR; INTRAVENOUS at 23:47

## 2022-11-17 RX ADMIN — CLINDAMYCIN IN 5 PERCENT DEXTROSE 900 MG: 18 INJECTION, SOLUTION INTRAVENOUS at 12:22

## 2022-11-17 RX ADMIN — METOCLOPRAMIDE 10 MG: 10 TABLET ORAL at 09:24

## 2022-11-17 RX ADMIN — PREGABALIN 200 MG: 100 CAPSULE ORAL at 20:21

## 2022-11-17 RX ADMIN — METOCLOPRAMIDE 10 MG: 10 TABLET ORAL at 17:05

## 2022-11-17 RX ADMIN — ZINC SULFATE 220 MG (50 MG) CAPSULE 220 MG: CAPSULE at 09:24

## 2022-11-17 RX ADMIN — ACETAMINOPHEN 975 MG: 325 TABLET, FILM COATED ORAL at 12:22

## 2022-11-17 RX ADMIN — KETAMINE HYDROCHLORIDE 10 MG/HR: 100 INJECTION, SOLUTION, CONCENTRATE INTRAMUSCULAR; INTRAVENOUS at 05:01

## 2022-11-17 RX ADMIN — POTASSIUM CHLORIDE 40 MEQ: 750 TABLET, EXTENDED RELEASE ORAL at 09:23

## 2022-11-17 RX ADMIN — BUSPIRONE HYDROCHLORIDE 15 MG: 15 TABLET ORAL at 20:27

## 2022-11-17 RX ADMIN — PREGABALIN 200 MG: 100 CAPSULE ORAL at 09:23

## 2022-11-17 RX ADMIN — OXYCODONE HYDROCHLORIDE 10 MG: 10 TABLET ORAL at 04:05

## 2022-11-17 RX ADMIN — TRAZODONE HYDROCHLORIDE 300 MG: 150 TABLET ORAL at 22:06

## 2022-11-17 RX ADMIN — Medication 200 UNITS: at 17:05

## 2022-11-17 RX ADMIN — PREGABALIN 200 MG: 100 CAPSULE ORAL at 13:52

## 2022-11-17 RX ADMIN — POTASSIUM CHLORIDE 40 MEQ: 750 TABLET, EXTENDED RELEASE ORAL at 15:10

## 2022-11-17 RX ADMIN — METOCLOPRAMIDE 10 MG: 10 TABLET ORAL at 12:23

## 2022-11-17 RX ADMIN — Medication 500 MG: at 09:23

## 2022-11-17 RX ADMIN — Medication: at 12:49

## 2022-11-17 RX ADMIN — BUSPIRONE HYDROCHLORIDE 15 MG: 15 TABLET ORAL at 09:26

## 2022-11-17 RX ADMIN — MENTHOL 1 PATCH: 205.5 PATCH TOPICAL at 20:28

## 2022-11-17 RX ADMIN — SENNOSIDES AND DOCUSATE SODIUM 2 TABLET: 50; 8.6 TABLET ORAL at 09:54

## 2022-11-17 RX ADMIN — MAGNESIUM OXIDE TAB 400 MG (241.3 MG ELEMENTAL MG) 400 MG: 400 (241.3 MG) TAB at 09:24

## 2022-11-17 RX ADMIN — DIAZEPAM 5 MG: 5 TABLET ORAL at 20:42

## 2022-11-17 RX ADMIN — ACETAMINOPHEN 975 MG: 325 TABLET, FILM COATED ORAL at 04:05

## 2022-11-17 RX ADMIN — CLINDAMYCIN IN 5 PERCENT DEXTROSE 900 MG: 18 INJECTION, SOLUTION INTRAVENOUS at 04:05

## 2022-11-17 RX ADMIN — LEVOTHYROXINE SODIUM 100 MCG: 100 TABLET ORAL at 09:23

## 2022-11-17 RX ADMIN — SENNOSIDES AND DOCUSATE SODIUM 2 TABLET: 50; 8.6 TABLET ORAL at 20:20

## 2022-11-17 RX ADMIN — BACLOFEN 10 MG: 10 TABLET ORAL at 20:26

## 2022-11-17 RX ADMIN — BACLOFEN 10 MG: 10 TABLET ORAL at 09:24

## 2022-11-17 RX ADMIN — KETAMINE HYDROCHLORIDE 10 MG/HR: 100 INJECTION, SOLUTION, CONCENTRATE INTRAMUSCULAR; INTRAVENOUS at 15:10

## 2022-11-17 RX ADMIN — DULOXETINE HYDROCHLORIDE 90 MG: 60 CAPSULE, DELAYED RELEASE ORAL at 09:24

## 2022-11-17 RX ADMIN — BACLOFEN 10 MG: 10 TABLET ORAL at 13:53

## 2022-11-17 RX ADMIN — POLYETHYLENE GLYCOL 3350 17 G: 17 POWDER, FOR SOLUTION ORAL at 20:20

## 2022-11-17 RX ADMIN — MAGNESIUM OXIDE TAB 400 MG (241.3 MG ELEMENTAL MG) 400 MG: 400 (241.3 MG) TAB at 12:23

## 2022-11-17 RX ADMIN — CLINDAMYCIN IN 5 PERCENT DEXTROSE 900 MG: 18 INJECTION, SOLUTION INTRAVENOUS at 22:06

## 2022-11-17 RX ADMIN — LIDOCAINE PATCH 4% 1 PATCH: 40 PATCH TOPICAL at 12:23

## 2022-11-17 RX ADMIN — POLYETHYLENE GLYCOL 3350 17 G: 17 POWDER, FOR SOLUTION ORAL at 09:54

## 2022-11-17 RX ADMIN — OXYCODONE HYDROCHLORIDE 5 MG: 5 TABLET ORAL at 09:23

## 2022-11-17 RX ADMIN — BUSPIRONE HYDROCHLORIDE 15 MG: 15 TABLET ORAL at 13:53

## 2022-11-17 RX ADMIN — ACETAMINOPHEN 975 MG: 325 TABLET, FILM COATED ORAL at 20:20

## 2022-11-17 RX ADMIN — Medication 20000 UNITS: at 09:23

## 2022-11-17 ASSESSMENT — ACTIVITIES OF DAILY LIVING (ADL)
ADLS_ACUITY_SCORE: 45

## 2022-11-17 NOTE — PLAN OF CARE
Major Shift Events:  Patient A&Ox4, reports back pain and LLE pain. Some numbness and tingling to LLE, team aware. Is moving all extremities. Patient is on 2L NC overnight. Khan was pulled yesterday evening, patient is still due to void, purewick in place. Bladder scanned 620mL this morning. Not out of bed overnight, is up A2 with GB and back brace    Plan: Possible transfer to floor today    For vital signs and complete assessments, please see documentation flowsheets.

## 2022-11-17 NOTE — PLAN OF CARE
ICU End of Shift Summary. See flowsheets for vital signs and detailed assessment.    Changes this shift: No acute changes during shift. Got out of bed to commode and chair with RN and OT, tolerated well.     Neuro: Q4 neuro. Aox4. Strength equal. Pupil equal and reactive. Thoracic brace use when out of bed.     Cards: Afebrile. SR. HR 80-100s. SBP < 160.     Pulm: Requires low O2 when sleeping. RA when awake and out of bed. LS clear. Minimal coughs.    GI: Regular diet. Poor appetite. Large BM x1, soft and brown.     : Pt reported not being able to urinate or feel when she wants to urinate, team notified. Bladder scan for 532 mL, straight cath 825 mL, post void residual 32 mL.    Labs: Hgb 6.6, team notified.      Plan: Continue POC. Transfer to  when avail.
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
## 2022-11-17 NOTE — PROGRESS NOTES
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 5.0 mmol/L    Hemoglobin POCT 9.1 (L) 11.7 - 15.7 g/dL    Glucose Whole Blood POCT 111 (H) 70 - 99 mg/dL    Calcium, Ionized Whole Blood POCT 4.6 4.4 - 5.2 mg/dL    Base Excess/Deficit (+/-) POCT -3.2 -9.6 - 2.0 mmol/L    FIO2 POCT 50.0 %    Lactic Acid POCT 3.5 (H) <=2.0 mmol/L   Arterial Panel POCT     Status: Abnormal   Result Value Ref Range    pH Arterial POCT 7.37 7.35 - 7.45    pCO2 Arterial POCT 37 35 - 45 mm Hg    pO2 Arterial POCT 195 (H) 80 - 105 mm Hg    Bicarbonate Arterial POCT 22 21 - 28 mmol/L    Sodium POCT 137 133 - 144 mmol/L    Potassium POCT 4.2 3.5 - 5.0 mmol/L    Hemoglobin POCT 8.9 (L) 11.7 - 15.7 g/dL    Glucose Whole Blood POCT 123 (H) 70 - 99 mg/dL    Calcium, Ionized Whole Blood POCT 4.4 4.4 - 5.2 mg/dL    Base Excess/Deficit (+/-) POCT -3.3 -9.6 - 2.0 mmol/L    FIO2 POCT 50.0 %    Lactic Acid POCT 3.3 (H) <=2.0 mmol/L   Arterial Panel POCT     Status: Abnormal   Result Value Ref Range    pH Arterial POCT 7.38 7.35 - 7.45    pCO2 Arterial POCT 37 35 - 45 mm Hg    pO2 Arterial POCT 194 (H) 80 - 105 mm Hg    Bicarbonate Arterial POCT 22 21 - 28 mmol/L    Sodium POCT 138 133 - 144 mmol/L    Potassium POCT 4.0 3.5 - 5.0 mmol/L    Hemoglobin POCT 8.1 (L) 11.7 - 15.7 g/dL    Glucose Whole Blood POCT 126 (H)  70 - 99 mg/dL    Calcium, Ionized Whole Blood POCT 5.2 4.4 - 5.2 mg/dL    Base Excess/Deficit (+/-) POCT -3.1 -9.6 - 2.0 mmol/L    FIO2 POCT 50.0 %    Lactic Acid POCT 3.4 (H) <=2.0 mmol/L   Arterial Panel POCT     Status: Abnormal   Result Value Ref Range    pH Arterial POCT 7.35 7.35 - 7.45    pCO2 Arterial POCT 38 35 - 45 mm Hg    pO2 Arterial POCT 179 (H) 80 - 105 mm Hg    Bicarbonate Arterial POCT 21 21 - 28 mmol/L    Sodium POCT 138 133 - 144 mmol/L    Potassium POCT 4.2 3.5 - 5.0 mmol/L    Hemoglobin POCT 9.1 (L) 11.7 - 15.7 g/dL    Glucose Whole Blood POCT 129 (H) 70 - 99 mg/dL    Calcium, Ionized Whole Blood POCT 4.9 4.4 - 5.2 mg/dL    Base Excess/Deficit (+/-) POCT -4.1 -9.6 - 2.0 mmol/L    FIO2 POCT 50.0 %    Lactic Acid POCT 3.5 (H) <=2.0 mmol/L   Glucose by meter     Status: Abnormal   Result Value Ref Range    GLUCOSE BY METER POCT 138 (H) 70 - 99 mg/dL   Hemoglobin A1c     Status: Abnormal   Result Value Ref Range    Hemoglobin A1C 5.7 (H) <5.7 %   Basic metabolic panel     Status: Abnormal   Result Value Ref Range    Sodium 137 136 - 145 mmol/L    Potassium 4.2 3.4 - 5.3 mmol/L    Chloride 107 98 - 107 mmol/L    Carbon Dioxide (CO2) 20 (L) 22 - 29 mmol/L    Anion Gap 10 7 - 15 mmol/L    Urea Nitrogen 5.4 (L) 6.0 - 20.0 mg/dL    Creatinine 0.48 (L) 0.51 - 0.95 mg/dL    Calcium 8.4 (L) 8.6 - 10.0 mg/dL    Glucose 140 (H) 70 - 99 mg/dL    GFR Estimate >90 >60 mL/min/1.73m2   Lactic acid whole blood     Status: Normal   Result Value Ref Range    Lactic Acid 2.0 0.7 - 2.0 mmol/L   Ionized Calcium     Status: Normal   Result Value Ref Range    Calcium Ionized 5.1 4.4 - 5.2 mg/dL   Magnesium     Status: Normal   Result Value Ref Range    Magnesium 1.9 1.7 - 2.3 mg/dL   Phosphorus     Status: Normal   Result Value Ref Range    Phosphorus 3.7 2.5 - 4.5 mg/dL   INR     Status: Abnormal   Result Value Ref Range    INR 1.36 (H) 0.85 - 1.15   Partial thromboplastin time     Status: Normal   Result Value Ref Range     aPTT 29 22 - 38 Seconds   Blood gas arterial     Status: Abnormal   Result Value Ref Range    pH Arterial 7.42 7.35 - 7.45    pCO2 Arterial 36 35 - 45 mm Hg    pO2 Arterial 109 (H) 80 - 105 mm Hg    FIO2 60     Bicarbonate Arterial 23 21 - 28 mmol/L    Base Excess/Deficit (+/-) -0.9 -9.0 - 1.8 mmol/L   CBC with platelets and differential     Status: Abnormal   Result Value Ref Range    WBC Count 6.7 4.0 - 11.0 10e3/uL    RBC Count 3.27 (L) 3.80 - 5.20 10e6/uL    Hemoglobin 9.7 (L) 11.7 - 15.7 g/dL    Hematocrit 27.7 (L) 35.0 - 47.0 %    MCV 85 78 - 100 fL    MCH 29.7 26.5 - 33.0 pg    MCHC 35.0 31.5 - 36.5 g/dL    RDW 14.0 10.0 - 15.0 %    Platelet Count 68 (L) 150 - 450 10e3/uL    % Neutrophils 82 %    % Lymphocytes 10 %    % Monocytes 6 %    % Eosinophils 0 %    % Basophils 0 %    % Immature Granulocytes 2 %    NRBCs per 100 WBC 0 <1 /100    Absolute Neutrophils 5.5 1.6 - 8.3 10e3/uL    Absolute Lymphocytes 0.7 (L) 0.8 - 5.3 10e3/uL    Absolute Monocytes 0.4 0.0 - 1.3 10e3/uL    Absolute Eosinophils 0.0 0.0 - 0.7 10e3/uL    Absolute Basophils 0.0 0.0 - 0.2 10e3/uL    Absolute Immature Granulocytes 0.1 <=0.4 10e3/uL    Absolute NRBCs 0.0 10e3/uL   RBC and Platelet Morphology     Status: Abnormal   Result Value Ref Range    Platelet Assessment  Automated Count Confirmed. Platelet morphology is normal.     Automated Count Confirmed. Platelet morphology is normal.    [place] Cells Slight (A) None Seen    RBC Morphology Confirmed RBC Indices    Troponin T, High Sensitivity     Status: Abnormal   Result Value Ref Range    Troponin T, High Sensitivity 18 (H) <=14 ng/L   Troponin T, High Sensitivity     Status: Abnormal   Result Value Ref Range    Troponin T, High Sensitivity 22 (H) <=14 ng/L   Lactic acid whole blood     Status: Abnormal   Result Value Ref Range    Lactic Acid 2.4 (H) 0.7 - 2.0 mmol/L   CBC with platelets and differential     Status: Abnormal   Result Value Ref Range    WBC Count 8.9 4.0 - 11.0 10e3/uL     RBC Count 2.87 (L) 3.80 - 5.20 10e6/uL    Hemoglobin 8.3 (L) 11.7 - 15.7 g/dL    Hematocrit 24.7 (L) 35.0 - 47.0 %    MCV 86 78 - 100 fL    MCH 28.9 26.5 - 33.0 pg    MCHC 33.6 31.5 - 36.5 g/dL    RDW 14.1 10.0 - 15.0 %    Platelet Count 102 (L) 150 - 450 10e3/uL    % Neutrophils 83 %    % Lymphocytes 10 %    % Monocytes 5 %    % Eosinophils 0 %    % Basophils 0 %    % Immature Granulocytes 2 %    NRBCs per 100 WBC 0 <1 /100    Absolute Neutrophils 7.4 1.6 - 8.3 10e3/uL    Absolute Lymphocytes 0.9 0.8 - 5.3 10e3/uL    Absolute Monocytes 0.4 0.0 - 1.3 10e3/uL    Absolute Eosinophils 0.0 0.0 - 0.7 10e3/uL    Absolute Basophils 0.0 0.0 - 0.2 10e3/uL    Absolute Immature Granulocytes 0.2 <=0.4 10e3/uL    Absolute NRBCs 0.0 10e3/uL   Glucose by meter     Status: Abnormal   Result Value Ref Range    GLUCOSE BY METER POCT 136 (H) 70 - 99 mg/dL   RBC and Platelet Morphology     Status: None   Result Value Ref Range    Platelet Assessment  Automated Count Confirmed. Platelet morphology is normal.     Automated Count Confirmed. Platelet morphology is normal.    RBC Morphology Confirmed RBC Indices    Triglycerides     Status: Abnormal   Result Value Ref Range    Triglycerides 189 (H) <150 mg/dL   Glucose by meter     Status: Abnormal   Result Value Ref Range    GLUCOSE BY METER POCT 137 (H) 70 - 99 mg/dL   TSH with free T4 reflex     Status: Abnormal   Result Value Ref Range    TSH 0.17 (L) 0.30 - 4.20 uIU/mL   CBC with platelets     Status: Abnormal   Result Value Ref Range    WBC Count 8.5 4.0 - 11.0 10e3/uL    RBC Count 2.92 (L) 3.80 - 5.20 10e6/uL    Hemoglobin 8.4 (L) 11.7 - 15.7 g/dL    Hematocrit 23.9 (L) 35.0 - 47.0 %    MCV 82 78 - 100 fL    MCH 28.8 26.5 - 33.0 pg    MCHC 35.1 31.5 - 36.5 g/dL    RDW 14.1 10.0 - 15.0 %    Platelet Count 111 (L) 150 - 450 10e3/uL   Troponin T, High Sensitivity     Status: Abnormal   Result Value Ref Range    Troponin T, High Sensitivity 18 (H) <=14 ng/L   T4 free     Status:  Abnormal   Result Value Ref Range    Free T4 0.78 (L) 0.90 - 1.70 ng/dL   HIV Rapid Antibody Screen     Status: None   Result Value Ref Range    HIV-1/HIV-2 Antibody Non Reactive Non Reactive    HIV1 P24 Antigen Non Reactive Non Reactive    HIV Interpretation      Narrative    All positive rapid HIV 1/2 Ag/Ab samples need to be confirmed by a second HIV 1/2 Ag/Ab Combination test.   HIV Antigen Antibody Combo     Status: Normal   Result Value Ref Range    HIV Antigen Antibody Combo Nonreactive Nonreactive   Hepatitis B surface antigen     Status: Normal   Result Value Ref Range    Hepatitis B Surface Antigen Nonreactive Nonreactive   Glucose by meter     Status: Abnormal   Result Value Ref Range    GLUCOSE BY METER POCT 127 (H) 70 - 99 mg/dL   Glucose by meter     Status: Abnormal   Result Value Ref Range    GLUCOSE BY METER POCT 134 (H) 70 - 99 mg/dL   Troponin T, High Sensitivity     Status: Normal   Result Value Ref Range    Troponin T, High Sensitivity 14 <=14 ng/L   Basic metabolic panel     Status: Abnormal   Result Value Ref Range    Sodium 138 136 - 145 mmol/L    Potassium 3.1 (L) 3.4 - 5.3 mmol/L    Chloride 108 (H) 98 - 107 mmol/L    Carbon Dioxide (CO2) 24 22 - 29 mmol/L    Anion Gap 6 (L) 7 - 15 mmol/L    Urea Nitrogen 7.4 6.0 - 20.0 mg/dL    Creatinine 0.59 0.51 - 0.95 mg/dL    Calcium 6.9 (L) 8.6 - 10.0 mg/dL    Glucose 88 70 - 99 mg/dL    GFR Estimate >90 >60 mL/min/1.73m2   Magnesium     Status: Normal   Result Value Ref Range    Magnesium 1.9 1.7 - 2.3 mg/dL   Phosphorus     Status: Normal   Result Value Ref Range    Phosphorus 3.9 2.5 - 4.5 mg/dL   Troponin T, High Sensitivity     Status: Normal   Result Value Ref Range    Troponin T, High Sensitivity 11 <=14 ng/L   Glucose by meter     Status: Normal   Result Value Ref Range    GLUCOSE BY METER POCT 98 70 - 99 mg/dL   Glucose by meter     Status: Normal   Result Value Ref Range    GLUCOSE BY METER POCT 80 70 - 99 mg/dL   EKG 12-lead, complete      Status: None   Result Value Ref Range    Systolic Blood Pressure  mmHg    Diastolic Blood Pressure  mmHg    Ventricular Rate 98 BPM    Atrial Rate 98 BPM    IL Interval 154 ms    QRS Duration 76 ms     ms    QTc 457 ms    P Axis 27 degrees    R AXIS 48 degrees    T Axis 52 degrees    Interpretation ECG       Sinus rhythm  Inferior infarct , age undetermined  Cannot rule out Anterior infarct , age undetermined  Abnormal ECG  No previous ECGs available  Confirmed by MD BRADEN DAVID ([id number]) on 11/16/2022 12:33:34 PM     Prepare red blood cells (unit)     Status: None (Preliminary result)   Result Value Ref Range    Blood Component Type Red Blood Cells     Product Code I1807D19     Unit Status Ready for issue     Unit Number [id number]     CROSSMATCH Compatible     CODING SYSTEM [id number]    Prepare red blood cells (unit)     Status: None   Result Value Ref Range    Blood Component Type Red Blood Cells     Product Code D1473X58     Unit Status Transfused     Unit Number [id number]     CROSSMATCH Compatible     CODING SYSTEM [id number]     ISSUE DATE AND TIME [id number]     UNIT ABO/RH O+     UNIT TYPE ISBT 5100    Prepare red blood cells (unit)     Status: None   Result Value Ref Range    Blood Component Type Red Blood Cells     Product Code K2501B23     Unit Status Transfused     Unit Number [id number]     CROSSMATCH Compatible     CODING SYSTEM [id number]     ISSUE DATE AND TIME [id number]     UNIT ABO/RH O+     UNIT TYPE ISBT 5100    Prepare plasma (unit)     Status: None   Result Value Ref Range    Blood Component Type Plasma     Product Code X5506B79     Unit Status Transfused     Unit Number [id number]     CODING SYSTEM [id number]     ISSUE DATE AND TIME [id number]     UNIT ABO/RH AB+     UNIT TYPE ISBT 8400    Prepare red blood cells (unit)     Status: None   Result Value Ref Range    Blood Component Type Red Blood Cells     Product Code A1149F03     Unit Status Transfused     Unit Number  [id number]     CROSSMATCH Compatible     CODING SYSTEM [id number]     ISSUE DATE AND TIME [id number]     UNIT ABO/RH O+     UNIT TYPE ISBT 5100    Prepare red blood cells (unit)     Status: None   Result Value Ref Range    Blood Component Type Red Blood Cells     Product Code O2049K32     Unit Status Transfused     Unit Number [id number]     CROSSMATCH Compatible     CODING SYSTEM [id number]     ISSUE DATE AND TIME [id number]     UNIT ABO/RH O+     UNIT TYPE ISBT 5100    Prepare red blood cells (unit)     Status: None   Result Value Ref Range    Blood Component Type Red Blood Cells     Product Code Y3065W71     Unit Status Transfused     Unit Number [id number]     CROSSMATCH Compatible     CODING SYSTEM [id number]     ISSUE DATE AND TIME [id number]     UNIT ABO/RH O+     UNIT TYPE ISBT 5100    Prepare red blood cells (unit)     Status: None   Result Value Ref Range    Blood Component Type Red Blood Cells     Product Code T0060G49     Unit Status Transfused     Unit Number [id number]     CROSSMATCH Compatible     CODING SYSTEM [id number]     ISSUE DATE AND TIME [id number]     UNIT ABO/RH O+     UNIT TYPE ISBT 5100    Prepare plasma (unit)     Status: None   Result Value Ref Range    Blood Component Type Plasma     Product Code R2170L92     Unit Status Transfused     Unit Number [id number]     CODING SYSTEM [id number]     ISSUE DATE AND TIME [id number]     UNIT ABO/RH AB+     UNIT TYPE ISBT 8400    Prepare plasma (unit)     Status: None   Result Value Ref Range    Blood Component Type Plasma     Product Code E1310R45     Unit Status Transfused     Unit Number [id number]     CODING SYSTEM [id number]     ISSUE DATE AND TIME [id number]     UNIT ABO/RH AB+     UNIT TYPE ISBT 8400    Prepare red blood cells (unit)     Status: None   Result Value Ref Range    Blood Component Type Red Blood Cells     Product Code Q9195J13     Unit Status Transfused     Unit Number [id number]     CROSSMATCH  Compatible     CODING SYSTEM [id number]     ISSUE DATE AND TIME [id number]     UNIT ABO/RH O+     UNIT TYPE ISBT 5100    Prepare plasma (unit)     Status: None   Result Value Ref Range    Blood Component Type Plasma     Product Code Q1785R77     Unit Status Transfused     Unit Number [id number]     CODING SYSTEM [id number]     ISSUE DATE AND TIME [id number]     UNIT ABO/RH AB+     UNIT TYPE ISBT 8400    Prepare pheresed platelets (unit)     Status: None   Result Value Ref Range    ISSUE DATE AND TIME [id number]     Blood Component Type Platelets     Product Code L9690P77     Unit Status Transfused     Unit Number [id number]     UNIT ABO/RH A+     CODING SYSTEM [id number]     UNIT TYPE ISBT 6200    CBC with platelets differential *Canceled*     Status: None ()    Narrative    The following orders were created for panel order CBC with platelets differential.  Procedure                               Abnormality         Status                     ---------                               -----------         ------                     CBC with platelets and d...[[id number]]                                                   Please view results for these tests on the individual orders.   CBC with platelets differential     Status: Abnormal    Narrative    The following orders were created for panel order CBC with platelets differential.  Procedure                               Abnormality         Status                     ---------                               -----------         ------                     CBC with platelets and d...[[id number]]  Abnormal            Final result               RBC and Platelet Morphology[[id number]]                      Final result                 Please view results for these tests on the individual orders.   CBC with platelets differential     Status: Abnormal    Narrative    The following orders were created for panel order CBC with platelets differential.  Procedure                                Abnormality         Status                     ---------                               -----------         ------                     CBC with platelets and d...[[id number]]  Abnormal            Final result               RBC and Platelet Morphology[[id number]]  Abnormal            Final result                 Please view results for these tests on the individual orders.   [facility] [employer] Panel: Source Employee or Patient     Status: None (In process)    Narrative    The following orders were created for panel order [facility] [employer] Panel: Source Employee or Patient.  Procedure                               Abnormality         Status                     ---------                               -----------         ------                     HIV Rapid Antibody Screen[[id number]]                        Final result               HIV Antigen Antibody Combo[[id number]]   Normal              Final result               Hepatitis B surface antigen[[id number]]  Normal              Final result               Hepatitis C RNA, [employer]...[[id number]]                      In process                   Please view results for these tests on the individual orders.       All relevant imaging and laboratory values personally reviewed.

## 2022-11-17 NOTE — PROGRESS NOTES
Writer assessed current PIV on right antecubital, noticed catheter was kinked initially and upon removing the old dressing and straightening the catheter, good flushing noted with 10ml NSS with good blood return, new dressing placed. Notified bedside nurse personally.

## 2022-11-17 NOTE — PROGRESS NOTES
[facility], [place]   11/17/2022  Neurosurgery Progress Note:    Assessment:  Louise Tyler is a 47 year old female with a history of chronic back pain, acquired spinal deformity, now POD-2 for [id number] posterior segmented fusion with L2-3 and L3-4 TLIF, and L5-S1 ALIF with assistance of vascular surgery.     Plan:  - Serial neuro exams  - Hemovacs to full suction  - Ancef while drains in place  - TLSO to be worn when OOB  - Pain control   - Pain consult   - Dilaudid PCA and ketamine gtt     - Intraoperative intrathecal morphine administered  - Regular diet  - Bowel regimen  - PRN antiemetics  - PT/OT  - SCDs for DVT proph   - Transfer to floor       -----------------------------------  Padma Aguiar MD  Neurosurgery PGY3    Please contact neurosurgery resident on call with questions.    Dial * * *[phone], enter [phone] when prompted.  -----------------------------------    Interval History: No acute events overnight. Ambulated yesterday, upright XR obtained. Patient endorses postoperative pain but improved back pain and posture when standing upright. Endorses mild pain and numbness in left anterior leg and dorsum of foot, improving.     Objective:   Temp:  [96.1  F (35.6  C)-98.8  F (37.1  C)] 98.3  F (36.8  C)  Pulse:  [] 86  Resp:  [12-22] 20  BP: ()/() 105/63  MAP:  [56 mmHg-255 mmHg] 69 mmHg  Arterial Line BP: ()/() 102/49  FiO2 (%):  [30 %-60 %] 30 %  SpO2:  [87 %-100 %] 94 %  I/O last 3 completed shifts:  In: 5812.5 [P.O.:120; I.V.:2519.5; NG/GT:265; IV Piggyback:1000]  Out: 5735 [Urine:2410; Drains:325; Blood:3000]    Gen: Appears comfortable, NAD  Wound: clean, dry, intact  Neurologic:  - AOx3, following commands. No aphasia or dysarthria.   - PEERL, EOMI, face symmetrical  - BUE full strength  - RLE full strength  - LLE with pain limited weakness in knee extension, rest 5/5    LABS:  Recent Labs   Lab 11/16/22  2220 11/16/22  1514 11/16/22  1130  11/16/22  0550 11/16/22  0307 11/16/22  0237 11/16/22  0206 11/16/22  0157 11/15/22  0644 11/14/22  1105   NA  --   --   --   --  137  --  138 138   < > 135*   POTASSIUM  --   --   --   --  4.2  --  4.2 4.0   < > 4.3   CHLORIDE  --   --   --   --  107  --   --   --   --  99   CO2  --   --   --   --  20*  --   --   --   --  27   ANIONGAP  --   --   --   --  10  --   --   --   --  9   GLC 98 134* 127*   < > 140*   < > 129* 126*   < > 106*   BUN  --   --   --   --  5.4*  --   --   --   --  11.4   CR  --   --   --   --  0.48*  --   --   --   --  0.70   SEBASTIAN  --   --   --   --  8.4*  --   --   --   --  9.5    < > = values in this interval not displayed.       Recent Labs   Lab 11/16/22  1023   WBC 8.5   RBC 2.92*   HGB 8.4*   HCT 23.9*   MCV 82   MCH 28.8   MCHC 35.1   RDW 14.1   *       IMAGING:  Recent Results (from the past 24 hour(s))   XR Chest Port 1 View    Narrative    EXAM: XR CHEST PORT 1 VIEW  11/16/2022 3:03 AM      HISTORY: Verify ETT and OGT positioning post-op    COMPARISON: None.    FINDINGS: Portable semiupright AP radiograph of the chest. The tip and  sidehole of the NG/OG tube project over the stomach. The tip of the  endotracheal tube projects over the midthoracic trachea. Right IJ  central venous catheter tip projects over the low SVC. The trachea is  midline. No pleural effusion or pneumothorax. Hazy left perihilar and  basilar opacities. The visualized upper abdomen is unremarkable.  Spinal fusion hardware of the thoracolumbar spine. Midline skin  staples of the abdomen.      Impression    IMPRESSION:   1. The tip of the endotracheal tube projects over the midthoracic  trachea.  2. The tip and sidehole of the OG tube project over the stomach.  3. Hazy left perihilar and basilar opacities, likely postoperative  atelectasis.    I have personally reviewed the examination and initial interpretation  and I agree with the findings.    RODY JEFFERS MD         SYSTEM ID:  [id number]   XR Chest Port 1  View    Narrative    EXAM: XR CHEST PORT 1 VIEW  11/16/2022 5:43 AM      HISTORY: Follow up left lung opacity    COMPARISON: Chest x-ray 11/16/2022    FINDINGS: Portable semiupright AP radiograph of the chest. Right IJ  central venous catheter tip projects over the lower SVC. The tip of  the endotracheal tube projects over the midthoracic trachea. The tip  of the NG/OG tube projects over the distal stomach. The trachea is  midline. The cardiac silhouette is stable. No pleural effusion or  pneumothorax. Slightly improved retrocardiac opacity. The visualized  upper abdomen is unremarkable. Cholecystectomy clips. Partially  visualized surgical hardware of the thoracolumbar spine.      Impression    IMPRESSION: Slightly improved retrocardiac opacity, likely decreased  postoperative atelectasis.    I have personally reviewed the examination and initial interpretation  and I agree with the findings.    RODY JEFFERS MD         SYSTEM ID:  [id number]   CT Abdomen Pelvis w/o Contrast    Narrative    EXAMINATION: CT ABDOMEN PELVIS W/O CONTRAST, 11/16/2022 6:30 AM    TECHNIQUE:  Helical CT images from the lung bases through the  symphysis pubis were obtained without contrast.  Coronal reformatted  images were generated at a workstation for further assessment.    CONTRAST: None    COMPARISON: None.    HISTORY: Hypotension, postop anterior spinal fusion approach, rule out  peritoneal/retroperitoneal hematoma    FINDINGS:    Abdomen and pelvis:   Liver: No suspicious liver lesions.   Gallbladder: Cholecystectomy clips.  Spleen: Normal size.  Pancreas: No suspicious pancreatic lesions. The pancreatic duct is not  dilated.  Adrenal glands: No adrenal nodules.  Kidneys: No kidney masses. No hydronephrosis or obstructing renal  stones.  Bladder / Pelvic organs: The urinary bladder is decompressed around a  Khan catheter. Air within the lumen of the urinary bladder, likely  from catheterization. Status post hysterectomy.  Bowel:  Enteric tube tip near the pylorus. No bowel wall thickening or  evidence for obstruction. Mild hyperdense material within the appendix  without appendiceal dilation or periappendiceal inflammatory changes  to suggest acute appendicitis. Colonic diverticulosis.  Lymph nodes: No retroperitoneal, mesenteric, or pelvic  lymphadenopathy.  Peritoneum/retroperitoneum: Trace free fluid in the pelvis. No  loculated fluid collection. No pneumoperitoneum. No evidence for acute  hemorrhage.  Vessels: No infrarenal aortic aneurysm. Right femoral artery catheter  tip in the distal external iliac artery. Atherosclerotic calcification  of the abdominal aorta.    Lung bases: Small bilateral pleural effusions. Left greater than right  mild basilar consolidative opacities. Partially visualized catheter  tip in the low SVC.    Bones and soft tissues: Postsurgical changes of extensive  thoracolumbar spinal posterior fusion and bilateral sacroiliac joint  fusion. No suspicious osseous lesions. Air within the soft tissues of  the left inguinal region. Soft tissue nodularity and subcutaneous  emphysema in the subcutaneous fat of the anterior abdominal wall,  likely also from anterior approach. The bilateral right S1 screw  penetrates through the anterior wall of the sacrum. Bilateral L5  intrapedicular screws also penetrate the anterior wall of the  vertebral body. No hematoma is present. The do not penetrate any major  vessels. Right L5 screw is a few mm away from the right common iliac  vein. Diffuse body wall anasarca.      Impression    IMPRESSION:   1.  No evidence for peritoneal/retroperitoneal hematoma.   2.  Small bilateral pleural effusions with bibasilar decompressive  atelectasis.  3.  Air in the retroperitoneum and in the anterior abdominal wall.  Likely postsurgical from the anterior spinal fusion approach.  4.  L5 and S1 screws penetrate the anterior wall of the vertebral  body. No hematoma present. They do not appear to have  damaged any  major vascular structures.    I have personally reviewed the examination and initial interpretation  and I agree with the findings.    RODY JEFFERS MD         SYSTEM ID:  [id number]   XR Abdomen Port 1 View    Narrative    Exam: XR ABDOMEN PORT 1 VIEW, 11/16/2022 11:19 AM    Indication: verify NGT placement, pulled out    Comparison: CT abdomen same day.    Findings:   Portable AP semiupright view of the abdomen. Spinal fusion  instrumentation. Surgical clips projecting over the mid abdomen.  Enteric tube tip and sidehole projected over the stomach.  Nonobstructive bowel gas pattern. Lung bases are clear. No acute  osseous abnormality.      Impression    Impression: Nasogastric tube tip and sidehole projected over the  stomach. Nonobstructive bowel gas pattern.    I have personally reviewed the examination and initial interpretation  and I agree with the findings.    DARELL AGGARWAL MD         SYSTEM ID:  [id number]   XR Lumbar Spine 2/3 Views    Narrative    EXAM: XR LUMBAR SPINE 2/3 VIEWS  LOCATION: [facility]  DATE/TIME: 11/16/2022 4:32 PM    INDICATION: Upright, post TLSO brace  COMPARISON: None.  TECHNIQUE: CR Lumbar Spine.      Impression    IMPRESSION: There are postsurgical changes of posterior instrumented effusion from T10 through S2 with bilateral sacroiliac arthrodesis and anterior interbody fusion at L3-L4, L4-L5 and L5-S1. Vertebral bodies appear grossly normal in height and   alignment.

## 2022-11-17 NOTE — PROGRESS NOTES
Neurosurgery Brief Progress Note    Left Hemovac drain removal    Drain not deemed to be necessary with low output. Drain site was prepped in usual sterile fashion with chloraprep. The drain was taken off suction. The sutures securing the drain were cut and the site was re-prepped. The drain was removed with tip intact. No immediate complication was observed and the patient tolerated the procedure well.     MD Vikram Cain/DON Preliminary Resident  Department of Neurosurgery

## 2022-11-18 ENCOUNTER — APPOINTMENT (OUTPATIENT)
Dept: OCCUPATIONAL THERAPY | Facility: CLINIC | Age: 47
DRG: 453 | End: 2022-11-18
Attending: NEUROLOGICAL SURGERY
Payer: MEDICAID

## 2022-11-18 ENCOUNTER — APPOINTMENT (OUTPATIENT)
Dept: PHYSICAL THERAPY | Facility: CLINIC | Age: 47
DRG: 453 | End: 2022-11-18
Attending: NEUROLOGICAL SURGERY
Payer: MEDICAID

## 2022-11-18 LAB
ABO/RH(D): NORMAL
ANION GAP SERPL CALCULATED.3IONS-SCNC: 9 MMOL/L (ref 7–15)
ANTIBODY SCREEN: NEGATIVE
BASOPHILS # BLD AUTO: 0 10E3/UL (ref 0–0.2)
BASOPHILS NFR BLD AUTO: 0 %
BLD PROD TYP BPU: NORMAL
BLOOD COMPONENT TYPE: NORMAL
BUN SERPL-MCNC: 7.7 MG/DL (ref 6–20)
CALCIUM SERPL-MCNC: 7.4 MG/DL (ref 8.6–10)
CHLORIDE SERPL-SCNC: 106 MMOL/L (ref 98–107)
CODING SYSTEM: NORMAL
CREAT SERPL-MCNC: 0.52 MG/DL (ref 0.51–0.95)
CROSSMATCH: NORMAL
DEPRECATED HCO3 PLAS-SCNC: 22 MMOL/L (ref 22–29)
EOSINOPHIL # BLD AUTO: 0.1 10E3/UL (ref 0–0.7)
EOSINOPHIL NFR BLD AUTO: 1 %
ERYTHROCYTE [DISTWIDTH] IN BLOOD BY AUTOMATED COUNT: 15.2 % (ref 10–15)
GFR SERPL CREATININE-BSD FRML MDRD: >90 ML/MIN/1.73M2
GLUCOSE BLDC GLUCOMTR-MCNC: 105 MG/DL (ref 70–99)
GLUCOSE BLDC GLUCOMTR-MCNC: 86 MG/DL (ref 70–99)
GLUCOSE BLDC GLUCOMTR-MCNC: 90 MG/DL (ref 70–99)
GLUCOSE SERPL-MCNC: 80 MG/DL (ref 70–99)
HCT VFR BLD AUTO: 20.7 % (ref 35–47)
HGB BLD-MCNC: 7 G/DL (ref 11.7–15.7)
HGB BLD-MCNC: 7.7 G/DL (ref 11.7–15.7)
HGB BLD-MCNC: 9.3 G/DL (ref 11.7–15.7)
IMM GRANULOCYTES # BLD: 0.2 10E3/UL
IMM GRANULOCYTES NFR BLD: 2 %
ISSUE DATE AND TIME: NORMAL
LYMPHOCYTES # BLD AUTO: 1 10E3/UL (ref 0.8–5.3)
LYMPHOCYTES NFR BLD AUTO: 12 %
MAGNESIUM SERPL-MCNC: 2 MG/DL (ref 1.7–2.3)
MCH RBC QN AUTO: 29.2 PG (ref 26.5–33)
MCHC RBC AUTO-ENTMCNC: 33.8 G/DL (ref 31.5–36.5)
MCV RBC AUTO: 86 FL (ref 78–100)
MONOCYTES # BLD AUTO: 0.6 10E3/UL (ref 0–1.3)
MONOCYTES NFR BLD AUTO: 7 %
MRSA DNA SPEC QL NAA+PROBE: NEGATIVE
NEUTROPHILS # BLD AUTO: 6.4 10E3/UL (ref 1.6–8.3)
NEUTROPHILS NFR BLD AUTO: 78 %
NRBC # BLD AUTO: 0 10E3/UL
NRBC BLD AUTO-RTO: 0 /100
PHOSPHATE SERPL-MCNC: 2.9 MG/DL (ref 2.5–4.5)
PLATELET # BLD AUTO: 107 10E3/UL (ref 150–450)
POTASSIUM SERPL-SCNC: 3.9 MMOL/L (ref 3.4–5.3)
POTASSIUM SERPL-SCNC: 4.3 MMOL/L (ref 3.4–5.3)
RBC # BLD AUTO: 2.4 10E6/UL (ref 3.8–5.2)
SA TARGET DNA: NEGATIVE
SODIUM SERPL-SCNC: 137 MMOL/L (ref 136–145)
SPECIMEN EXPIRATION DATE: NORMAL
TROPONIN T SERPL HS-MCNC: 13 NG/L
TROPONIN T SERPL HS-MCNC: 15 NG/L
TROPONIN T SERPL HS-MCNC: 9 NG/L
TROPONIN T SERPL HS-MCNC: 9 NG/L
UNIT ABO/RH: NORMAL
UNIT NUMBER: NORMAL
UNIT STATUS: NORMAL
UNIT TYPE ISBT: 6200
WBC # BLD AUTO: 8.2 10E3/UL (ref 4–11)

## 2022-11-18 PROCEDURE — 87641 MR-STAPH DNA AMP PROBE: CPT | Performed by: NEUROLOGICAL SURGERY

## 2022-11-18 PROCEDURE — 85018 HEMOGLOBIN: CPT | Performed by: STUDENT IN AN ORGANIZED HEALTH CARE EDUCATION/TRAINING PROGRAM

## 2022-11-18 PROCEDURE — 97530 THERAPEUTIC ACTIVITIES: CPT | Mod: GO

## 2022-11-18 PROCEDURE — 258N000003 HC RX IP 258 OP 636: Performed by: STUDENT IN AN ORGANIZED HEALTH CARE EDUCATION/TRAINING PROGRAM

## 2022-11-18 PROCEDURE — 250N000009 HC RX 250: Performed by: STUDENT IN AN ORGANIZED HEALTH CARE EDUCATION/TRAINING PROGRAM

## 2022-11-18 PROCEDURE — 250N000013 HC RX MED GY IP 250 OP 250 PS 637: Performed by: STUDENT IN AN ORGANIZED HEALTH CARE EDUCATION/TRAINING PROGRAM

## 2022-11-18 PROCEDURE — 86901 BLOOD TYPING SEROLOGIC RH(D): CPT | Performed by: NEUROLOGICAL SURGERY

## 2022-11-18 PROCEDURE — 999N000127 HC STATISTIC PERIPHERAL IV START W US GUIDANCE

## 2022-11-18 PROCEDURE — 97530 THERAPEUTIC ACTIVITIES: CPT | Mod: GP

## 2022-11-18 PROCEDURE — 97535 SELF CARE MNGMENT TRAINING: CPT | Mod: GO

## 2022-11-18 PROCEDURE — 84484 ASSAY OF TROPONIN QUANT: CPT | Performed by: STUDENT IN AN ORGANIZED HEALTH CARE EDUCATION/TRAINING PROGRAM

## 2022-11-18 PROCEDURE — 80048 BASIC METABOLIC PNL TOTAL CA: CPT | Performed by: STUDENT IN AN ORGANIZED HEALTH CARE EDUCATION/TRAINING PROGRAM

## 2022-11-18 PROCEDURE — 250N000011 HC RX IP 250 OP 636: Performed by: STUDENT IN AN ORGANIZED HEALTH CARE EDUCATION/TRAINING PROGRAM

## 2022-11-18 PROCEDURE — 86923 COMPATIBILITY TEST ELECTRIC: CPT

## 2022-11-18 PROCEDURE — 83735 ASSAY OF MAGNESIUM: CPT | Performed by: STUDENT IN AN ORGANIZED HEALTH CARE EDUCATION/TRAINING PROGRAM

## 2022-11-18 PROCEDURE — 99232 SBSQ HOSP IP/OBS MODERATE 35: CPT | Performed by: ANESTHESIOLOGY

## 2022-11-18 PROCEDURE — 120N000002 HC R&B MED SURG/OB UMMC

## 2022-11-18 PROCEDURE — 97116 GAIT TRAINING THERAPY: CPT | Mod: GP

## 2022-11-18 PROCEDURE — 85025 COMPLETE CBC W/AUTO DIFF WBC: CPT | Performed by: STUDENT IN AN ORGANIZED HEALTH CARE EDUCATION/TRAINING PROGRAM

## 2022-11-18 PROCEDURE — 258N000003 HC RX IP 258 OP 636: Performed by: NEUROLOGICAL SURGERY

## 2022-11-18 PROCEDURE — 84100 ASSAY OF PHOSPHORUS: CPT | Performed by: STUDENT IN AN ORGANIZED HEALTH CARE EDUCATION/TRAINING PROGRAM

## 2022-11-18 PROCEDURE — 250N000013 HC RX MED GY IP 250 OP 250 PS 637: Performed by: NEUROLOGICAL SURGERY

## 2022-11-18 PROCEDURE — P9016 RBC LEUKOCYTES REDUCED: HCPCS

## 2022-11-18 PROCEDURE — 86850 RBC ANTIBODY SCREEN: CPT | Performed by: NEUROLOGICAL SURGERY

## 2022-11-18 PROCEDURE — 250N000011 HC RX IP 250 OP 636: Performed by: NEUROLOGICAL SURGERY

## 2022-11-18 RX ORDER — ACETAMINOPHEN 325 MG/1
975 TABLET ORAL EVERY 8 HOURS
Status: DISCONTINUED | OUTPATIENT
Start: 2022-11-18 | End: 2022-11-23 | Stop reason: HOSPADM

## 2022-11-18 RX ORDER — TAMSULOSIN HYDROCHLORIDE 0.4 MG/1
0.4 CAPSULE ORAL DAILY
Status: DISCONTINUED | OUTPATIENT
Start: 2022-11-18 | End: 2022-11-23 | Stop reason: HOSPADM

## 2022-11-18 RX ORDER — HYDROMORPHONE HYDROCHLORIDE 2 MG/1
6-8 TABLET ORAL
Status: DISCONTINUED | OUTPATIENT
Start: 2022-11-18 | End: 2022-11-23 | Stop reason: HOSPADM

## 2022-11-18 RX ORDER — HYDROMORPHONE HYDROCHLORIDE 1 MG/ML
.3-.5 INJECTION, SOLUTION INTRAMUSCULAR; INTRAVENOUS; SUBCUTANEOUS
Status: DISCONTINUED | OUTPATIENT
Start: 2022-11-18 | End: 2022-11-21

## 2022-11-18 RX ORDER — MAGNESIUM SULFATE HEPTAHYDRATE 40 MG/ML
2 INJECTION, SOLUTION INTRAVENOUS ONCE
Status: COMPLETED | OUTPATIENT
Start: 2022-11-18 | End: 2022-11-18

## 2022-11-18 RX ADMIN — CLINDAMYCIN IN 5 PERCENT DEXTROSE 900 MG: 18 INJECTION, SOLUTION INTRAVENOUS at 14:19

## 2022-11-18 RX ADMIN — DIAZEPAM 5 MG: 5 TABLET ORAL at 12:17

## 2022-11-18 RX ADMIN — ZINC SULFATE 220 MG (50 MG) CAPSULE 220 MG: CAPSULE at 08:54

## 2022-11-18 RX ADMIN — DIAZEPAM 5 MG: 5 TABLET ORAL at 02:15

## 2022-11-18 RX ADMIN — KETAMINE HYDROCHLORIDE 15 MG/HR: 100 INJECTION, SOLUTION, CONCENTRATE INTRAMUSCULAR; INTRAVENOUS at 08:45

## 2022-11-18 RX ADMIN — TRAZODONE HYDROCHLORIDE 300 MG: 150 TABLET ORAL at 21:58

## 2022-11-18 RX ADMIN — PREGABALIN 200 MG: 100 CAPSULE ORAL at 14:18

## 2022-11-18 RX ADMIN — ONDANSETRON 4 MG: 2 INJECTION INTRAMUSCULAR; INTRAVENOUS at 09:37

## 2022-11-18 RX ADMIN — METOCLOPRAMIDE 10 MG: 10 TABLET ORAL at 14:20

## 2022-11-18 RX ADMIN — ACETAMINOPHEN 975 MG: 325 TABLET, FILM COATED ORAL at 04:07

## 2022-11-18 RX ADMIN — BACLOFEN 10 MG: 10 TABLET ORAL at 09:14

## 2022-11-18 RX ADMIN — METOCLOPRAMIDE 10 MG: 10 TABLET ORAL at 09:20

## 2022-11-18 RX ADMIN — KETAMINE HYDROCHLORIDE 15 MG/HR: 100 INJECTION, SOLUTION, CONCENTRATE INTRAMUSCULAR; INTRAVENOUS at 23:17

## 2022-11-18 RX ADMIN — HYDROMORPHONE HYDROCHLORIDE 8 MG: 2 TABLET ORAL at 16:15

## 2022-11-18 RX ADMIN — MAGNESIUM SULFATE IN WATER 2 G: 40 INJECTION, SOLUTION INTRAVENOUS at 08:51

## 2022-11-18 RX ADMIN — CLINDAMYCIN IN 5 PERCENT DEXTROSE 900 MG: 18 INJECTION, SOLUTION INTRAVENOUS at 22:20

## 2022-11-18 RX ADMIN — BUSPIRONE HYDROCHLORIDE 15 MG: 15 TABLET ORAL at 14:18

## 2022-11-18 RX ADMIN — LEVOTHYROXINE SODIUM 100 MCG: 100 TABLET ORAL at 08:55

## 2022-11-18 RX ADMIN — SODIUM CHLORIDE 1500 MG: 9 INJECTION, SOLUTION INTRAVENOUS at 23:15

## 2022-11-18 RX ADMIN — Medication 20000 UNITS: at 08:55

## 2022-11-18 RX ADMIN — DULOXETINE HYDROCHLORIDE 90 MG: 60 CAPSULE, DELAYED RELEASE ORAL at 08:54

## 2022-11-18 RX ADMIN — LIDOCAINE PATCH 4% 2 PATCH: 40 PATCH TOPICAL at 09:15

## 2022-11-18 RX ADMIN — HYDROMORPHONE HYDROCHLORIDE 0.5 MG: 1 INJECTION, SOLUTION INTRAMUSCULAR; INTRAVENOUS; SUBCUTANEOUS at 18:26

## 2022-11-18 RX ADMIN — Medication 500 MG: at 08:54

## 2022-11-18 RX ADMIN — HYDROMORPHONE HYDROCHLORIDE 0.5 MG: 1 INJECTION, SOLUTION INTRAMUSCULAR; INTRAVENOUS; SUBCUTANEOUS at 21:48

## 2022-11-18 RX ADMIN — KETAMINE HYDROCHLORIDE 15 MG/HR: 100 INJECTION, SOLUTION, CONCENTRATE INTRAMUSCULAR; INTRAVENOUS at 15:30

## 2022-11-18 RX ADMIN — SENNOSIDES AND DOCUSATE SODIUM 2 TABLET: 50; 8.6 TABLET ORAL at 08:55

## 2022-11-18 RX ADMIN — PREGABALIN 200 MG: 100 CAPSULE ORAL at 19:55

## 2022-11-18 RX ADMIN — Medication 200 UNITS: at 08:55

## 2022-11-18 RX ADMIN — DIAZEPAM 5 MG: 5 TABLET ORAL at 18:26

## 2022-11-18 RX ADMIN — BACLOFEN 10 MG: 10 TABLET ORAL at 19:55

## 2022-11-18 RX ADMIN — BUSPIRONE HYDROCHLORIDE 15 MG: 15 TABLET ORAL at 19:58

## 2022-11-18 RX ADMIN — HYDROMORPHONE HYDROCHLORIDE 8 MG: 2 TABLET ORAL at 19:54

## 2022-11-18 RX ADMIN — CLINDAMYCIN IN 5 PERCENT DEXTROSE 900 MG: 18 INJECTION, SOLUTION INTRAVENOUS at 06:24

## 2022-11-18 RX ADMIN — MENTHOL 1 PATCH: 205.5 PATCH TOPICAL at 04:07

## 2022-11-18 RX ADMIN — Medication: at 09:21

## 2022-11-18 RX ADMIN — TAMSULOSIN HYDROCHLORIDE 0.4 MG: 0.4 CAPSULE ORAL at 18:32

## 2022-11-18 RX ADMIN — BACLOFEN 10 MG: 10 TABLET ORAL at 14:19

## 2022-11-18 RX ADMIN — BUSPIRONE HYDROCHLORIDE 15 MG: 15 TABLET ORAL at 08:56

## 2022-11-18 RX ADMIN — ACETAMINOPHEN 975 MG: 325 TABLET, FILM COATED ORAL at 19:54

## 2022-11-18 RX ADMIN — VANCOMYCIN HYDROCHLORIDE 2000 MG: 1 INJECTION, POWDER, LYOPHILIZED, FOR SOLUTION INTRAVENOUS at 10:25

## 2022-11-18 RX ADMIN — PREGABALIN 200 MG: 100 CAPSULE ORAL at 08:54

## 2022-11-18 RX ADMIN — ACETAMINOPHEN 975 MG: 325 TABLET, FILM COATED ORAL at 11:17

## 2022-11-18 ASSESSMENT — ACTIVITIES OF DAILY LIVING (ADL)
ADLS_ACUITY_SCORE: 45
ADLS_ACUITY_SCORE: 39
ADLS_ACUITY_SCORE: 45
ADLS_ACUITY_SCORE: 45
ADLS_ACUITY_SCORE: 41
ADLS_ACUITY_SCORE: 41
ADLS_ACUITY_SCORE: 45
ADLS_ACUITY_SCORE: 41
ADLS_ACUITY_SCORE: 41
ADLS_ACUITY_SCORE: 45
ADLS_ACUITY_SCORE: 41
ADLS_ACUITY_SCORE: 45

## 2022-11-18 ASSESSMENT — VISUAL ACUITY
OU: BASELINE;GLASSES;NORMAL ACUITY
OU: BASELINE;GLASSES;NORMAL ACUITY

## 2022-11-18 NOTE — PHARMACY-VANCOMYCIN DOSING SERVICE
Pharmacy Vancomycin Initial Note  Date of Service 2022  Patient's  1975  47 year old, female    Indication: Postoperative Infection    Current estimated CrCl = Estimated Creatinine Clearance: 163.9 mL/min (based on SCr of 0.52 mg/dL).    Creatinine for last 3 days  2022:  3:07 AM Creatinine 0.48 mg/dL  2022:  4:11 AM Creatinine 0.59 mg/dL  2022:  4:25 AM Creatinine 0.52 mg/dL    Recent Vancomycin Level(s) for last 3 days  No results found for requested labs within last 72 hours.      Vancomycin IV Administrations (past 72 hours)                   vancomycin (VANCOCIN) 1,500 mg in 0.9% NaCl 250 mL intermittent infusion (mg) 1,500 mg Given 11/15/22 1000                Nephrotoxins and other renal medications (From now, onward)    Start     Dose/Rate Route Frequency Ordered Stop    22 2200  vancomycin (VANCOCIN) 1,500 mg in 0.9% NaCl 250 mL intermittent infusion         1,500 mg  over 90 Minutes Intravenous EVERY 12 HOURS 22 0902      22 1000  vancomycin (VANCOCIN) 2,000 mg in sodium chloride 0.9 % 500 mL intermittent infusion         2,000 mg  over 120 Minutes Intravenous ONCE 22 0902            Contrast Orders - past 72 hours (72h ago, onward)    None          Insight[employer] Prediction of Planned Initial Vancomycin Regimen  Exposure target: AUC24 (range)400-600 mg/L.hr   AUC24,ss: 537 mg/L.hr  Probability of AUC24 > 400: 71 %  Ctrough,ss: 13.6 mg/L  Probability of Ctrough,ss > 20: 33 %  Probability of nephrotoxicity (Lodise GABRIELLE ): 9 %          Plan:  1. Give vancomycin 2000mg IV x1 dose followed by 1500mg IV vancomycin q12h.   2. Vancomycin monitoring method: AUC  3. Vancomycin therapeutic monitoring goal: 400-600 mg*h/L  4. Pharmacy will check vancomycin levels as appropriate in 1-3 Days.    5. Serum creatinine levels will be ordered daily for the first week of therapy and at least twice weekly for subsequent weeks.      Marie Soto, PharmD, BCCCP

## 2022-11-18 NOTE — PROGRESS NOTES
Neurocritical Care Multi-Disciplinary Note    Reason for critical care admission: Spine surgery   Admitting Team: TREMAINE   Date of Service:  11/18/2022  Date of Admission:  11/15/2022  Hospital Day: 4    Patient condition reviewed and discussed while on multidisciplinary rounds today.   Please note these minor interventions that were initiated:  1. None.     The Neurocritical Care service will continue to follow peripherally while the patient is within the ICU. We are readily available should issues arise. Please feel free to contact us for critical care issues with which we may be of assistance. For all other concerns, please contact primary service first.     Please contact NCC team phone at *[phone].    CHRISSY Pineda, CNP  Neurocritical Care *[phone]

## 2022-11-18 NOTE — PROGRESS NOTES
CLINICAL NUTRITION SERVICES - BRIEF NOTE    Nutrition Prescription    RECOMMENDATIONS FOR MDs/PROVIDERS TO ORDER:  - Encouragement of adequate PO    Recommendations already ordered by Registered Dietitian (RD):  - Ordered oral nutrition supplement: Ensure Enlive and Gelatein Plus    Future/Additional Recommendations:  - PO/supp adequacy      Pt spot-op spine; extubated and advanced diet to reg. High protein per NSG    Nutrition Progress Note - f/u for progress towards previous nutrition POC (see previous reassessment for details)     Panda Workman, ALEXANDER, LD, [facility]  Neuro ICU  Pager: [phone]
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
## 2022-11-18 NOTE — PLAN OF CARE
ICU End of Shift Summary. See flowsheets for vital signs and detailed assessment.    Changes this shift: Ketamine drip continued. Dilaudid PCA discontinued this evening and transitioned to PO, will continue to reassess pain management plan. Patient has stated her pain is better than yesterday, remains at about a 5 on DPRVS scale, and a 3/4 score would be acceptable to her. Lidocaine patches applied. PRN valium administered as well. Up to chair today for ~4hrs w/ 1x assist. Brace applied before getting out of bed. Poor appetite, protein intake encouraged, able to drink ensure and have jell-o today. Up to bedside commode for 1 BM today, soft, large in amount. Attempted to remove central line earlier in shift, however issues w/ PIV access and compatibility with meds. Reassess removal tomorrow. Straight cathed x2 today, (x4 in last 24hrs), neurosurg team aware, no order for luo at this time. Continue straight cath Q4h for bladder scan >500mL.     Plan: Assess pain management plan with transition from PCA to PO dilaudid. Continue to straight cath for bladder scan >500mL per provider orders. Possibly remove central line tomorrow.         Goal Outcome Evaluation:      Plan of Care Reviewed With: patient    Overall Patient Progress: improvingOverall Patient Progress: improving    Outcome Evaluation: Transitioned this evening off PCA dilaudid, will continue to evaluate if patient is tolerating pain regemine.

## 2022-11-18 NOTE — PROGRESS NOTES
[facility], [place]   11/18/2022  Neurosurgery Progress Note:    Assessment:  Louise Tyler is a 47 year old female with a history of chronic back pain, acquired spinal deformity, now POD-4 for B51-H5NP posterior segmented fusion with L2-3 and L3-4 TLIF, and L5-S1 ALIF with assistance of vascular surgery.     Other diagnoses include:  Acute blood loss anemia requiring blood transfusion  Urinary retention    Plan:  - Serial neuro exams  - Hemovac to full suction  - Ancef while drain in place  - TLSO to be worn when OOB  - Pain control   - Pain consult   - Dilaudid PCA and ketamine gtt     - Intraoperative intrathecal morphine administered  - Transfuse 1 unit of pRBCs today  - Regular diet  - Bowel regimen  - PRN antiemetics  - PT/OT  - SCDs for DVT proph   - Transfer to floor       -----------------------------------  Padma Aguiar MD  Neurosurgery PGY3    Please contact neurosurgery resident on call with questions.    Dial * * *187, enter [phone] when prompted.  -----------------------------------    Interval History: No acute events overnight. Low hemoglobin yesterday, 1 unit of blood transfused, hemoglobin 7.0 this am. Endorsing mechanical left hip pain.     Objective:   Temp:  [97.8  F (36.6  C)-98.5  F (36.9  C)] 98.2  F (36.8  C)  Pulse:  [] 80  Resp:  [16-20] 18  BP: (104-149)/(53-99) 117/66  SpO2:  [92 %-100 %] 100 %  I/O last 3 completed shifts:  In: 1517.59 [P.O.:680; I.V.:537.59]  Out: 2355 [Urine:2225; Drains:130]    Gen: Appears comfortable, NAD  Wound: clean, dry, intact  Neurologic:  - AOx3, following commands. No aphasia or dysarthria.   - PEERL, EOMI, face symmetrical  -Full strength x4  - Sensation intact to light touch    LABS:  Recent Labs   Lab 11/18/22 0427 11/18/22 0425 11/17/22  2038 11/17/22  1310 11/17/22  1308 11/17/22  0416 11/17/22  0411 11/16/22  0550 11/16/22  0307   NA  --  137  --   --   --   --  138  --  137   POTASSIUM  --  3.9 4.3  --  3.1*  --   3.1*  --  4.2   CHLORIDE  --  106  --   --   --   --  108*  --  107   CO2  --  22  --   --   --   --  24  --  20*   ANIONGAP  --  9  --   --   --   --  6*  --  10   GLC 86 80  --  105*  --    < > 88   < > 140*   BUN  --  7.7  --   --   --   --  7.4  --  5.4*   CR  --  0.52  --   --   --   --  0.59  --  0.48*   SEBASTIAN  --  7.4*  --   --   --   --  6.9*  --  8.4*    < > = values in this interval not displayed.       Recent Labs   Lab 11/18/22  0425   WBC 8.2   RBC 2.40*   HGB 7.0*   HCT 20.7*   MCV 86   MCH 29.2   MCHC 33.8   RDW 15.2*   *       IMAGING:  No results found for this or any previous visit (from the past 24 hour(s)).

## 2022-11-18 NOTE — PLAN OF CARE
[id number]  Major Shift Events: A&Ox4, able to make needs known. Neuros intact, patient denies any numbness/tingling in LLE. Patient continues to complain of back and incisional pain with increased left hip pain not well managed with scheduled tylenol, PRN valium, dilaudid PCA, and ketamine gtt. MD notified and ketamine gtt increased to 12.5 mg/hr from 10 mg/hr with patient report of improved left hip pain. Afebrile. SR rates 70s-80s. BP stable. On 2L NC while asleep. No BM. Bladder scanned 412 ml, straight cathed for 1400ml. Hbg 7, 1 unit RBCs needed. Mg 2, 2G IV needed.    Plan: Continue with plan of care.    For vital signs and complete assessments, please see documentation flowsheets.

## 2022-11-18 NOTE — PLAN OF CARE
Major Shift Events:  [id number]    Neuro: Patient remains A&Ox4. Moving all extremities equally. Pupils equal and reactive. Reports some N/T to LLE but says this has improved  Cardiac: Afebrile. NSR. Blood pressures within parameters  Respiratory: 2L NC, unlabored  GI: Regular diet. LBM today  : Patient is due to void, was straight cathed in the afternoon for 825 mL    Plan: Patient received 1 unit RBCs this evening, continue to monitor hgb. Transfer to  when bed available    For vital signs and complete assessments, please see documentation flowsheets.

## 2022-11-19 ENCOUNTER — APPOINTMENT (OUTPATIENT)
Dept: PHYSICAL THERAPY | Facility: CLINIC | Age: 47
DRG: 453 | End: 2022-11-19
Attending: NEUROLOGICAL SURGERY
Payer: MEDICAID

## 2022-11-19 LAB
ANION GAP SERPL CALCULATED.3IONS-SCNC: 8 MMOL/L (ref 7–15)
BASOPHILS # BLD AUTO: 0 10E3/UL (ref 0–0.2)
BASOPHILS NFR BLD AUTO: 1 %
BUN SERPL-MCNC: 5.2 MG/DL (ref 6–20)
CALCIUM SERPL-MCNC: 7.7 MG/DL (ref 8.6–10)
CHLORIDE SERPL-SCNC: 106 MMOL/L (ref 98–107)
CREAT SERPL-MCNC: 0.51 MG/DL (ref 0.51–0.95)
DEPRECATED HCO3 PLAS-SCNC: 24 MMOL/L (ref 22–29)
EOSINOPHIL # BLD AUTO: 0.3 10E3/UL (ref 0–0.7)
EOSINOPHIL NFR BLD AUTO: 4 %
ERYTHROCYTE [DISTWIDTH] IN BLOOD BY AUTOMATED COUNT: 14.9 % (ref 10–15)
GFR SERPL CREATININE-BSD FRML MDRD: >90 ML/MIN/1.73M2
GLUCOSE SERPL-MCNC: 103 MG/DL (ref 70–99)
HCT VFR BLD AUTO: 23.1 % (ref 35–47)
HGB BLD-MCNC: 7.5 G/DL (ref 11.7–15.7)
HOLD SPECIMEN: NORMAL
IMM GRANULOCYTES # BLD: 0.2 10E3/UL
IMM GRANULOCYTES NFR BLD: 2 %
LYMPHOCYTES # BLD AUTO: 1.5 10E3/UL (ref 0.8–5.3)
LYMPHOCYTES NFR BLD AUTO: 19 %
MAGNESIUM SERPL-MCNC: 1.8 MG/DL (ref 1.7–2.3)
MCH RBC QN AUTO: 28.8 PG (ref 26.5–33)
MCHC RBC AUTO-ENTMCNC: 32.5 G/DL (ref 31.5–36.5)
MCV RBC AUTO: 89 FL (ref 78–100)
MONOCYTES # BLD AUTO: 0.6 10E3/UL (ref 0–1.3)
MONOCYTES NFR BLD AUTO: 8 %
NEUTROPHILS # BLD AUTO: 5.1 10E3/UL (ref 1.6–8.3)
NEUTROPHILS NFR BLD AUTO: 66 %
NRBC # BLD AUTO: 0 10E3/UL
NRBC BLD AUTO-RTO: 0 /100
PHOSPHATE SERPL-MCNC: 2.2 MG/DL (ref 2.5–4.5)
PLATELET # BLD AUTO: 141 10E3/UL (ref 150–450)
POTASSIUM SERPL-SCNC: 3.5 MMOL/L (ref 3.4–5.3)
RBC # BLD AUTO: 2.6 10E6/UL (ref 3.8–5.2)
SODIUM SERPL-SCNC: 138 MMOL/L (ref 136–145)
TROPONIN T SERPL HS-MCNC: 13 NG/L
WBC # BLD AUTO: 7.9 10E3/UL (ref 4–11)

## 2022-11-19 PROCEDURE — 258N000003 HC RX IP 258 OP 636: Performed by: NEUROLOGICAL SURGERY

## 2022-11-19 PROCEDURE — 250N000011 HC RX IP 250 OP 636: Performed by: NEUROLOGICAL SURGERY

## 2022-11-19 PROCEDURE — 80048 BASIC METABOLIC PNL TOTAL CA: CPT | Performed by: STUDENT IN AN ORGANIZED HEALTH CARE EDUCATION/TRAINING PROGRAM

## 2022-11-19 PROCEDURE — 250N000011 HC RX IP 250 OP 636: Performed by: STUDENT IN AN ORGANIZED HEALTH CARE EDUCATION/TRAINING PROGRAM

## 2022-11-19 PROCEDURE — 84100 ASSAY OF PHOSPHORUS: CPT | Performed by: STUDENT IN AN ORGANIZED HEALTH CARE EDUCATION/TRAINING PROGRAM

## 2022-11-19 PROCEDURE — 258N000003 HC RX IP 258 OP 636: Performed by: STUDENT IN AN ORGANIZED HEALTH CARE EDUCATION/TRAINING PROGRAM

## 2022-11-19 PROCEDURE — 97116 GAIT TRAINING THERAPY: CPT | Mod: GP

## 2022-11-19 PROCEDURE — 36415 COLL VENOUS BLD VENIPUNCTURE: CPT

## 2022-11-19 PROCEDURE — 250N000013 HC RX MED GY IP 250 OP 250 PS 637: Performed by: NEUROLOGICAL SURGERY

## 2022-11-19 PROCEDURE — 250N000013 HC RX MED GY IP 250 OP 250 PS 637: Performed by: STUDENT IN AN ORGANIZED HEALTH CARE EDUCATION/TRAINING PROGRAM

## 2022-11-19 PROCEDURE — 97530 THERAPEUTIC ACTIVITIES: CPT | Mod: GP

## 2022-11-19 PROCEDURE — 120N000002 HC R&B MED SURG/OB UMMC

## 2022-11-19 PROCEDURE — 250N000009 HC RX 250: Performed by: STUDENT IN AN ORGANIZED HEALTH CARE EDUCATION/TRAINING PROGRAM

## 2022-11-19 PROCEDURE — 84484 ASSAY OF TROPONIN QUANT: CPT | Performed by: STUDENT IN AN ORGANIZED HEALTH CARE EDUCATION/TRAINING PROGRAM

## 2022-11-19 PROCEDURE — 97110 THERAPEUTIC EXERCISES: CPT | Mod: GP

## 2022-11-19 PROCEDURE — 99232 SBSQ HOSP IP/OBS MODERATE 35: CPT | Mod: GC | Performed by: ANESTHESIOLOGY

## 2022-11-19 PROCEDURE — 83735 ASSAY OF MAGNESIUM: CPT | Performed by: STUDENT IN AN ORGANIZED HEALTH CARE EDUCATION/TRAINING PROGRAM

## 2022-11-19 PROCEDURE — 85025 COMPLETE CBC W/AUTO DIFF WBC: CPT | Performed by: STUDENT IN AN ORGANIZED HEALTH CARE EDUCATION/TRAINING PROGRAM

## 2022-11-19 RX ORDER — POTASSIUM CHLORIDE 750 MG/1
20 TABLET, EXTENDED RELEASE ORAL ONCE
Status: COMPLETED | OUTPATIENT
Start: 2022-11-19 | End: 2022-11-19

## 2022-11-19 RX ORDER — MAGNESIUM OXIDE 400 MG/1
400 TABLET ORAL EVERY 4 HOURS
Status: COMPLETED | OUTPATIENT
Start: 2022-11-19 | End: 2022-11-19

## 2022-11-19 RX ADMIN — SODIUM CHLORIDE 1500 MG: 9 INJECTION, SOLUTION INTRAVENOUS at 09:58

## 2022-11-19 RX ADMIN — KETAMINE HYDROCHLORIDE 15 MG/HR: 100 INJECTION, SOLUTION, CONCENTRATE INTRAMUSCULAR; INTRAVENOUS at 06:05

## 2022-11-19 RX ADMIN — Medication 200 UNITS: at 08:00

## 2022-11-19 RX ADMIN — HYDROMORPHONE HYDROCHLORIDE 0.5 MG: 1 INJECTION, SOLUTION INTRAMUSCULAR; INTRAVENOUS; SUBCUTANEOUS at 13:37

## 2022-11-19 RX ADMIN — DIAZEPAM 5 MG: 5 TABLET ORAL at 21:08

## 2022-11-19 RX ADMIN — LIDOCAINE PATCH 4% 2 PATCH: 40 PATCH TOPICAL at 07:59

## 2022-11-19 RX ADMIN — BUSPIRONE HYDROCHLORIDE 15 MG: 15 TABLET ORAL at 20:11

## 2022-11-19 RX ADMIN — HYDROMORPHONE HYDROCHLORIDE 8 MG: 2 TABLET ORAL at 00:06

## 2022-11-19 RX ADMIN — HYDROMORPHONE HYDROCHLORIDE 0.5 MG: 1 INJECTION, SOLUTION INTRAMUSCULAR; INTRAVENOUS; SUBCUTANEOUS at 15:32

## 2022-11-19 RX ADMIN — BUSPIRONE HYDROCHLORIDE 15 MG: 15 TABLET ORAL at 14:08

## 2022-11-19 RX ADMIN — SENNOSIDES AND DOCUSATE SODIUM 2 TABLET: 50; 8.6 TABLET ORAL at 20:12

## 2022-11-19 RX ADMIN — BACLOFEN 10 MG: 10 TABLET ORAL at 14:08

## 2022-11-19 RX ADMIN — CLINDAMYCIN IN 5 PERCENT DEXTROSE 900 MG: 18 INJECTION, SOLUTION INTRAVENOUS at 14:08

## 2022-11-19 RX ADMIN — SENNOSIDES AND DOCUSATE SODIUM 2 TABLET: 50; 8.6 TABLET ORAL at 08:00

## 2022-11-19 RX ADMIN — HYDROMORPHONE HYDROCHLORIDE 0.5 MG: 1 INJECTION, SOLUTION INTRAMUSCULAR; INTRAVENOUS; SUBCUTANEOUS at 19:32

## 2022-11-19 RX ADMIN — PREGABALIN 200 MG: 100 CAPSULE ORAL at 14:08

## 2022-11-19 RX ADMIN — HYDROMORPHONE HYDROCHLORIDE 0.5 MG: 1 INJECTION, SOLUTION INTRAMUSCULAR; INTRAVENOUS; SUBCUTANEOUS at 11:28

## 2022-11-19 RX ADMIN — Medication 500 MG: at 08:11

## 2022-11-19 RX ADMIN — DIAZEPAM 5 MG: 5 TABLET ORAL at 08:02

## 2022-11-19 RX ADMIN — POLYETHYLENE GLYCOL 3350 17 G: 17 POWDER, FOR SOLUTION ORAL at 07:59

## 2022-11-19 RX ADMIN — TAMSULOSIN HYDROCHLORIDE 0.4 MG: 0.4 CAPSULE ORAL at 08:02

## 2022-11-19 RX ADMIN — MAGNESIUM OXIDE TAB 400 MG (241.3 MG ELEMENTAL MG) 400 MG: 400 (241.3 MG) TAB at 20:30

## 2022-11-19 RX ADMIN — BACLOFEN 10 MG: 10 TABLET ORAL at 08:02

## 2022-11-19 RX ADMIN — POTASSIUM & SODIUM PHOSPHATES POWDER PACK 280-160-250 MG 1 PACKET: 280-160-250 PACK at 20:30

## 2022-11-19 RX ADMIN — HYDROMORPHONE HYDROCHLORIDE 0.5 MG: 1 INJECTION, SOLUTION INTRAMUSCULAR; INTRAVENOUS; SUBCUTANEOUS at 17:26

## 2022-11-19 RX ADMIN — HYDROMORPHONE HYDROCHLORIDE 8 MG: 2 TABLET ORAL at 03:15

## 2022-11-19 RX ADMIN — CLINDAMYCIN IN 5 PERCENT DEXTROSE 900 MG: 18 INJECTION, SOLUTION INTRAVENOUS at 05:45

## 2022-11-19 RX ADMIN — ACETAMINOPHEN 975 MG: 325 TABLET, FILM COATED ORAL at 20:12

## 2022-11-19 RX ADMIN — PREGABALIN 200 MG: 100 CAPSULE ORAL at 08:01

## 2022-11-19 RX ADMIN — POTASSIUM & SODIUM PHOSPHATES POWDER PACK 280-160-250 MG 1 PACKET: 280-160-250 PACK at 17:26

## 2022-11-19 RX ADMIN — BUSPIRONE HYDROCHLORIDE 15 MG: 15 TABLET ORAL at 08:02

## 2022-11-19 RX ADMIN — HYDROMORPHONE HYDROCHLORIDE 8 MG: 2 TABLET ORAL at 09:18

## 2022-11-19 RX ADMIN — TRAZODONE HYDROCHLORIDE 300 MG: 150 TABLET ORAL at 22:25

## 2022-11-19 RX ADMIN — HYDROMORPHONE HYDROCHLORIDE 8 MG: 2 TABLET ORAL at 23:01

## 2022-11-19 RX ADMIN — HYDROMORPHONE HYDROCHLORIDE 8 MG: 2 TABLET ORAL at 20:12

## 2022-11-19 RX ADMIN — DULOXETINE HYDROCHLORIDE 90 MG: 60 CAPSULE, DELAYED RELEASE ORAL at 08:00

## 2022-11-19 RX ADMIN — Medication 20000 UNITS: at 08:00

## 2022-11-19 RX ADMIN — ACETAMINOPHEN 975 MG: 325 TABLET, FILM COATED ORAL at 12:12

## 2022-11-19 RX ADMIN — KETAMINE HYDROCHLORIDE 15 MG/HR: 100 INJECTION, SOLUTION, CONCENTRATE INTRAMUSCULAR; INTRAVENOUS at 13:36

## 2022-11-19 RX ADMIN — DIAZEPAM 5 MG: 5 TABLET ORAL at 14:08

## 2022-11-19 RX ADMIN — POTASSIUM CHLORIDE 20 MEQ: 750 TABLET, EXTENDED RELEASE ORAL at 17:26

## 2022-11-19 RX ADMIN — LEVOTHYROXINE SODIUM 100 MCG: 100 TABLET ORAL at 08:00

## 2022-11-19 RX ADMIN — HYDROMORPHONE HYDROCHLORIDE 8 MG: 2 TABLET ORAL at 12:11

## 2022-11-19 RX ADMIN — HYDROMORPHONE HYDROCHLORIDE 0.5 MG: 1 INJECTION, SOLUTION INTRAMUSCULAR; INTRAVENOUS; SUBCUTANEOUS at 21:31

## 2022-11-19 RX ADMIN — CLINDAMYCIN IN 5 PERCENT DEXTROSE 900 MG: 18 INJECTION, SOLUTION INTRAVENOUS at 21:21

## 2022-11-19 RX ADMIN — MAGNESIUM OXIDE TAB 400 MG (241.3 MG ELEMENTAL MG) 400 MG: 400 (241.3 MG) TAB at 17:26

## 2022-11-19 RX ADMIN — ZINC SULFATE 220 MG (50 MG) CAPSULE 220 MG: CAPSULE at 08:00

## 2022-11-19 RX ADMIN — PREGABALIN 200 MG: 100 CAPSULE ORAL at 20:12

## 2022-11-19 RX ADMIN — SODIUM CHLORIDE 1500 MG: 9 INJECTION, SOLUTION INTRAVENOUS at 22:24

## 2022-11-19 RX ADMIN — KETAMINE HYDROCHLORIDE 15 MG/HR: 100 INJECTION, SOLUTION, CONCENTRATE INTRAMUSCULAR; INTRAVENOUS at 19:48

## 2022-11-19 RX ADMIN — HYDROMORPHONE HYDROCHLORIDE 8 MG: 2 TABLET ORAL at 16:26

## 2022-11-19 RX ADMIN — BACLOFEN 10 MG: 10 TABLET ORAL at 20:30

## 2022-11-19 RX ADMIN — HYDROMORPHONE HYDROCHLORIDE 8 MG: 2 TABLET ORAL at 06:01

## 2022-11-19 RX ADMIN — ACETAMINOPHEN 975 MG: 325 TABLET, FILM COATED ORAL at 03:15

## 2022-11-19 ASSESSMENT — ACTIVITIES OF DAILY LIVING (ADL)
ADLS_ACUITY_SCORE: 39
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
## 2022-11-19 NOTE — PLAN OF CARE
"/59 (BP Location: Left arm)   Pulse 86   Temp 98.4  F (36.9  C) (Oral)   Resp 18   Ht 1.626 m (5' 4\")   Wt 109.9 kg (242 lb 4.6 oz)   SpO2 95%   BMI 41.59 kg/m       Time: 2124-1829     Reason for admission: spinal deformity.   Activity: assist of one person with mobility.   Pain: C/o back and ABD area pain, received PRN oral dilaudid every three hours, and it was effective.   Neuro: alert and oriented, UE 5/5, and LE 5/5.   Cardiac: WDL  Resp: Denied SOB, lung sounds clear upper lobes and diminished lower lobes.   GI/: regular diet, Khan catheter, voids without difficulities, bowel sounds present x four quadrants.   Lines: R and L PIV, infusing.   Skin: WDL X, ABD incision with liquid bandage, and back incision covered with Primapore dressing.   Labs/Imaging: No lab or imaging this shift.   New changes to shift: No change.   Plan: continue with current plan of cares.                        "

## 2022-11-19 NOTE — PROGRESS NOTES
[facility], [place]   11/19/2022  Neurosurgery Progress Note:    Assessment:  Louise Tyler is a 47 year old female with a history of chronic back pain, acquired spinal deformity, now POD-4 for G82-E2AL posterior segmented fusion with L2-3 and L3-4 TLIF, and L5-S1 ALIF with assistance of vascular surgery.     Other diagnoses include:  Acute blood loss anemia requiring blood transfusion  Urinary retention requiring Khan placement    Plan:  - Serial neuro exams  - Hemovac to full suction  - Ancef/vancomycin while drain in place  - TLSO to be worn when OOB  - Pain control   - Pain consult   - Dilaudid PCA and ketamine gtt     - Intraoperative intrathecal morphine administered  - Khan for urinary retention  - Regular diet  - Bowel regimen  - PRN antiemetics  - PT/OT  - SCDs for DVT proph        -----------------------------------  Padma Aguiar MD  Neurosurgery PGY3    Please contact neurosurgery resident on call with questions.    Dial * * *[phone], enter [phone] when prompted.  -----------------------------------    Interval History: No acute events overnight. Transferred to floor. Ambulated x2. Khan placed for urinary retention.     Objective:   Temp:  [97.8  F (36.6  C)-98.4  F (36.9  C)] 98.4  F (36.9  C)  Pulse:  [] 86  Resp:  [14-20] 18  BP: (112-170)/(59-98) 112/59  SpO2:  [90 %-100 %] 95 %  I/O last 3 completed shifts:  In: 1925.29 [P.O.:600; I.V.:1025.29]  Out: 3460 [Urine:3350; Drains:110]    Gen: Appears comfortable, NAD  Wound: clean, dry, intact  Neurologic:  - AOx3, following commands. No aphasia or dysarthria.   - PEERL, EOMI, face symmetrical  -Full strength x4  - Sensation intact to light touch    LABS:  Recent Labs   Lab 11/18/22  1523 11/18/22  0843 11/18/22  0427 11/18/22  0425 11/17/22 2038 11/17/22  1310 11/17/22  1308 11/17/22  0416 11/17/22  0411 11/16/22  0550 11/16/22  0307   NA  --   --   --  137  --   --   --   --  138  --  137   POTASSIUM  --   --   --   3.9 4.3  --  3.1*  --  3.1*  --  4.2   CHLORIDE  --   --   --  106  --   --   --   --  108*  --  107   CO2  --   --   --  22  --   --   --   --  24  --  20*   ANIONGAP  --   --   --  9  --   --   --   --  6*  --  10   * 90 86 80  --    < >  --    < > 88   < > 140*   BUN  --   --   --  7.7  --   --   --   --  7.4  --  5.4*   CR  --   --   --  0.52  --   --   --   --  0.59  --  0.48*   SEBASTIAN  --   --   --  7.4*  --   --   --   --  6.9*  --  8.4*    < > = values in this interval not displayed.       Recent Labs   Lab 11/18/22  1515 11/18/22  0425   WBC  --  8.2   RBC  --  2.40*   HGB 9.3* 7.0*   HCT  --  20.7*   MCV  --  86   MCH  --  29.2   MCHC  --  33.8   RDW  --  15.2*   PLT  --  107*       IMAGING:  No results found for this or any previous visit (from the past 24 hour(s)).

## 2022-11-19 NOTE — PLAN OF CARE
Goal Outcome Evaluation:      Plan of Care Reviewed With: patient    Overall Patient Progress: no changeOverall Patient Progress: no change    Temp: 97.8  F (36.6  C) Temp src: Oral BP: 115/74 Pulse: 101   Resp: 18 SpO2: 94 % O2 Device: None (Room air) Oxygen Delivery: 2 LPM  NEURO: A&Ox4, strengths 5/5 throughout, calm/cooperative, able to make needs known  RESPIRATORY: WNL on RA, continuous pulse oximeter in place  CARDIAC: WNL  GI/: Khan for retention w/ 650mL light yellow UOP this shift, +BS, BMx1 today  SKIN: Mid-back incision w/ primapore CDI, R-back wound vac site CDI, small amount bloody drainage visible in wound vac, abd incision SANDER w/ liquid bandage   DIET: Regular  PAIN/NAUSEA: 7-9/10 back and abdominal pain, ketamine gtt infusing at 7.5mL/hr, PRN oral dilaudid given q3hr, PRN valium given x2, PRN IV dilaudid for breakthrough pain given x2; pain management adequate per pt  IV ACCESS: R-PIV infusing scheduled abx, L-PIV infusing ketamine gtt @7.5mL/hr  ACTIVITY: Assist x1 w/ gait belt/walker, TLSO brace on when OOB, up to chair for lunch today  LAB: Lab unable to draw blood after multiple attempts, vascular access ordered for lab draw, Troponin pending  PLAN: Continue w/ current POC and pain management

## 2022-11-19 NOTE — PLAN OF CARE
Patent was unable to void and her bladder scan was 819, Khan catheter placed as ordered and patient had 950 urine output, treatment team notified.

## 2022-11-20 ENCOUNTER — APPOINTMENT (OUTPATIENT)
Dept: OCCUPATIONAL THERAPY | Facility: CLINIC | Age: 47
DRG: 453 | End: 2022-11-20
Attending: NEUROLOGICAL SURGERY
Payer: MEDICAID

## 2022-11-20 LAB
ANION GAP SERPL CALCULATED.3IONS-SCNC: 7 MMOL/L (ref 7–15)
BASOPHILS # BLD AUTO: 0 10E3/UL (ref 0–0.2)
BASOPHILS NFR BLD AUTO: 1 %
BUN SERPL-MCNC: 3.9 MG/DL (ref 6–20)
CALCIUM SERPL-MCNC: 7.6 MG/DL (ref 8.6–10)
CHLORIDE SERPL-SCNC: 108 MMOL/L (ref 98–107)
CREAT SERPL-MCNC: 0.55 MG/DL (ref 0.51–0.95)
DEPRECATED HCO3 PLAS-SCNC: 24 MMOL/L (ref 22–29)
EOSINOPHIL # BLD AUTO: 0.4 10E3/UL (ref 0–0.7)
EOSINOPHIL NFR BLD AUTO: 6 %
ERYTHROCYTE [DISTWIDTH] IN BLOOD BY AUTOMATED COUNT: 15 % (ref 10–15)
GFR SERPL CREATININE-BSD FRML MDRD: >90 ML/MIN/1.73M2
GLUCOSE SERPL-MCNC: 96 MG/DL (ref 70–99)
HCT VFR BLD AUTO: 24.7 % (ref 35–47)
HGB BLD-MCNC: 7.9 G/DL (ref 11.7–15.7)
IMM GRANULOCYTES # BLD: 0.2 10E3/UL
IMM GRANULOCYTES NFR BLD: 3 %
LYMPHOCYTES # BLD AUTO: 1.6 10E3/UL (ref 0.8–5.3)
LYMPHOCYTES NFR BLD AUTO: 28 %
MAGNESIUM SERPL-MCNC: 1.9 MG/DL (ref 1.7–2.3)
MCH RBC QN AUTO: 28.7 PG (ref 26.5–33)
MCHC RBC AUTO-ENTMCNC: 32 G/DL (ref 31.5–36.5)
MCV RBC AUTO: 90 FL (ref 78–100)
MONOCYTES # BLD AUTO: 0.4 10E3/UL (ref 0–1.3)
MONOCYTES NFR BLD AUTO: 8 %
NEUTROPHILS # BLD AUTO: 3 10E3/UL (ref 1.6–8.3)
NEUTROPHILS NFR BLD AUTO: 54 %
NRBC # BLD AUTO: 0 10E3/UL
NRBC BLD AUTO-RTO: 0 /100
PHOSPHATE SERPL-MCNC: 3.8 MG/DL (ref 2.5–4.5)
PLATELET # BLD AUTO: 163 10E3/UL (ref 150–450)
POTASSIUM SERPL-SCNC: 3.7 MMOL/L (ref 3.4–5.3)
RBC # BLD AUTO: 2.75 10E6/UL (ref 3.8–5.2)
SODIUM SERPL-SCNC: 139 MMOL/L (ref 136–145)
TROPONIN T SERPL HS-MCNC: 10 NG/L
TROPONIN T SERPL HS-MCNC: 11 NG/L
TROPONIN T SERPL HS-MCNC: 9 NG/L
TROPONIN T SERPL HS-MCNC: 9 NG/L
WBC # BLD AUTO: 5.6 10E3/UL (ref 4–11)

## 2022-11-20 PROCEDURE — 999N000127 HC STATISTIC PERIPHERAL IV START W US GUIDANCE

## 2022-11-20 PROCEDURE — 82310 ASSAY OF CALCIUM: CPT | Performed by: STUDENT IN AN ORGANIZED HEALTH CARE EDUCATION/TRAINING PROGRAM

## 2022-11-20 PROCEDURE — 250N000011 HC RX IP 250 OP 636: Performed by: NEUROLOGICAL SURGERY

## 2022-11-20 PROCEDURE — 250N000009 HC RX 250: Performed by: STUDENT IN AN ORGANIZED HEALTH CARE EDUCATION/TRAINING PROGRAM

## 2022-11-20 PROCEDURE — 258N000003 HC RX IP 258 OP 636: Performed by: NEUROLOGICAL SURGERY

## 2022-11-20 PROCEDURE — 120N000002 HC R&B MED SURG/OB UMMC

## 2022-11-20 PROCEDURE — 36415 COLL VENOUS BLD VENIPUNCTURE: CPT | Performed by: STUDENT IN AN ORGANIZED HEALTH CARE EDUCATION/TRAINING PROGRAM

## 2022-11-20 PROCEDURE — 258N000003 HC RX IP 258 OP 636: Performed by: STUDENT IN AN ORGANIZED HEALTH CARE EDUCATION/TRAINING PROGRAM

## 2022-11-20 PROCEDURE — 84100 ASSAY OF PHOSPHORUS: CPT | Performed by: STUDENT IN AN ORGANIZED HEALTH CARE EDUCATION/TRAINING PROGRAM

## 2022-11-20 PROCEDURE — 250N000013 HC RX MED GY IP 250 OP 250 PS 637: Performed by: STUDENT IN AN ORGANIZED HEALTH CARE EDUCATION/TRAINING PROGRAM

## 2022-11-20 PROCEDURE — 250N000013 HC RX MED GY IP 250 OP 250 PS 637: Performed by: NEUROLOGICAL SURGERY

## 2022-11-20 PROCEDURE — 84484 ASSAY OF TROPONIN QUANT: CPT | Performed by: STUDENT IN AN ORGANIZED HEALTH CARE EDUCATION/TRAINING PROGRAM

## 2022-11-20 PROCEDURE — 85025 COMPLETE CBC W/AUTO DIFF WBC: CPT | Performed by: STUDENT IN AN ORGANIZED HEALTH CARE EDUCATION/TRAINING PROGRAM

## 2022-11-20 PROCEDURE — 250N000011 HC RX IP 250 OP 636: Performed by: STUDENT IN AN ORGANIZED HEALTH CARE EDUCATION/TRAINING PROGRAM

## 2022-11-20 PROCEDURE — 36416 COLLJ CAPILLARY BLOOD SPEC: CPT | Performed by: STUDENT IN AN ORGANIZED HEALTH CARE EDUCATION/TRAINING PROGRAM

## 2022-11-20 PROCEDURE — 97535 SELF CARE MNGMENT TRAINING: CPT | Mod: GO

## 2022-11-20 PROCEDURE — 99231 SBSQ HOSP IP/OBS SF/LOW 25: CPT | Mod: GC | Performed by: ANESTHESIOLOGY

## 2022-11-20 PROCEDURE — 97530 THERAPEUTIC ACTIVITIES: CPT | Mod: GO

## 2022-11-20 PROCEDURE — 83735 ASSAY OF MAGNESIUM: CPT | Performed by: STUDENT IN AN ORGANIZED HEALTH CARE EDUCATION/TRAINING PROGRAM

## 2022-11-20 RX ORDER — POTASSIUM CHLORIDE 750 MG/1
20 TABLET, EXTENDED RELEASE ORAL ONCE
Status: COMPLETED | OUTPATIENT
Start: 2022-11-20 | End: 2022-11-20

## 2022-11-20 RX ORDER — MAGNESIUM OXIDE 400 MG/1
400 TABLET ORAL EVERY 4 HOURS
Status: COMPLETED | OUTPATIENT
Start: 2022-11-20 | End: 2022-11-20

## 2022-11-20 RX ADMIN — HYDROMORPHONE HYDROCHLORIDE 8 MG: 2 TABLET ORAL at 09:58

## 2022-11-20 RX ADMIN — POTASSIUM & SODIUM PHOSPHATES POWDER PACK 280-160-250 MG 1 PACKET: 280-160-250 PACK at 00:11

## 2022-11-20 RX ADMIN — MENTHOL 1 PATCH: 205.5 PATCH TOPICAL at 20:32

## 2022-11-20 RX ADMIN — HYDROMORPHONE HYDROCHLORIDE 8 MG: 2 TABLET ORAL at 20:32

## 2022-11-20 RX ADMIN — HYDROMORPHONE HYDROCHLORIDE 0.5 MG: 1 INJECTION, SOLUTION INTRAMUSCULAR; INTRAVENOUS; SUBCUTANEOUS at 08:54

## 2022-11-20 RX ADMIN — MAGNESIUM OXIDE TAB 400 MG (241.3 MG ELEMENTAL MG) 400 MG: 400 (241.3 MG) TAB at 08:09

## 2022-11-20 RX ADMIN — HYDROMORPHONE HYDROCHLORIDE 8 MG: 2 TABLET ORAL at 16:24

## 2022-11-20 RX ADMIN — ACETAMINOPHEN 975 MG: 325 TABLET, FILM COATED ORAL at 03:15

## 2022-11-20 RX ADMIN — PREGABALIN 200 MG: 100 CAPSULE ORAL at 19:05

## 2022-11-20 RX ADMIN — SENNOSIDES AND DOCUSATE SODIUM 2 TABLET: 50; 8.6 TABLET ORAL at 08:09

## 2022-11-20 RX ADMIN — POLYETHYLENE GLYCOL 3350 17 G: 17 POWDER, FOR SOLUTION ORAL at 08:07

## 2022-11-20 RX ADMIN — DIAZEPAM 5 MG: 5 TABLET ORAL at 04:30

## 2022-11-20 RX ADMIN — LIDOCAINE PATCH 4% 2 PATCH: 40 PATCH TOPICAL at 08:14

## 2022-11-20 RX ADMIN — KETAMINE HYDROCHLORIDE 15 MG/HR: 100 INJECTION, SOLUTION, CONCENTRATE INTRAMUSCULAR; INTRAVENOUS at 08:55

## 2022-11-20 RX ADMIN — Medication 20000 UNITS: at 08:12

## 2022-11-20 RX ADMIN — CLINDAMYCIN IN 5 PERCENT DEXTROSE 900 MG: 18 INJECTION, SOLUTION INTRAVENOUS at 21:06

## 2022-11-20 RX ADMIN — BACLOFEN 10 MG: 10 TABLET ORAL at 08:11

## 2022-11-20 RX ADMIN — ONDANSETRON 4 MG: 2 INJECTION INTRAMUSCULAR; INTRAVENOUS at 08:29

## 2022-11-20 RX ADMIN — Medication 500 MG: at 08:13

## 2022-11-20 RX ADMIN — HYDROMORPHONE HYDROCHLORIDE 8 MG: 2 TABLET ORAL at 03:17

## 2022-11-20 RX ADMIN — DIAZEPAM 5 MG: 5 TABLET ORAL at 10:59

## 2022-11-20 RX ADMIN — HYDROMORPHONE HYDROCHLORIDE 0.5 MG: 1 INJECTION, SOLUTION INTRAMUSCULAR; INTRAVENOUS; SUBCUTANEOUS at 15:23

## 2022-11-20 RX ADMIN — HYDROMORPHONE HYDROCHLORIDE 0.5 MG: 1 INJECTION, SOLUTION INTRAMUSCULAR; INTRAVENOUS; SUBCUTANEOUS at 06:19

## 2022-11-20 RX ADMIN — SODIUM CHLORIDE 1500 MG: 9 INJECTION, SOLUTION INTRAVENOUS at 09:53

## 2022-11-20 RX ADMIN — HYDROMORPHONE HYDROCHLORIDE 0.5 MG: 1 INJECTION, SOLUTION INTRAMUSCULAR; INTRAVENOUS; SUBCUTANEOUS at 00:11

## 2022-11-20 RX ADMIN — ACETAMINOPHEN 975 MG: 325 TABLET, FILM COATED ORAL at 19:05

## 2022-11-20 RX ADMIN — LEVOTHYROXINE SODIUM 100 MCG: 100 TABLET ORAL at 08:09

## 2022-11-20 RX ADMIN — DIAZEPAM 5 MG: 5 TABLET ORAL at 19:05

## 2022-11-20 RX ADMIN — DULOXETINE HYDROCHLORIDE 90 MG: 60 CAPSULE, DELAYED RELEASE ORAL at 08:06

## 2022-11-20 RX ADMIN — BUSPIRONE HYDROCHLORIDE 15 MG: 15 TABLET ORAL at 19:05

## 2022-11-20 RX ADMIN — BACLOFEN 10 MG: 10 TABLET ORAL at 13:33

## 2022-11-20 RX ADMIN — CLINDAMYCIN IN 5 PERCENT DEXTROSE 900 MG: 18 INJECTION, SOLUTION INTRAVENOUS at 05:14

## 2022-11-20 RX ADMIN — HYDROMORPHONE HYDROCHLORIDE 0.5 MG: 1 INJECTION, SOLUTION INTRAMUSCULAR; INTRAVENOUS; SUBCUTANEOUS at 02:17

## 2022-11-20 RX ADMIN — HYDROMORPHONE HYDROCHLORIDE 8 MG: 2 TABLET ORAL at 23:45

## 2022-11-20 RX ADMIN — PREGABALIN 200 MG: 100 CAPSULE ORAL at 08:05

## 2022-11-20 RX ADMIN — ACETAMINOPHEN 975 MG: 325 TABLET, FILM COATED ORAL at 11:10

## 2022-11-20 RX ADMIN — TAMSULOSIN HYDROCHLORIDE 0.4 MG: 0.4 CAPSULE ORAL at 08:12

## 2022-11-20 RX ADMIN — PREGABALIN 200 MG: 100 CAPSULE ORAL at 13:33

## 2022-11-20 RX ADMIN — HYDROMORPHONE HYDROCHLORIDE 0.5 MG: 1 INJECTION, SOLUTION INTRAMUSCULAR; INTRAVENOUS; SUBCUTANEOUS at 18:00

## 2022-11-20 RX ADMIN — BUSPIRONE HYDROCHLORIDE 15 MG: 15 TABLET ORAL at 08:11

## 2022-11-20 RX ADMIN — KETAMINE HYDROCHLORIDE 15 MG/HR: 100 INJECTION, SOLUTION, CONCENTRATE INTRAMUSCULAR; INTRAVENOUS at 01:31

## 2022-11-20 RX ADMIN — HYDROMORPHONE HYDROCHLORIDE 0.5 MG: 1 INJECTION, SOLUTION INTRAMUSCULAR; INTRAVENOUS; SUBCUTANEOUS at 12:07

## 2022-11-20 RX ADMIN — ZINC SULFATE 220 MG (50 MG) CAPSULE 220 MG: CAPSULE at 08:05

## 2022-11-20 RX ADMIN — Medication 200 UNITS: at 08:12

## 2022-11-20 RX ADMIN — MAGNESIUM OXIDE TAB 400 MG (241.3 MG ELEMENTAL MG) 400 MG: 400 (241.3 MG) TAB at 11:10

## 2022-11-20 RX ADMIN — HYDROMORPHONE HYDROCHLORIDE 0.5 MG: 1 INJECTION, SOLUTION INTRAMUSCULAR; INTRAVENOUS; SUBCUTANEOUS at 21:29

## 2022-11-20 RX ADMIN — BUSPIRONE HYDROCHLORIDE 15 MG: 15 TABLET ORAL at 13:33

## 2022-11-20 RX ADMIN — KETAMINE HYDROCHLORIDE 10 MG/HR: 100 INJECTION, SOLUTION, CONCENTRATE INTRAMUSCULAR; INTRAVENOUS at 17:49

## 2022-11-20 RX ADMIN — HYDROMORPHONE HYDROCHLORIDE 8 MG: 2 TABLET ORAL at 13:33

## 2022-11-20 RX ADMIN — TRAZODONE HYDROCHLORIDE 300 MG: 150 TABLET ORAL at 21:06

## 2022-11-20 RX ADMIN — POTASSIUM CHLORIDE 20 MEQ: 750 TABLET, EXTENDED RELEASE ORAL at 08:09

## 2022-11-20 RX ADMIN — BACLOFEN 10 MG: 10 TABLET ORAL at 19:10

## 2022-11-20 RX ADMIN — CLINDAMYCIN IN 5 PERCENT DEXTROSE 900 MG: 18 INJECTION, SOLUTION INTRAVENOUS at 13:56

## 2022-11-20 RX ADMIN — SODIUM CHLORIDE 1500 MG: 9 INJECTION, SOLUTION INTRAVENOUS at 22:00

## 2022-11-20 ASSESSMENT — VISUAL ACUITY
OU: GLASSES;BASELINE
OU: GLASSES;BASELINE

## 2022-11-20 ASSESSMENT — ACTIVITIES OF DAILY LIVING (ADL)
ADLS_ACUITY_SCORE: 39

## 2022-11-20 NOTE — CARE PLAN
"RN [id number]:    /71 (BP Location: Left arm)   Pulse 83   Temp 98.6  F (37  C) (Oral)   Resp 15   Ht 1.626 m (5' 4\")   Wt 109.9 kg (242 lb 4.6 oz)   SpO2 96%   BMI 41.59 kg/m      Status: S/P L5-S1 anterior lumbar fusion, T10-S1 posterior segmental instrumentation & fusion, and Bilateral stacked sacral 2 alar iliac screws for sacroiliac joint fusion 11/16  Pain/Nausea: on ketamine infusion tapered down to 10 mg/hr, PRN IV and oral dilaudid, PRN valium, scheduled Tylenol, lidocaine patches to L hip. Encouraging patient to wean. Intermittent nausea resolved with IV Zofran  Mobility: assist x 1 with gait belt and walker. TLSO brace when OOB  Diet: regular diet  Labs: reviewed, potassium and magnesium replaced this shift  LDAs: PIV x 2  Skin/incisions: Mid-back incision w/ primapore. Hemovac x1 from back incision with bloody bright red output. Abdominal incision SANDER w/ liquid bandage  Neuro: AOX4. 5/5 throughout. L top of foot numbness and tingling since surgery patient states is improving  Respiratory: On 1 L O2 via nasal cannula. Sating %. Denies SOB  Cardiac: WNL  GI/: luo in place with adequate output. No BM this shift  Plan: continue with plan of care    "

## 2022-11-20 NOTE — PLAN OF CARE
Status: S/P L5-S1 anterior lumbar fusion, T10-S1 posterior segmental instrumentation & fusion, and Bilateral stacked sacral 2 alar iliac screws for sacroiliac joint fusion 11/16  Vitals: VSS on 1-2L NC overnight, continuous pulse ox 93-97%. Respirations 14-16  Neuros: A&Ox4, strength 5/5 throughout, N/T to top of L. Foot since surgery unchaged per pt  IV: Bilateral PIV with L. one infusing Ketamine at 15mg/hr and R TKO between antibiotics   Labs/Electrolytes: Phos replacement given this shift. Hgb: 7.5   Resp/trach: On 1-2L O2. Snoring. LS clear.   Diet: Regular diet   Bowel status: LBM 11/19  : Khan cath in place for retention with large light pale UO  Skin: Mid-back incision w/ primapore and scant amount of dried drainage marked this shift. Hemovac x1 from back incision with 100mL bloody bright red output overnight. Abd incision SANDER w/ liquid bandage   Pain: Back pain managed with ketamine infusion, PRN IV dilaudid, PO dilaudid, PRN valium, scheduled tylenol, and icy hot to L. Hip. Pt. Likes to be woken up for pain meds and likes them given around the clock. Encouraging pt. To wean doses as able, declining to do so this shift  Activity: Ax1, GB, walker. TLSO when OOB   Plan: Continue to monitor and follow POC    **0655: Ketamine infusion beeping for low reservoir volume, observed to have 10mL remaining. Pharmacy was messaged for replacement cartridge at 0517 and acknowledged at 0522. New cartridge currently not available in pt. narc drawer. Will relay to AM nurse. Khan catheter wipes completed this morning
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
## 2022-11-20 NOTE — PLAN OF CARE
Status: S/P L5-S1 anterior lumbar fusion, T10-S1 posterior segmental instrumentation & fusion, and Bilateral stacked sacral 2 alar iliac screws for sacroiliac joint fusion 11/16  Vitals: VSS  Neuros: A&Ox4, strength 5/5 throughout  IV: PIV infusing Ketamine, and TKO between antibiotics   Labs/Electrolytes: K, Mg, and Phos replaced this shift, Hgb: 7.5   Resp/trach: On 2L O2  Diet: Regular diet   Bowel status: LBM 11/19  : Khan cath in place for retention   Skin: Mid-back incision w/ primapore CDI, R-back wound vac site CDI, small bloody drainage into wound vac, abd incision SANDER w/ liquid bandage   Pain: PRN IV & PO dilaudid given round the clock for L hip and back pain, and Valium   Activity: Ax1, GB, walker. TLSO when OOB   Social: Mother was at bedside supportive   Plan: Continue to monitor and follow POC

## 2022-11-20 NOTE — PROGRESS NOTES
[facility], [place]   11/20/2022  Neurosurgery Progress Note:    Assessment:  Louise Tyler is a 47 year old female with a history of chronic back pain, acquired spinal deformity, now POD-5 for [id number] posterior segmented fusion with L2-3 and L3-4 TLIF, and L5-S1 ALIF with assistance of vascular surgery.     Other diagnoses include:  Acute blood loss anemia requiring blood transfusion  Urinary retention requiring Khan placement    Plan:  - Serial neuro exams  - Hemovac to full suction- continue   - Clindamycin/vancomycin while drain in place  - TLSO to be worn when OOB  - Pain control   - Pain consult- Following pain team's recommendations   - Dilaudid PCA discontinued, continue PO dilaudid and ketamine gtt    - Khan for urinary retention  - Regular diet  - Bowel regimen  - PRN antiemetics  - PT/OT  - SCDs for DVT proph   -----------------------------------  Mario Perkins  Neurosurgery Resident PGY2    Please contact neurosurgery resident on call with questions.    Dial * * *[phone], enter [phone] when prompted.  -----------------------------------    Interval History: No acute events overnight. Paperwork signed to return the patient's previous hardware back to her- paperwork delivered to OR.     Objective:   Temp:  [97.6  F (36.4  C)-98.7  F (37.1  C)] 98.6  F (37  C)  Pulse:  [50-87] 83  Resp:  [14-19] 15  BP: (108-154)/(60-77) 123/71  SpO2:  [92 %-100 %] 96 %  I/O last 3 completed shifts:  In: 1300.75 [P.O.:840; I.V.:460.75]  Out: 3450 [Urine:3350; Drains:100]    Gen: Appears comfortable, NAD  Wound: clean, dry, intact  Neurologic:  - AOx3, following commands. No aphasia or dysarthria.   - PEERL, EOMI, face symmetrical  -Full strength x4  - Sensation intact to light touch    LABS:  Recent Labs   Lab 11/20/22  0551 11/19/22  1549 11/18/22  1523 11/18/22  0427 11/18/22  0425    138  --   --  137   POTASSIUM 3.7 3.5  --   --  3.9   CHLORIDE 108* 106  --   --  106   CO2 24 24  --   --  22    ANIONGAP 7 8  --   --  9   GLC 96 103* 105*   < > 80   BUN 3.9* 5.2*  --   --  7.7   CR 0.55 0.51  --   --  0.52   SEBASTIAN 7.6* 7.7*  --   --  7.4*    < > = values in this interval not displayed.       Recent Labs   Lab 11/20/22  0551   WBC 5.6   RBC 2.75*   HGB 7.9*   HCT 24.7*   MCV 90   MCH 28.7   MCHC 32.0   RDW 15.0          IMAGING:  No results found for this or any previous visit (from the past 24 hour(s)).

## 2022-11-21 ENCOUNTER — APPOINTMENT (OUTPATIENT)
Dept: OCCUPATIONAL THERAPY | Facility: CLINIC | Age: 47
DRG: 453 | End: 2022-11-21
Attending: NEUROLOGICAL SURGERY
Payer: MEDICAID

## 2022-11-21 ENCOUNTER — APPOINTMENT (OUTPATIENT)
Dept: PHYSICAL THERAPY | Facility: CLINIC | Age: 47
DRG: 453 | End: 2022-11-21
Attending: NEUROLOGICAL SURGERY
Payer: MEDICAID

## 2022-11-21 LAB
ANION GAP SERPL CALCULATED.3IONS-SCNC: 9 MMOL/L (ref 7–15)
BASOPHILS # BLD AUTO: 0 10E3/UL (ref 0–0.2)
BASOPHILS NFR BLD AUTO: 0 %
BUN SERPL-MCNC: 4 MG/DL (ref 6–20)
CALCIUM SERPL-MCNC: 7.9 MG/DL (ref 8.6–10)
CHLORIDE SERPL-SCNC: 106 MMOL/L (ref 98–107)
CREAT SERPL-MCNC: 0.63 MG/DL (ref 0.51–0.95)
DEPRECATED HCO3 PLAS-SCNC: 27 MMOL/L (ref 22–29)
EOSINOPHIL # BLD AUTO: 0.3 10E3/UL (ref 0–0.7)
EOSINOPHIL NFR BLD AUTO: 5 %
ERYTHROCYTE [DISTWIDTH] IN BLOOD BY AUTOMATED COUNT: 15.2 % (ref 10–15)
GFR SERPL CREATININE-BSD FRML MDRD: >90 ML/MIN/1.73M2
GLUCOSE SERPL-MCNC: 82 MG/DL (ref 70–99)
HCT VFR BLD AUTO: 27.8 % (ref 35–47)
HGB BLD-MCNC: 8.9 G/DL (ref 11.7–15.7)
IMM GRANULOCYTES # BLD: 0.2 10E3/UL
IMM GRANULOCYTES NFR BLD: 3 %
LYMPHOCYTES # BLD AUTO: 1.4 10E3/UL (ref 0.8–5.3)
LYMPHOCYTES NFR BLD AUTO: 24 %
MAGNESIUM SERPL-MCNC: 1.8 MG/DL (ref 1.7–2.3)
MCH RBC QN AUTO: 29 PG (ref 26.5–33)
MCHC RBC AUTO-ENTMCNC: 32 G/DL (ref 31.5–36.5)
MCV RBC AUTO: 91 FL (ref 78–100)
MONOCYTES # BLD AUTO: 0.4 10E3/UL (ref 0–1.3)
MONOCYTES NFR BLD AUTO: 7 %
NEUTROPHILS # BLD AUTO: 3.7 10E3/UL (ref 1.6–8.3)
NEUTROPHILS NFR BLD AUTO: 61 %
NRBC # BLD AUTO: 0 10E3/UL
NRBC BLD AUTO-RTO: 0 /100
PHOSPHATE SERPL-MCNC: 3.7 MG/DL (ref 2.5–4.5)
PLATELET # BLD AUTO: 201 10E3/UL (ref 150–450)
POTASSIUM SERPL-SCNC: 3.5 MMOL/L (ref 3.4–5.3)
RBC # BLD AUTO: 3.07 10E6/UL (ref 3.8–5.2)
SODIUM SERPL-SCNC: 142 MMOL/L (ref 136–145)
VANCOMYCIN SERPL-MCNC: 17.5 UG/ML
WBC # BLD AUTO: 6 10E3/UL (ref 4–11)

## 2022-11-21 PROCEDURE — 36415 COLL VENOUS BLD VENIPUNCTURE: CPT | Performed by: NEUROLOGICAL SURGERY

## 2022-11-21 PROCEDURE — 250N000011 HC RX IP 250 OP 636

## 2022-11-21 PROCEDURE — 250N000013 HC RX MED GY IP 250 OP 250 PS 637: Performed by: STUDENT IN AN ORGANIZED HEALTH CARE EDUCATION/TRAINING PROGRAM

## 2022-11-21 PROCEDURE — 83735 ASSAY OF MAGNESIUM: CPT | Performed by: STUDENT IN AN ORGANIZED HEALTH CARE EDUCATION/TRAINING PROGRAM

## 2022-11-21 PROCEDURE — 258N000003 HC RX IP 258 OP 636: Performed by: NEUROLOGICAL SURGERY

## 2022-11-21 PROCEDURE — 250N000013 HC RX MED GY IP 250 OP 250 PS 637: Performed by: NEUROLOGICAL SURGERY

## 2022-11-21 PROCEDURE — 99024 POSTOP FOLLOW-UP VISIT: CPT | Performed by: NURSE PRACTITIONER

## 2022-11-21 PROCEDURE — 258N000003 HC RX IP 258 OP 636: Performed by: STUDENT IN AN ORGANIZED HEALTH CARE EDUCATION/TRAINING PROGRAM

## 2022-11-21 PROCEDURE — 97116 GAIT TRAINING THERAPY: CPT | Mod: GP

## 2022-11-21 PROCEDURE — 250N000009 HC RX 250: Performed by: STUDENT IN AN ORGANIZED HEALTH CARE EDUCATION/TRAINING PROGRAM

## 2022-11-21 PROCEDURE — 80202 ASSAY OF VANCOMYCIN: CPT | Performed by: NEUROLOGICAL SURGERY

## 2022-11-21 PROCEDURE — 80048 BASIC METABOLIC PNL TOTAL CA: CPT | Performed by: STUDENT IN AN ORGANIZED HEALTH CARE EDUCATION/TRAINING PROGRAM

## 2022-11-21 PROCEDURE — 250N000013 HC RX MED GY IP 250 OP 250 PS 637

## 2022-11-21 PROCEDURE — 97535 SELF CARE MNGMENT TRAINING: CPT | Mod: GO

## 2022-11-21 PROCEDURE — 97530 THERAPEUTIC ACTIVITIES: CPT | Mod: GP

## 2022-11-21 PROCEDURE — 120N000002 HC R&B MED SURG/OB UMMC

## 2022-11-21 PROCEDURE — 85025 COMPLETE CBC W/AUTO DIFF WBC: CPT | Performed by: STUDENT IN AN ORGANIZED HEALTH CARE EDUCATION/TRAINING PROGRAM

## 2022-11-21 PROCEDURE — 84100 ASSAY OF PHOSPHORUS: CPT | Performed by: STUDENT IN AN ORGANIZED HEALTH CARE EDUCATION/TRAINING PROGRAM

## 2022-11-21 PROCEDURE — 250N000011 HC RX IP 250 OP 636: Performed by: NURSE PRACTITIONER

## 2022-11-21 PROCEDURE — 250N000011 HC RX IP 250 OP 636: Performed by: NEUROLOGICAL SURGERY

## 2022-11-21 PROCEDURE — 250N000011 HC RX IP 250 OP 636: Performed by: STUDENT IN AN ORGANIZED HEALTH CARE EDUCATION/TRAINING PROGRAM

## 2022-11-21 PROCEDURE — 97530 THERAPEUTIC ACTIVITIES: CPT | Mod: GO

## 2022-11-21 RX ORDER — MAGNESIUM OXIDE 400 MG/1
400 TABLET ORAL EVERY 4 HOURS
Status: COMPLETED | OUTPATIENT
Start: 2022-11-21 | End: 2022-11-21

## 2022-11-21 RX ORDER — POTASSIUM CHLORIDE 750 MG/1
20 TABLET, EXTENDED RELEASE ORAL ONCE
Status: COMPLETED | OUTPATIENT
Start: 2022-11-21 | End: 2022-11-21

## 2022-11-21 RX ORDER — HYDROMORPHONE HYDROCHLORIDE 1 MG/ML
.3-.5 INJECTION, SOLUTION INTRAMUSCULAR; INTRAVENOUS; SUBCUTANEOUS
Status: DISCONTINUED | OUTPATIENT
Start: 2022-11-21 | End: 2022-11-21

## 2022-11-21 RX ORDER — HYDROMORPHONE HYDROCHLORIDE 1 MG/ML
.3-.5 INJECTION, SOLUTION INTRAMUSCULAR; INTRAVENOUS; SUBCUTANEOUS EVERY 8 HOURS PRN
Status: DISCONTINUED | OUTPATIENT
Start: 2022-11-21 | End: 2022-11-22

## 2022-11-21 RX ORDER — OXYCODONE HYDROCHLORIDE 5 MG/1
5 TABLET ORAL EVERY 4 HOURS PRN
Status: DISCONTINUED | OUTPATIENT
Start: 2022-11-21 | End: 2022-11-21

## 2022-11-21 RX ADMIN — ZINC SULFATE 220 MG (50 MG) CAPSULE 220 MG: CAPSULE at 07:53

## 2022-11-21 RX ADMIN — PREGABALIN 200 MG: 100 CAPSULE ORAL at 13:32

## 2022-11-21 RX ADMIN — BUSPIRONE HYDROCHLORIDE 15 MG: 15 TABLET ORAL at 13:32

## 2022-11-21 RX ADMIN — HYDROMORPHONE HYDROCHLORIDE 0.5 MG: 1 INJECTION, SOLUTION INTRAMUSCULAR; INTRAVENOUS; SUBCUTANEOUS at 15:47

## 2022-11-21 RX ADMIN — ACETAMINOPHEN 975 MG: 325 TABLET, FILM COATED ORAL at 03:05

## 2022-11-21 RX ADMIN — LIDOCAINE PATCH 4% 2 PATCH: 40 PATCH TOPICAL at 07:54

## 2022-11-21 RX ADMIN — TAMSULOSIN HYDROCHLORIDE 0.4 MG: 0.4 CAPSULE ORAL at 07:53

## 2022-11-21 RX ADMIN — BUSPIRONE HYDROCHLORIDE 15 MG: 15 TABLET ORAL at 20:03

## 2022-11-21 RX ADMIN — HYDROMORPHONE HYDROCHLORIDE 8 MG: 2 TABLET ORAL at 14:35

## 2022-11-21 RX ADMIN — HYDROMORPHONE HYDROCHLORIDE 8 MG: 2 TABLET ORAL at 21:39

## 2022-11-21 RX ADMIN — ONDANSETRON 4 MG: 4 TABLET, ORALLY DISINTEGRATING ORAL at 10:08

## 2022-11-21 RX ADMIN — ACETAMINOPHEN 975 MG: 325 TABLET, FILM COATED ORAL at 12:06

## 2022-11-21 RX ADMIN — PREGABALIN 200 MG: 100 CAPSULE ORAL at 20:03

## 2022-11-21 RX ADMIN — DIAZEPAM 5 MG: 5 TABLET ORAL at 07:52

## 2022-11-21 RX ADMIN — ACETAMINOPHEN 975 MG: 325 TABLET, FILM COATED ORAL at 20:03

## 2022-11-21 RX ADMIN — BACLOFEN 10 MG: 10 TABLET ORAL at 13:32

## 2022-11-21 RX ADMIN — BACLOFEN 10 MG: 10 TABLET ORAL at 07:53

## 2022-11-21 RX ADMIN — SODIUM CHLORIDE 1500 MG: 9 INJECTION, SOLUTION INTRAVENOUS at 10:17

## 2022-11-21 RX ADMIN — HYDROMORPHONE HYDROCHLORIDE 0.5 MG: 1 INJECTION, SOLUTION INTRAMUSCULAR; INTRAVENOUS; SUBCUTANEOUS at 18:39

## 2022-11-21 RX ADMIN — POLYETHYLENE GLYCOL 3350 17 G: 17 POWDER, FOR SOLUTION ORAL at 07:53

## 2022-11-21 RX ADMIN — DIAZEPAM 5 MG: 5 TABLET ORAL at 01:16

## 2022-11-21 RX ADMIN — PREGABALIN 200 MG: 100 CAPSULE ORAL at 07:52

## 2022-11-21 RX ADMIN — BACLOFEN 10 MG: 10 TABLET ORAL at 20:03

## 2022-11-21 RX ADMIN — Medication 200 UNITS: at 07:53

## 2022-11-21 RX ADMIN — HYDROMORPHONE HYDROCHLORIDE 8 MG: 2 TABLET ORAL at 08:56

## 2022-11-21 RX ADMIN — HYDROMORPHONE HYDROCHLORIDE 8 MG: 2 TABLET ORAL at 17:43

## 2022-11-21 RX ADMIN — HYDROMORPHONE HYDROCHLORIDE 8 MG: 2 TABLET ORAL at 12:06

## 2022-11-21 RX ADMIN — KETAMINE HYDROCHLORIDE 7.5 MG/HR: 100 INJECTION, SOLUTION, CONCENTRATE INTRAMUSCULAR; INTRAVENOUS at 13:35

## 2022-11-21 RX ADMIN — CLINDAMYCIN IN 5 PERCENT DEXTROSE 900 MG: 18 INJECTION, SOLUTION INTRAVENOUS at 05:19

## 2022-11-21 RX ADMIN — TRAZODONE HYDROCHLORIDE 300 MG: 150 TABLET ORAL at 21:40

## 2022-11-21 RX ADMIN — MENTHOL 1 PATCH: 205.5 PATCH TOPICAL at 20:04

## 2022-11-21 RX ADMIN — Medication 500 MG: at 07:53

## 2022-11-21 RX ADMIN — CLINDAMYCIN IN 5 PERCENT DEXTROSE 900 MG: 18 INJECTION, SOLUTION INTRAVENOUS at 13:40

## 2022-11-21 RX ADMIN — Medication 400 MG: at 13:32

## 2022-11-21 RX ADMIN — SENNOSIDES AND DOCUSATE SODIUM 2 TABLET: 50; 8.6 TABLET ORAL at 20:03

## 2022-11-21 RX ADMIN — Medication 20000 UNITS: at 07:53

## 2022-11-21 RX ADMIN — OXYCODONE HYDROCHLORIDE 5 MG: 5 TABLET ORAL at 10:08

## 2022-11-21 RX ADMIN — HYDROMORPHONE HYDROCHLORIDE 8 MG: 2 TABLET ORAL at 03:05

## 2022-11-21 RX ADMIN — Medication 400 MG: at 15:47

## 2022-11-21 RX ADMIN — BUSPIRONE HYDROCHLORIDE 15 MG: 15 TABLET ORAL at 07:53

## 2022-11-21 RX ADMIN — LEVOTHYROXINE SODIUM 100 MCG: 100 TABLET ORAL at 07:53

## 2022-11-21 RX ADMIN — DIAZEPAM 5 MG: 5 TABLET ORAL at 20:03

## 2022-11-21 RX ADMIN — POTASSIUM CHLORIDE 20 MEQ: 750 TABLET, EXTENDED RELEASE ORAL at 13:32

## 2022-11-21 RX ADMIN — DULOXETINE HYDROCHLORIDE 90 MG: 60 CAPSULE, DELAYED RELEASE ORAL at 07:53

## 2022-11-21 RX ADMIN — DIAZEPAM 5 MG: 5 TABLET ORAL at 14:04

## 2022-11-21 RX ADMIN — HYDROMORPHONE HYDROCHLORIDE 8 MG: 2 TABLET ORAL at 06:10

## 2022-11-21 RX ADMIN — KETAMINE HYDROCHLORIDE 10 MG/HR: 100 INJECTION, SOLUTION, CONCENTRATE INTRAMUSCULAR; INTRAVENOUS at 03:26

## 2022-11-21 ASSESSMENT — ACTIVITIES OF DAILY LIVING (ADL)
ADLS_ACUITY_SCORE: 35
ADLS_ACUITY_SCORE: 35
ADLS_ACUITY_SCORE: 39
ADLS_ACUITY_SCORE: 39
ADLS_ACUITY_SCORE: 35
ADLS_ACUITY_SCORE: 35
ADLS_ACUITY_SCORE: 39
ADLS_ACUITY_SCORE: 39
ADLS_ACUITY_SCORE: 35
ADLS_ACUITY_SCORE: 35
ADLS_ACUITY_SCORE: 39
ADLS_ACUITY_SCORE: 39

## 2022-11-21 NOTE — PLAN OF CARE
"Status: Pt on 6A s/p L5-S1 anterior lumbar fusion, T10-S1 posterior segmental instrumentation & fusion, and bilateral stacked sacral 2 alar iliac screws for sacroiliac joint fusion 11/16.  Vitals: VSS on 1L NC overnight.  Neuros: A&O x4. Strengths 5/5. N/T to top of left foot.   IV: One PIV infusing Ketamine infusion at 10 mg/hr, other PIV TKO between abx.  Labs/Electrolytes: Hgb 7.9.  Resp/trach: LS clear/dim. Denies SOB.   Diet: Regular, good intake.   Bowel status: BS+. LBM 11/20.  : Khan in place for retention. Cares completed.  Skin: Midline back incision with primapore, changed by MD this shift. Hemovac x1 from back incision with bright red bloody output. Abdominal incision with liquid bandage.   Pain: C/o back and L hip pain at 6-7/10 pain continually. Continuous ketamine infusion; scheduled baclofen, Lyrica, Tylenol, lidocaine patch; PRN IV Dilaudid, PO Dilaudid, PO Valium, and Icy Hot patch given. Pt said they only \"take the edge off.\"  Activity: SBA, GB, walker. TLSO when OOB. Up in chair at start of shift.   Social: Mom at bedside during visiting hours, supportive of pt.   Plan: Pain control.      "

## 2022-11-21 NOTE — PHARMACY-VANCOMYCIN DOSING SERVICE
Pharmacy Vancomycin Note  Date of Service 2022  Patient's  1975   47 year old, female    Indication: Postoperative Infection  Day of Therapy: 4  Current vancomycin regimen:  1500 mg IV q12h  Current vancomycin monitoring method: AUC  Current vancomycin therapeutic monitoring goal: 400-600 mg*h/L    InsightRX Prediction of Current Vancomycin Regimen  Loading dose: N/A  Regimen: 1500 mg IV every 12 hours.  Start time: 14:25 on 2022  Exposure target: AUC24 (range)400-600 mg/L.hr   AUC24,ss: 608 mg/L.hr  Probability of AUC24 > 400: 100 %  Ctrough,ss: 16.5 mg/L  Probability of Ctrough,ss > 20: 4 %  Probability of nephrotoxicity (Lodise GABRIELLE ): 12 %      Current estimated CrCl = Estimated Creatinine Clearance: 137.4 mL/min (based on SCr of 0.63 mg/dL).    Creatinine for last 3 days  2022:  3:49 PM Creatinine 0.51 mg/dL  2022:  5:51 AM Creatinine 0.55 mg/dL  2022:  8:31 AM Creatinine 0.63 mg/dL    Recent Vancomycin Levels (past 3 days)  2022:  8:31 AM Vancomycin 17.5 ug/mL    Vancomycin IV Administrations (past 72 hours)                   vancomycin (VANCOCIN) 1,500 mg in 0.9% NaCl 250 mL intermittent infusion (mg) 1,500 mg New Bag 22 1017     1,500 mg New Bag 22 2200     1,500 mg New Bag  0953     1,500 mg New Bag 22 2224     1,500 mg New Bag  0958     1,500 mg New Bag 22 2315                Nephrotoxins and other renal medications (From now, onward)    Start     Dose/Rate Route Frequency Ordered Stop    22 2200  vancomycin (VANCOCIN) 1,250 mg in 0.9% NaCl 250 mL intermittent infusion         1,250 mg  over 90 Minutes Intravenous EVERY 12 HOURS 22 1327               Contrast Orders - past 72 hours (72h ago, onward)    None          Interpretation of levels and current regimen:  Vancomycin level is reflective of AUC greater than 600    Has serum creatinine changed greater than 50% in last 72 hours: No    Urine output:  good urine  output    Renal Function: Stable    InsightRX Prediction of Planned New Vancomycin Regimen  Loading dose: N/A  Regimen: 1250 mg IV every 12 hours.  Start time: 14:25 on 11/21/2022  Exposure target: AUC24 (range)400-600 mg/L.hr   AUC24,ss: 507 mg/L.hr  Probability of AUC24 > 400: 94 %  Ctrough,ss: 13.8 mg/L  Probability of Ctrough,ss > 20: 0 %  Probability of nephrotoxicity (Lodise GABRIELLE 2009): 9 %      Plan:  1. Decrease Dose to vancomycin 1250 every 12 hours  2. Vancomycin monitoring method: AUC  3. Vancomycin therapeutic monitoring goal: 400-600 mg*h/L  4. Pharmacy will check vancomycin levels as appropriate in 1-3 Days.  5. Serum creatinine levels will be ordered daily for the first week of therapy and at least twice weekly for subsequent weeks.    Mainor Williamson RPH

## 2022-11-21 NOTE — PLAN OF CARE
Status: S/P L5-S1 anterior lumbar fusion, T10-S1 posterior segmental instrumentation & fusion, and Bilateral stacked sacral 2 alar iliac screws for sacroiliac joint fusion 11/16  Vitals: VSS on 1L NC overnight, continuous pulse ox 93-97%. Respirations 16-18. Intermittent tachy 100-110bpm  Neuros: A&Ox4, strength 5/5 throughout with generalized weakness, N/T to top of L. Foot since surgery unchaged per pt  IV: PIV x2 with one infusing Ketamine at 5mL/hr and other TKO between antibiotics   Labs/Electrolytes: Phos replacement given this shift. Hgb: 7.9  Resp/trach: On 1L O2. Snoring. LS clear.   Diet: Regular diet   Bowel status: LBM 11/20  : Khan cath in place for retention with large light pale UO  Skin: Mid-back incision w/ primapore. CDI. Hemovac x1 from back incision. Abd incision SANDER w/ liquid bandage   Pain: Back pain managed with ketamine infusion, PRN PO dilaudid, PRN valium, scheduled tylenol. Encouraging pt. to wean doses as able, did not take any IV dilaudid overnight only PO this shift  Activity: Ax1, GB, walker. TLSO when OOB   Plan: Continue to monitor and follow POC

## 2022-11-21 NOTE — PLAN OF CARE
Vitals: VSS on 1L NC when in bed.   Neuros: Unchanged; numbness to top of left foot.   IV: PIV x2. Ketamine 7.5mg/hr.   Labs/Electrolytes: K and Mg replaced  Resp/trach: WNL  Diet: Regular diet - Fair po. Zofran given with good relief this am.   Bowel status: Small BM 11/21  : Khan cath in place for retention - Good UOP   Skin: Back incision dressing CDI. Abd incision SANDER. Hemovac with 20 ml output this shift.   Pain: Back pain constantly 6/10. PO dilaudid, valium, oxycodone given along with scheduled medications. Ketamine pump decreased this shift, plan to stop tomorrow. Has IV dilaudid to use for breakthrough pain, did not use this shift.   Activity: Up with 1, GB and walker. TLSO when oob. Walked halls multiple times and sat in recliner.   Plan: Pain management.
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
## 2022-11-21 NOTE — PROGRESS NOTES
[facility], [place]   11/21/2022  Neurosurgery Progress Note:    Assessment:  Louise Tyler is a 47 year old female with a history of chronic back pain, acquired spinal deformity, now POD-5 for [id number] posterior segmented fusion with L2-3 and L3-4 TLIF, and L5-S1 ALIF with assistance of vascular surgery.     Other diagnoses include:  Acute blood loss anemia requiring blood transfusion  Urinary retention requiring Luo placement    Plan:  - Serial neuro exams  - Hemovac to full suction- continue.  If output continues to diminish and is less than 30 mL X 2, will remove drain.   - Clindamycin/vancomycin while drain in place  - TLSO to be worn when OOB  - Pain control   - Pain consult- Following pain team's recommendations   - Continue PO dilaudid and weaning ketamine gtt    - Luo for urinary retention  - Regular diet  - Bowel regimen  - PRN antiemetics  - PT/OT  - SCDs for DVT proph   -----------------------------------  CHRISSY Vogel, CNP  Department of Neurosurgery  Pager: [phone]    Please contact neurosurgery resident on call with questions.    Dial * * *[phone], enter [phone] when prompted.  -----------------------------------    Interval History:   Patient complaining of worsening incisional pain and left lateral leg pain this morning.  +BM, luo remains in place.   Hemovac remains in place, however output is slowing down.  PT recommending home.     Objective:   Temp:  [97.7  F (36.5  C)-98.6  F (37  C)] 98.3  F (36.8  C)  Pulse:  [74-98] 80  Resp:  [15-18] 18  BP: (108-123)/(59-71) 111/65  SpO2:  [93 %-98 %] 96 %  I/O last 3 completed shifts:  In: 85.33 [I.V.:85.33]  Out: 3760 [Urine:3650; Drains:110]    Gen: Appears comfortable, NAD  Wound: clean, dry, intact  Neurologic:  - AOx3, following commands. No aphasia or dysarthria.   - PEERL, EOMI, face symmetrical  -Full strength x4  - Sensation intact to light touch    LABS:  Recent Labs   Lab 11/20/22  0551 11/19/22  1549 11/18/22  1522  11/18/22  0427 11/18/22  0425    138  --   --  137   POTASSIUM 3.7 3.5  --   --  3.9   CHLORIDE 108* 106  --   --  106   CO2 24 24  --   --  22   ANIONGAP 7 8  --   --  9   GLC 96 103* 105*   < > 80   BUN 3.9* 5.2*  --   --  7.7   CR 0.55 0.51  --   --  0.52   SEBASTIAN 7.6* 7.7*  --   --  7.4*    < > = values in this interval not displayed.       Recent Labs   Lab 11/21/22  0831   WBC 6.0   RBC 3.07*   HGB 8.9*   HCT 27.8*   MCV 91   MCH 29.0   MCHC 32.0   RDW 15.2*          IMAGING:  No results found for this or any previous visit (from the past 24 hour(s)).

## 2022-11-22 ENCOUNTER — APPOINTMENT (OUTPATIENT)
Dept: OCCUPATIONAL THERAPY | Facility: CLINIC | Age: 47
DRG: 453 | End: 2022-11-22
Attending: NEUROLOGICAL SURGERY
Payer: MEDICAID

## 2022-11-22 LAB
ANION GAP SERPL CALCULATED.3IONS-SCNC: 15 MMOL/L (ref 7–15)
BASOPHILS # BLD AUTO: 0 10E3/UL (ref 0–0.2)
BASOPHILS NFR BLD AUTO: 0 %
BUN SERPL-MCNC: 5.4 MG/DL (ref 6–20)
CALCIUM SERPL-MCNC: 8.7 MG/DL (ref 8.6–10)
CHLORIDE SERPL-SCNC: 105 MMOL/L (ref 98–107)
CREAT SERPL-MCNC: 0.54 MG/DL (ref 0.51–0.95)
DEPRECATED HCO3 PLAS-SCNC: 20 MMOL/L (ref 22–29)
EOSINOPHIL # BLD AUTO: 0.3 10E3/UL (ref 0–0.7)
EOSINOPHIL NFR BLD AUTO: 3 %
ERYTHROCYTE [DISTWIDTH] IN BLOOD BY AUTOMATED COUNT: 14.9 % (ref 10–15)
GFR SERPL CREATININE-BSD FRML MDRD: >90 ML/MIN/1.73M2
GLUCOSE SERPL-MCNC: 113 MG/DL (ref 70–99)
HCT VFR BLD AUTO: 30.4 % (ref 35–47)
HGB BLD-MCNC: 10 G/DL (ref 11.7–15.7)
IMM GRANULOCYTES # BLD: 0.3 10E3/UL
IMM GRANULOCYTES NFR BLD: 3 %
LYMPHOCYTES # BLD AUTO: 1.7 10E3/UL (ref 0.8–5.3)
LYMPHOCYTES NFR BLD AUTO: 18 %
MAGNESIUM SERPL-MCNC: 1.9 MG/DL (ref 1.7–2.3)
MCH RBC QN AUTO: 28.9 PG (ref 26.5–33)
MCHC RBC AUTO-ENTMCNC: 32.9 G/DL (ref 31.5–36.5)
MCV RBC AUTO: 88 FL (ref 78–100)
MONOCYTES # BLD AUTO: 0.5 10E3/UL (ref 0–1.3)
MONOCYTES NFR BLD AUTO: 6 %
NEUTROPHILS # BLD AUTO: 6.4 10E3/UL (ref 1.6–8.3)
NEUTROPHILS NFR BLD AUTO: 70 %
NRBC # BLD AUTO: 0 10E3/UL
NRBC BLD AUTO-RTO: 0 /100
PHOSPHATE SERPL-MCNC: 3.1 MG/DL (ref 2.5–4.5)
PLATELET # BLD AUTO: 271 10E3/UL (ref 150–450)
POTASSIUM SERPL-SCNC: 3.8 MMOL/L (ref 3.4–5.3)
RBC # BLD AUTO: 3.46 10E6/UL (ref 3.8–5.2)
SODIUM SERPL-SCNC: 140 MMOL/L (ref 136–145)
WBC # BLD AUTO: 9.2 10E3/UL (ref 4–11)

## 2022-11-22 PROCEDURE — 84100 ASSAY OF PHOSPHORUS: CPT | Performed by: STUDENT IN AN ORGANIZED HEALTH CARE EDUCATION/TRAINING PROGRAM

## 2022-11-22 PROCEDURE — 85041 AUTOMATED RBC COUNT: CPT | Performed by: STUDENT IN AN ORGANIZED HEALTH CARE EDUCATION/TRAINING PROGRAM

## 2022-11-22 PROCEDURE — 250N000013 HC RX MED GY IP 250 OP 250 PS 637: Performed by: NEUROLOGICAL SURGERY

## 2022-11-22 PROCEDURE — 258N000003 HC RX IP 258 OP 636: Performed by: STUDENT IN AN ORGANIZED HEALTH CARE EDUCATION/TRAINING PROGRAM

## 2022-11-22 PROCEDURE — 250N000013 HC RX MED GY IP 250 OP 250 PS 637: Performed by: STUDENT IN AN ORGANIZED HEALTH CARE EDUCATION/TRAINING PROGRAM

## 2022-11-22 PROCEDURE — 80048 BASIC METABOLIC PNL TOTAL CA: CPT | Performed by: STUDENT IN AN ORGANIZED HEALTH CARE EDUCATION/TRAINING PROGRAM

## 2022-11-22 PROCEDURE — 97535 SELF CARE MNGMENT TRAINING: CPT | Mod: GO

## 2022-11-22 PROCEDURE — 83735 ASSAY OF MAGNESIUM: CPT | Performed by: STUDENT IN AN ORGANIZED HEALTH CARE EDUCATION/TRAINING PROGRAM

## 2022-11-22 PROCEDURE — 120N000002 HC R&B MED SURG/OB UMMC

## 2022-11-22 PROCEDURE — 36415 COLL VENOUS BLD VENIPUNCTURE: CPT | Performed by: STUDENT IN AN ORGANIZED HEALTH CARE EDUCATION/TRAINING PROGRAM

## 2022-11-22 PROCEDURE — 250N000011 HC RX IP 250 OP 636: Performed by: STUDENT IN AN ORGANIZED HEALTH CARE EDUCATION/TRAINING PROGRAM

## 2022-11-22 PROCEDURE — 99024 POSTOP FOLLOW-UP VISIT: CPT | Performed by: NURSE PRACTITIONER

## 2022-11-22 PROCEDURE — 250N000009 HC RX 250: Performed by: STUDENT IN AN ORGANIZED HEALTH CARE EDUCATION/TRAINING PROGRAM

## 2022-11-22 PROCEDURE — 250N000011 HC RX IP 250 OP 636

## 2022-11-22 RX ORDER — HEPARIN SODIUM 5000 [USP'U]/.5ML
5000 INJECTION, SOLUTION INTRAVENOUS; SUBCUTANEOUS EVERY 8 HOURS
Status: DISCONTINUED | OUTPATIENT
Start: 2022-11-22 | End: 2022-11-23 | Stop reason: HOSPADM

## 2022-11-22 RX ORDER — POTASSIUM CHLORIDE 750 MG/1
20 TABLET, EXTENDED RELEASE ORAL ONCE
Status: COMPLETED | OUTPATIENT
Start: 2022-11-22 | End: 2022-11-22

## 2022-11-22 RX ORDER — HYDROMORPHONE HYDROCHLORIDE 1 MG/ML
.3-.5 INJECTION, SOLUTION INTRAMUSCULAR; INTRAVENOUS; SUBCUTANEOUS EVERY 8 HOURS PRN
Status: DISCONTINUED | OUTPATIENT
Start: 2022-11-22 | End: 2022-11-23

## 2022-11-22 RX ORDER — MAGNESIUM OXIDE 400 MG/1
400 TABLET ORAL EVERY 4 HOURS
Status: COMPLETED | OUTPATIENT
Start: 2022-11-22 | End: 2022-11-22

## 2022-11-22 RX ADMIN — HYDROMORPHONE HYDROCHLORIDE 8 MG: 2 TABLET ORAL at 20:29

## 2022-11-22 RX ADMIN — HYDROMORPHONE HYDROCHLORIDE 8 MG: 2 TABLET ORAL at 01:34

## 2022-11-22 RX ADMIN — KETAMINE HYDROCHLORIDE 7.5 MG/HR: 100 INJECTION, SOLUTION, CONCENTRATE INTRAMUSCULAR; INTRAVENOUS at 01:00

## 2022-11-22 RX ADMIN — TRAZODONE HYDROCHLORIDE 300 MG: 150 TABLET ORAL at 20:28

## 2022-11-22 RX ADMIN — Medication 200 UNITS: at 08:36

## 2022-11-22 RX ADMIN — SENNOSIDES AND DOCUSATE SODIUM 2 TABLET: 50; 8.6 TABLET ORAL at 08:36

## 2022-11-22 RX ADMIN — BUSPIRONE HYDROCHLORIDE 15 MG: 15 TABLET ORAL at 14:39

## 2022-11-22 RX ADMIN — BUSPIRONE HYDROCHLORIDE 15 MG: 15 TABLET ORAL at 20:29

## 2022-11-22 RX ADMIN — BACLOFEN 10 MG: 10 TABLET ORAL at 08:36

## 2022-11-22 RX ADMIN — ACETAMINOPHEN 975 MG: 325 TABLET, FILM COATED ORAL at 03:27

## 2022-11-22 RX ADMIN — HEPARIN SODIUM 5000 UNITS: 5000 INJECTION, SOLUTION INTRAVENOUS; SUBCUTANEOUS at 16:50

## 2022-11-22 RX ADMIN — HYDROMORPHONE HYDROCHLORIDE 8 MG: 2 TABLET ORAL at 09:40

## 2022-11-22 RX ADMIN — DIAZEPAM 5 MG: 5 TABLET ORAL at 10:22

## 2022-11-22 RX ADMIN — HYDROMORPHONE HYDROCHLORIDE 8 MG: 2 TABLET ORAL at 06:33

## 2022-11-22 RX ADMIN — LEVOTHYROXINE SODIUM 100 MCG: 100 TABLET ORAL at 08:36

## 2022-11-22 RX ADMIN — PREGABALIN 200 MG: 100 CAPSULE ORAL at 20:29

## 2022-11-22 RX ADMIN — PREGABALIN 200 MG: 100 CAPSULE ORAL at 14:39

## 2022-11-22 RX ADMIN — ACETAMINOPHEN 975 MG: 325 TABLET, FILM COATED ORAL at 20:29

## 2022-11-22 RX ADMIN — ZINC SULFATE 220 MG (50 MG) CAPSULE 220 MG: CAPSULE at 08:36

## 2022-11-22 RX ADMIN — ONDANSETRON 4 MG: 4 TABLET, ORALLY DISINTEGRATING ORAL at 09:00

## 2022-11-22 RX ADMIN — Medication 400 MG: at 14:39

## 2022-11-22 RX ADMIN — BUSPIRONE HYDROCHLORIDE 15 MG: 15 TABLET ORAL at 08:37

## 2022-11-22 RX ADMIN — Medication 400 MG: at 17:48

## 2022-11-22 RX ADMIN — ACETAMINOPHEN 975 MG: 325 TABLET, FILM COATED ORAL at 12:34

## 2022-11-22 RX ADMIN — BACLOFEN 10 MG: 10 TABLET ORAL at 14:39

## 2022-11-22 RX ADMIN — POTASSIUM CHLORIDE 20 MEQ: 750 TABLET, EXTENDED RELEASE ORAL at 14:39

## 2022-11-22 RX ADMIN — MENTHOL 1 PATCH: 205.5 PATCH TOPICAL at 20:29

## 2022-11-22 RX ADMIN — Medication 500 MG: at 08:37

## 2022-11-22 RX ADMIN — SENNOSIDES AND DOCUSATE SODIUM 2 TABLET: 50; 8.6 TABLET ORAL at 20:34

## 2022-11-22 RX ADMIN — HEPARIN SODIUM 5000 UNITS: 5000 INJECTION, SOLUTION INTRAVENOUS; SUBCUTANEOUS at 08:41

## 2022-11-22 RX ADMIN — HYDROMORPHONE HYDROCHLORIDE 8 MG: 2 TABLET ORAL at 12:33

## 2022-11-22 RX ADMIN — PREGABALIN 200 MG: 100 CAPSULE ORAL at 08:37

## 2022-11-22 RX ADMIN — BACLOFEN 10 MG: 10 TABLET ORAL at 20:29

## 2022-11-22 RX ADMIN — POLYETHYLENE GLYCOL 3350 17 G: 17 POWDER, FOR SOLUTION ORAL at 08:37

## 2022-11-22 RX ADMIN — LIDOCAINE PATCH 4% 1 PATCH: 40 PATCH TOPICAL at 08:37

## 2022-11-22 RX ADMIN — TAMSULOSIN HYDROCHLORIDE 0.4 MG: 0.4 CAPSULE ORAL at 08:36

## 2022-11-22 RX ADMIN — DULOXETINE HYDROCHLORIDE 90 MG: 60 CAPSULE, DELAYED RELEASE ORAL at 08:37

## 2022-11-22 RX ADMIN — HYDROMORPHONE HYDROCHLORIDE 8 MG: 2 TABLET ORAL at 16:48

## 2022-11-22 RX ADMIN — DIAZEPAM 5 MG: 5 TABLET ORAL at 17:48

## 2022-11-22 RX ADMIN — Medication 20000 UNITS: at 08:37

## 2022-11-22 RX ADMIN — DIAZEPAM 5 MG: 5 TABLET ORAL at 03:33

## 2022-11-22 ASSESSMENT — ACTIVITIES OF DAILY LIVING (ADL)
ADLS_ACUITY_SCORE: 39
ADLS_ACUITY_SCORE: 35
ADLS_ACUITY_SCORE: 39
ADLS_ACUITY_SCORE: 35
ADLS_ACUITY_SCORE: 39
ADLS_ACUITY_SCORE: 39
ADLS_ACUITY_SCORE: 35
ADLS_ACUITY_SCORE: 39
ADLS_ACUITY_SCORE: 35
ADLS_ACUITY_SCORE: 39
ADLS_ACUITY_SCORE: 35
ADLS_ACUITY_SCORE: 39

## 2022-11-22 ASSESSMENT — VISUAL ACUITY
OU: GLASSES;BASELINE
OU: GLASSES;BASELINE

## 2022-11-22 NOTE — PLAN OF CARE
Vitals: VSS - Weaned to Room Air   Neuros: Unchanged; numbness to top of left foot.   IV: PIV x2 SLd   Labs/Electrolytes: K and Mg replaced  Resp/trach: WNL  Diet: Regular diet - Fair po. Zofran given with good relief this am.   Bowel status: Small BM 11/22  : Khan removed this am. Pt has voided x 2, 1st void 0 PVR   Skin: Back incision dressing CDI. Abd incision SANDER.   Pain: Back pain constantly 6/10. PO dilaudid, valium along with scheduled medications. Ketamine pump stopped today. Has not wanted IV dilaudid.   Activity: Up with SBA, GB and walker. TLSO when oob. Sat in recliner x 2.   Plan: Pain management. NP aware that pt was cold/shaky today after pain meds decreased. Will stay on top of po meds.

## 2022-11-22 NOTE — PROGRESS NOTES
[facility], [place]   11/22/2022  Neurosurgery Progress Note:    Assessment:  Louise Tyler is a 47 year old female with a history of chronic back pain, acquired spinal deformity, now POD-5 for [id number] posterior segmented fusion with L2-3 and L3-4 TLIF, and L5-S1 ALIF with assistance of vascular surgery.     Other diagnoses include:  Acute blood loss anemia requiring blood transfusion  Urinary retention requiring Khan placement    Plan:  - Serial neuro exams  - Hemovac removed 11/21/22  - Clindamycin/vancomycin while drain in place  - TLSO to be worn when OOB  - Pain control   - Pain consult- Following pain team's recommendations   - Continue PO dilaudid and weaning ketamine gtt    - Khan for urinary retention- will attempt trial of void today  - Regular diet  - Bowel regimen  - PRN antiemetics  - PT/OT  - SCDs/subcutaneous heparin for DVT proph   -----------------------------------  CHRISSY Vogel, CNP  Department of Neurosurgery  Pager: [phone]    Please contact neurosurgery resident on call with questions.    Dial * * [phone], enter [phone] when prompted.  -----------------------------------    Interval History:   Patient states pain is better controlled today.  Hemovac removed yesterday.  Working with PT/OT who recommend home upon discharge.  Ketamine drip being weaned.  Tolerating diet.  Khan remains in place due to retention.     Objective:   Temp:  [97.3  F (36.3  C)-98.4  F (36.9  C)] 97.8  F (36.6  C)  Pulse:  [64-88] 64  Resp:  [14-18] 16  BP: (107-131)/(56-78) 131/78  SpO2:  [88 %-97 %] 97 %  I/O last 3 completed shifts:  In: 520 [P.O.:500; I.V.:20]  Out: 3525 [Urine:3505; Drains:20]    Gen: Appears comfortable, NAD  Wound: clean, dry, intact  Neurologic:  - AOx3, following commands. No aphasia or dysarthria.   - PEERL, EOMI, face symmetrical  -Full strength x4  - Sensation intact to light touch    LABS:  Recent Labs   Lab 11/21/22  0831 11/20/22  0551 11/19/22  1549    139  138   POTASSIUM 3.5 3.7 3.5   CHLORIDE 106 108* 106   CO2 27 24 24   ANIONGAP 9 7 8   GLC 82 96 103*   BUN 4.0* 3.9* 5.2*   CR 0.63 0.55 0.51   SEBASTIAN 7.9* 7.6* 7.7*       Recent Labs   Lab 11/21/22  0831   WBC 6.0   RBC 3.07*   HGB 8.9*   HCT 27.8*   MCV 91   MCH 29.0   MCHC 32.0   RDW 15.2*          IMAGING:  No results found for this or any previous visit (from the past 24 hour(s)).

## 2022-11-22 NOTE — PROGRESS NOTES
"SPIRITUAL HEALTH SERVICES Progress Note  [facility] 6A    Saw pt Louise MORTON Brennonle per length of stay to introduce [facility], mother Mary was present.      Illness Narrative - Louise described her operation has having \"gone well they say.\" This morning she is alternatively feeling \"clammy and warm\" which is giving her some discomfort.      Distress - Louise shared that she is feeling \"scared\" about what rehab and recovery will entail for her. She spoke about a previous surgery in March after which \"all I could do was sit in a chair.\" Louise said \"I have good days and bad days here\" as she worries about her future.      Coping - Louise is supported by her mother Mary, and her two daughters. Louise articulated \"I believe there is something out there, but I don't go to [employer]\" when asked about her spiritual and elise background.      Meaning-Making - Something that gives Louise hope is looking to play with her 5 year old grand daughter. She also named that she underwent a similar surgery as a teenager and knows how difficult her recovery might be.      Plan - I offered supportive presence to Louise and affirmed her experience of fear mixed with hope.    Bishnu Cao  Chaplain Resident  Pager [phone]    * [facility] remains available 24/7 for emergent requests/referrals, either by having the switchboard page the on-call  or by entering an ASAP/STAT consult in Epic (this will also page the on-call ). Routine Epic consults receive an initial response within 24 hours.*    "
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
## 2022-11-22 NOTE — PROGRESS NOTES
Neurosurgery Brief Progress Note    Hemovac drain removal    Right sided drain not deemed to be necessary with low output. Drain site was prepped in usual sterile fashion with chloraprep. The drain was taken off suction. The sutures securing the drain were cut and the site was re-prepped. The drain was removed with tip intact. No immediate complication was observed and the patient tolerated the procedure well.     MD Prabhjot CainSuoj/DON Preliminary Resident  Department of Neurosurgery

## 2022-11-22 NOTE — PLAN OF CARE
"Status: Pt on 6A s/p L5-S1 anterior lumbar fusion, T10-S1 posterior segmental instrumentation & fusion, and bilateral stacked sacral 2 alar iliac screws for sacroiliac joint fusion 11/16.  Vitals: VSS on 1L NC overnight.  Neuros: A&O x4. Strengths 5/5. N/T to top of left foot.   IV: One PIV infusing Ketamine infusion at 7.5 mg/hr, other PIV SL.  Labs/Electrolytes: K and Mag replaced on day shift, redraw in AM.  Resp/trach: LS clear/dim. Denies SOB.   Diet: Regular, fair intake.   Bowel status: BS+. LBM 11/20.  : Khan in place for retention. Cares completed.  Skin: Midline back incision with primapore, CDI. Abdominal incision with liquid bandage and SANDER.   Pain: C/o back and L hip pain at 6-7/10 pain continually. Continuous ketamine infusion; scheduled baclofen, Lyrica, Tylenol, lidocaine patch; PRN IV Dilaudid, PO Dilaudid, PO Valium, and Icy Hot patch given. Pt said they only \"take the edge off.\" Pt encouraged to wean doses of Dilaudid as possible, but was concerned about decreased Ketamine and IV Dilaudid, so did not want to decrease PO Dilaudid this evening.  Activity: SBA, GB, walker. TLSO when OOB. Up in chair at start of shift.   Social: Mom at bedside during visiting hours, supportive of pt.   Plan: Wean off ketamine gtt and IV Dilaudid tomorrow. Possible discharge home Wednesday or Thursday. Encourage activity and OOB in chair.  Updates this shift: IV Dilaudid changed to q8h. Hemovac removed.  "

## 2022-11-22 NOTE — PLAN OF CARE
Goal Outcome Evaluation:         Neuro: A&Ox4. Neuro intact  Cardiac: Afebrile, VSS.   Respiratory: RA on 1 L 02 with sats dropping to 88 % now 02 is at 2L NC with sats 95 % NC  GI/: Khan catheter with good urine output. No BM this shift. On 11/20/22  Diet/appetite: Tolerating *regular diet. Denies nausea   Activity: Up with * assist  Of SBA, GB and walker.with TLSO on   Pain: . Constant back pain. Has Katamine gtt at 7.5 mg/hour.. Also requesting prn po Dilaudid 8 mg had x 2 and Valium x 1   Skin: No new deficits noted. Back dressing is clean/intact. Old drainage site dressing is clean/dry  Lines: X2 Right PIV one with Katamine gtt and the upper one is Saline loacked  Drains: None  Replacement:    Pt has been resting comfortably throughout night, will continue to monitor and follow plan of care. Pt has been sleeping well between cares.

## 2022-11-23 ENCOUNTER — APPOINTMENT (OUTPATIENT)
Dept: OCCUPATIONAL THERAPY | Facility: CLINIC | Age: 47
DRG: 453 | End: 2022-11-23
Attending: NEUROLOGICAL SURGERY
Payer: MEDICAID

## 2022-11-23 ENCOUNTER — APPOINTMENT (OUTPATIENT)
Dept: PHYSICAL THERAPY | Facility: CLINIC | Age: 47
DRG: 453 | End: 2022-11-23
Attending: NEUROLOGICAL SURGERY
Payer: MEDICAID

## 2022-11-23 ENCOUNTER — DOCUMENTATION ONLY (OUTPATIENT)
Dept: NEUROSURGERY | Facility: CLINIC | Age: 47
End: 2022-11-23

## 2022-11-23 VITALS
HEIGHT: 64 IN | RESPIRATION RATE: 16 BRPM | HEART RATE: 71 BPM | WEIGHT: 249 LBS | BODY MASS INDEX: 42.51 KG/M2 | DIASTOLIC BLOOD PRESSURE: 64 MMHG | SYSTOLIC BLOOD PRESSURE: 120 MMHG | OXYGEN SATURATION: 95 % | TEMPERATURE: 97.7 F

## 2022-11-23 LAB
ANION GAP SERPL CALCULATED.3IONS-SCNC: 12 MMOL/L (ref 7–15)
BASOPHILS # BLD AUTO: 0 10E3/UL (ref 0–0.2)
BASOPHILS NFR BLD AUTO: 0 %
BUN SERPL-MCNC: 5.2 MG/DL (ref 6–20)
CALCIUM SERPL-MCNC: 8.1 MG/DL (ref 8.6–10)
CHLORIDE SERPL-SCNC: 105 MMOL/L (ref 98–107)
CREAT SERPL-MCNC: 0.55 MG/DL (ref 0.51–0.95)
DEPRECATED HCO3 PLAS-SCNC: 21 MMOL/L (ref 22–29)
EOSINOPHIL # BLD AUTO: 0.1 10E3/UL (ref 0–0.7)
EOSINOPHIL NFR BLD AUTO: 1 %
ERYTHROCYTE [DISTWIDTH] IN BLOOD BY AUTOMATED COUNT: 15.4 % (ref 10–15)
GFR SERPL CREATININE-BSD FRML MDRD: >90 ML/MIN/1.73M2
GLUCOSE SERPL-MCNC: 83 MG/DL (ref 70–99)
HCT VFR BLD AUTO: 26.3 % (ref 35–47)
HGB BLD-MCNC: 8.1 G/DL (ref 11.7–15.7)
IMM GRANULOCYTES # BLD: 0.2 10E3/UL
IMM GRANULOCYTES NFR BLD: 2 %
LYMPHOCYTES # BLD AUTO: 1.5 10E3/UL (ref 0.8–5.3)
LYMPHOCYTES NFR BLD AUTO: 20 %
MAGNESIUM SERPL-MCNC: 1.9 MG/DL (ref 1.7–2.3)
MCH RBC QN AUTO: 28.8 PG (ref 26.5–33)
MCHC RBC AUTO-ENTMCNC: 30.8 G/DL (ref 31.5–36.5)
MCV RBC AUTO: 94 FL (ref 78–100)
MONOCYTES # BLD AUTO: 0.5 10E3/UL (ref 0–1.3)
MONOCYTES NFR BLD AUTO: 7 %
NEUTROPHILS # BLD AUTO: 5.4 10E3/UL (ref 1.6–8.3)
NEUTROPHILS NFR BLD AUTO: 70 %
NRBC # BLD AUTO: 0 10E3/UL
NRBC BLD AUTO-RTO: 0 /100
PHOSPHATE SERPL-MCNC: 3.7 MG/DL (ref 2.5–4.5)
PLATELET # BLD AUTO: 231 10E3/UL (ref 150–450)
POTASSIUM SERPL-SCNC: 3.6 MMOL/L (ref 3.4–5.3)
RBC # BLD AUTO: 2.81 10E6/UL (ref 3.8–5.2)
SODIUM SERPL-SCNC: 138 MMOL/L (ref 136–145)
WBC # BLD AUTO: 7.7 10E3/UL (ref 4–11)

## 2022-11-23 PROCEDURE — 250N000011 HC RX IP 250 OP 636: Performed by: NEUROLOGICAL SURGERY

## 2022-11-23 PROCEDURE — G0008 ADMIN INFLUENZA VIRUS VAC: HCPCS | Performed by: NEUROLOGICAL SURGERY

## 2022-11-23 PROCEDURE — 250N000013 HC RX MED GY IP 250 OP 250 PS 637: Performed by: STUDENT IN AN ORGANIZED HEALTH CARE EDUCATION/TRAINING PROGRAM

## 2022-11-23 PROCEDURE — 85004 AUTOMATED DIFF WBC COUNT: CPT | Performed by: STUDENT IN AN ORGANIZED HEALTH CARE EDUCATION/TRAINING PROGRAM

## 2022-11-23 PROCEDURE — 80048 BASIC METABOLIC PNL TOTAL CA: CPT | Performed by: STUDENT IN AN ORGANIZED HEALTH CARE EDUCATION/TRAINING PROGRAM

## 2022-11-23 PROCEDURE — 97535 SELF CARE MNGMENT TRAINING: CPT | Mod: GO | Performed by: OCCUPATIONAL THERAPIST

## 2022-11-23 PROCEDURE — 250N000011 HC RX IP 250 OP 636

## 2022-11-23 PROCEDURE — 250N000013 HC RX MED GY IP 250 OP 250 PS 637: Performed by: NEUROLOGICAL SURGERY

## 2022-11-23 PROCEDURE — 97530 THERAPEUTIC ACTIVITIES: CPT | Mod: GO | Performed by: OCCUPATIONAL THERAPIST

## 2022-11-23 PROCEDURE — 90686 IIV4 VACC NO PRSV 0.5 ML IM: CPT | Performed by: NEUROLOGICAL SURGERY

## 2022-11-23 PROCEDURE — 83735 ASSAY OF MAGNESIUM: CPT | Performed by: STUDENT IN AN ORGANIZED HEALTH CARE EDUCATION/TRAINING PROGRAM

## 2022-11-23 PROCEDURE — 97530 THERAPEUTIC ACTIVITIES: CPT | Mod: GP | Performed by: REHABILITATION PRACTITIONER

## 2022-11-23 PROCEDURE — 36415 COLL VENOUS BLD VENIPUNCTURE: CPT | Performed by: STUDENT IN AN ORGANIZED HEALTH CARE EDUCATION/TRAINING PROGRAM

## 2022-11-23 PROCEDURE — 84100 ASSAY OF PHOSPHORUS: CPT | Performed by: STUDENT IN AN ORGANIZED HEALTH CARE EDUCATION/TRAINING PROGRAM

## 2022-11-23 RX ORDER — POLYETHYLENE GLYCOL 3350 17 G/17G
17 POWDER, FOR SOLUTION ORAL 2 TIMES DAILY
Qty: 60 PACKET | Refills: 11 | Status: SHIPPED | OUTPATIENT
Start: 2022-11-23 | End: 2022-12-23

## 2022-11-23 RX ORDER — ZINC SULFATE 50(220)MG
220 CAPSULE ORAL DAILY
Qty: 7 CAPSULE | Refills: 0 | Status: SHIPPED | OUTPATIENT
Start: 2022-11-24 | End: 2022-12-01

## 2022-11-23 RX ORDER — AMOXICILLIN 250 MG
2 CAPSULE ORAL 2 TIMES DAILY
Qty: 120 TABLET | Refills: 0 | Status: SHIPPED | OUTPATIENT
Start: 2022-11-23 | End: 2022-12-23

## 2022-11-23 RX ORDER — TAMSULOSIN HYDROCHLORIDE 0.4 MG/1
0.4 CAPSULE ORAL DAILY
Qty: 3 CAPSULE | Refills: 0 | Status: SHIPPED | OUTPATIENT
Start: 2022-11-24 | End: 2022-11-27

## 2022-11-23 RX ORDER — DIAZEPAM 5 MG
5 TABLET ORAL EVERY 6 HOURS PRN
Qty: 30 TABLET | Refills: 0 | Status: SHIPPED | OUTPATIENT
Start: 2022-11-23

## 2022-11-23 RX ORDER — HYDROMORPHONE HYDROCHLORIDE 2 MG/1
6-8 TABLET ORAL
Qty: 60 TABLET | Refills: 0 | Status: SHIPPED | OUTPATIENT
Start: 2022-11-23

## 2022-11-23 RX ORDER — CHOLECALCIFEROL (VITAMIN D3) 125 MCG
20000 CAPSULE ORAL DAILY
Qty: 14 CAPSULE | Refills: 0 | Status: SHIPPED | OUTPATIENT
Start: 2022-11-24 | End: 2022-12-01

## 2022-11-23 RX ORDER — BACLOFEN 10 MG/1
10 TABLET ORAL 3 TIMES DAILY
Qty: 45 TABLET | Refills: 0 | Status: SHIPPED | OUTPATIENT
Start: 2022-11-23

## 2022-11-23 RX ADMIN — HYDROMORPHONE HYDROCHLORIDE 8 MG: 2 TABLET ORAL at 04:02

## 2022-11-23 RX ADMIN — TAMSULOSIN HYDROCHLORIDE 0.4 MG: 0.4 CAPSULE ORAL at 08:38

## 2022-11-23 RX ADMIN — SENNOSIDES AND DOCUSATE SODIUM 2 TABLET: 50; 8.6 TABLET ORAL at 08:43

## 2022-11-23 RX ADMIN — Medication 200 UNITS: at 08:38

## 2022-11-23 RX ADMIN — DULOXETINE HYDROCHLORIDE 90 MG: 60 CAPSULE, DELAYED RELEASE ORAL at 08:38

## 2022-11-23 RX ADMIN — INFLUENZA A VIRUS A/VICTORIA/2570/2019 IVR-215 (H1N1) ANTIGEN (FORMALDEHYDE INACTIVATED), INFLUENZA A VIRUS A/DARWIN/9/2021 SAN-010 (H3N2) ANTIGEN (FORMALDEHYDE INACTIVATED), INFLUENZA B VIRUS B/PHUKET/3073/2013 ANTIGEN (FORMALDEHYDE INACTIVATED), AND INFLUENZA B VIRUS B/MICHIGAN/01/2021 ANTIGEN (FORMALDEHYDE INACTIVATED) 0.5 ML: 15; 15; 15; 15 INJECTION, SUSPENSION INTRAMUSCULAR at 10:29

## 2022-11-23 RX ADMIN — LEVOTHYROXINE SODIUM 100 MCG: 100 TABLET ORAL at 08:38

## 2022-11-23 RX ADMIN — BUSPIRONE HYDROCHLORIDE 15 MG: 15 TABLET ORAL at 08:38

## 2022-11-23 RX ADMIN — Medication 500 MG: at 08:38

## 2022-11-23 RX ADMIN — HYDROMORPHONE HYDROCHLORIDE 8 MG: 2 TABLET ORAL at 09:58

## 2022-11-23 RX ADMIN — BACLOFEN 10 MG: 10 TABLET ORAL at 08:38

## 2022-11-23 RX ADMIN — HEPARIN SODIUM 5000 UNITS: 5000 INJECTION, SOLUTION INTRAVENOUS; SUBCUTANEOUS at 00:56

## 2022-11-23 RX ADMIN — Medication 20000 UNITS: at 08:38

## 2022-11-23 RX ADMIN — HEPARIN SODIUM 5000 UNITS: 5000 INJECTION, SOLUTION INTRAVENOUS; SUBCUTANEOUS at 08:37

## 2022-11-23 RX ADMIN — DIAZEPAM 5 MG: 5 TABLET ORAL at 08:36

## 2022-11-23 RX ADMIN — HYDROMORPHONE HYDROCHLORIDE 8 MG: 2 TABLET ORAL at 06:55

## 2022-11-23 RX ADMIN — PREGABALIN 200 MG: 100 CAPSULE ORAL at 08:38

## 2022-11-23 RX ADMIN — DIAZEPAM 5 MG: 5 TABLET ORAL at 02:28

## 2022-11-23 RX ADMIN — HYDROMORPHONE HYDROCHLORIDE 8 MG: 2 TABLET ORAL at 00:55

## 2022-11-23 RX ADMIN — ZINC SULFATE 220 MG (50 MG) CAPSULE 220 MG: CAPSULE at 08:38

## 2022-11-23 RX ADMIN — ACETAMINOPHEN 975 MG: 325 TABLET, FILM COATED ORAL at 04:02

## 2022-11-23 ASSESSMENT — ACTIVITIES OF DAILY LIVING (ADL)
ADLS_ACUITY_SCORE: 40
ADLS_ACUITY_SCORE: 39

## 2022-11-23 ASSESSMENT — VISUAL ACUITY: OU: GLASSES

## 2022-11-23 NOTE — PLAN OF CARE
Discharge Planner PT   Patient plan for discharge: few weeks in hotel, then back to [place]   Current status: PT: pt demo supine to sit with good tech follow all spinal precautions. pt demo mulit STS to 4WW needing SBA. pt demo short amb up to 200'x 1 pt stating has be up already this morning. pt needing SBA for all. pt safe and stabe with all functinal mob with side stepping, backward stepping R<<>L turns. pt ed on walking program and TE at home. review car transfer tech follow all precautions. pt stating understanding and feels ready for D/c.  Barriers to return to prior living situation: pain weakness, fatigue   Recommendations for discharge: per PT eval home with assist. OP PT to follow.   Rationale for recommendations: pt is progressing well, good support at home.   Pt met 4/4 goals. No DME needs.        Entered by: Manuelito Patel, PTA 11/23/2022 10:11 AM

## 2022-11-23 NOTE — PROGRESS NOTES
Discharge time/date: 11/23 1130  Walked or Wheelchair: Wheelchair   PIV removed: Yes   Reviewed AVS with patient: Yes and mother   Medication due times added to AVS in EPIC: Yes   Verbalized understanding of discharge with teachback: Yes   Medications retrieved from pharmacy: Medications retrieved   Supplies sent home: Yes supplies for incision   Belongings from security with patient: N/A
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
## 2022-11-23 NOTE — PLAN OF CARE
"Status: Pt on 6A s/p L5-S1 anterior lumbar fusion, T10-S1 posterior segmental instrumentation & fusion, and bilateral stacked sacral 2 alar iliac screws for sacroiliac joint fusion 11/16.  Vitals: VSS on 1-2L NC overnight.  Neuros: A&O x4. Strengths 5/5. N/T to top of left foot.   IV: PIV SL x2, lower PIV not flushing, pt wants \"trouble shooting\" with vascular access, but not until morning.  Labs/Electrolytes: K and Mag replaced on day shift, redraw in AM.  Resp/trach: LS clear/dim. Denies SOB.   Diet: Regular, fair intake d/t nausea today.  Bowel status: BS+. LBM 11/22.  : Khan removed this morning. Voiding spontaneously since removal.  Skin: Midline back incision with primapore, CDI. Abdominal incision with liquid bandage and SANDER.   Pain: C/o back and L hip pain at 6/10 pain continually. Scheduled baclofen, Lyrica, Tylenol, lidocaine patch; PRN PO Dilaudid, PO Valium, and Icy Hot patch given. Pt feels that pain is better controlled overall this evening. No IV Dilaudid given today.   Activity: SBA, GB, walker. TLSO when OOB.   Social: Mom at bedside during visiting hours, supportive of pt.   Plan: Possible discharge home later this week, pt feeling nervous about discharge. Encourage activity and OOB in chair.    "

## 2022-11-23 NOTE — PLAN OF CARE
Status: Admitted s/p L5-S1 anterior lumbar fusion, T10-S1 posterior segmental instrumentation & fusion, and bilateral stacked sacral 2 alar iliac screws for sacroiliac joint fusion 11/16.  Vitals: VSS on 2L NC overnight.  Neuros: A&O x4. Strengths 5/5. N/T to top of left foot.   IV: PIV SL x2, lower PIV not flushing - since pt is a hard stick, pt req. IV team to troubleshoot IV later this AM.  Labs/Electrolytes: K,Mg replaced on day shift, redraw in AM.  Resp/trach: LS clear/dim. No SOB  Diet: Regular denies nause  Bowel status: BS+. LBM 11/22.  : Khna removed this morning. Voiding spontaneously since removal.  Skin: Midline back incision with primapore, CDI. Abdominal incision with liquid bandage and SANDER.   Pain: Back/L hip pain ongioing. Scheduled baclofen, Lyrica, Tylenol, lidocaine patch; PRN PO Dilaudid, PO Valium given overnight and did well. IV Dilaudid discontinued last night. Offered hot pack for breakthrough pain which was somewhat effective  Activity: SBA, GB, walker. TLSO when OOB.   Plan: Possible discharge home later this week, pt feeling nervous about discharge. Encourage activity and OOB in chair.

## 2022-11-23 NOTE — PROGRESS NOTES
Prior Authorization Approval    HYDROmorphone 2mg tabs  Date Initiated: 11/23/2022  Date Completed: 11/23/2022  Prior Auth Type: Quantity Limit                Status: Approved    Effective Date: 03/30/2022 - 03/30/2023  Copay: 1.00     Filling Pharmacy: [facility] - [place], [place] - [address] [place] [address]    Insurance: [place] Medicaid - Phone [phone] Fax [phone]  ID: [id number]  [facility]/Case Number: [id number]   Submitted Via: Telephone   Note: Insurance updated quantity limits from previous approval.    Carmel Storey  [facility] Liaison  Ph: [phone] Pager: [phone]

## 2022-11-25 NOTE — PLAN OF CARE
Occupational Therapy Discharge Summary    Reason for therapy discharge:    Discharged to home with outpatient therapy.    Progress towards therapy goal(s). See goals on Care Plan in [facility] electronic health record for goal details.  Goals partially met.  Barriers to achieving goals:   discharge from facility.    Therapy recommendation(s):    Pt. below baseline IND for ADLs/IADLs. Pt. plans to discharge to local hotel with 24/7 assist from family. Per patient, hotel room is accessible and family is available to provide 24/7 assist. Anticipate will be most appropriate for OP OT to further progress activity tolerance, and IND with ADL/IADL while adhering to spinal precautions. Patient limited with IADL performance prior to surgery due to pain; anticipate could make gains with OP OT to progress IADL performance as well.

## 2022-11-28 LAB
PATH REPORT.COMMENTS IMP SPEC: NORMAL
PATH REPORT.COMMENTS IMP SPEC: NORMAL
PATH REPORT.GROSS SPEC: NORMAL
PATH REPORT.RELEVANT HX SPEC: NORMAL
PHOTO IMAGE: NORMAL

## 2022-11-28 PROCEDURE — 88300 SURGICAL PATH GROSS: CPT | Mod: 26 | Performed by: PATHOLOGY

## 2022-12-02 ENCOUNTER — OFFICE VISIT (OUTPATIENT)
Dept: NEUROSURGERY | Facility: CLINIC | Age: 47
End: 2022-12-02
Payer: MEDICAID

## 2022-12-02 ENCOUNTER — ANCILLARY PROCEDURE (OUTPATIENT)
Dept: GENERAL RADIOLOGY | Facility: CLINIC | Age: 47
End: 2022-12-02
Attending: NEUROLOGICAL SURGERY
Payer: MEDICAID

## 2022-12-02 VITALS
BODY MASS INDEX: 43.58 KG/M2 | DIASTOLIC BLOOD PRESSURE: 86 MMHG | WEIGHT: 253.9 LBS | HEART RATE: 82 BPM | RESPIRATION RATE: 16 BRPM | OXYGEN SATURATION: 96 % | SYSTOLIC BLOOD PRESSURE: 151 MMHG

## 2022-12-02 DIAGNOSIS — M51.369 LUMBAR DEGENERATIVE DISC DISEASE: ICD-10-CM

## 2022-12-02 DIAGNOSIS — M43.25 FUSION OF SPINE OF THORACOLUMBAR REGION: Primary | ICD-10-CM

## 2022-12-02 DIAGNOSIS — E66.01 MORBID OBESITY (H): ICD-10-CM

## 2022-12-02 PROCEDURE — 99024 POSTOP FOLLOW-UP VISIT: CPT | Performed by: NEUROLOGICAL SURGERY

## 2022-12-02 PROCEDURE — 72082 X-RAY EXAM ENTIRE SPI 2/3 VW: CPT | Performed by: STUDENT IN AN ORGANIZED HEALTH CARE EDUCATION/TRAINING PROGRAM

## 2022-12-02 ASSESSMENT — PAIN SCALES - GENERAL: PAINLEVEL: SEVERE PAIN (7)

## 2022-12-02 NOTE — LETTER
Date:December 3, 2022      Provider requested that no letter be sent. Do not send.       [facility]
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
## 2022-12-02 NOTE — NURSING NOTE
Chief Complaint   Patient presents with     RECHECK     POST-OP SPINE - 2 wk post op     Roberto Tillman
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
## 2022-12-30 ENCOUNTER — HOSPITAL ENCOUNTER (OUTPATIENT)
Dept: RADIOLOGY | Facility: HOSPITAL | Age: 47
Discharge: HOME | End: 2022-12-30
Payer: COMMERCIAL

## 2022-12-30 DIAGNOSIS — Z98.890 OTHER SPECIFIED POSTPROCEDURAL STATES: ICD-10-CM

## 2022-12-30 PROCEDURE — 72082 X-RAY EXAM ENTIRE SPI 2/3 VW: CPT

## 2023-03-06 ENCOUNTER — HOSPITAL ENCOUNTER (OUTPATIENT)
Dept: RADIOLOGY | Facility: HOSPITAL | Age: 48
End: 2023-03-06
Payer: COMMERCIAL

## 2023-03-13 ENCOUNTER — HOSPITAL ENCOUNTER (OUTPATIENT)
Dept: RADIOLOGY | Facility: HOSPITAL | Age: 48
Discharge: HOME | End: 2023-03-13
Payer: COMMERCIAL

## 2023-03-13 DIAGNOSIS — G89.29 OTHER CHRONIC PAIN: ICD-10-CM

## 2023-03-13 DIAGNOSIS — M54.16 LUMBAR RADICULOPATHY, CHRONIC: ICD-10-CM

## 2023-03-13 PROCEDURE — 72082 X-RAY EXAM ENTIRE SPI 2/3 VW: CPT

## 2023-06-26 ENCOUNTER — HOSPITAL ENCOUNTER (OUTPATIENT)
Dept: RADIOLOGY | Facility: HOSPITAL | Age: 48
Discharge: HOME | End: 2023-06-26
Payer: COMMERCIAL

## 2023-06-26 DIAGNOSIS — Z98.890 HISTORY OF BACK SURGERY: ICD-10-CM

## 2023-06-26 PROCEDURE — 72082 X-RAY EXAM ENTIRE SPI 2/3 VW: CPT

## 2023-06-27 DIAGNOSIS — M43.25 FUSION OF SPINE OF THORACOLUMBAR REGION: Primary | ICD-10-CM

## 2023-09-27 ENCOUNTER — TELEPHONE (OUTPATIENT)
Dept: NEUROSURGERY | Facility: CLINIC | Age: 48
End: 2023-09-27
Payer: MEDICAID

## 2023-10-22 ENCOUNTER — HEALTH MAINTENANCE LETTER (OUTPATIENT)
Age: 48
End: 2023-10-22

## 2023-11-06 ENCOUNTER — TELEPHONE (OUTPATIENT)
Dept: NEUROSURGERY | Facility: CLINIC | Age: 48
End: 2023-11-06
Payer: MEDICAID

## 2023-11-06 NOTE — TELEPHONE ENCOUNTER
[facility] Call Center    Phone Message    May a detailed message be left on voicemail: yes     Reason for Call: Pt is requesting a call back to discuss getting prior authorization for her imaging.  She said that it's for the appointment on 11/17/23.  Please call her back to discuss.  Thanks.

## 2023-11-07 NOTE — TELEPHONE ENCOUNTER
[facility] Call Center    Phone Message    May a detailed message be left on voicemail: yes     Reason for Call: Other: Louise calling back stating she needs a prior auth for the appt as well as the imaging.  She is nervous this will not be done in time for her appt on the 11/17/2023 and already has the plane tickets bought.  Patient requesting a call back.     Action Taken: Message routed to:  Other: [facility] Neurosurgery    Travel Screening: Not Applicable

## 2023-11-07 NOTE — TELEPHONE ENCOUNTER
[facility] Call Center    Phone Message    May a detailed message be left on voicemail: yes     Reason for Call: Other: Pt called again about prior authorizations and whether or not the imaging can be done where she lives and not in [place] and needs prior authorization for actual appt too not just imaging. Pt very frustrated and upset worried this won't get done in time.      Action Taken: Message routed to:  [facility] Neurosurgery    Travel Screening: Not Applicable

## 2023-11-10 NOTE — TELEPHONE ENCOUNTER
Called patient and she reports she has not heard from the PA team. Still needs to know the status. Writer states she will follow up with the team on Monday.    Eliza Abrams

## 2023-11-17 ENCOUNTER — ANCILLARY PROCEDURE (OUTPATIENT)
Dept: GENERAL RADIOLOGY | Facility: CLINIC | Age: 48
End: 2023-11-17
Attending: NEUROLOGICAL SURGERY
Payer: MEDICAID

## 2023-11-17 ENCOUNTER — OFFICE VISIT (OUTPATIENT)
Dept: NEUROSURGERY | Facility: CLINIC | Age: 48
End: 2023-11-17
Payer: MEDICAID

## 2023-11-17 VITALS — OXYGEN SATURATION: 100 % | HEART RATE: 69 BPM | BODY MASS INDEX: 19.77 KG/M2 | WEIGHT: 115.2 LBS

## 2023-11-17 DIAGNOSIS — M43.25 FUSION OF SPINE OF THORACOLUMBAR REGION: ICD-10-CM

## 2023-11-17 DIAGNOSIS — M43.25 FUSION OF SPINE OF THORACOLUMBAR REGION: Primary | ICD-10-CM

## 2023-11-17 PROCEDURE — 72082 X-RAY EXAM ENTIRE SPI 2/3 VW: CPT | Performed by: STUDENT IN AN ORGANIZED HEALTH CARE EDUCATION/TRAINING PROGRAM

## 2023-11-17 PROCEDURE — 99213 OFFICE O/P EST LOW 20 MIN: CPT | Performed by: NEUROLOGICAL SURGERY

## 2023-11-17 ASSESSMENT — PAIN SCALES - GENERAL: PAINLEVEL: MODERATE PAIN (5)

## 2023-11-17 NOTE — LETTER
11/17/2023       RE: Louise Tyler  [address]  [place] [address]     Dear Colleague,    Thank you for referring your patient, Louise Tyler, to the [facility] NEUROSURGERY CLINIC [place] [facility]. Please see a copy of my visit note below.    Date of service: 11/17/2023    Louise Tyler is a 48-year-old female who is 1 year status post L5-S1 ALIF and L35-vwuklz fusion for correction of kyphosis and removal of Velez angelito from her previous surgery.      Today she reports that that she has no back pain whatsoever.  She however has some discomfort in the left buttock.  Overall she is standing better and her pain has substantially improved compared to her preop pain level.    Neuro intact    I personally reviewed the EOS scoliosis x-ray which show hardware is in good position.    Impression/plan:  Louise is is doing very well clinically.  She is having some discomfort in the left buttock region which I think is due to the S2 AI iliac screws.  For now she will take some ibuprofen to see whether this would improve her discomfort.  But overall she is doing much better than how she was prior to surgery.  She will call me if any further problems arise in the future.      Again, thank you for allowing me to participate in the care of your patient.      Sincerely,    Darien Kam MD

## 2023-11-17 NOTE — NURSING NOTE
Chief Complaint   Patient presents with    RECHECK     Follow up from surgery   Pulse 69   Wt 52.3 kg (115 lb 3.2 oz)   SpO2 100%   BMI 19.77 kg/m      Haleigh Swann CMA at 1:26 PM on 11/17/2023.

## 2023-11-17 NOTE — PROGRESS NOTES
Date of service: 11/17/2023    Louise Tyler is a 48-year-old female who is 1 year status post L5-S1 ALIF and A35-gieobr fusion for correction of kyphosis and removal of Velez angelito from her previous surgery.      Today she reports that that she has no back pain whatsoever.  She however has some discomfort in the left buttock.  Overall she is standing better and her pain has substantially improved compared to her preop pain level.    Neuro intact    I personally reviewed the EOS scoliosis x-ray which show hardware is in good position.    Impression/plan:  Louise is is doing very well clinically.  She is having some discomfort in the left buttock region which I think is due to the S2 AI iliac screws.  For now she will take some ibuprofen to see whether this would improve her discomfort.  But overall she is doing much better than how she was prior to surgery.  She will call me if any further problems arise in the future.    Darien Kam MD.

## 2024-12-15 ENCOUNTER — HEALTH MAINTENANCE LETTER (OUTPATIENT)
Age: 49
End: 2024-12-15

## 2024-12-30 ENCOUNTER — HOSPITAL ENCOUNTER (EMERGENCY)
Facility: HOSPITAL | Age: 49
Discharge: HOME | End: 2024-12-30
Attending: EMERGENCY MEDICINE
Payer: COMMERCIAL

## 2024-12-30 ENCOUNTER — APPOINTMENT (OUTPATIENT)
Dept: CT IMAGING | Facility: HOSPITAL | Age: 49
End: 2024-12-30
Payer: COMMERCIAL

## 2024-12-30 VITALS
DIASTOLIC BLOOD PRESSURE: 71 MMHG | OXYGEN SATURATION: 97 % | SYSTOLIC BLOOD PRESSURE: 103 MMHG | BODY MASS INDEX: 25.63 KG/M2 | TEMPERATURE: 95.9 F | HEIGHT: 64 IN | RESPIRATION RATE: 18 BRPM | WEIGHT: 150.13 LBS | HEART RATE: 80 BPM

## 2024-12-30 DIAGNOSIS — R10.9 ABDOMINAL PAIN: Primary | ICD-10-CM

## 2024-12-30 DIAGNOSIS — R11.2 NAUSEA AND VOMITING: ICD-10-CM

## 2024-12-30 LAB
ALBUMIN SERPL-MCNC: 4.1 G/DL (ref 3.5–5.3)
ALP SERPL-CCNC: 88 U/L (ref 39–100)
ALT SERPL-CCNC: 41 U/L (ref 7–52)
ANION GAP SERPL CALC-SCNC: 13 MMOL/L (ref 3–11)
AST SERPL-CCNC: 16 U/L
BACTERIA #/AREA URNS HPF: ABNORMAL /HPF
BILIRUB SERPL-MCNC: 0.53 MG/DL (ref 0.2–1.4)
BILIRUB UR QL: NEGATIVE
BUN SERPL-MCNC: 15 MG/DL (ref 7–25)
CALCIUM ALBUM COR SERPL-MCNC: 10.1 MG/DL (ref 8.6–10.3)
CALCIUM SERPL-MCNC: 10.2 MG/DL (ref 8.6–10.3)
CHLORIDE SERPL-SCNC: 100 MMOL/L (ref 98–107)
CLARITY UR: CLEAR
CO2 SERPL-SCNC: 24 MMOL/L (ref 21–32)
COLOR UR: YELLOW
CREAT SERPL-MCNC: 0.77 MG/DL (ref 0.6–1.1)
EGFRCR SERPLBLD CKD-EPI 2021: 95 ML/MIN/1.73M*2
ERYTHROCYTE [DISTWIDTH] IN BLOOD BY AUTOMATED COUNT: 13.6 % (ref 11.5–14)
ETHANOL SERPL-MCNC: <10 MG/DL (ref 0–10)
GLUCOSE SERPL-MCNC: 169 MG/DL (ref 70–105)
GLUCOSE UR QL: NEGATIVE MG/DL
HCT VFR BLD AUTO: 45.8 % (ref 34–45)
HGB BLD-MCNC: 15.7 G/DL (ref 11.5–15.5)
HGB UR QL: NEGATIVE
KETONES UR-MCNC: NEGATIVE MG/DL
LEUKOCYTE ESTERASE UR QL STRIP: NEGATIVE
LIPASE SERPL-CCNC: <3 U/L (ref 11–82)
LYMPHOCYTES # BLD MANUAL: 0.1 10*3/UL
LYMPHOCYTES NFR BLD MANUAL: 1 % (ref 11–47)
MAGNESIUM SERPL-MCNC: 1.8 MG/DL (ref 1.8–2.4)
MCH RBC QN AUTO: 30.9 PG (ref 28–33)
MCHC RBC AUTO-ENTMCNC: 34.4 G/DL (ref 32–36)
MCV RBC AUTO: 89.8 FL (ref 81–97)
MONOCYTES # BLD MANUAL: 0.1 10*3/UL
MONOCYTES NFR BLD MANUAL: 1 % (ref 3–11)
NEUTROPHILS # BLD MANUAL: 12.84 10*3/UL
NEUTS SEG # BLD MANUAL: 12.8 10*3/UL
NEUTS SEG NFR BLD MANUAL: 98 % (ref 41–81)
NITRITE UR QL: NEGATIVE
PH UR: 7.5 PH
PLATELET # BLD AUTO: 258 10*3/UL (ref 140–350)
PLATELET CLUMP BLD QL SMEAR: ABNORMAL
PLATELET MORPHOLOGY IN BLOOD: NORMAL
PMV BLD AUTO: 7.8 FL (ref 6.9–10.8)
POTASSIUM SERPL-SCNC: 3.5 MMOL/L (ref 3.5–5.1)
PROT SERPL-MCNC: 7.4 G/DL (ref 6–8.3)
PROT UR STRIP-MCNC: 10 MG/DL
RBC # BLD AUTO: 5.1 10*6/UL (ref 3.7–5.3)
RBC #/AREA URNS HPF: ABNORMAL /HPF
RBC MORPH BLD: NORMAL
SODIUM SERPL-SCNC: 137 MMOL/L (ref 135–145)
SP GR UR: >1.05 (ref 1–1.03)
SQUAMOUS #/AREA URNS HPF: ABNORMAL /HPF
TOTAL CELLS COUNTED BLD: 100 CELLS
UROBILINOGEN UR-MCNC: NORMAL MG/DL
WBC # BLD AUTO: 13.1 10*3/UL (ref 4.5–10.5)
WBC #/AREA URNS HPF: ABNORMAL /HPF
WBC CLUMPS #/AREA URNS HPF: ABNORMAL /HPF
WBC NRBC COR # BLD: 13.1 10*3/UL

## 2024-12-30 PROCEDURE — 82077 ASSAY SPEC XCP UR&BREATH IA: CPT | Performed by: EMERGENCY MEDICINE

## 2024-12-30 PROCEDURE — 81001 URINALYSIS AUTO W/SCOPE: CPT | Performed by: EMERGENCY MEDICINE

## 2024-12-30 PROCEDURE — 83690 ASSAY OF LIPASE: CPT | Performed by: EMERGENCY MEDICINE

## 2024-12-30 PROCEDURE — 83735 ASSAY OF MAGNESIUM: CPT | Performed by: EMERGENCY MEDICINE

## 2024-12-30 PROCEDURE — 96375 TX/PRO/DX INJ NEW DRUG ADDON: CPT

## 2024-12-30 PROCEDURE — 85025 COMPLETE CBC W/AUTO DIFF WBC: CPT | Performed by: EMERGENCY MEDICINE

## 2024-12-30 PROCEDURE — 6360000200 HC RX 636 W HCPCS (ALT 250 FOR IP): Performed by: EMERGENCY MEDICINE

## 2024-12-30 PROCEDURE — 96374 THER/PROPH/DIAG INJ IV PUSH: CPT

## 2024-12-30 PROCEDURE — 99284 EMERGENCY DEPT VISIT MOD MDM: CPT | Performed by: EMERGENCY MEDICINE

## 2024-12-30 PROCEDURE — 2580000300 HC RX 258: Performed by: EMERGENCY MEDICINE

## 2024-12-30 PROCEDURE — 2550000100 HC RX 255: Performed by: EMERGENCY MEDICINE

## 2024-12-30 PROCEDURE — 36415 COLL VENOUS BLD VENIPUNCTURE: CPT | Performed by: EMERGENCY MEDICINE

## 2024-12-30 PROCEDURE — 80053 COMPREHEN METABOLIC PANEL: CPT | Performed by: EMERGENCY MEDICINE

## 2024-12-30 PROCEDURE — 96361 HYDRATE IV INFUSION ADD-ON: CPT

## 2024-12-30 PROCEDURE — 74174 CTA ABD&PLVS W/CONTRAST: CPT

## 2024-12-30 RX ORDER — ONDANSETRON HYDROCHLORIDE 2 MG/ML
4 INJECTION, SOLUTION INTRAVENOUS ONCE
Status: COMPLETED | OUTPATIENT
Start: 2024-12-30 | End: 2024-12-30

## 2024-12-30 RX ORDER — MORPHINE SULFATE 4 MG/ML
4 INJECTION, SOLUTION INTRAMUSCULAR; INTRAVENOUS ONCE
Status: COMPLETED | OUTPATIENT
Start: 2024-12-30 | End: 2024-12-30

## 2024-12-30 RX ORDER — IOPAMIDOL 755 MG/ML
80 INJECTION, SOLUTION INTRAVASCULAR ONCE
Status: COMPLETED | OUTPATIENT
Start: 2024-12-30 | End: 2024-12-30

## 2024-12-30 RX ORDER — METOCLOPRAMIDE 10 MG/1
10 TABLET ORAL 3 TIMES DAILY PRN
Qty: 30 TABLET | Refills: 0 | Status: SHIPPED | OUTPATIENT
Start: 2024-12-30 | End: 2025-01-09

## 2024-12-30 RX ORDER — SODIUM CHLORIDE 9 MG/ML
500 INJECTION, SOLUTION INTRAVENOUS ONCE
Status: COMPLETED | OUTPATIENT
Start: 2024-12-30 | End: 2024-12-30

## 2024-12-30 RX ADMIN — IOPAMIDOL 80 ML: 755 INJECTION, SOLUTION INTRAVENOUS at 21:10

## 2024-12-30 RX ADMIN — SODIUM CHLORIDE 500 ML: 9 INJECTION, SOLUTION INTRAVENOUS at 20:19

## 2024-12-30 RX ADMIN — ONDANSETRON 4 MG: 2 INJECTION, SOLUTION INTRAMUSCULAR; INTRAVENOUS at 20:23

## 2024-12-30 RX ADMIN — MORPHINE SULFATE 4 MG: 4 INJECTION INTRAVENOUS at 20:23

## 2024-12-30 ASSESSMENT — PAIN DESCRIPTION - DESCRIPTORS: DESCRIPTORS: SHOOTING;SHARP

## 2024-12-31 NOTE — ED PROVIDER NOTES
HPI:  Chief Complaint   Patient presents with    Abdominal Pain     Pt reports severe abd pain and back pain. States pain started when she woke up this morning, also reports vomiting and vomiting blood. Pt very restless in triage. Sent from . Pt acting erratic and difficulty following commands      HPI  Patient has been having severe lower abdominal pain and lower back pain since this morning.  Also with some nausea and vomiting, says she had some blood in the stool in the form of diarrhea and maybe some of the vomit as well.  Has had difficulty urinating today.  Says she has chronic back pain but this is different.    HISTORY:  Past Medical History:   Diagnosis Date    Chronic pain     Dental disease     Depression     Diverticulitis     Endocrine disorder     Extremity pain     Fibromyalgia     Hashimoto's thyroiditis     History of transfusion     Hypothyroidism     Injury of back     Low back pain     Neurologic disorder     Peripheral neuropathy     Respiratory disease     Sleep apnea     does not use cpap    Wears dentures        Past Surgical History:   Procedure Laterality Date    BACK SURGERY      CHOLECYSTECTOMY      COLONOSCOPY N/A 12/27/2019    Procedure: COLONOSCOPY WITH ASCENDING COLON BIOPSIES;  Surgeon: Kelvin Cordova MD PhD;  Location: [facility];  Service: Endoscopy;  Laterality: N/A;    COLONOSCOPY N/A 4/6/2021    Procedure: COLONOSCOPY WITH FECAL MICROBIOTA TRANSPLANTATION;  Surgeon: Maria Dolores Richardson MD;  Location: [facility];  Service: Endoscopy;  Laterality: N/A;    ESOPHAGOGASTRODUODENOSCOPY N/A 12/27/2019    Procedure: ESOPHAGOGASTRODUODENOSCOPY WITH DUODENAL BIOPSIES;  Surgeon: Kelvin Cordova MD PhD;  Location: [facility];  Service: Endoscopy;  Laterality: N/A;    EXAMINATION UNDER ANESTHESIA N/A 1/17/2020    Procedure: EXAM UNDER ANESTHESIA;  Surgeon: Steve Michelle MD;  Location: [facility] OR;  Service: General;  Laterality: N/A;    HEMORRHOID SURGERY N/A 1/17/2020    Procedure:  HEMORRHOIDECTOMY TIMES ONE with BANDING TIMES ONE;  Surgeon: Steve Michelle MD;  Location: [facility] OR;  Service: General;  Laterality: N/A;    HYSTERECTOMY      REDUCTION MAMMAPLASTY      TUBAL LIGATION         Family History   Problem Relation Age of Onset    No Known Problems Mother        Social History     Tobacco Use    Smoking status: Former     Types: Cigarettes    Smokeless tobacco: Never    Tobacco comments:     'VAPES'   Vaping Use    Vaping status: Former    Substances: Nicotine    Devices: Pre-filled or refillable cartridge, Refillable tank   Substance Use Topics    Alcohol use: No    Drug use: No       ROS:    Pertinent positives and negatives as documented per HPI, otherwise noncontributory    PHYSICAL EXAM:  Physical Exam  Vitals and nursing note reviewed.   Constitutional:       General: She is in acute distress.      Appearance: She is well-developed. She is not toxic-appearing or diaphoretic.   HENT:      Head: Normocephalic and atraumatic.      Nose: Nose normal.      Mouth/Throat:      Mouth: Mucous membranes are moist.   Eyes:      Extraocular Movements: Extraocular movements intact.      Conjunctiva/sclera: Conjunctivae normal.   Cardiovascular:      Rate and Rhythm: Regular rhythm. Tachycardia present.      Pulses: Normal pulses.   Pulmonary:      Effort: Tachypnea present. No respiratory distress.   Abdominal:      General: There is no distension.      Palpations: Abdomen is soft.      Tenderness: There is no abdominal tenderness.   Musculoskeletal:         General: Normal range of motion.      Cervical back: Normal range of motion.   Skin:     General: Skin is warm and dry.   Neurological:      General: No focal deficit present.      Mental Status: She is alert and oriented to person, place, and time.   Psychiatric:         Mood and Affect: Mood normal.         Behavior: Behavior normal.          ED LABS:  Labs Reviewed   COMPREHENSIVE METABOLIC PANEL - Abnormal       Result Value    Sodium 137       Potassium 3.5      Chloride 100      CO2 24      Anion Gap 13 (*)     BUN 15      Creatinine 0.77      Glucose 169 (*)     Calcium 10.2      AST 16      ALT (SGPT) 41      Alkaline Phosphatase 88      Total Protein 7.4      Albumin 4.1      Total Bilirubin 0.53      Corrected Calcium 10.1      eGFR 95      Narrative:     Calculation based on the 2021 Chronic Kidney Disease Epidemiology Collaboration (CKD-EPI) equation refit without adjustment for race.   CBC WITH AUTO DIFFERENTIAL - Abnormal    WBC 13.1 (*)     RBC 5.10      Hemoglobin 15.7 (*)     Hematocrit 45.8 (*)     MCV 89.8      MCH 30.9      MCHC 34.4      RDW 13.6      Platelets 258      MPV 7.8     LIPASE - Abnormal    Lipase <3 (*)    URINALYSIS, DIPSTICK ONLY - Abnormal    Color, Urine Yellow      Clarity, Urine Clear      Specific Gravity, Urine >1.050 (*)     Leukocytes, Urine Negative      Nitrite, Urine Negative      Protein, Urine 10 (*)     Ketones, Urine Negative      Urobilinogen, Urine Normal      Bilirubin, Urine Negative      Blood, Urine Negative      Glucose, Urine Negative      pH, Urine 7.5      Narrative:     If patient has an indwelling urinary catheter, follow the Adult Indwelling Urinary Catheter Removal and Replacement Protocol to remove the catheter prior to obtaining the Urinalysis. If questions, please contact the primary provider for guidance.   URINALYSIS MICROSCOPIC, REFLEX CULTURE - Abnormal    RBC, Urine 15-29 (*)     WBC, Urine 5-9 (*)     WBC Clumps, Urine None seen      Squamous Epithelial, Urine 0-4      Bacteria, Urine Few      Narrative:     If patient has an indwelling urinary catheter, follow the Adult Indwelling Urinary Catheter Removal and Replacement Protocol to remove the catheter prior to obtaining the Urinalysis. If questions, please contact the primary provider for guidance.   MANUAL DIFF PERFORMABLE - Abnormal    WBC 13.10      Neutrophils% 98 (*)     Lymphocytes% 1 (*)     Monocytes% 1 (*)     ANC (manual  diff) 12.838      Segs Absolute 12.8      Lymphocytes Absolute 0.1      Monocytes Absolute 0.1      Total Counted 100      RBC Morphology Normal      Platelet Morphology Normal      Clumped Platelets None Seen     ETHANOL - Normal    Ethanol Lvl <10      Narrative:     This blood alcohol is for medical use only.   80 mg/dL is legally intoxicated.      MAGNESIUM - Normal    Magnesium 1.8     URINALYSIS WITH MICROSCOPIC, REFLEX CULTURE    Narrative:     The following orders were created for panel order Urinalysis w/microscopic, reflex culture Urine, Clean Catch.  Procedure                               Abnormality         Status                     ---------                               -----------         ------                     Urinalysis, dipstick Uri...[[id number]]  Abnormal            Final result               Urinalysis, Micro (part ...[[id number]]  Abnormal            Final result                 Please view results for these tests on the individual orders.       ED IMAGES:  CT angiogram abdomen pelvis with contrast   Final Result   IMPRESSION:   1.  No acute aortic injury. Large caliber mesenteric arterial vasculature is widely patent.   2.  No other acute finding in the abdomen or pelvis.                PROCEDURES:  Procedures    ED COURSE:          Sepsis Quality Bundle     MDM:  Telemetry interpretation: Sinus tachycardia with no ectopy    Patient was in a lot of distress initially and appeared quite uncomfortable.  Administered Zofran and morphine as well as fluids.  Was having a lot of lower abdominal pain with radiation to the back, obtained a CT scan, this is unremarkable, no signs of any surgical pathology, no nephrolithiasis or other concerning abnormalities.  Tachycardia has resolved, on reassessment she appears much improved and has been feeling much better.  Lab results likewise unremarkable with no signs of any infectious problems or organ insufficiency.  Unclear what was causing her severe  pain earlier but no evidence for any dangerous processes, patient thinks she may have gastroparesis.  Have recommended she follow-up with her primary care for further evaluation, given prescription for Reglan.    Discussed findings, diagnosis, and treatment plan in detail with patient or caregiver who is in agreement with plan.  Patient is stable with no further interventions indicated in the emergency department at this time, discharged with return precautions and follow-up instructions given.        CLINICAL IMPRESSION:  Final diagnoses:   [R10.9] Abdominal pain   [R11.2] Nausea and vomiting         A voice recognition program was used to aid in medical record documentation. Some words may be printed not exactly as they were spoken. Efforts were made to carefully edit and correct any inaccuracies; however, some errors may be present.         Escobar Ro I, DO  12/30/24 1326

## 2024-12-31 NOTE — DISCHARGE INSTRUCTIONS
He was given a prescription for Reglan, this can help with nausea and sometimes abdominal pain.  I do recommend you call your primary care provider for further evaluation.  Return here for any new or worsening symptoms.

## 2025-01-08 ENCOUNTER — CONSULT (OUTPATIENT)
Dept: PLASTIC SURGERY | Facility: CLINIC | Age: 50
End: 2025-01-08
Payer: COMMERCIAL

## 2025-01-08 VITALS
RESPIRATION RATE: 16 BRPM | WEIGHT: 150 LBS | HEART RATE: 79 BPM | DIASTOLIC BLOOD PRESSURE: 48 MMHG | SYSTOLIC BLOOD PRESSURE: 119 MMHG | OXYGEN SATURATION: 99 % | BODY MASS INDEX: 25.61 KG/M2 | HEIGHT: 64 IN | TEMPERATURE: 98 F

## 2025-01-08 DIAGNOSIS — L30.4 INTERTRIGO: Primary | ICD-10-CM

## 2025-01-08 DIAGNOSIS — L98.7 EXCESS SKIN OF ABDOMEN: ICD-10-CM

## 2025-01-08 DIAGNOSIS — E65 PANNUS, ABDOMINAL: ICD-10-CM

## 2025-01-08 DIAGNOSIS — L08.9 INFECTION OF SKIN AND SUBCUTANEOUS TISSUE: ICD-10-CM

## 2025-01-08 PROBLEM — Q76.49 SPINAL DEFORMITY: Status: ACTIVE | Noted: 2022-11-16

## 2025-01-08 PROBLEM — M54.17 LUMBOSACRAL NEURITIS: Status: ACTIVE | Noted: 2025-01-08

## 2025-01-08 PROBLEM — L81.0 POST-INFLAMMATORY HYPERPIGMENTATION: Status: ACTIVE | Noted: 2025-01-08

## 2025-01-08 PROBLEM — K59.00 CONSTIPATION: Status: ACTIVE | Noted: 2025-01-08

## 2025-01-08 PROBLEM — G89.18 POSTOPERATIVE PAIN: Status: ACTIVE | Noted: 2025-01-08

## 2025-01-08 PROBLEM — M41.20 IDIOPATHIC SCOLIOSIS: Status: ACTIVE | Noted: 2025-01-08

## 2025-01-08 PROBLEM — E78.5 HYPERLIPIDEMIA: Status: ACTIVE | Noted: 2025-01-08

## 2025-01-08 PROBLEM — E66.01 MORBID OBESITY (CMS/HCC): Status: ACTIVE | Noted: 2022-12-02

## 2025-01-08 PROBLEM — F41.8 MIXED ANXIETY DEPRESSIVE DISORDER: Status: ACTIVE | Noted: 2025-01-08

## 2025-01-08 PROBLEM — L30.0 NUMMULAR ECZEMA: Status: ACTIVE | Noted: 2025-01-08

## 2025-01-08 PROBLEM — M48.061 SPINAL STENOSIS OF LUMBAR REGION: Status: ACTIVE | Noted: 2025-01-08

## 2025-01-08 PROBLEM — M54.16 LUMBAR RADICULOPATHY: Status: ACTIVE | Noted: 2019-05-14

## 2025-01-08 PROBLEM — B35.4 TINEA CORPORIS: Status: ACTIVE | Noted: 2025-01-08

## 2025-01-08 PROBLEM — G43.009 MIGRAINE WITHOUT AURA: Status: ACTIVE | Noted: 2025-01-08

## 2025-01-08 PROBLEM — M70.60 GREATER TROCHANTERIC BURSITIS: Status: ACTIVE | Noted: 2019-02-05

## 2025-01-08 PROCEDURE — 99204 OFFICE O/P NEW MOD 45 MIN: CPT | Performed by: STUDENT IN AN ORGANIZED HEALTH CARE EDUCATION/TRAINING PROGRAM

## 2025-01-08 RX ORDER — PHENTERMINE HYDROCHLORIDE 37.5 MG/1
18.75 TABLET ORAL EVERY MORNING
COMMUNITY
Start: 2024-12-05

## 2025-01-08 RX ORDER — ESTRADIOL 0.03 MG/D
1 PATCH TRANSDERMAL
COMMUNITY
Start: 2024-10-17

## 2025-01-08 ASSESSMENT — PAIN SCALES - GENERAL: PAINLEVEL_OUTOF10: 0-NO PAIN

## 2025-01-08 NOTE — PROGRESS NOTES
Referring provider: Jacy Harris DO  Subjective   Chief Complaint   Patient presents with    Consult        History of Present Illness    Isabell is a 50-year-old female  with a history of fibromyalgia and back fusion from T4 to the pelvis, presents with abdominal skin excess and rash. She has lost over 100 pounds in the past two years, with a stable weight for the past six months. She attributes her weight loss to Ozempic, phentermine, diet changes, and exercise. She reports rashes in the abdominal fold, which she manages with antifungal creams.  The rashes are particularly bad in the summer.  She currently has a bad umbilical rash that is very bothersome.  She reports that she has discussed this issue with her primary care provider multiple times, who referred her for my evaluation.  The excess skin causes discomfort, difficulty finding well-fitted clothes, and hinders exercise. It also impacts her self-esteem and comfort in new relationships. She has a history of laparoscopic gallbladder removal and a vaginal hysterectomy. She quit smoking two and a half years ago.     The history documented above was obtained from the patient directly as well as through my personal review of the patient's prior medical records.    Review of Systems  All systems reviewed and negative except as stated in the HPI.      Objective    Past Medical History  Patient Active Problem List    Diagnosis Date Noted    Constipation 01/08/2025    Hyperlipidemia 01/08/2025    Idiopathic scoliosis 01/08/2025    Lumbosacral neuritis 01/08/2025    Migraine without aura 01/08/2025    Mixed anxiety depressive disorder 01/08/2025    Nummular eczema 01/08/2025    Post-inflammatory hyperpigmentation 01/08/2025    Postoperative pain 01/08/2025    Spinal stenosis of lumbar region 01/08/2025    Tinea corporis 01/08/2025    Morbid obesity (CMS/[facility]) 12/02/2022    Spinal deformity 11/16/2022    Acute on chronic respiratory failure with hypoxia (CMS/[facility])  01/28/2022    Acute hypoxemic respiratory failure due to COVID-19 (CMS/HCC) 01/11/2022    Sinus bradycardia 01/11/2022    LUIS ALBERTO on CPAP 01/11/2022    Coccygeal pain 05/07/2020    Osteoarthritis of spine with radiculopathy, lumbar region 10/08/2019    Postlaminectomy syndrome of lumbar region 10/08/2019    Sacroiliitis (CMS/HCC) 10/08/2019    Other chronic pain 10/08/2019    Fall 10/08/2019    Lower GI bleed 09/24/2019    Right lower quadrant abdominal pain 09/24/2019    Fibromyalgia 09/24/2019    Chronic back pain 09/24/2019    Other specified hypothyroidism 09/24/2019    Lumbar radiculopathy 05/14/2019    Greater trochanteric bursitis 02/05/2019    Abnormal glucose level 11/04/2016    Lymphocytic thyroiditis 11/04/2016     Past Medical History:   Diagnosis Date    Chronic pain     Dental disease     Depression     Diverticulitis     Endocrine disorder     Extremity pain     Fibromyalgia     Hashimoto's thyroiditis     History of transfusion     Hypothyroidism     Injury of back     Low back pain     Neurologic disorder     Peripheral neuropathy     Respiratory disease     Sleep apnea     does not use cpap    Wears dentures      Past Surgical History:   Procedure Laterality Date    BACK SURGERY      CHOLECYSTECTOMY      COLONOSCOPY N/A 12/27/2019    Procedure: COLONOSCOPY WITH ASCENDING COLON BIOPSIES;  Surgeon: Kelvin Cordova MD PhD;  Location: [facility];  Service: Endoscopy;  Laterality: N/A;    COLONOSCOPY N/A 4/6/2021    Procedure: COLONOSCOPY WITH FECAL MICROBIOTA TRANSPLANTATION;  Surgeon: Maria Dolores Richardson MD;  Location: [facility];  Service: Endoscopy;  Laterality: N/A;    ESOPHAGOGASTRODUODENOSCOPY N/A 12/27/2019    Procedure: ESOPHAGOGASTRODUODENOSCOPY WITH DUODENAL BIOPSIES;  Surgeon: Kelvin Cordova MD PhD;  Location: [facility];  Service: Endoscopy;  Laterality: N/A;    EXAMINATION UNDER ANESTHESIA N/A 1/17/2020    Procedure: EXAM UNDER ANESTHESIA;  Surgeon: Steve Michelle MD;  Location: [facility] OR;   Service: General;  Laterality: N/A;    HEMORRHOID SURGERY N/A 1/17/2020    Procedure: HEMORRHOIDECTOMY TIMES ONE with BANDING TIMES ONE;  Surgeon: Steve Michelle MD;  Location: [facility] OR;  Service: General;  Laterality: N/A;    HYSTERECTOMY      REDUCTION MAMMAPLASTY      TUBAL LIGATION       Social History     Socioeconomic History    Marital status: Single     Spouse name: Not on file    Number of children: Not on file    Years of education: Not on file    Highest education level: Not on file   Occupational History    Not on file   Tobacco Use    Smoking status: Former     Types: Cigarettes    Smokeless tobacco: Never    Tobacco comments:     'VAPES'   Vaping Use    Vaping status: Former    Substances: Nicotine    Devices: Pre-filled or refillable cartridge, Refillable tank   Substance and Sexual Activity    Alcohol use: No    Drug use: No    Sexual activity: Defer   Other Topics Concern    Not on file   Social History Narrative    Not on file     Social Drivers of Health     Financial Resource Strain: Not on file   Food Insecurity: Not on file   Transportation Needs: Not on file   Physical Activity: Not on file   Stress: Not on file   Social Connections: Not on file   Intimate Partner Violence: Not on file   Housing Stability: Not on file       Family History  Family History   Problem Relation Age of Onset    No Known Problems Mother      Medications    Current Outpatient Medications:     estradioL (CLIMARA) 0.025 mg/24 hr, 1 patch every 7 (seven) days, Disp: , Rfl:     phentermine (ADIPEX-P) 37.5 mg tablet, Take 0.5 tablets (18.75 mg total) by mouth every morning Max Daily Amount: 18.75 mg, Disp: , Rfl:     cariprazine (Vraylar) 1.5 mg capsule capsule, Take 1 capsule (1.5 mg total) by mouth daily, Disp: , Rfl:     metoclopramide (Reglan) 10 mg tablet, Take 1 tablet (10 mg total) by mouth 3 (three) times a day as needed (Nausea) for up to 10 days, Disp: 30 tablet, Rfl: 0    semaglutide (Ozempic) 1 mg/dose (4 mg/3  "mL) pen injector, Inject 1 mg under the skin every 7 (seven) days Wednesday 0.5 dosage, Disp: , Rfl:     Lactobacillus acidophilus (PROBIOTIC ORAL), Take 1 Dose by mouth daily, Disp: , Rfl:     baclofen (LIORESAL) 10 mg tablet, Take 1 tablet (10 mg total) by mouth 3 (three) times a day as needed for muscle spasms, Disp: , Rfl:     naloxone 4 mg/actuation spray,non-aerosol, Administer 0.1 mL (4 mg total) into nostril(s) as needed for opioid reversal 1 spray into 1 nostril; may repeat every 2-3 min in alternating nostrils until medical assistance available, Disp: , Rfl:     DULoxetine (CYMBALTA) 30 mg capsule, Take 1 capsule (30 mg total) by mouth daily Takes with a 60mg for total of 90mg, Disp: , Rfl:     DULoxetine (CYMBALTA) 60 mg capsule, Take 1 capsule (60 mg total) by mouth daily Takes with a 30mg for total of 90mg, Disp: , Rfl:     pregabalin (LYRICA) 200 mg capsule, Take 1 capsule (200 mg total) by mouth 3 (three) times a day, Disp: , Rfl:     levothyroxine (SYNTHROID, LEVOTHROID) 100 mcg tablet, Take 1 tablet (100 mcg total) by mouth every morning, Disp: , Rfl:     acetaminophen (TYLENOL) 500 mg tablet, Take 2 tablets (1,000 mg total) by mouth as needed, Disp: , Rfl:     traZODone (DESYREL) 100 mg tablet, Take 3 tablets (300 mg total) by mouth nightly, Disp: , Rfl: 2    Current Facility-Administered Medications:     lidocaine PF (XYLOCAINE) 10 mg/mL (1 %) vial 30 mL, 30 mL, injection, Once, Stephen Alonso MD  Allergies  Allergies   Allergen Reactions    Cephalexin Hives, Rash and Shortness of breath     hives, throat swelling    Other Reaction(s): HIVES, THROAT CLOSE (SNOMED-CT: null)    Other reaction(s): Skin Rashes / Eruption of skin, Hives / Urticaria, Asthma / Allergic asthma, Shortness of Breath / Dyspnea   hives, throat swelling, life-threatening       Physical Exam:  /48   Pulse 79   Temp 36.7 °C (98 °F) (Temporal)   Resp 16   Ht 1.626 m (5' 4.02\")   Wt 68 kg (150 lb)   SpO2 99%   BMI 25.73 " kg/m²   Body mass index is 25.73 kg/m².    General: No acute distress, alert and appropriately interactive  Psych: Normal mood, pleasant affect  HEENT: NCAT, EOMI, nares patent without rhinorrhea, ears with normal external morphology bilaterally, mucous membranes moist, tongue midline.  Cardiovascular: RRR  Lungs: CTAB, Breathing comfortably on room air  Abdomen: Significant abdominal skin ptosis below and above the umbilicus.  Pannus covers most of the mons pubis.  There is intertrigo and erythema within the abdominal folds as well as in the umbilicus.  The umbilicus is tender to palpation and has evidence of ulceration.  There is also significant ptosis of the mons pubis.  Patient has a well-healed vertical scar below the umbilicus from spinal fusion.  Patient also has a oblique scar to the left flank.  There is no palpable hernias or bulges.    Imaging: I personally reviewed the images from the patient's CT abdomen pelvis performed 12/30/2024 which shows intact abdominal wall without any abnormalities.          Assessment    1. Infection of skin and subcutaneous tissue  2. Pannus, abdominal  3. Intertrigo (Primary)  4. Excess skin of abdomen  Patient is a good candidate for panniculectomy.  She has intertrigo rashes that are refractory to conservative management with antifungals.  I believe that this will significantly improve her quality of life.  We discussed that this procedure involves excising the overhanging pannus without addressing any of the additional laxity above the umbilicus.  We discussed the risks of panniculectomy, which include but not limited to wound healing problems, thromboembolic events (low risk with Caprini score at 4, would utilize pneumatic compression Intra-Op and graduated compression stockings postop), bleeding, infection, seroma, pain, injury to surrounding structures, need for repeat surgery, flap necrosis, asymmetries, poor cosmetic result.  We discussed postoperative requirement  for drains, as well as wearing a compression gown for up to three months post op. We discussed the expected low transverse hip to hip scar.  Expected postoperative course was reviewed.  Patient voiced understanding of above and desires to proceed with insurance preauthorization.       All questions were answered to patient's satisfaction. Patient advised to contact the clinic with any further questions or concerns.    A total of 35  minutes was spent in direct patient contact, reviewing prior medical records, and directly coordinating future appointments and surgery    Electronically signed: Heidi Engel MD

## 2025-01-16 ENCOUNTER — HOSPITAL ENCOUNTER (OUTPATIENT)
Facility: HOSPITAL | Age: 50
Setting detail: OUTPATIENT SURGERY
End: 2025-01-16
Attending: STUDENT IN AN ORGANIZED HEALTH CARE EDUCATION/TRAINING PROGRAM | Admitting: STUDENT IN AN ORGANIZED HEALTH CARE EDUCATION/TRAINING PROGRAM
Payer: COMMERCIAL

## 2025-01-16 DIAGNOSIS — L30.4 INTERTRIGO: ICD-10-CM

## 2025-01-16 DIAGNOSIS — E65 ABDOMINAL PANNUS: Primary | ICD-10-CM

## 2025-01-30 DIAGNOSIS — E65 ABDOMINAL PANNUS: Primary | ICD-10-CM

## 2025-01-30 DIAGNOSIS — L30.4 INTERTRIGO: ICD-10-CM

## 2025-01-30 NOTE — PROGRESS NOTES
Case in Epic without surgery date. Staff message sent to PSS and they will add the [facility] notes under media tab.

## 2025-01-31 ENCOUNTER — TELEPHONE (OUTPATIENT)
Dept: PLASTIC SURGERY | Facility: CLINIC | Age: 50
End: 2025-01-31
Payer: COMMERCIAL

## 2025-01-31 NOTE — TELEPHONE ENCOUNTER
Pt called wanting to talk to  nurse. Told pt nurse is not available at the moment and will take a message. She said she has dates. Please call back.

## 2025-02-01 NOTE — TELEPHONE ENCOUNTER
Spoke with patient. Notes further reviewed and appears to have everything in. Can see MC's. Advised PSS team to submit will await insurance

## 2025-02-14 ENCOUNTER — TELEPHONE (OUTPATIENT)
Dept: PLASTIC SURGERY | Facility: CLINIC | Age: 50
End: 2025-02-14
Payer: COMMERCIAL

## 2025-02-14 NOTE — TELEPHONE ENCOUNTER
Patient contacted and informed we did just get a denial on her panniculectomy. Denial came in today and I have not had a chance to review it. Will plan to do that and contact with what they say.

## 2025-02-14 NOTE — TELEPHONE ENCOUNTER
Pt called with questions regarding documentation for surgery. Told pt nurse is currently  unavailable but will reach out shortly. Pt stated understanding

## 2025-02-25 ENCOUNTER — TELEPHONE (OUTPATIENT)
Dept: PLASTIC SURGERY | Facility: CLINIC | Age: 50
End: 2025-02-25
Payer: COMMERCIAL

## 2025-02-25 NOTE — TELEPHONE ENCOUNTER
Patient contacted regarding her message.  I asked patient about the letter she received but she stated she was not at home to review the letter with me.  I told the patient we did get a denial from her insurance but I sent an appeal letter via [employer]PS mail per insurance request on Monday, 2/24/2025.  I advised her that I reviewed the denial and then compose the letter showing information supporting that she is in need of a panniculectomy.  Was able to route letter to patient and she will review that with the denial at her convenience.  We will wait for what the insurance says there could potentially be other options if they do decline again such as a peer to peer.  We will wait the insurance decision and get back with the patient.

## 2025-02-25 NOTE — TELEPHONE ENCOUNTER
Pt called wanting to speak to Dr.Schuths parsons regarding a letter she got. Told pt nurse will call back when she is available.

## 2025-03-18 ENCOUNTER — ANCILLARY PROCEDURE (OUTPATIENT)
Dept: RADIOLOGY | Facility: HOSPITAL | Age: 50
End: 2025-03-18
Payer: COMMERCIAL

## 2025-03-18 ENCOUNTER — TELEPHONE (OUTPATIENT)
Dept: PLASTIC SURGERY | Facility: CLINIC | Age: 50
End: 2025-03-18
Payer: COMMERCIAL

## 2025-03-18 NOTE — TELEPHONE ENCOUNTER
Patient contacted regarding her message.  Patient called to inform us that she received a denial letter from her insurance company stating that they will not cover her panniculectomy.  Her chart further reviewed and I advised the patient I have not been informed of this but can reach out to my financial team for more information.  Will review the denial to have a better understanding of what is going on and then will contact the patient with the plan.

## 2025-03-18 NOTE — TELEPHONE ENCOUNTER
Pt called wanting to talk to 's nurse when I asked pt what it may be in regards of the pt said she would know, ,told pt nurse is currently unavailable and will reach out when she is available, asked pt if there is anything she would like me to put in the message, pt asked to just have the nurse call her back.   Please call back.

## 2025-03-20 NOTE — TELEPHONE ENCOUNTER
Left message and sent MyChart message to patient with update.  I have been working with the supervisors of the financial team who states that they have been in communication with her insurance.  Advised patient I do not know a whole lot of information on this just that there is possibly a technical issue going on.  I was advised they should hopefully have an update on Friday, 3/21/2025.

## 2025-03-20 NOTE — TELEPHONE ENCOUNTER
Pt called wanting to talk to Myrna, told pt the nurse is unavailable and will reach out when she can. Pt stated understanding.

## 2025-03-21 NOTE — TELEPHONE ENCOUNTER
Patient contacted and was informed our financial department is still working to get more information on this denial letter.  Patient informed me that her insurance company reached out to her primary care provider.  They informed to the PCP that the patient will need to come in to be weighed to make sure she is not losing more weight.  She has an appointment with her PCP this upcoming Monday.  She will let us know if we need to do anything with the information or if her PCP will pass this onto her health insurance.

## 2025-03-21 NOTE — TELEPHONE ENCOUNTER
Pt called for lb, told pt nurse is not available and I can send a message, pt said she has some info from her PCP to give to her nurse, told pt I would send this back.

## 2025-03-24 ENCOUNTER — TELEPHONE (OUTPATIENT)
Dept: PLASTIC SURGERY | Facility: CLINIC | Age: 50
End: 2025-03-24
Payer: COMMERCIAL

## 2025-03-24 DIAGNOSIS — E65 ABDOMINAL PANNUS: ICD-10-CM

## 2025-03-24 DIAGNOSIS — E65 PANNUS, ABDOMINAL: Primary | ICD-10-CM

## 2025-03-24 NOTE — TELEPHONE ENCOUNTER
Belgica from admin hearing for Myrna, regarding pt myrna requesting a hearing for , call back at [phone] for an updated status.

## 2025-03-24 NOTE — INTERDISCIPLINARY/THERAPY
Case Management Discharge Note    Phone # [phone]    Discharge Disposition: HO     Needs transportation assistance at DC: Has a ride.     Specialty Referrals: PCP    Support System Notified: Notified by pt    Narrative: Discharge orders placed. CM called and verified with pt that she is off oxygen. Pt was weaned this morning and has ambulated in the room. Pulse oximeter given to pt prior to discharge. COVID home monitoring ordered. Prescriptions sent to [facility] on [address] Pt has no further discharge needs.      Patient called and said she saw Caridad Way a year ago and was told when she was thinking about getting pregnant to call and schedule an appointment. She does not have a  referral. Can you please ask Caridad if this is ok or does she need a referral and she is not scheduled yet.  Patient can be reached at [phone] . Thank you

## 2025-03-25 NOTE — TELEPHONE ENCOUNTER
Left Belgica a message to give us a call back.    LM on [facility] [phone]

## 2025-03-25 NOTE — TELEPHONE ENCOUNTER
Belgica called back wanting to talk to Myrna, told pt the nurse will give her a call back as soon as she can, she stated understanding and said that the pt is wanting an update.

## 2025-03-26 NOTE — TELEPHONE ENCOUNTER
Patient is approved for her panniculectomy surgery until 7/31/2025.  Patient contacted to schedule surgery April 29, 2025 surgery date was picked out.  She will need preop clearance with her PCP prior to surgery for anesthesia.  A referral was sent out to Dr. Harris at [facility] along with the preop form and Dr. Engel's consult note.  In addition to being informed by my financial team of the approval, Belgica also confirmed this.  Patient informed the facility will reach out to contact her on the surgery time about 5 to 7 days prior to surgery.  Surgery folder mailed out 3/26/2025.

## 2025-04-22 ENCOUNTER — TELEPHONE (OUTPATIENT)
Dept: PLASTIC SURGERY | Facility: HOSPITAL | Age: 50
End: 2025-04-22
Payer: COMMERCIAL

## 2025-04-22 RX ORDER — ASCORBIC ACID 250 MG
1 TABLET,CHEWABLE ORAL DAILY
COMMUNITY

## 2025-04-22 RX ORDER — CHOLECALCIFEROL (VITAMIN D3) 25 MCG
1000 TABLET ORAL DAILY
COMMUNITY

## 2025-04-22 RX ORDER — BUPROPION HYDROCHLORIDE 300 MG/1
300 TABLET ORAL DAILY
COMMUNITY

## 2025-04-22 NOTE — TELEPHONE ENCOUNTER
Informed patient she will be provided a compression and does not need to purchase one. Patient stated understanding.

## 2025-04-22 NOTE — TELEPHONE ENCOUNTER
4/29 [facility] panniculectomy. Patient is requesting to discuss what is the best abdominal compression for after surgery. Thank you

## 2025-04-22 NOTE — PRE-PROCEDURE INSTRUCTIONS
Pre-Surgery Instructions:   Medication Instructions    estradioL (CLIMARA) 0.025 mg/24 hr Continue as prescribed do not hold    phentermine (ADIPEX-P) 37.5 mg tablet Stop 1 week prior to surgery/procedure    semaglutide (Ozempic) 1 mg/dose (4 mg/3 mL) pen injector Stop 1 week prior to surgery/procedure    Lactobacillus acidophilus (PROBIOTIC ORAL) Continue as prescribed do not hold    naloxone 4 mg/actuation spray,non-aerosol Continue as prescribed do not hold    DULoxetine (CYMBALTA) 30 mg capsule Continue as prescribed do not hold    pregabalin (LYRICA) 200 mg capsule Continue as prescribed do not hold    acetaminophen (TYLENOL) 500 mg tablet Continue as prescribed do not hold    buPROPion XL (Wellbutrin XL) 300 mg 24 hr tablet Continue as prescribed do not hold    cholecalciferol, vitamin D3, (cholecalciferol) 25 mcg (1,000 unit) tablet Stop 1 week prior to surgery/procedure    ascorbic acid, vitamin C, (ascorbic acid) 250 mg tablet,chewable Stop 1 week prior to surgery/procedure    VITAMIN K2 ORAL Stop 1 week prior to surgery/procedure    DULoxetine (CYMBALTA) 60 mg capsule Continue as prescribed do not hold    levothyroxine (SYNTHROID, LEVOTHROID) 100 mcg tablet Continue as prescribed do not hold       Diet instructions for surgery:      Preop questionnaire:  Preop Questionairre  Does patient have physical restrictions or limitations?: No  Has patient had an upper respiratory tract infection in the last 2 weeks?: No  Home Oxygen: No    Preop instructions:  Pre-op Phone Call  Surgery Time Verified: No  Arrival Time Verified: No  Surgery Location Verified: Yes  Instructed to shower within 12 hours: Yes  Patient has special physician orders: No  Does Patient Require Bowel Prep for Procedure?: No  Instructed to remove/not apply makeup, petroleum products, lotion, powder, scents, or deodorant on the morning of surgery: Yes  Recommend eye glasses for day of surgery, contact lenses must be removed prior to surgery::  Yes  Instructed to bring CPAP mask: No  Instructed to bring Oxygen tank from home: N/A  Does the patient wear a glucose monitoring device?: N/A  Instructed to remove all jewelry, including piercings, and leave at home.: Yes  Instructed to leave all valuables at home: Yes  Instructed to leave all medications at home: Yes  Instructed to bring a valid photo ID, insurance card and form of payment: Yes  NPO Status Reinforced: No  Instruct patient to ensure transportation is available following surgery/procedure:: Yes  Consider having a caregiver stay with the patient for 24 hours following anesthesia:: Yes

## 2025-04-28 ENCOUNTER — TELEPHONE (OUTPATIENT)
Dept: PLASTIC SURGERY | Facility: CLINIC | Age: 50
End: 2025-04-28
Payer: COMMERCIAL

## 2025-04-28 NOTE — TELEPHONE ENCOUNTER
SAUL from 04/28 at 8:58am     Patient called in wanting to know why she has no received a time for her surgery on 04/29.

## 2025-04-28 NOTE — TELEPHONE ENCOUNTER
Called and spoke to patient. Informed patient she would need to contact [facility]. Patient stated she had called [facility] and they stated they still did not have a time but would be calling her in the afternoon. Patient asked if she could be transferred to [facility] to speak with them again

## 2025-04-29 ENCOUNTER — HOSPITAL ENCOUNTER (OUTPATIENT)
Facility: HOSPITAL | Age: 50
Setting detail: OUTPATIENT SURGERY
Discharge: HOME | End: 2025-04-29
Attending: STUDENT IN AN ORGANIZED HEALTH CARE EDUCATION/TRAINING PROGRAM | Admitting: STUDENT IN AN ORGANIZED HEALTH CARE EDUCATION/TRAINING PROGRAM
Payer: COMMERCIAL

## 2025-04-29 ENCOUNTER — ANESTHESIA EVENT (OUTPATIENT)
Dept: OPERATING ROOM | Facility: HOSPITAL | Age: 50
End: 2025-04-29
Payer: COMMERCIAL

## 2025-04-29 ENCOUNTER — ANESTHESIA (OUTPATIENT)
Dept: OPERATING ROOM | Facility: HOSPITAL | Age: 50
End: 2025-04-29
Payer: COMMERCIAL

## 2025-04-29 VITALS
WEIGHT: 157 LBS | SYSTOLIC BLOOD PRESSURE: 107 MMHG | RESPIRATION RATE: 14 BRPM | BODY MASS INDEX: 26.8 KG/M2 | HEIGHT: 64 IN | HEART RATE: 59 BPM | DIASTOLIC BLOOD PRESSURE: 64 MMHG | OXYGEN SATURATION: 98 % | TEMPERATURE: 97.2 F

## 2025-04-29 DIAGNOSIS — E65 ABDOMINAL PANNUS: Primary | ICD-10-CM

## 2025-04-29 DIAGNOSIS — L30.4 INTERTRIGO: ICD-10-CM

## 2025-04-29 PROCEDURE — (BLANK) HC GENERAL ANESTHESIA FACILITY CHARGE 1ST 15 MIN: Performed by: STUDENT IN AN ORGANIZED HEALTH CARE EDUCATION/TRAINING PROGRAM

## 2025-04-29 PROCEDURE — 00802 ANES PX LWR ABDL WAL PANNIC: CPT | Performed by: NURSE ANESTHETIST, CERTIFIED REGISTERED

## 2025-04-29 PROCEDURE — 2500000200 HC RX 250 WO HCPCS: Performed by: STUDENT IN AN ORGANIZED HEALTH CARE EDUCATION/TRAINING PROGRAM

## 2025-04-29 PROCEDURE — 6360000200 HC RX 636 W HCPCS (ALT 250 FOR IP): Performed by: NURSE ANESTHETIST, CERTIFIED REGISTERED

## 2025-04-29 PROCEDURE — 2500000200 HC RX 250 WO HCPCS: Mod: JZ | Performed by: NURSE ANESTHETIST, CERTIFIED REGISTERED

## 2025-04-29 PROCEDURE — 6370000100 HC RX 637 (ALT 250 FOR IP): Performed by: STUDENT IN AN ORGANIZED HEALTH CARE EDUCATION/TRAINING PROGRAM

## 2025-04-29 PROCEDURE — 2720000000 HC SUPP 272 WO HCPCS: Performed by: STUDENT IN AN ORGANIZED HEALTH CARE EDUCATION/TRAINING PROGRAM

## 2025-04-29 PROCEDURE — (BLANK) HC OR LEVEL 3 PROC 1ST 15MIN: Performed by: STUDENT IN AN ORGANIZED HEALTH CARE EDUCATION/TRAINING PROGRAM

## 2025-04-29 PROCEDURE — (BLANK) HC GENERAL ANESTHESIA FACILITY CHARGE EACH ADDITIONAL MIN: Performed by: STUDENT IN AN ORGANIZED HEALTH CARE EDUCATION/TRAINING PROGRAM

## 2025-04-29 PROCEDURE — (BLANK) HC RECOVERY PHASE-1 1ST  HOUR ACUITY LEVEL 3: Performed by: STUDENT IN AN ORGANIZED HEALTH CARE EDUCATION/TRAINING PROGRAM

## 2025-04-29 PROCEDURE — (BLANK) HC RECOVERY PHASE-2 1ST 1/2 HOUR ACUITY LEVEL 2: Performed by: STUDENT IN AN ORGANIZED HEALTH CARE EDUCATION/TRAINING PROGRAM

## 2025-04-29 PROCEDURE — 2580000300 HC RX 258: Performed by: STUDENT IN AN ORGANIZED HEALTH CARE EDUCATION/TRAINING PROGRAM

## 2025-04-29 PROCEDURE — 15830 EXC EXCESSIVE SKIN ABDOMEN: CPT | Performed by: STUDENT IN AN ORGANIZED HEALTH CARE EDUCATION/TRAINING PROGRAM

## 2025-04-29 PROCEDURE — (BLANK) HC RECOVERY PHASE-2 EACH ADDITIONAL 1/2 HOUR ACUITY LEVEL 2: Performed by: STUDENT IN AN ORGANIZED HEALTH CARE EDUCATION/TRAINING PROGRAM

## 2025-04-29 PROCEDURE — (BLANK) HC OR LEVEL 3 PROC EACH ADDITIONAL MIN: Performed by: STUDENT IN AN ORGANIZED HEALTH CARE EDUCATION/TRAINING PROGRAM

## 2025-04-29 PROCEDURE — 6360000200 HC RX 636 W HCPCS (ALT 250 FOR IP): Performed by: ANESTHESIOLOGY

## 2025-04-29 PROCEDURE — C1729 CATH, DRAINAGE: HCPCS | Performed by: STUDENT IN AN ORGANIZED HEALTH CARE EDUCATION/TRAINING PROGRAM

## 2025-04-29 PROCEDURE — 2580000300 HC RX 258: Performed by: NURSE ANESTHETIST, CERTIFIED REGISTERED

## 2025-04-29 PROCEDURE — 6360000200 HC RX 636 W HCPCS (ALT 250 FOR IP): Performed by: STUDENT IN AN ORGANIZED HEALTH CARE EDUCATION/TRAINING PROGRAM

## 2025-04-29 PROCEDURE — 15830 EXC EXCESSIVE SKIN ABDOMEN: CPT | Mod: AS | Performed by: PHYSICIAN ASSISTANT

## 2025-04-29 RX ORDER — HYDROCODONE BITARTRATE AND ACETAMINOPHEN 5; 325 MG/1; MG/1
1 TABLET ORAL EVERY 6 HOURS PRN
Qty: 20 TABLET | Refills: 0 | Status: SHIPPED | OUTPATIENT
Start: 2025-04-29 | End: 2025-05-04

## 2025-04-29 RX ORDER — ONDANSETRON HYDROCHLORIDE 2 MG/ML
INJECTION, SOLUTION INTRAVENOUS AS NEEDED
Status: DISCONTINUED | OUTPATIENT
Start: 2025-04-29 | End: 2025-04-29 | Stop reason: SURG

## 2025-04-29 RX ORDER — FENTANYL CITRATE/PF 50 MCG/ML
PLASTIC BAG, INJECTION (ML) INTRAVENOUS AS NEEDED
Status: DISCONTINUED | OUTPATIENT
Start: 2025-04-29 | End: 2025-04-29 | Stop reason: SURG

## 2025-04-29 RX ORDER — GLYCOPYRROLATE 0.2 MG/ML
INJECTION INTRAMUSCULAR; INTRAVENOUS AS NEEDED
Status: DISCONTINUED | OUTPATIENT
Start: 2025-04-29 | End: 2025-04-29 | Stop reason: SURG

## 2025-04-29 RX ORDER — SODIUM CHLORIDE, SODIUM LACTATE, POTASSIUM CHLORIDE, CALCIUM CHLORIDE 600; 310; 30; 20 MG/100ML; MG/100ML; MG/100ML; MG/100ML
INJECTION, SOLUTION INTRAVENOUS CONTINUOUS PRN
Status: DISCONTINUED | OUTPATIENT
Start: 2025-04-29 | End: 2025-04-29 | Stop reason: SURG

## 2025-04-29 RX ORDER — KETOROLAC TROMETHAMINE 30 MG/ML
30 INJECTION, SOLUTION INTRAMUSCULAR; INTRAVENOUS ONCE
Status: DISCONTINUED | OUTPATIENT
Start: 2025-04-29 | End: 2025-04-29 | Stop reason: HOSPADM

## 2025-04-29 RX ORDER — MIDAZOLAM HYDROCHLORIDE 1 MG/ML
1 INJECTION INTRAMUSCULAR; INTRAVENOUS EVERY 5 MIN PRN
Status: DISCONTINUED | OUTPATIENT
Start: 2025-04-29 | End: 2025-04-29 | Stop reason: HOSPADM

## 2025-04-29 RX ORDER — LIDOCAINE HYDROCHLORIDE 20 MG/ML
INJECTION, SOLUTION EPIDURAL; INFILTRATION; INTRACAUDAL; PERINEURAL AS NEEDED
Status: DISCONTINUED | OUTPATIENT
Start: 2025-04-29 | End: 2025-04-29 | Stop reason: SURG

## 2025-04-29 RX ORDER — DEXAMETHASONE SODIUM PHOSPHATE 4 MG/ML
4 INJECTION, SOLUTION INTRA-ARTICULAR; INTRALESIONAL; INTRAMUSCULAR; INTRAVENOUS; SOFT TISSUE ONCE AS NEEDED
Status: DISCONTINUED | OUTPATIENT
Start: 2025-04-29 | End: 2025-04-29 | Stop reason: HOSPADM

## 2025-04-29 RX ORDER — DEXAMETHASONE SODIUM PHOSPHATE 4 MG/ML
INJECTION, SOLUTION INTRA-ARTICULAR; INTRALESIONAL; INTRAMUSCULAR; INTRAVENOUS; SOFT TISSUE AS NEEDED
Status: DISCONTINUED | OUTPATIENT
Start: 2025-04-29 | End: 2025-04-29 | Stop reason: SURG

## 2025-04-29 RX ORDER — PROPOFOL 10 MG/ML
INJECTION, EMULSION INTRAVENOUS AS NEEDED
Status: DISCONTINUED | OUTPATIENT
Start: 2025-04-29 | End: 2025-04-29 | Stop reason: SURG

## 2025-04-29 RX ORDER — DIPHENHYDRAMINE HYDROCHLORIDE 50 MG/ML
25 INJECTION, SOLUTION INTRAMUSCULAR; INTRAVENOUS ONCE AS NEEDED
Status: DISCONTINUED | OUTPATIENT
Start: 2025-04-29 | End: 2025-04-29 | Stop reason: HOSPADM

## 2025-04-29 RX ORDER — OXYCODONE HYDROCHLORIDE 5 MG/1
5 TABLET ORAL ONCE
Status: COMPLETED | OUTPATIENT
Start: 2025-04-29 | End: 2025-04-29

## 2025-04-29 RX ORDER — PROPOFOL 10 MG/ML
INJECTION, EMULSION INTRAVENOUS CONTINUOUS PRN
Status: DISCONTINUED | OUTPATIENT
Start: 2025-04-29 | End: 2025-04-29 | Stop reason: SURG

## 2025-04-29 RX ORDER — BUPIVACAINE HYDROCHLORIDE 2.5 MG/ML
INJECTION, SOLUTION EPIDURAL; INFILTRATION; INTRACAUDAL; PERINEURAL AS NEEDED
Status: DISCONTINUED | OUTPATIENT
Start: 2025-04-29 | End: 2025-04-29 | Stop reason: HOSPADM

## 2025-04-29 RX ORDER — ACETAMINOPHEN 10 MG/ML
INJECTION, SOLUTION INTRAVENOUS AS NEEDED
Status: DISCONTINUED | OUTPATIENT
Start: 2025-04-29 | End: 2025-04-29 | Stop reason: SURG

## 2025-04-29 RX ORDER — KETAMINE HCL IN 0.9 % NACL 50 MG/5 ML
SYRINGE (ML) INTRAVENOUS AS NEEDED
Status: DISCONTINUED | OUTPATIENT
Start: 2025-04-29 | End: 2025-04-29 | Stop reason: SURG

## 2025-04-29 RX ORDER — NORETHINDRONE AND ETHINYL ESTRADIOL 0.5-0.035
KIT ORAL AS NEEDED
Status: DISCONTINUED | OUTPATIENT
Start: 2025-04-29 | End: 2025-04-29 | Stop reason: SURG

## 2025-04-29 RX ORDER — ROCURONIUM BROMIDE 10 MG/ML
INJECTION, SOLUTION INTRAVENOUS AS NEEDED
Status: DISCONTINUED | OUTPATIENT
Start: 2025-04-29 | End: 2025-04-29 | Stop reason: SURG

## 2025-04-29 RX ORDER — HYDROMORPHONE HYDROCHLORIDE 1 MG/ML
0.5 INJECTION, SOLUTION INTRAMUSCULAR; INTRAVENOUS; SUBCUTANEOUS EVERY 5 MIN PRN
Status: DISCONTINUED | OUTPATIENT
Start: 2025-04-29 | End: 2025-04-29 | Stop reason: HOSPADM

## 2025-04-29 RX ORDER — DEXAMETHASONE SODIUM PHOSPHATE 4 MG/ML
4 INJECTION, SOLUTION INTRA-ARTICULAR; INTRALESIONAL; INTRAMUSCULAR; INTRAVENOUS; SOFT TISSUE ONCE
Status: DISCONTINUED | OUTPATIENT
Start: 2025-04-29 | End: 2025-04-29 | Stop reason: HOSPADM

## 2025-04-29 RX ORDER — PREGABALIN 50 MG/1
50 CAPSULE ORAL 2 TIMES DAILY
Qty: 60 CAPSULE | Refills: 0 | Status: SHIPPED | OUTPATIENT
Start: 2025-04-29 | End: 2025-05-29

## 2025-04-29 RX ORDER — ONDANSETRON HYDROCHLORIDE 2 MG/ML
4 INJECTION, SOLUTION INTRAVENOUS ONCE AS NEEDED
Status: DISCONTINUED | OUTPATIENT
Start: 2025-04-29 | End: 2025-04-29 | Stop reason: HOSPADM

## 2025-04-29 RX ORDER — ONDANSETRON HYDROCHLORIDE 2 MG/ML
4 INJECTION, SOLUTION INTRAVENOUS ONCE
Status: DISCONTINUED | OUTPATIENT
Start: 2025-04-29 | End: 2025-04-29 | Stop reason: HOSPADM

## 2025-04-29 RX ORDER — MIDAZOLAM HYDROCHLORIDE 1 MG/ML
1 INJECTION INTRAMUSCULAR; INTRAVENOUS ONCE
Status: DISCONTINUED | OUTPATIENT
Start: 2025-04-29 | End: 2025-04-29 | Stop reason: HOSPADM

## 2025-04-29 RX ORDER — CLINDAMYCIN PHOSPHATE 900 MG/50ML
900 INJECTION, SOLUTION INTRAVENOUS EVERY 8 HOURS
Status: COMPLETED | OUTPATIENT
Start: 2025-04-29 | End: 2025-04-29

## 2025-04-29 RX ORDER — METOPROLOL TARTRATE 1 MG/ML
1 INJECTION, SOLUTION INTRAVENOUS EVERY 5 MIN PRN
Status: DISCONTINUED | OUTPATIENT
Start: 2025-04-29 | End: 2025-04-29 | Stop reason: HOSPADM

## 2025-04-29 RX ORDER — MAGNESIUM SULFATE HEPTAHYDRATE 40 MG/ML
INJECTION, SOLUTION INTRAVENOUS AS NEEDED
Status: DISCONTINUED | OUTPATIENT
Start: 2025-04-29 | End: 2025-04-29 | Stop reason: SURG

## 2025-04-29 RX ADMIN — SUGAMMADEX 150 MG: 100 INJECTION, SOLUTION INTRAVENOUS at 08:21

## 2025-04-29 RX ADMIN — Medication 100 MCG: at 07:54

## 2025-04-29 RX ADMIN — HYDROMORPHONE HYDROCHLORIDE 0.5 MG: 0.5 INJECTION, SOLUTION INTRAMUSCULAR; INTRAVENOUS; SUBCUTANEOUS at 08:56

## 2025-04-29 RX ADMIN — EPHEDRINE SULFATE 5 MG: 50 INJECTION, SOLUTION INTRAVENOUS at 08:14

## 2025-04-29 RX ADMIN — ROCURONIUM BROMIDE 30 MG: 10 INJECTION, SOLUTION INTRAVENOUS at 07:36

## 2025-04-29 RX ADMIN — EPHEDRINE SULFATE 10 MG: 50 INJECTION, SOLUTION INTRAVENOUS at 07:28

## 2025-04-29 RX ADMIN — ONDANSETRON 4 MG: 2 INJECTION INTRAMUSCULAR; INTRAVENOUS at 07:06

## 2025-04-29 RX ADMIN — FENTANYL CITRATE 50 MCG: 50 INJECTION, SOLUTION INTRAMUSCULAR; INTRAVENOUS at 07:17

## 2025-04-29 RX ADMIN — EPHEDRINE SULFATE 5 MG: 50 INJECTION, SOLUTION INTRAVENOUS at 08:19

## 2025-04-29 RX ADMIN — SODIUM CHLORIDE, POTASSIUM CHLORIDE, SODIUM LACTATE AND CALCIUM CHLORIDE: 600; 310; 30; 20 INJECTION, SOLUTION INTRAVENOUS at 06:52

## 2025-04-29 RX ADMIN — PROPOFOL 150 MG: 10 INJECTION, EMULSION INTRAVENOUS at 06:58

## 2025-04-29 RX ADMIN — PROPOFOL 50 MG: 10 INJECTION, EMULSION INTRAVENOUS at 06:59

## 2025-04-29 RX ADMIN — Medication 50 MG: at 07:01

## 2025-04-29 RX ADMIN — CLINDAMYCIN PHOSPHATE 900 MG: 900 INJECTION, SOLUTION INTRAVENOUS at 07:03

## 2025-04-29 RX ADMIN — Medication 100 MCG: at 07:34

## 2025-04-29 RX ADMIN — GLYCOPYRROLATE 0.1 MG: 0.2 INJECTION, SOLUTION INTRAMUSCULAR; INTRAVENOUS at 07:34

## 2025-04-29 RX ADMIN — FENTANYL CITRATE 50 MCG: 50 INJECTION, SOLUTION INTRAMUSCULAR; INTRAVENOUS at 08:23

## 2025-04-29 RX ADMIN — DEXAMETHASONE SODIUM PHOSPHATE 4 MG: 4 INJECTION, SOLUTION INTRAMUSCULAR; INTRAVENOUS at 07:06

## 2025-04-29 RX ADMIN — FENTANYL CITRATE 25 MCG: 50 INJECTION, SOLUTION INTRAMUSCULAR; INTRAVENOUS at 07:49

## 2025-04-29 RX ADMIN — PROPOFOL 50 MCG/KG/MIN: 10 INJECTION, EMULSION INTRAVENOUS at 06:58

## 2025-04-29 RX ADMIN — MAGNESIUM SULFATE 2 G: 2 INJECTION INTRAVENOUS at 07:01

## 2025-04-29 RX ADMIN — SODIUM CHLORIDE, POTASSIUM CHLORIDE, SODIUM LACTATE AND CALCIUM CHLORIDE: 600; 310; 30; 20 INJECTION, SOLUTION INTRAVENOUS at 08:24

## 2025-04-29 RX ADMIN — OXYCODONE 5 MG: 5 TABLET ORAL at 09:47

## 2025-04-29 RX ADMIN — Medication 100 MCG: at 08:19

## 2025-04-29 RX ADMIN — EPHEDRINE SULFATE 5 MG: 50 INJECTION, SOLUTION INTRAVENOUS at 07:58

## 2025-04-29 RX ADMIN — LIDOCAINE HYDROCHLORIDE 60 MG: 20 INJECTION, SOLUTION EPIDURAL; INFILTRATION; INTRACAUDAL; PERINEURAL at 06:58

## 2025-04-29 RX ADMIN — HYDROMORPHONE HYDROCHLORIDE 0.5 MG: 0.5 INJECTION, SOLUTION INTRAMUSCULAR; INTRAVENOUS; SUBCUTANEOUS at 09:09

## 2025-04-29 RX ADMIN — EPHEDRINE SULFATE 5 MG: 50 INJECTION, SOLUTION INTRAVENOUS at 07:34

## 2025-04-29 RX ADMIN — ROCURONIUM BROMIDE 50 MG: 10 INJECTION, SOLUTION INTRAVENOUS at 06:59

## 2025-04-29 RX ADMIN — FENTANYL CITRATE 50 MCG: 50 INJECTION, SOLUTION INTRAMUSCULAR; INTRAVENOUS at 06:58

## 2025-04-29 RX ADMIN — Medication 150 MCG: at 07:58

## 2025-04-29 RX ADMIN — EPHEDRINE SULFATE 10 MG: 50 INJECTION, SOLUTION INTRAVENOUS at 07:25

## 2025-04-29 RX ADMIN — Medication 50 MCG: at 07:40

## 2025-04-29 RX ADMIN — FENTANYL CITRATE 25 MCG: 50 INJECTION, SOLUTION INTRAMUSCULAR; INTRAVENOUS at 08:22

## 2025-04-29 RX ADMIN — ACETAMINOPHEN 1000 MG: 10 INJECTION INTRAVENOUS at 07:16

## 2025-04-29 ASSESSMENT — PAIN DESCRIPTION - DESCRIPTORS: DESCRIPTORS: BURNING

## 2025-04-29 NOTE — ANESTHESIA PROCEDURE NOTES
Airway  Date/Time: 4/29/2025 7:01 AM  Urgency: elective    Airway not difficult    General Information and Staff    Patient location during procedure: OR  CRNA: Samara Schmidt CRNA  Performed: CRNA     Indications and Patient Condition  Indications for airway management: anesthesia and airway protection  Spontaneous Ventilation: absent  Sedation level: deep  Preoxygenated: yes  Patient position: sniffing  MILS maintained throughout  Mask difficulty assessment: 2 - vent by mask + OA or adjuvant +/- NMBA    Final Airway Details  Final airway type: endotracheal airway      Successful airway: ETT  Cuffed: yes   Successful intubation technique: direct laryngoscopy  Facilitating devices/methods: stylet  Endotracheal tube insertion site: oral  Blade: Michelle  Blade size: #2  ETT size (mm): 7.0  Cormack-Lehane Classification: grade I - full view of glottis  Placement verified by: chest auscultation, capnometry and palpation of cuff   Measured from: lips  ETT to lips (cm): 21  Number of attempts at approach: 1

## 2025-04-29 NOTE — H&P
Referring provider: No ref. provider found  Subjective   No chief complaint on file.       History of Present Illness    Isabell is a 50-year-old female  with a history of fibromyalgia and back fusion from T4 to the pelvis, presents with abdominal skin excess and rash. She has lost over 100 pounds in the past two years, with a stable weight for the past six months. She attributes her weight loss to Ozempic, phentermine, diet changes, and exercise. She reports rashes in the abdominal fold, which she manages with antifungal creams.  The rashes are particularly bad in the summer.  She currently has a bad umbilical rash that is very bothersome.  She reports that she has discussed this issue with her primary care provider multiple times, who referred her for my evaluation.  The excess skin causes discomfort, difficulty finding well-fitted clothes, and hinders exercise. It also impacts her self-esteem and comfort in new relationships. She has a history of laparoscopic gallbladder removal and a vaginal hysterectomy. She quit smoking two and a half years ago.     Interval Events: She continues to suffer with excess skin sx. Denies any changes to her medical health.     Review of Systems  All systems reviewed and negative except as stated in the HPI.      Objective    Past Medical History  Patient Active Problem List    Diagnosis Date Noted    Abdominal pannus 01/16/2025    Intertrigo 01/16/2025    Constipation 01/08/2025    Hyperlipidemia 01/08/2025    Idiopathic scoliosis 01/08/2025    Lumbosacral neuritis 01/08/2025    Migraine without aura 01/08/2025    Mixed anxiety depressive disorder 01/08/2025    Nummular eczema 01/08/2025    Post-inflammatory hyperpigmentation 01/08/2025    Postoperative pain 01/08/2025    Spinal stenosis of lumbar region 01/08/2025    Tinea corporis 01/08/2025    Morbid obesity (CMS/[facility]) 12/02/2022    Spinal deformity 11/16/2022    Acute on chronic respiratory failure with hypoxia (CMS/[facility]) 01/28/2022     Acute hypoxemic respiratory failure due to COVID-19 (CMS/HCC) 01/11/2022    Sinus bradycardia 01/11/2022    LUIS ALBERTO on CPAP 01/11/2022    Coccygeal pain 05/07/2020    Osteoarthritis of spine with radiculopathy, lumbar region 10/08/2019    Postlaminectomy syndrome of lumbar region 10/08/2019    Sacroiliitis (CMS/[facility]) 10/08/2019    Other chronic pain 10/08/2019    Fall 10/08/2019    Lower GI bleed 09/24/2019    Right lower quadrant abdominal pain 09/24/2019    Fibromyalgia 09/24/2019    Chronic back pain 09/24/2019    Other specified hypothyroidism 09/24/2019    Lumbar radiculopathy 05/14/2019    Greater trochanteric bursitis 02/05/2019    Abnormal glucose level 11/04/2016    Lymphocytic thyroiditis 11/04/2016     Past Medical History:   Diagnosis Date    Chronic pain     Dental disease     Depression     Diverticulitis     Endocrine disorder     Extremity pain     Fibromyalgia     Hashimoto's thyroiditis     Hiatal hernia     History of transfusion     Hypothyroidism     Injury of back     Low back pain     Neurologic disorder     Peripheral neuropathy     Respiratory disease     Sleep apnea     does not use cpap-4/22/2025 patient reports that she does not snore anymore    Wears dentures      Past Surgical History:   Procedure Laterality Date    BACK SURGERY      CHOLECYSTECTOMY      COLONOSCOPY N/A 12/27/2019    Procedure: COLONOSCOPY WITH ASCENDING COLON BIOPSIES;  Surgeon: Kelvin Cordova MD PhD;  Location: [facility];  Service: Endoscopy;  Laterality: N/A;    COLONOSCOPY N/A 4/6/2021    Procedure: COLONOSCOPY WITH FECAL MICROBIOTA TRANSPLANTATION;  Surgeon: Maria Dolores Richardson MD;  Location: [facility];  Service: Endoscopy;  Laterality: N/A;    ESOPHAGOGASTRODUODENOSCOPY N/A 12/27/2019    Procedure: ESOPHAGOGASTRODUODENOSCOPY WITH DUODENAL BIOPSIES;  Surgeon: Kelvin Cordova MD PhD;  Location: [facility];  Service: Endoscopy;  Laterality: N/A;    EXAMINATION UNDER ANESTHESIA N/A 1/17/2020    Procedure: EXAM  UNDER ANESTHESIA;  Surgeon: Steve Michelle MD;  Location: [facility] OR;  Service: General;  Laterality: N/A;    HEMORRHOID SURGERY N/A 1/17/2020    Procedure: HEMORRHOIDECTOMY TIMES ONE with BANDING TIMES ONE;  Surgeon: Steve Michelle MD;  Location: [facility] OR;  Service: General;  Laterality: N/A;    HYSTERECTOMY      REDUCTION MAMMAPLASTY      TUBAL LIGATION       Social History     Socioeconomic History    Marital status: Single     Spouse name: Not on file    Number of children: Not on file    Years of education: Not on file    Highest education level: Not on file   Occupational History    Not on file   Tobacco Use    Smoking status: Former     Types: Cigarettes    Smokeless tobacco: Never    Tobacco comments:     'VAPES'   Vaping Use    Vaping status: Former    Substances: Nicotine    Devices: Pre-filled or refillable cartridge, Refillable tank   Substance and Sexual Activity    Alcohol use: No    Drug use: Yes     Types: Marijuana     Comment: medical    Sexual activity: Defer   Other Topics Concern    Not on file   Social History Narrative    Not on file     Social Drivers of Health     Financial Resource Strain: Not on file   Food Insecurity: Not on file   Transportation Needs: Not on file   Physical Activity: Not on file   Stress: Not on file   Social Connections: Not on file   Intimate Partner Violence: Not on file   Housing Stability: Not on file       Family History  Family History   Problem Relation Age of Onset    No Known Problems Mother      Medications    Current Facility-Administered Medications:     sodium chloride 0.9 % 500 mL with EPINEPHrine (ADRENALIN) 1 mg, tranexamic acid 2,000 mg solution for infiltration, , infiltration, Once, Heidi Engel MD    clindamycin (CLEOCIN) 900 mg in 50 mL IVPB (premix), 900 mg, intravenous, q8h, Heidi Engel MD  Allergies  Allergies   Allergen Reactions    Cephalexin Hives, Rash and Shortness of breath     hives, throat swelling    Other Reaction(s): HIVES, THROAT  "CLOSE (SNOMED-CT: null)    Other reaction(s): Skin Rashes / Eruption of skin, Hives / Urticaria, Asthma / Allergic asthma, Shortness of Breath / Dyspnea   hives, throat swelling, life-threatening       Physical Exam:  /69   Pulse 64   Temp 36 °C (96.8 °F) (Temporal)   Resp 16   Ht 1.626 m (5' 4.02\")   Wt 71.2 kg (157 lb)   SpO2 100%   BMI 26.94 kg/m²   Body mass index is 26.94 kg/m².    General: No acute distress, alert and appropriately interactive  Psych: Normal mood, pleasant affect  HEENT: NCAT, EOMI, nares patent without rhinorrhea, ears with normal external morphology bilaterally, mucous membranes moist, tongue midline.  Cardiovascular: RRR  Lungs: CTAB, Breathing comfortably on room air  Abdomen: Significant abdominal skin ptosis below and above the umbilicus.  Pannus covers most of the mons pubis.  There is intertrigo and erythema within the abdominal folds as well as in the umbilicus.  The umbilicus is tender to palpation and has evidence of ulceration.  There is also significant ptosis of the mons pubis.  Patient has a well-healed vertical scar below the umbilicus from spinal fusion.  Patient also has a oblique scar to the left flank.  There is no palpable hernias or bulges.    Imaging: I personally reviewed the images from the patient's CT abdomen pelvis performed 12/30/2024 which shows intact abdominal wall without any abnormalities.          Assessment    1. Infection of skin and subcutaneous tissue  2. Pannus, abdominal  3. Intertrigo (Primary)  4. Excess skin of abdomen  Informed consent has been obtained   Operative site appropriately marked   Proceed with surgery    Electronically signed: Heidi Engel MD   "

## 2025-04-29 NOTE — ANESTHESIA POSTPROCEDURE EVALUATION
Patient: Isabell Harper    Procedure Summary       Date: 04/29/25 Room / Location: [facility] OR 01 / [facility] OR    Anesthesia Start: 0652 Anesthesia Stop: 0842    Procedure: PANNICULECTOMY (Abdomen) Diagnosis:       Abdominal pannus      Intertrigo      (Abdominal pannus [E65])      (Intertrigo [L30.4])    Surgeons: Heidi Engel MD Responsible Provider: YASIR Hoang MD    Anesthesia Type: general ASA Status: 3            Anesthesia Type: general    Last vitals   Vitals Value Taken Time   /65 04/29/25 0850   Temp 36 °C (96.8 °F) 04/29/25 0839   Pulse 60 04/29/25 0850   Resp 15 04/29/25 0850   SpO2 100 % 04/29/25 0850   Pain Score 6 04/29/25 0856       Anesthesia Post Evaluation    Patient location during evaluation: PACU  Patient participation: complete - patient participated  Level of consciousness: awake and alert  Pain management: adequate  Airway patency: patent  Cardiovascular status: acceptable  Respiratory status: acceptable  Hydration status: acceptable  May dismiss recovered patient based on consultation with the appropriate physicians and/or meeting appropriate discharge criteria     No notable events documented.    Cosmetic?  Other Billing Consideration (see comments) [120]

## 2025-04-29 NOTE — ANESTHESIA PREPROCEDURE EVALUATION
"Pre-Procedure Assessment    Patient: Isabell Harper, female, 50 y.o.    Ht Readings from Last 1 Encounters:   04/29/25 1.626 m (5' 4.02\")     Wt Readings from Last 1 Encounters:   04/29/25 71.2 kg (157 lb)       Last Vitals  /69 (04/29/25 0603)    Temp 36 °C (96.8 °F) (04/29/25 0603)    Pulse 64 (04/29/25 0603)   Resp 16 (04/29/25 0603)    SpO2 100 % (04/29/25 0603)    Pain Score 4-Distracts me, can do usual activities (04/29/25 0603)       Problem list reviewed and Medical history reviewed           Airway   Mallampati: II  TM distance: >3 FB  Neck ROM: full      Dental      Pulmonary     breath sounds clear to auscultation  (+) sleep apnea  Cardiovascular - negative ROS    Rhythm: regular  Rate: normal    Mental Status/Neuro/Psych    Pt is alert.      (+) arthritis, back injury, peripheral neuropathy    GI/Hepatic/Renal    (+) hiatal hernia    Endo/Other    (+) hypothyroidism, history of blood transfusion  Abdominal           Social History     Tobacco Use   • Smoking status: Former     Types: Cigarettes   • Smokeless tobacco: Never   • Tobacco comments:     'VAPES'   Substance Use Topics   • Alcohol use: No      Hematology   WBC   Date Value Ref Range Status   12/30/2024 13.1 (H) 4.5 - 10.5 10*3/uL Final     RBC   Date Value Ref Range Status   12/30/2024 5.10 3.70 - 5.30 10*6/uL Final     MCV   Date Value Ref Range Status   12/30/2024 89.8 81.0 - 97.0 fL Final     Hemoglobin   Date Value Ref Range Status   12/30/2024 15.7 (H) 11.5 - 15.5 g/dL Final     Hematocrit   Date Value Ref Range Status   12/30/2024 45.8 (H) 34.0 - 45.0 % Final     Platelets   Date Value Ref Range Status   12/30/2024 258 140 - 350 10*3/uL Final      Coagulation   Protime   Date Value Ref Range Status   01/11/2022 13.1 (H) 9.4 - 12.5 seconds Final     PTT   Date Value Ref Range Status   09/24/2019 31.3 25.1 - 36.5 s Final     INR   Date Value Ref Range Status   01/11/2022 1.1 <=1.1 Final      General Chemistry   Calcium   Date Value Ref " Range Status   12/30/2024 10.2 8.6 - 10.3 mg/dL Final     BUN   Date Value Ref Range Status   12/30/2024 15 7 - 25 mg/dL Final     Creatinine   Date Value Ref Range Status   12/30/2024 0.77 0.60 - 1.10 mg/dL Final     Glucose   Date Value Ref Range Status   12/30/2024 169 (H) 70 - 105 mg/dL Final     Sodium   Date Value Ref Range Status   12/30/2024 137 135 - 145 mmol/L Final     Potassium   Date Value Ref Range Status   12/30/2024 3.5 3.5 - 5.1 MMOL/L Final     Magnesium   Date Value Ref Range Status   12/30/2024 1.8 1.8 - 2.4 MG/DL Final     CO2   Date Value Ref Range Status   12/30/2024 24 21 - 32 mmol/L Final     Chloride   Date Value Ref Range Status   12/30/2024 100 98 - 107 mmol/L Final     Anesthesia Plan    ASA 3   NPO status reviewed: > 6 hours    General         Induction: intravenous   Airway Planning: oral ET tube          Plan for postoperative opioid use.    Anesthetic plan and risks discussed with patient.  Use of blood products discussed with patient who consented to blood products.

## 2025-04-29 NOTE — PERIOPERATIVE NURSING NOTE
1100 Dr Engel in to assess patient and answer questions prior to discharge. Left drain emptied with 1 mL bloody drainage noted, pt and mom instructed on how to strip, empty, and record drain output. All discharge instructions provided, pt and family voice no questions or concerns and left in good spirits.

## 2025-04-29 NOTE — OP NOTE
Plastic Surgery OP Note:    Patient ID :  [id number]  Isabell CHAVEZ Ihle [id number]    Surgeon:  Heidi Engel MD      Assistant:    Raymundo Juarez PA-C  *Note:  The first assist was needed for the critical portions of this case to include: Retraction, facilitation with tissue dissection, closure and application of dressings     Anesthesia: General     Pre OP Dx:  1.  Abdominal panniculus  2.  Intertrigo     Post OP Dx: As above         Procedure:  1. Panniculectomy (31440)     Estimated Blood Loss:  75 mL    Complications: None    Tourniquet Time: Not applicable    Staff:   Circulator: Carolyn Whiting RN  Scrub Person: Shelley Alfaro; Isadora Dejesus; Kierra Lubin  First Assist: Johanne Barker  [employer]: Raymundo Juarez PA-C  Surgical Scrub Student: Avinash Larry    Specimens:  No specimens collected during this procedure.    Drains:  Closed/Suction Drain 1 Right Hip Bulb 15 Fr. (Active)       Closed/Suction Drain 2 Left Hip Accordion 15 Fr. (Active)       Findings: 2063 grams of pannus excised.  Good contour at the conclusion of the case      Indication: Isabell is a 50-year-old female  with a history of fibromyalgia and back fusion from T4 to the pelvis, presents with abdominal skin excess and rash. She has lost over 100 pounds in the past two years, with a stable weight for the past six months. She attributes her weight loss to Ozempic, phentermine, diet changes, and exercise. She reports rashes in the abdominal fold, which she manages with antifungal creams.  The rashes are particularly bad in the summer.  She currently has a bad umbilical rash that is very bothersome.  She reports that she has discussed this issue with her primary care provider multiple times, who referred her for my evaluation. She was found to be a good candidate for panniculectomy with umbilical sacrifice and insurance preauthorization was obtained.   We discussed the risk, benefits and alternatives, as well as planned incisions  and operative plan of panniculectomy and umbilical sacrifice. We reviewed procedure consent and she verbalized her understanding of the risk of seroma, hematoma, changes in skin sensation, contour irregularities, scarring, major or minor wound separation, visible or palpable sutures,  fat necrosis, need for further procedures, asymmetry, persistent swelling or lymphedema, cardiac or pulmonary complications, blood clots, or unsatisfactory results.  Patient was cleared by his primary care provider for this procedure.  Patient verbalized understanding of above risk factors and desires to proceed with surgery today. Written consent was obtained and the abdominal landmarks as well as the planned incision were marked with patient upright position.     Operation: After verification of consent patient was brought to the operating room and laid supine on the operating table.  Patient received weight-based dose of Ancef preoperatively.  After induction of general anesthesia and intubation bilateral upper extremities were secured on arm boards.  All pressure points were padded and he had bilateral sequential compression devices in place.  360 cc of tumescent solution containing 1 amp of epi and 2 g of TXA was used to inject around the planned incision.  The abdomen was prepped with ChloraPrep and draped in the usual sterile manner.  A timeout was performed repeating patient's name date of birth and the planned procedure. The markings were re-delineated.  A scalpel was used to make the lower abdominal incision staying at least 9 cm above the vulvar commisure.  Bovie electrocautery was used to carry the dissection through the Kal's fascia and down onto the anterior rectus fascia.  The dissection continued cephalad taking care to leave a small amount of fat on the anterior rectus fascia to minimize seroma formation.  Decision made to proceed with umbilical sacrifice.  A scalpel was used to make an incision around the  umbilicus.  Scissors were used to dissect the umbilical stalk.  The stalk were then tied off using combination of 0 silk x3.  Electrocautery was then used to remove the skin component of the umbilicus.  Dissection continued cephalad, approximately 2cm. The patient was placed in light beachchair position.  The pannus was then split in half and stapled in the midline.  The extent of lateral excision was determined by stretching the pannus down to the mons, and marking the planned incision area, ensuring symmetry.  Care was taken to make sure there was no undue tension.  Scalpel was used to excise the pannus fully.  It was a weighed and sent to pathology.  Bovie electrocautery was used to obtain hemostasis.  Copious amounts of Irrisept irrigation was used to washout the wound.  Two 15 Tamazight round channel drains were left in the surgical site, coming out laterally and sutured in place with 0 silk.  Kal's fascia was reapproximated using 2-0 PDS, plicating it down to anterior rectus sheath.  Deep dermis was closed using combination of clear 3-0 PDS and INSORB staples. Skin was closed using a 4-0 STRATAFIX in a subcuticular manner.  Postop dressings included Prineo, ABDs and abdominal binder.  Patient tolerated the procedure well, was extubated, and brought to the PACU in stable condition.  All counts were correct x2.      Post-op Plan: Routine MUSHTAQ drain care. Continuous compression at all times. PO pain meds.      Electronically Signed by:  Heidi Engel MD 4/29/2025 8:57 AM    Other Billing Consideration (see comments) [120]

## 2025-05-01 ENCOUNTER — TELEPHONE (OUTPATIENT)
Dept: PLASTIC SURGERY | Facility: CLINIC | Age: 50
End: 2025-05-01
Payer: COMMERCIAL

## 2025-05-01 DIAGNOSIS — R11.2 POSTOPERATIVE NAUSEA AND VOMITING: ICD-10-CM

## 2025-05-01 DIAGNOSIS — Z98.890 STATUS POST SURGERY: Primary | ICD-10-CM

## 2025-05-01 DIAGNOSIS — Z98.890 POSTOPERATIVE NAUSEA AND VOMITING: ICD-10-CM

## 2025-05-01 RX ORDER — ONDANSETRON 4 MG/1
4 TABLET, FILM COATED ORAL EVERY 8 HOURS PRN
Qty: 20 TABLET | Refills: 0 | Status: SHIPPED | OUTPATIENT
Start: 2025-05-01 | End: 2025-05-08

## 2025-05-01 NOTE — TELEPHONE ENCOUNTER
After collaborating with [employer], a prescription for Zofran was sent into the patient's preferred pharmacy.  Patient contacted and informed of this and she will pick it up once it is ready.

## 2025-05-01 NOTE — TELEPHONE ENCOUNTER
Patient contacted clinic with some concerns.  She is 2 days status post panniculectomy surgery.  She reports last 12 hours she has been vomiting.  She reports nausea also but no fevers or chills.  I asked patient if she has tried anything to help with the nausea and she has not.  Her discharge meds do not show an antinausea medication.  I asked the patient if she has tried Zofran in the past and she states she has not seem to work.  I informed her I will review this with the [employer] [facility] and get back to her a little bit later.  She verbalized understanding

## 2025-05-01 NOTE — TELEPHONE ENCOUNTER
Pt called stating that she had been throwing up all night and is concerned but has no fevers, transferred her to 's nurse Myrna.

## 2025-05-02 ENCOUNTER — TELEPHONE (OUTPATIENT)
Dept: PLASTIC SURGERY | Facility: CLINIC | Age: 50
End: 2025-05-02
Payer: COMMERCIAL

## 2025-05-02 NOTE — TELEPHONE ENCOUNTER
SAUL from 05/02 at 8:58am     Patient's mother (on [employer]) called in regarding some questions they had after the patient's surgery on 04/29 with Dr. Engel.

## 2025-05-02 NOTE — TELEPHONE ENCOUNTER
Diane, mother, contacted regarding her message.  Patient had a panniculectomy about 3 days ago.  The mother states she has not yet had a bowel movement.  In the last 3 days the patient has experienced quite a bit of nausea and vomiting.  She was prescribed Zofran yesterday and they report this is working well.  During the 3 days with the nausea and vomiting she was not eating or drinking very well.  They have been using over-the-counter MiraLAX and stool softeners.  I reviewed that these medications typically work best when the patient is fully hydrated because they utilize water in the body.  Mother states they were both quite concerned about the lack of bowel movement but also confirmed she has not been consuming very much food due to the nausea and vomiting.  Patient does not have any abdominal pain or cramping that she would contribute to her bowels. She is passing gas.  Reviewed the need for hydration to help with bowel movements.  We had a maylin conversation about options.  They could keep going with the over-the-counter meds and increase her hydration, they could go to an urgent care or the ED.  Our conversation took place on Friday at about noon and unfortunately our clinic is not open over the weekend.  They agreed to keep trying the over-the-counter medications at home and if something changes over the weekend they will utilize other services for care.

## 2025-05-05 ENCOUNTER — TELEPHONE (OUTPATIENT)
Dept: PLASTIC SURGERY | Facility: CLINIC | Age: 50
End: 2025-05-05
Payer: COMMERCIAL

## 2025-05-05 NOTE — TELEPHONE ENCOUNTER
Informed the patients mother the photo was reviewed by Raymnudo and a message was sent via [employer] but everything looks to be feeling well. The pain she is experiencing may be due to the drain rubbing a nerve but that is normal and will stop once we are able to take the drain out. Patients mother stated understanding.

## 2025-05-05 NOTE — TELEPHONE ENCOUNTER
"Pt mother called with concerns of extreme leg pain where the drain is at, if you were to touch the area it \"sends her through the roof\", that drain has very little out put, but the other side is okay. Pt mom said it doesn't look right, asked pt mom if she can send in a photo to either [employer] or the secure email address, pt mom said it will get sent through [employer] or the email if they are unable to figure out [employer]. Told her the nurse I have today is going to be in a procedure so she will give a call back as soon as she can, pt mom asked to be called at her number of [phone] rahul Whyte is sleeping.   "

## 2025-05-07 NOTE — TELEPHONE ENCOUNTER
Left message on mother and patient's phone for them to call us back regarding their concerns below.

## 2025-05-07 NOTE — TELEPHONE ENCOUNTER
Pt mother lvm stating the the voicemail is for  and that she is concerned about her drain tubes, left is less then 20 cc, the one on the right leg is slowing down to 10cc, and wants to know when they can get them removed. Please call back

## 2025-05-16 ENCOUNTER — OFFICE VISIT (OUTPATIENT)
Dept: PLASTIC SURGERY | Facility: CLINIC | Age: 50
End: 2025-05-16
Payer: COMMERCIAL

## 2025-05-16 VITALS
TEMPERATURE: 96.6 F | SYSTOLIC BLOOD PRESSURE: 137 MMHG | HEIGHT: 64 IN | DIASTOLIC BLOOD PRESSURE: 81 MMHG | OXYGEN SATURATION: 99 % | RESPIRATION RATE: 16 BRPM | BODY MASS INDEX: 26.94 KG/M2 | HEART RATE: 82 BPM

## 2025-05-16 DIAGNOSIS — Z98.890 S/P PANNICULECTOMY: ICD-10-CM

## 2025-05-16 DIAGNOSIS — R11.2 POSTOPERATIVE NAUSEA AND VOMITING: Primary | ICD-10-CM

## 2025-05-16 DIAGNOSIS — Z98.890 POSTOPERATIVE NAUSEA AND VOMITING: Primary | ICD-10-CM

## 2025-05-16 RX ORDER — CALCIUM CARBONATE 500(1250)
TABLET ORAL EVERY 12 HOURS
COMMUNITY

## 2025-05-16 RX ORDER — TRAMADOL HYDROCHLORIDE 50 MG/1
50 TABLET, FILM COATED ORAL EVERY 8 HOURS PRN
COMMUNITY
Start: 2025-03-17

## 2025-05-16 RX ORDER — BACLOFEN 5 MG/1
5 TABLET ORAL EVERY 8 HOURS PRN
COMMUNITY
Start: 2025-02-08

## 2025-05-16 RX ORDER — LIDOCAINE 50 MG/G
1 PATCH TOPICAL DAILY
COMMUNITY
Start: 2025-03-17

## 2025-05-16 RX ORDER — TIZANIDINE HYDROCHLORIDE 2 MG/1
2 CAPSULE, GELATIN COATED ORAL 2 TIMES DAILY
COMMUNITY
Start: 2025-03-17

## 2025-05-16 ASSESSMENT — PAIN SCALES - GENERAL: PAINLEVEL_OUTOF10: 0-NO PAIN

## 2025-05-16 NOTE — PROGRESS NOTES
"Subjective:  Date of Surgery: 4/29/2025   Procedure: Panniculectomy  Patient is a 50 y.o. female who is here today for her first 2-week postoperative visit.  Patient did bring drain log with today which shows output has been less than 10 cc over the last 4 days.  Patient does note that she has not taken her abdominal binder off for any extended period of time since surgery.  She has continue to follow activity restrictions.  She denies any severe pain or discomfort today.  Patient denies fever, chills, malaise, or any other signs and symptoms of infection.    ROS:  Review of Systems : see HPI      Objective:  /81   Pulse 82   Temp (!) 35.9 °C (96.6 °F) (Temporal)   Resp 16   Ht 1.626 m (5' 4.02\")   SpO2 99%   BMI 26.94 kg/m²    Physical Exam  Constitutional:       Appearance: Normal appearance.   Pulmonary:      Effort: Pulmonary effort is normal.   Skin:     General: Skin is warm and dry.   Neurological:      Mental Status: She is alert and oriented to person, place, and time.   Psychiatric:         Mood and Affect: Mood normal.       Abdomen:  Skin is warm, dry, intact with no erythema or ecchymosis.  Prineo was removed to reveal well-healed surgical incision with no evidence of infection or dehiscence.  Very small, soft standing cone deformity noted to the left apex of incision. patient did have abdominal binder in place at the start of today's visit.  Abdomen is soft, nontender with no notable seroma or hematoma formations.  Mild distention noted.  MUSHTAQ drains x 2 in place to bilateral anterior hips with serous output within tubing and bulbs.  Due to low output, these both were removed today.  Drain sites were dressed with gauze and Tegaderm.    Assessment/ Plan:  Isabell Harper is a 50-year-old female who presents to the clinic today for 2-week follow-up visit s/p panniculectomy with umbilical sacrifice.  After reviewing drain log, output has been less than 10 cc over the last 4 days, less than 20 cc " over the last week.  We did go ahead and remove both MUSHTAQ drains today.  Drain sites were dressed with gauze and Tegaderm.  Did discuss that if the dressings become saturated, she may replace dressings as needed.  Encourage patient to continue to avoid showering for at least 3 days to allow drain sites to begin to close and help reduce risk of infection.  When patient does shower, encouraged her to keep her back towards the water.  Also reviewed the importance of abdominal compression and that this is even more important now since drains are removed.  Patient has had a difficult time wearing the binder as she does have a very short torso and finds it very uncomfortable although she has remained diligent with use.  We did discuss that she may transition to a compression garment or shape wear if she finds this more tolerable however it does need to be adequately tight.  She understands that if she does not provide adequate compression, the risk for a seroma increases.  Did discuss that compression is needed for about 3 months after surgery.  We did review activity restrictions.  Encourage patient to continue to take it easy and limit activity for an additional 2 weeks.  Once patient is 1 month postop, she may begin to incorporate upper body lower body exercises however encouraged to continue to avoid any abdominal exercises.  Patient will follow-up in 1 month for her 6-week postop visit.  As long as things are continuing to heal well and patient is tolerating activities, we will hope to remove any additional exercise or weight restrictions at that time.  Patient was provided with blue silicone tape today to apply to surgical incision.  Did discuss reapplying the tape every third day.  If patient wishes, she may utilize a scar cream or gel instead of the tape.  Patient notes understanding agrees with plan above.  She has no additional questions today.  Patient will follow-up in 1 month for 6-week postop visit or sooner  if needed.        This visit was performed by Raymundo Juarez PA-C.  All extraneous collected information was purged from this note to provide useful review.  In addition to voice activated recording software this visit was completed in a team-based fashion with nursing staff, support staff and myself all actively participating in care. A voice to text program was used to aid in medical record documentation. Sometimes words are printed not exactly as they were spoken. While efforts were made to carefully edit and correct any inaccuracies, some errors may be present. Errors should be taken within the context of the discussion.  Please contact our office if you need assistance interpreting this medical record or notice any mistakes.     Raymundo Juarez PA-C

## 2025-05-16 NOTE — LETTER
[facility]  [facility] [address]   [address] [facility] [address]   Dept: [phone]  May 16, 2025     Patient: Isabell Harper   YOB: 1975   Date of Visit: 5/16/2025       To Whom It May Concern:    It is my medical opinion that Isabell Harper may return to work on May 27th 2025when she is 1 month post op. She will return to light duty slowing increasing activity over 2 more weeks..    If you have any questions or concerns, please have the patient contact [facility] or work directly with employer’s internal employee health to address specific needs.            Sincerely,        Raymundo Juarez PA-C    Electronically signed by Raymundo Juarez PA-C at 10:09 AM    CC: No Recipients

## 2025-06-18 ENCOUNTER — OFFICE VISIT (OUTPATIENT)
Dept: PLASTIC SURGERY | Facility: CLINIC | Age: 50
End: 2025-06-18
Payer: COMMERCIAL

## 2025-06-18 VITALS
RESPIRATION RATE: 18 BRPM | TEMPERATURE: 97.5 F | HEIGHT: 64 IN | BODY MASS INDEX: 26.8 KG/M2 | OXYGEN SATURATION: 99 % | SYSTOLIC BLOOD PRESSURE: 110 MMHG | DIASTOLIC BLOOD PRESSURE: 72 MMHG | HEART RATE: 83 BPM | WEIGHT: 156.97 LBS

## 2025-06-18 DIAGNOSIS — Z09 POSTOPERATIVE FOLLOW-UP: Primary | ICD-10-CM

## 2025-06-18 DIAGNOSIS — Z98.890 S/P PANNICULECTOMY: ICD-10-CM

## 2025-06-18 PROCEDURE — 99024 POSTOP FOLLOW-UP VISIT: CPT | Performed by: PHYSICIAN ASSISTANT

## 2025-06-18 ASSESSMENT — PAIN SCALES - GENERAL: PAINLEVEL_OUTOF10: 0-NO PAIN

## 2025-06-18 ASSESSMENT — PAIN - FUNCTIONAL ASSESSMENT: PAIN_FUNCTIONAL_ASSESSMENT: NO/DENIES PAIN

## 2025-06-18 NOTE — PROGRESS NOTES
"Subjective:  Date of Surgery: 4/29/2025   Procedure: Panniculectomy  Patient is a 50 y.o. female who is here today for her 6-week postoperative visit.  Patient has continued abdominal compression as discussed.  She is slowly returning to activities of daily living and tolerating well without any pain or discomfort.  Today patient only concern is related to bilateral standing cone deformities at either end of abdominal incision.  Patient denies fever, chills, malaise, or any other signs and symptoms of infection.    ROS:  Review of Systems : see HPI      Objective:  /72   Pulse 83   Temp 36.4 °C (97.5 °F) (Temporal)   Resp 18   Ht 1.626 m (5' 4.02\")   Wt 71.2 kg (156 lb 15.5 oz)   SpO2 99%   BMI 26.93 kg/m²    Physical Exam  Constitutional:       Appearance: Normal appearance.   Pulmonary:      Effort: Pulmonary effort is normal.   Skin:     General: Skin is warm and dry.   Neurological:      Mental Status: She is alert and oriented to person, place, and time.   Psychiatric:         Mood and Affect: Mood normal.       Abdomen:  Abdomen is soft, nontender with no palpable fluid collections.  Abdominal incision well-healed with no infection or dehiscence.  Patient does have notable standing cone deformities to either end of incision, the right being worse than the left.  Drain sites well-healed.    Assessment/ Plan:  Isabell Harper is a 50-year-old female who presents to clinic today for 6-week follow-up visit s/p panniculectomy.  Upon examination, surgical incision is well-healed.  No palpable fluid collections.  Patient does have notable standing cone deformities to either end of surgical incision.  We did discuss the possibility of revision to remove and help flatten the appearance of the standing cone deformities.  Patient finds most discomfort with clothing rubbing on these areas as they do stick out further than the surrounding tissue.  Did discuss that we will follow-up at her 3-month postop visit to " reassess the standing cone deformities and determine possibility of excision.  Otherwise, no additional restrictions indicated at this time.  Patient may return to normal activities as tolerable she may now begin to swim or soak if she desires.  Patient will continue compression with shape wear or high wasted biker shorts for an additional 6 weeks.  Patient notes understanding agrees with plan above.  She has additional questions today.  Patient will follow-up in 6 weeks for 3-month postop visit or sooner if needed.  Did encourage patient to contact the office if questions or concerns arise.        This visit was performed by Raymundo Juarez PA-C.  All extraneous collected information was purged from this note to provide useful review.  In addition to voice activated recording software this visit was completed in a team-based fashion with nursing staff, support staff and myself all actively participating in care. A voice to text program was used to aid in medical record documentation. Sometimes words are printed not exactly as they were spoken. While efforts were made to carefully edit and correct any inaccuracies, some errors may be present. Errors should be taken within the context of the discussion.  Please contact our office if you need assistance interpreting this medical record or notice any mistakes.     Raymundo Juarez PA-C

## 2025-07-05 ENCOUNTER — APPOINTMENT (OUTPATIENT)
Dept: CT IMAGING | Facility: HOSPITAL | Age: 50
End: 2025-07-05
Payer: COMMERCIAL

## 2025-07-05 ENCOUNTER — HOSPITAL ENCOUNTER (OUTPATIENT)
Facility: HOSPITAL | Age: 50
Setting detail: OBSERVATION
LOS: 1 days | Discharge: HOME | End: 2025-07-06
Attending: EMERGENCY MEDICINE | Admitting: HOSPITALIST
Payer: COMMERCIAL

## 2025-07-05 DIAGNOSIS — K59.01 CONSTIPATION BY DELAYED COLONIC TRANSIT: ICD-10-CM

## 2025-07-05 DIAGNOSIS — R10.9 ABDOMINAL PAIN: Primary | ICD-10-CM

## 2025-07-05 PROBLEM — K56.1 INTUSSUSCEPTION (CMS/HCC): Status: ACTIVE | Noted: 2025-07-05

## 2025-07-05 LAB
ALBUMIN SERPL-MCNC: 3.9 G/DL (ref 3.5–5.3)
ALP SERPL-CCNC: 100 U/L (ref 39–100)
ALT SERPL-CCNC: 91 U/L (ref 7–52)
ANION GAP SERPL CALC-SCNC: 3 MMOL/L (ref 3–11)
AST SERPL-CCNC: 49 U/L
BACTERIA #/AREA URNS HPF: NORMAL /HPF
BASOPHILS # BLD AUTO: 0 10*3/UL
BASOPHILS NFR BLD AUTO: 0.8 % (ref 0–2)
BILIRUB SERPL-MCNC: 0.27 MG/DL (ref 0.2–1.4)
BILIRUB UR QL: NEGATIVE
BUN SERPL-MCNC: 12 MG/DL (ref 7–25)
CALCIUM ALBUM COR SERPL-MCNC: 8.8 MG/DL (ref 8.6–10.3)
CALCIUM SERPL-MCNC: 8.7 MG/DL (ref 8.6–10.3)
CHLORIDE SERPL-SCNC: 104 MMOL/L (ref 98–107)
CLARITY UR: CLEAR
CO2 SERPL-SCNC: 30 MMOL/L (ref 21–32)
COLOR UR: COLORLESS
CREAT SERPL-MCNC: 0.82 MG/DL (ref 0.6–1.1)
EGFRCR SERPLBLD CKD-EPI 2021: 88 ML/MIN/1.73M*2
EOSINOPHIL # BLD AUTO: 0.2 10*3/UL
EOSINOPHIL NFR BLD AUTO: 4.7 % (ref 0–3)
ERYTHROCYTE [DISTWIDTH] IN BLOOD BY AUTOMATED COUNT: 13.4 % (ref 11.5–14)
GLUCOSE SERPL-MCNC: 77 MG/DL (ref 70–105)
GLUCOSE UR QL: NEGATIVE MG/DL
HCG SERPL QL: POSITIVE
HCG SERPL-ACNC: 8 MIU/ML
HCT VFR BLD AUTO: 37.4 % (ref 34–45)
HGB BLD-MCNC: 12.7 G/DL (ref 11.5–15.5)
HGB UR QL: NEGATIVE
KETONES UR-MCNC: NEGATIVE MG/DL
LEUKOCYTE ESTERASE UR QL STRIP: NEGATIVE
LIPASE SERPL-CCNC: 101 U/L (ref 11–82)
LYMPHOCYTES # BLD AUTO: 1.3 10*3/UL
LYMPHOCYTES NFR BLD AUTO: 37.3 % (ref 11–47)
MCH RBC QN AUTO: 29.8 PG (ref 28–33)
MCHC RBC AUTO-ENTMCNC: 33.9 G/DL (ref 32–36)
MCV RBC AUTO: 87.9 FL (ref 81–97)
MONOCYTES # BLD AUTO: 0.2 10*3/UL
MONOCYTES NFR BLD AUTO: 6.1 % (ref 3–11)
NEUTROPHILS # BLD AUTO: 1.8 10*3/UL
NEUTROPHILS NFR BLD AUTO: 51.1 % (ref 41–81)
NITRITE UR QL: NEGATIVE
PH UR: 7 PH (ref 5–8)
PLATELET # BLD AUTO: 157 10*3/UL (ref 140–350)
PMV BLD AUTO: 8.7 FL (ref 6.9–10.8)
POTASSIUM SERPL-SCNC: 4.5 MMOL/L (ref 3.5–5.1)
PROT SERPL-MCNC: 6.7 G/DL (ref 6–8.3)
PROT UR STRIP-MCNC: NEGATIVE MG/DL
RBC # BLD AUTO: 4.26 10*6/UL (ref 3.7–5.3)
RBC #/AREA URNS HPF: NEGATIVE /HPF
SODIUM SERPL-SCNC: 137 MMOL/L (ref 135–145)
SP GR UR: 1.02 (ref 1–1.03)
SQUAMOUS #/AREA URNS HPF: NORMAL /HPF
UROBILINOGEN UR-MCNC: NORMAL MG/DL
WBC # BLD AUTO: 3.5 10*3/UL (ref 4.5–10.5)
WBC #/AREA URNS HPF: NORMAL /HPF
WBC CLUMPS #/AREA URNS HPF: NORMAL /HPF

## 2025-07-05 PROCEDURE — 2550000100 HC RX 255: Performed by: EMERGENCY MEDICINE

## 2025-07-05 PROCEDURE — 2500000200 HC RX 250 WO HCPCS: Performed by: INTERNAL MEDICINE

## 2025-07-05 PROCEDURE — 99222 1ST HOSP IP/OBS MODERATE 55: CPT | Mod: AI | Performed by: INTERNAL MEDICINE

## 2025-07-05 PROCEDURE — 96372 THER/PROPH/DIAG INJ SC/IM: CPT

## 2025-07-05 PROCEDURE — (BLANK) HC ROOM SEMI PRIVATE

## 2025-07-05 PROCEDURE — 83690 ASSAY OF LIPASE: CPT | Performed by: EMERGENCY MEDICINE

## 2025-07-05 PROCEDURE — 80053 COMPREHEN METABOLIC PANEL: CPT | Performed by: EMERGENCY MEDICINE

## 2025-07-05 PROCEDURE — 2580000300 HC RX 258: Performed by: INTERNAL MEDICINE

## 2025-07-05 PROCEDURE — 84703 CHORIONIC GONADOTROPIN ASSAY: CPT | Performed by: EMERGENCY MEDICINE

## 2025-07-05 PROCEDURE — 6360000200 HC RX 636 W HCPCS (ALT 250 FOR IP): Mod: JZ | Performed by: EMERGENCY MEDICINE

## 2025-07-05 PROCEDURE — 6360000200 HC RX 636 W HCPCS (ALT 250 FOR IP): Performed by: INTERNAL MEDICINE

## 2025-07-05 PROCEDURE — G0378 HOSPITAL OBSERVATION PER HR: HCPCS

## 2025-07-05 PROCEDURE — 2590000100 HC RX 259: Performed by: INTERNAL MEDICINE

## 2025-07-05 PROCEDURE — 36415 COLL VENOUS BLD VENIPUNCTURE: CPT | Performed by: EMERGENCY MEDICINE

## 2025-07-05 PROCEDURE — 99285 EMERGENCY DEPT VISIT HI MDM: CPT | Performed by: EMERGENCY MEDICINE

## 2025-07-05 PROCEDURE — 99222 1ST HOSP IP/OBS MODERATE 55: CPT | Performed by: SURGERY

## 2025-07-05 PROCEDURE — 74177 CT ABD & PELVIS W/CONTRAST: CPT

## 2025-07-05 PROCEDURE — 96374 THER/PROPH/DIAG INJ IV PUSH: CPT

## 2025-07-05 PROCEDURE — 84702 CHORIONIC GONADOTROPIN TEST: CPT | Performed by: EMERGENCY MEDICINE

## 2025-07-05 PROCEDURE — 81001 URINALYSIS AUTO W/SCOPE: CPT | Performed by: EMERGENCY MEDICINE

## 2025-07-05 PROCEDURE — 2580000300 HC RX 258: Performed by: EMERGENCY MEDICINE

## 2025-07-05 PROCEDURE — 85025 COMPLETE CBC W/AUTO DIFF WBC: CPT | Performed by: EMERGENCY MEDICINE

## 2025-07-05 PROCEDURE — 6370000100 HC RX 637 (ALT 250 FOR IP): Performed by: NURSE PRACTITIONER

## 2025-07-05 PROCEDURE — 96375 TX/PRO/DX INJ NEW DRUG ADDON: CPT

## 2025-07-05 PROCEDURE — 6370000100 HC RX 637 (ALT 250 FOR IP): Performed by: INTERNAL MEDICINE

## 2025-07-05 RX ORDER — SODIUM CHLORIDE 0.9 % (FLUSH) 0.9 %
3 SYRINGE (ML) INJECTION AS NEEDED
Status: DISCONTINUED | OUTPATIENT
Start: 2025-07-05 | End: 2025-07-06 | Stop reason: HOSPADM

## 2025-07-05 RX ORDER — LACTULOSE 10 G/15ML
30 SOLUTION ORAL; RECTAL 2 TIMES DAILY
COMMUNITY
Start: 2025-07-03

## 2025-07-05 RX ORDER — LEVOTHYROXINE SODIUM 88 UG/1
88 TABLET ORAL DAILY
COMMUNITY

## 2025-07-05 RX ORDER — IOPAMIDOL 755 MG/ML
80 INJECTION, SOLUTION INTRAVASCULAR ONCE
Status: COMPLETED | OUTPATIENT
Start: 2025-07-05 | End: 2025-07-05

## 2025-07-05 RX ORDER — MORPHINE SULFATE 4 MG/ML
4 INJECTION, SOLUTION INTRAMUSCULAR; INTRAVENOUS ONCE
Status: COMPLETED | OUTPATIENT
Start: 2025-07-05 | End: 2025-07-05

## 2025-07-05 RX ORDER — SODIUM CHLORIDE, SODIUM LACTATE, POTASSIUM CHLORIDE, CALCIUM CHLORIDE 600; 310; 30; 20 MG/100ML; MG/100ML; MG/100ML; MG/100ML
100 INJECTION, SOLUTION INTRAVENOUS CONTINUOUS
Status: DISCONTINUED | OUTPATIENT
Start: 2025-07-05 | End: 2025-07-06 | Stop reason: HOSPADM

## 2025-07-05 RX ORDER — ONDANSETRON 4 MG/1
4 TABLET, ORALLY DISINTEGRATING ORAL EVERY 8 HOURS
COMMUNITY
Start: 2025-07-02

## 2025-07-05 RX ORDER — ENOXAPARIN SODIUM 100 MG/ML
40 INJECTION SUBCUTANEOUS
Status: DISCONTINUED | OUTPATIENT
Start: 2025-07-05 | End: 2025-07-06 | Stop reason: HOSPADM

## 2025-07-05 RX ORDER — SODIUM CHLORIDE 9 MG/ML
1000 INJECTION, SOLUTION INTRAVENOUS ONCE
Status: COMPLETED | OUTPATIENT
Start: 2025-07-05 | End: 2025-07-05

## 2025-07-05 RX ORDER — AMOXICILLIN 250 MG
2 CAPSULE ORAL 2 TIMES DAILY
Status: DISCONTINUED | OUTPATIENT
Start: 2025-07-05 | End: 2025-07-06

## 2025-07-05 RX ORDER — ACETAMINOPHEN 325 MG/1
650 TABLET ORAL EVERY 6 HOURS PRN
Status: DISCONTINUED | OUTPATIENT
Start: 2025-07-05 | End: 2025-07-06 | Stop reason: HOSPADM

## 2025-07-05 RX ORDER — PREGABALIN 100 MG/1
200 CAPSULE ORAL ONCE
Status: COMPLETED | OUTPATIENT
Start: 2025-07-05 | End: 2025-07-05

## 2025-07-05 RX ORDER — KETOROLAC TROMETHAMINE 15 MG/ML
15 INJECTION, SOLUTION INTRAMUSCULAR; INTRAVENOUS ONCE
Status: COMPLETED | OUTPATIENT
Start: 2025-07-05 | End: 2025-07-05

## 2025-07-05 RX ADMIN — ACETAMINOPHEN 650 MG: 325 TABLET ORAL at 23:07

## 2025-07-05 RX ADMIN — KETOROLAC TROMETHAMINE 15 MG: 15 INJECTION, SOLUTION INTRAMUSCULAR; INTRAVENOUS at 13:14

## 2025-07-05 RX ADMIN — ENOXAPARIN SODIUM 40 MG: 100 INJECTION SUBCUTANEOUS at 18:11

## 2025-07-05 RX ADMIN — PREGABALIN 200 MG: 100 CAPSULE ORAL at 21:37

## 2025-07-05 RX ADMIN — MORPHINE SULFATE 4 MG: 4 INJECTION INTRAVENOUS at 15:57

## 2025-07-05 RX ADMIN — DOCUSATE SODIUM 50 MG AND SENNOSIDES 8.6 MG 2 TABLET: 8.6; 5 TABLET, FILM COATED ORAL at 21:37

## 2025-07-05 RX ADMIN — IOPAMIDOL 80 ML: 755 INJECTION, SOLUTION INTRAVENOUS at 13:45

## 2025-07-05 RX ADMIN — SODIUM CHLORIDE 300 ML: 900 IRRIGANT IRRIGATION at 23:07

## 2025-07-05 RX ADMIN — SODIUM CHLORIDE, POTASSIUM CHLORIDE, SODIUM LACTATE AND CALCIUM CHLORIDE 100 ML/HR: 600; 310; 30; 20 INJECTION, SOLUTION INTRAVENOUS at 18:12

## 2025-07-05 RX ADMIN — SODIUM CHLORIDE 1000 ML: 9 INJECTION, SOLUTION INTRAVENOUS at 13:15

## 2025-07-05 NOTE — CONSULTS
General Surgery Consultation    07/05/25  3:10 PM    Chief Complaint   Patient presents with    Abdominal Pain     Pt reports on going abd pain, been dx with constipation. Seen at [facility] x2 and PCP and given medication to treat without relief.        HPI  Patient is a 50 y.o. female who presents to the emergency department with abdominal pain.  No nausea and vomiting.  States that her last bowel movement was several days ago.  She had a CT scan which shows an area of intussusception between the descending colon and sigmoid colon.  She does have leukopenia with a white count of 3.5.  Most recent colonoscopy was around 3 years ago reportedly and she states that this was normal at that time without concern for malignancy.  No previous episodes of intussusception.    Past Medical History:   Diagnosis Date    Anxiety     Arthritis     Back pain     Chronic pain     Dental disease     Depression     Diverticulitis     Endocrine disorder     Extremity pain     Fibromyalgia     Hashimoto's thyroiditis     Hiatal hernia     History of transfusion     Hypothyroidism     Injury of back     Low back pain     Neurologic disorder     Neuromuscular disorder (CMS/HCC)     Obesity     Peripheral neuropathy     Respiratory disease     Skin abnormalities     Sleep apnea     does not use cpap-4/22/2025 patient reports that she does not snore anymore    Wears dentures        Past Surgical History:   Procedure Laterality Date    BACK SURGERY      CHOLECYSTECTOMY      COLONOSCOPY N/A 12/27/2019    Procedure: COLONOSCOPY WITH ASCENDING COLON BIOPSIES;  Surgeon: Kelvin Cordova MD PhD;  Location: [facility];  Service: Endoscopy;  Laterality: N/A;    COLONOSCOPY N/A 4/6/2021    Procedure: COLONOSCOPY WITH FECAL MICROBIOTA TRANSPLANTATION;  Surgeon: Maria Dolores Richardson MD;  Location: [facility];  Service: Endoscopy;  Laterality: N/A;    ESOPHAGOGASTRODUODENOSCOPY N/A 12/27/2019    Procedure: ESOPHAGOGASTRODUODENOSCOPY WITH DUODENAL BIOPSIES;   Surgeon: Kelvin Cordova MD PhD;  Location: [facility];  Service: Endoscopy;  Laterality: N/A;    EXAMINATION UNDER ANESTHESIA N/A 1/17/2020    Procedure: EXAM UNDER ANESTHESIA;  Surgeon: Steve Michelle MD;  Location: [facility] OR;  Service: General;  Laterality: N/A;    HEMORRHOID SURGERY N/A 1/17/2020    Procedure: HEMORRHOIDECTOMY TIMES ONE with BANDING TIMES ONE;  Surgeon: Steve Michelle MD;  Location: [facility] OR;  Service: General;  Laterality: N/A;    HYSTERECTOMY      LIPECTOMY N/A 4/29/2025    Procedure: PANNICULECTOMY;  Surgeon: Heidi Engel MD;  Location: [facility] OR;  Service: Plastics;  Laterality: N/A;  CPT Code: 65535    ORTHOPEDIC SURGERY      REDUCTION MAMMAPLASTY      SPINE SURGERY      TUBAL LIGATION         Family History   Problem Relation Age of Onset    No Known Problems Mother        Social History     Socioeconomic History    Marital status: Single     Spouse name: Not on file    Number of children: Not on file    Years of education: Not on file    Highest education level: Not on file   Occupational History    Not on file   Tobacco Use    Smoking status: Former     Types: Cigarettes    Smokeless tobacco: Never    Tobacco comments:     'VAPES'   Vaping Use    Vaping status: Former    Substances: Nicotine    Devices: Pre-filled or refillable cartridge, Refillable tank   Substance and Sexual Activity    Alcohol use: No    Drug use: Yes     Types: Marijuana     Comment: medical    Sexual activity: Defer   Other Topics Concern    Not on file   Social History Narrative    Not on file     Social Drivers of Health     Financial Resource Strain: Not on file   Food Insecurity: Not on file   Transportation Needs: Not on file   Physical Activity: Not on file   Stress: Not on file   Social Connections: Not on file   Intimate Partner Violence: Not on file   Housing Stability: Not on file       Allergies   Allergen Reactions    Cephalexin Hives, Rash, Shortness of breath and Anaphylaxis     hives, throat  swelling    Other Reaction(s): HIVES, THROAT CLOSE (SNOMED-CT: null)    Other reaction(s): Skin Rashes / Eruption of skin, Hives / Urticaria, Asthma / Allergic asthma, Shortness of Breath / Dyspnea   hives, throat swelling, life-threatening       Prior to Admission medications   Medication Sig Start Date End Date Taking? Authorizing Provider   baclofen (LIORESAL) 5 mg tablet Take 1 tablet (5 mg total) by mouth every 8 hours as needed 2/8/25   Liseth Ennis MD   lidocaine (LIDODERM) 5 % patch 1 patch daily 3/17/25   Liseth Ennis MD   tiZANidine (ZANAFLEX) 2 mg capsule Take 1 capsule (2 mg total) by mouth 2 (two) times a day 3/17/25   Liseth Ennis MD   traMADol (ULTRAM 50) 50 mg tablet Take 1 tablet (50 mg total) by mouth every 8 hours as needed 3/17/25   Liseth Ennis MD   calcium carbonate (OS-RYAN) 500 mg calcium tablet every 12 hours    Liseth Ennis MD   pregabalin (Lyrica) 50 mg capsule Take 1 capsule (50 mg total) by mouth 2 (two) times a day Max Daily Amount: 100 mg 4/29/25 5/29/25  Heidi Engel MD   buPROPion XL (Wellbutrin XL) 300 mg 24 hr tablet Take 1 tablet (300 mg total) by mouth daily    Liseth Ennis MD   cholecalciferol vitamin D3, (cholecalciferol) 25 mcg (1,000 unit) tablet Take 1 tablet (1,000 Units total) by mouth daily    Liseth Ennis MD   ascorbic acid, vitamin C, (ascorbic acid) 250 mg tablet,chewable Take 1 tab/cap by mouth daily    Liseth Ennis MD   VITAMIN K2 ORAL Take 1 tab/cap by mouth daily    Liseth Ennis MD   estradioL (CLIMARA) 0.025 mg/24 hr 1 patch every 7 (seven) days 10/17/24   Liseth Ennis MD   phentermine (ADIPEX-P) 37.5 mg tablet Take 0.5 tablets (18.75 mg total) by mouth every morning 12/5/24   Liseth Ennis MD   semaglutide (Ozempic) 1 mg/dose (4 mg/3 mL) pen injector Inject 1 mg under the skin every 7 (seven) days Wednesday 0.5 dosage    Liseth Ennis MD   Lactobacillus  acidophilus (PROBIOTIC ORAL) Take 1 Dose by mouth daily    Liseth Ennis MD   naloxone 4 mg/actuation spray,non-aerosol Administer 0.1 mL (4 mg total) into nostril(s) as needed for opioid reversal 1 spray into 1 nostril; may repeat every 2-3 min in alternating nostrils until medical assistance available    Liseth Ennis MD   DULoxetine (CYMBALTA) 30 mg capsule Take 1 capsule (30 mg total) by mouth daily Takes with a 60mg for total of 90mg    Liseth Ennis MD   pregabalin (LYRICA) 200 mg capsule Take 1 capsule (200 mg total) by mouth 3 (three) times a day    Liseth Ennis MD   levothyroxine (SYNTHROID, LEVOTHROID) 100 mcg tablet Take 1 tablet (100 mcg total) by mouth every morning 12/20/21   Liseth Ennis MD   acetaminophen (TYLENOL) 500 mg tablet Take 2 tablets (1,000 mg total) by mouth as needed    Liseth Ennis MD        Review of Systems:  10 point review of systems obtained negative except as noted per HPI    Physical Exam    Temp:  [36.5 °C (97.7 °F)] 36.5 °C (97.7 °F)  Heart Rate:  [] 71  Resp:  [18] 18  SpO2:  [91 %-100 %] 91 %  BP: (105-151)/() 148/86    General: Awake, alert, oriented, no acute distress  Heart: Regular rate and rhythm  Lungs: Clear to auscultation  Abdomen: Minimally distended, tender throughout the abdomen, no guarding or rigidity, nonperitoneal  Extremities: Nonedematous, warm      CBC with Platelet:    Lab Results   Component Value Date    WBC 3.5 (L) 07/05/2025    HGB 12.7 07/05/2025    HCT 37.4 07/05/2025     07/05/2025     Renal Panel:   Lab Results   Component Value Date     07/05/2025    K 4.5 07/05/2025     07/05/2025    CO2 30 07/05/2025    BUN 12 07/05/2025    CREATININE 0.82 07/05/2025    GLUCOSE 77 07/05/2025    CALCIUM 8.7 07/05/2025     Magnesium:   Lab Results   Component Value Date    MG 1.8 12/30/2024       IMAGING:  CT abdomen pelvis  IMPRESSION:   Suspected colocolonic intussusception near the  descending/sigmoid junction. Findings can be seen with underlying mass. Recommend correlation with colonoscopy. Large upstream stool burden.     Assessment and Plan:  Assessment/Plan   50 y.o.female    Plan: Patient is a 50-year-old female with intussusception of the descending and sigmoid colon, plan for admission to the hospitalist service.  General surgery consulted for evaluation and management.  No immediate plans for surgical intervention.  Would recommend GI consultation as patient would likely benefit from a colonoscopy to further delineate the cause of her intussusception.    Giovanni Finch MD

## 2025-07-05 NOTE — ED PROVIDER NOTES
Chief Complaint   Patient presents with    Abdominal Pain     Pt reports on going abd pain, been dx with constipation. Seen at UC x2 and PCP and given medication to treat without relief.            HPI:    Patient is a 50 y.o. female who presents with abdominal pain.  She states that for over the past week she has had abdominal pain which initially began in her right lower quadrant.  This led to constipation and she has been taking MiraLAX and lactulose without significant improvement.  She has been seen at urgent care twice and by her primary physician.  She denies fever or vomiting.  She is status post panniculectomy 4/29/2025 with Dr. Engel.         Past Medical History:   Diagnosis Date    Anxiety     Arthritis     Back pain     Chronic pain     Dental disease     Depression     Diverticulitis     Endocrine disorder     Extremity pain     Fibromyalgia     Hashimoto's thyroiditis     Hiatal hernia     History of transfusion     Hypothyroidism     Injury of back     Low back pain     Neurologic disorder     Neuromuscular disorder (CMS/HCC)     Obesity     Peripheral neuropathy     Respiratory disease     Skin abnormalities     Sleep apnea     does not use cpap-4/22/2025 patient reports that she does not snore anymore    Wears dentures        Past Surgical History:   Procedure Laterality Date    BACK SURGERY      CHOLECYSTECTOMY      COLONOSCOPY N/A 12/27/2019    Procedure: COLONOSCOPY WITH ASCENDING COLON BIOPSIES;  Surgeon: Kelvin Cordova MD PhD;  Location: [facility];  Service: Endoscopy;  Laterality: N/A;    COLONOSCOPY N/A 4/6/2021    Procedure: COLONOSCOPY WITH FECAL MICROBIOTA TRANSPLANTATION;  Surgeon: Maria Dolores Richardson MD;  Location: [facility];  Service: Endoscopy;  Laterality: N/A;    ESOPHAGOGASTRODUODENOSCOPY N/A 12/27/2019    Procedure: ESOPHAGOGASTRODUODENOSCOPY WITH DUODENAL BIOPSIES;  Surgeon: Kelvin Cordova MD PhD;  Location: [facility];  Service: Endoscopy;  Laterality: N/A;     EXAMINATION UNDER ANESTHESIA N/A 1/17/2020    Procedure: EXAM UNDER ANESTHESIA;  Surgeon: Steve Michelle MD;  Location: [facility] OR;  Service: General;  Laterality: N/A;    HEMORRHOID SURGERY N/A 1/17/2020    Procedure: HEMORRHOIDECTOMY TIMES ONE with BANDING TIMES ONE;  Surgeon: Steve Michelle MD;  Location: [facility] OR;  Service: General;  Laterality: N/A;    HYSTERECTOMY      LIPECTOMY N/A 4/29/2025    Procedure: PANNICULECTOMY;  Surgeon: Heidi Engel MD;  Location: [facility] OR;  Service: Plastics;  Laterality: N/A;  CPT Code: 29145    ORTHOPEDIC SURGERY      REDUCTION MAMMAPLASTY      SPINE SURGERY      TUBAL LIGATION         Social History     Socioeconomic History    Marital status: Single     Spouse name: Not on file    Number of children: Not on file    Years of education: Not on file    Highest education level: Not on file   Occupational History    Not on file   Tobacco Use    Smoking status: Former     Types: Cigarettes    Smokeless tobacco: Never    Tobacco comments:     'VAPES'   Vaping Use    Vaping status: Former    Substances: Nicotine    Devices: Pre-filled or refillable cartridge, Refillable tank   Substance and Sexual Activity    Alcohol use: No    Drug use: Yes     Types: Marijuana     Comment: medical    Sexual activity: Defer   Other Topics Concern    Not on file   Social History Narrative    Not on file     Social Drivers of Health     Financial Resource Strain: Not on file   Food Insecurity: No Food Insecurity (7/5/2025)    Hunger Vital Sign     Worried About Running Out of Food in the Last Year: Never true     Ran Out of Food in the Last Year: Never true   Transportation Needs: No Transportation Needs (7/5/2025)    PRAPARE - Transportation     Lack of Transportation (Medical): No     Lack of Transportation (Non-Medical): No   Physical Activity: Not on file   Stress: Not on file   Social Connections: Not on file   Intimate Partner Violence: Not At Risk (7/5/2025)    Humiliation, Afraid, Rape, and Kick  questionnaire     Fear of Current or Ex-Partner: No     Emotionally Abused: No     Physically Abused: No     Sexually Abused: No   Housing Stability: Low Risk  (7/5/2025)    Housing Stability Vital Sign     Unable to Pay for Housing in the Last Year: No     Number of Times Moved in the Last Year: 0     Homeless in the Last Year: No       Family History   Problem Relation Age of Onset    No Known Problems Mother        Allergies   Allergen Reactions    Cephalexin Hives, Rash, Shortness of breath and Anaphylaxis     hives, throat swelling    Other Reaction(s): HIVES, THROAT CLOSE (SNOMED-CT: null)    Other reaction(s): Skin Rashes / Eruption of skin, Hives / Urticaria, Asthma / Allergic asthma, Shortness of Breath / Dyspnea   hives, throat swelling, life-threatening       No current outpatient medications on file.      ROS:  As per HPI.      ED Triage Vitals   Temp Heart Rate Resp BP SpO2   07/05/25 1057 07/05/25 1024 07/05/25 1053 07/05/25 1053 07/05/25 1024   36.5 °C (97.7 °F) 103 18 117/79 97 %      Mean BP (mmHg) Temp Source Heart Rate Source Patient Position BP Location   07/05/25 1245 07/05/25 1732 07/05/25 1732 07/05/25 1732 07/05/25 1732   65 Oral Monitor In bed Left arm      FiO2 (%)       --                    Physical Exam:  Nursing note and vitals reviewed.  Constitutional: appears well-developed.   Head: Normocephalic and atraumatic.   Eyes: Pupils are equal, round, and reactive to light.   Neck: Supple  Cardiovascular: Regular rate and rhythm.  Normal pulses.  Pulmonary/Chest: No respiratory distress.    Abdominal: Soft.  Mild diffuse tenderness, most tender in the right lower quadrant.  No rebound or guarding.  And nontender..  Panniculectomy incision is well-healed without erythema or drainage.  Musculoskeletal: No edema  Neurological: Alert.   Skin: Skin is warm and dry.   Psychiatric: Normal mood and affect.           Labs:  Labs Reviewed   CBC WITH AUTO DIFFERENTIAL - Abnormal       Result Value     WBC 3.5 (*)     RBC 4.26      Hemoglobin 12.7      Hematocrit 37.4      MCV 87.9      MCH 29.8      MCHC 33.9      RDW 13.4      Platelets 157      MPV 8.7      Neutrophils% 51.1      Lymphocytes% 37.3      Monocytes% 6.1      Eosinophils% 4.7 (*)     Basophils% 0.8      ANC (auto diff) 1.80      Lymphocytes Absolute 1.30      Monocytes Absolute 0.20      Eosinophils Absolute 0.20      Basophils Absolute 0.00     COMPREHENSIVE METABOLIC PANEL - Abnormal    Sodium 137      Potassium 4.5      Chloride 104      CO2 30      Anion Gap 3      BUN 12      Creatinine 0.82      Glucose 77      Calcium 8.7      AST 49 (*)     ALT (SGPT) 91 (*)     Alkaline Phosphatase 100      Total Protein 6.7      Albumin 3.9      Total Bilirubin 0.27      Corrected Calcium 8.8      eGFR 88      Narrative:     eGFR calculation based on the 2021 Chronic Kidney Disease Epidemiology Collaboration (CKD-EPI) equation refit without adjustment for race.   LIPASE - Abnormal    Lipase 101 (*)    URINALYSIS, MICROSCOPIC ONLY - Normal    RBC, Urine Negative      WBC, Urine 0-4      WBC Clumps, Urine None seen      Squamous Epithelial, Urine 5-9      Bacteria, Urine None seen      Narrative:     If patient has an indwelling urinary catheter, follow the Adult Indwelling Urinary Catheter Removal and Replacement Protocol to remove the catheter prior to obtaining the Urinalysis. If questions, please contact the primary provider for guidance.   URINALYSIS, DIPSTICK ONLY - Normal    Color, Urine Colorless      Clarity, Urine Clear      Specific Gravity, Urine 1.021      Leukocytes, Urine Negative      Nitrite, Urine Negative      Protein, Urine Negative      Ketones, Urine Negative      Urobilinogen, Urine Normal      Bilirubin, Urine Negative      Blood, Urine Negative      Glucose, Urine Negative      pH, Urine 7.0      Narrative:     If patient has an indwelling urinary catheter, follow the Adult Indwelling Urinary Catheter Removal and Replacement  Protocol to remove the catheter prior to obtaining the Urinalysis. If questions, please contact the primary provider for guidance.   HCG, RAPID QUALITATIVE, SERUM    HCG qualitative Positive     URINALYSIS WITH MICROSCOPIC    Narrative:     The following orders were created for panel order Urinalysis w/microscopic Urine, Clean Catch.                  Procedure                               Abnormality         Status                                     ---------                               -----------         ------                                     Urinalysis, microscopic ...[[id number]]  Normal              Final result                               Urinalysis, dipstick Uri...[[id number]]  Normal              Final result                                                 Please view results for these tests on the individual orders.   HCG, QUANTITATIVE, PREGNANCY    hCG Quant 8      Narrative:     REFERENCE UNITS:                                     Females Post-menopausal: <1-12                                    Healthy, non-pregnant individuals are typically: < 5 mIU/mL                                     Weeks after Conception       Expected HCG Value (mIU/mL)                      0.2-1 Week                         5-50                      1-2 Weeks                                             2-3 Weeks                      100-5,000                      3-4 Weeks                      500-10,000                      4-5 Weeks                    1,000-50,000                      5-6 Weeks                   10,000-100,000                      6-8 Weeks                   15,000-200,000                      2-3 Months                  10,000-100,000   LAVENDER TOP RAINBOW   URINE EXTRA CONTAINER         Imaging:  CT ABDOMEN PELVIS W IV CONTRAST   Final Result   Addendum (preliminary) 1 of 1   -----------------------------   ADDENDUM BEGINS      On further review, findings in the colon appear to have slightly  migrated    distally since prior CT and are favored to represent an irregular stool    ball. Recommend CT follow-up to ensure resolution. Revised findings    discussed with Dr. Lau.      ADDENDUM ENDS   -----------------------------         Final   IMPRESSION:   Suspected colocolonic intussusception near the descending/sigmoid junction. Findings can be seen with underlying mass. Recommend correlation with colonoscopy. Large upstream stool burden.                      MDM:    External records reviewed including progress notes from 6/18/2025.  She was given IV fluids and Toradol.  Labs were obtained and reviewed and showed no evidence of anemia, acidosis, or significant electrolyte abnormality.  UA negative for UTI.  Lipase minimally elevated.  Quantitative hCG minimally elevated and does not represent pregnancy.  CT abdomen pelvis obtained and shows a suspected colonic intussusception.  Case discussed with general surgery.  They recommend admission to hospitalist service with GI consult.  Internal medicine was consulted for admission and further management.    Dr. Lau reviewed the CT with radiology and radiology feels that findings may represent an irregular stool ball.        Given   ED Medication Administration from 07/05/2025 1023 to 07/05/2025 1719         Date/Time Order Dose Route Action Action by     07/05/2025 1315 MDT sodium chloride 0.9 % bolus 1,000 mL 1,000 mL intravenous $$ New Bag/New Syringe JACQUELINE Escobar     07/05/2025 1314 MDT ketorolac (TORADOL) injection 15 mg 15 mg intravenous Given JACQUELINE Escobar     07/05/2025 1345 MDT iopamidoL (ISOVUE-370) 370 mg iodine /mL (76 %) injection 80 mL 80 mL intravenous Given NINA Vela     07/05/2025 1557 MDT morphine injection 4 mg 4 mg intravenous Given JACQUELINE Escobar                Clinical Impression:    Abdominal pain  Constipation  Elevated quantitative hCG  Fibromyalgia        A voice recognition program was used to aid in documentation of this record.  Sometimes  words are not printed exactly as they were spoken.  While efforts were made to carefully edit and correct any inaccuracies, some areas may be present; please take these into context.  Please contact the provider if areas are identified.       Marvin De La Cruz MD  07/05/25 1937

## 2025-07-05 NOTE — H&P
Chief Complaint     Chief Complaint   Patient presents with    Abdominal Pain     Pt reports on going abd pain, been dx with constipation. Seen at UC x2 and PCP and given medication to treat without relief.        History of presenting illness     50 y.o. years old lady with history of back pain post back surgeries, history of Hashimoto thyroiditis has presented with abdominal pain of 1 week.  She was seen in urgent care a week prior and was diagnosed with UTI started on Bactrim.  Patient started having constipation 3 days later and having associated vomiting x 1.  She has a CT done 2 days prior that showed significant constipation.  Presents with worsening abdominal pain and constipation persistence today and CT was repeated.  Initially CT raise concerns for intussusception.  Imaging review was performed and findings have been revised in favor of ball of stool.  Unfortunately the look of the latter on the CT was easy to be confused with intussusception.  At this time patient has significant abdominal pain rated 7 x 10 located over right lower quadrant radiating towards the left.  Colicky in nature.    Past Medical and Surgical history     Patient Active Problem List   Diagnosis    Lower GI bleed    Right lower quadrant abdominal pain    Fibromyalgia    Chronic back pain    Other specified hypothyroidism    Osteoarthritis of spine with radiculopathy, lumbar region    Postlaminectomy syndrome of lumbar region    Sacroiliitis (CMS/HCC)    Other chronic pain    Fall    Coccygeal pain    Acute hypoxemic respiratory failure due to COVID-19 (CMS/HCC)    Sinus bradycardia    LUIS ALBERTO on CPAP    Acute on chronic respiratory failure with hypoxia (CMS/HCC)    Abnormal glucose level    Constipation    Greater trochanteric bursitis    Hyperlipidemia    Idiopathic scoliosis    Lumbar radiculopathy    Lumbosacral neuritis    Lymphocytic thyroiditis    Migraine without aura    Mixed anxiety depressive disorder    Morbid obesity  (CMS/HCC)    Nummular eczema    Post-inflammatory hyperpigmentation    Postoperative pain    Spinal deformity    Spinal stenosis of lumbar region    Tinea corporis    Abdominal pannus    Intertrigo    Intussusception (CMS/HCC)    Constipation by delayed colonic transit       Family history     Noncontributory as reviewed with the patient    Personal history    Allergies      Tobacco Use: Medium Risk (6/18/2025)    Patient History     Smoking Tobacco Use: Former     Smokeless Tobacco Use: Never     Passive Exposure: Not on file       She has history of marijuana use. Allergies   Allergen Reactions    Cephalexin Hives, Rash, Shortness of breath and Anaphylaxis     hives, throat swelling    Other Reaction(s): HIVES, THROAT CLOSE (SNOMED-CT: null)    Other reaction(s): Skin Rashes / Eruption of skin, Hives / Urticaria, Asthma / Allergic asthma, Shortness of Breath / Dyspnea   hives, throat swelling, life-threatening          Review of systems     10 point review of systems was negative except as mentioned in history of present illness,   as reviewed with patient.    YONY     Wt Readings from Last 3 Encounters:   07/05/25 70.9 kg (156 lb 4.9 oz)   06/18/25 71.2 kg (156 lb 15.5 oz)   04/29/25 71.2 kg (157 lb)     Vitals:    07/05/25 1505   BP: 148/86   Pulse: 71   Resp:    Temp:    SpO2:         Seen and examined bedside.    Chaperone offered and declined.  S1-S2 heard bilateral entry present.  Abdomen soft tender.  Minimal guarding.  Bowel sounds present.  Able to move all extremities.  Skin warm and dry to touch.  Memory speech erect.  Found to be grossly normal.    Labs/ Imagings/ Cultures     Lab Results   Component Value Date    WBC 3.5 (L) 07/05/2025    HGB 12.7 07/05/2025    HCT 37.4 07/05/2025    MCV 87.9 07/05/2025     07/05/2025       Lab Results   Component Value Date    BILITOT 0.27 07/05/2025    AST 49 (H) 07/05/2025    ALT 91 (H) 07/05/2025    PROT 6.7 07/05/2025    ALBUMIN 3.9 07/05/2025    ALKPHOS  100 07/05/2025     Imaging studies were reviewed.    Outpatient Medications     Current Outpatient Medications   Medication Instructions    acetaminophen (TYLENOL) 1,000 mg, As needed    ascorbic acid, vitamin C, (ascorbic acid) 250 mg tablet,chewable 1 tab/cap, Daily    baclofen (LIORESAL) 5 mg, Every 8 hours PRN    buPROPion XL (WELLBUTRIN XL) 300 mg, Daily    calcium carbonate (OS-RYAN) 500 mg calcium tablet Every 12 hours    cholecalciferol (vitamin D3) (CHOLECALCIFEROL) 1,000 Units, Daily    DULoxetine (CYMBALTA) 30 mg, Daily    estradioL (CLIMARA) 0.025 mg/24 hr 1 patch, Every week    Lactobacillus acidophilus (PROBIOTIC ORAL) 1 Dose, Daily    levothyroxine (SYNTHROID, LEVOTHROID) 100 mcg, Every morning    lidocaine (LIDODERM) 5 % patch 1 patch, Daily    naloxone 4 mg, As needed    Ozempic 1 mg, Every week    phentermine (ADIPEX-P) 18.75 mg, Every morning    pregabalin (LYRICA) 200 mg, 3 times daily    pregabalin (LYRICA) 50 mg, oral, 2 times daily    tiZANidine (ZANAFLEX) 2 mg, 2 times daily    traMADol (ULTRAM 50) 50 mg, Every 8 hours PRN    VITAMIN K2 ORAL 1 tab/cap, Daily          Assessment and Plan     Severe constipation: 50 years lady presents with severe constipation.  Initially the concern was for intussusception which has been ruled out to with a fair certainty with detailed image review with radiology.  At this time favoring fecalith as a cause.  Patient has been given stool softeners by mouth which have not been very effective.  I will try lactulose enema due to the intensity of her constipation.  Also senna docusate by mouth.  I hope that she will resolve her constipation.  If symptoms persist enema can be repeated.  At this time I do not feel that she will benefit with colonoscopy and it might be excessive in terms of intervention still can be reconsidered as deemed appropriate based on her clinical symptoms.  Will check her TSH as she has got Hashimoto thyroiditis.    Continue home meds for  fibromyalgia and anxiety depression.  Medication reconciliation performed.  Continue levothyroxine for hypothyroidism.    Positive hCG qualitative: Her hCG was positive which likely is false positive.  Quantitative test was performed and it is within normal range.  She does not have a uterus as reviewed.  Still if there are any persistent concerns it can be followed as outpatient.    DVT prophylaxis    She might be in the hospital for 2 to 3 days depending on symptom resolution.    Total time spent in care was 38 minutes that included obtaining history, reviewing records, talking to fellow clinicians, formulating a working diagnosis and creating a mutually agreed upon treatment plan with the patient/family member.    Dragon voice recognition and transcription services were used to dictate this note.  Though all the efforts have been made to ensure the accuracy, still there might be errors in transcription.

## 2025-07-06 VITALS
TEMPERATURE: 97.8 F | HEIGHT: 64 IN | DIASTOLIC BLOOD PRESSURE: 80 MMHG | WEIGHT: 156.31 LBS | OXYGEN SATURATION: 98 % | BODY MASS INDEX: 26.69 KG/M2 | HEART RATE: 72 BPM | RESPIRATION RATE: 18 BRPM | SYSTOLIC BLOOD PRESSURE: 122 MMHG

## 2025-07-06 LAB
ALBUMIN SERPL-MCNC: 3.8 G/DL (ref 3.5–5.3)
ALP SERPL-CCNC: 138 U/L (ref 39–100)
ALT SERPL-CCNC: 104 U/L (ref 7–52)
ANION GAP SERPL CALC-SCNC: 5 MMOL/L (ref 3–11)
AST SERPL-CCNC: 69 U/L
BASOPHILS # BLD AUTO: 0 10*3/UL
BASOPHILS NFR BLD AUTO: 0.8 % (ref 0–2)
BILIRUB SERPL-MCNC: 0.3 MG/DL (ref 0.2–1.4)
BUN SERPL-MCNC: 8 MG/DL (ref 7–25)
CALCIUM ALBUM COR SERPL-MCNC: 8.7 MG/DL (ref 8.6–10.3)
CALCIUM SERPL-MCNC: 8.5 MG/DL (ref 8.6–10.3)
CHLORIDE SERPL-SCNC: 107 MMOL/L (ref 98–107)
CO2 SERPL-SCNC: 25 MMOL/L (ref 21–32)
CREAT SERPL-MCNC: 0.61 MG/DL (ref 0.6–1.1)
EGFRCR SERPLBLD CKD-EPI 2021: 110 ML/MIN/1.73M*2
EOSINOPHIL # BLD AUTO: 0.1 10*3/UL
EOSINOPHIL NFR BLD AUTO: 3.4 % (ref 0–3)
ERYTHROCYTE [DISTWIDTH] IN BLOOD BY AUTOMATED COUNT: 13.2 % (ref 11.5–14)
GLUCOSE SERPL-MCNC: 84 MG/DL (ref 70–105)
HCT VFR BLD AUTO: 37.1 % (ref 34–45)
HGB BLD-MCNC: 12.8 G/DL (ref 11.5–15.5)
LYMPHOCYTES # BLD AUTO: 1.1 10*3/UL
LYMPHOCYTES NFR BLD AUTO: 30.3 % (ref 11–47)
MAGNESIUM SERPL-MCNC: 2 MG/DL (ref 1.8–2.4)
MCH RBC QN AUTO: 30.1 PG (ref 28–33)
MCHC RBC AUTO-ENTMCNC: 34.5 G/DL (ref 32–36)
MCV RBC AUTO: 87.1 FL (ref 81–97)
MONOCYTES # BLD AUTO: 0.2 10*3/UL
MONOCYTES NFR BLD AUTO: 6.1 % (ref 3–11)
NEUTROPHILS # BLD AUTO: 2.1 10*3/UL
NEUTROPHILS NFR BLD AUTO: 59.4 % (ref 41–81)
PLATELET # BLD AUTO: 153 10*3/UL (ref 140–350)
PMV BLD AUTO: 8.5 FL (ref 6.9–10.8)
POTASSIUM SERPL-SCNC: 3.7 MMOL/L (ref 3.5–5.1)
PROT SERPL-MCNC: 6.4 G/DL (ref 6–8.3)
RBC # BLD AUTO: 4.26 10*6/UL (ref 3.7–5.3)
SODIUM SERPL-SCNC: 137 MMOL/L (ref 135–145)
TSH SERPL DL<=0.05 MIU/L-ACNC: 3.88 UIU/ML (ref 0.34–4.82)
WBC # BLD AUTO: 3.5 10*3/UL (ref 4.5–10.5)

## 2025-07-06 PROCEDURE — 6360000200 HC RX 636 W HCPCS (ALT 250 FOR IP): Performed by: INTERNAL MEDICINE

## 2025-07-06 PROCEDURE — 2580000300 HC RX 258: Performed by: INTERNAL MEDICINE

## 2025-07-06 PROCEDURE — 36415 COLL VENOUS BLD VENIPUNCTURE: CPT | Performed by: INTERNAL MEDICINE

## 2025-07-06 PROCEDURE — 2500000200 HC RX 250 WO HCPCS: Performed by: NURSE PRACTITIONER

## 2025-07-06 PROCEDURE — 99231 SBSQ HOSP IP/OBS SF/LOW 25: CPT | Performed by: NURSE PRACTITIONER

## 2025-07-06 PROCEDURE — 84443 ASSAY THYROID STIM HORMONE: CPT | Performed by: INTERNAL MEDICINE

## 2025-07-06 PROCEDURE — 6370000100 HC RX 637 (ALT 250 FOR IP): Performed by: HOSPITALIST

## 2025-07-06 PROCEDURE — 99239 HOSP IP/OBS DSCHRG MGMT >30: CPT | Performed by: HOSPITALIST

## 2025-07-06 PROCEDURE — 96372 THER/PROPH/DIAG INJ SC/IM: CPT

## 2025-07-06 PROCEDURE — 2590000100 HC RX 259: Performed by: HOSPITALIST

## 2025-07-06 PROCEDURE — G0378 HOSPITAL OBSERVATION PER HR: HCPCS

## 2025-07-06 PROCEDURE — 2590000100 HC RX 259: Performed by: INTERNAL MEDICINE

## 2025-07-06 PROCEDURE — 83735 ASSAY OF MAGNESIUM: CPT | Performed by: INTERNAL MEDICINE

## 2025-07-06 PROCEDURE — 6370000100 HC RX 637 (ALT 250 FOR IP): Performed by: NURSE PRACTITIONER

## 2025-07-06 PROCEDURE — 6370000100 HC RX 637 (ALT 250 FOR IP): Performed by: INTERNAL MEDICINE

## 2025-07-06 PROCEDURE — 85025 COMPLETE CBC W/AUTO DIFF WBC: CPT | Performed by: INTERNAL MEDICINE

## 2025-07-06 PROCEDURE — 80053 COMPREHEN METABOLIC PANEL: CPT | Performed by: INTERNAL MEDICINE

## 2025-07-06 RX ORDER — LEVOTHYROXINE SODIUM 88 UG/1
88 TABLET ORAL
Status: DISCONTINUED | OUTPATIENT
Start: 2025-07-07 | End: 2025-07-06 | Stop reason: HOSPADM

## 2025-07-06 RX ORDER — AMOXICILLIN 250 MG
1-8 CAPSULE ORAL NIGHTLY
Status: DISCONTINUED | OUTPATIENT
Start: 2025-07-06 | End: 2025-07-06

## 2025-07-06 RX ORDER — PREGABALIN 100 MG/1
200 CAPSULE ORAL 3 TIMES DAILY
Status: DISCONTINUED | OUTPATIENT
Start: 2025-07-06 | End: 2025-07-06 | Stop reason: HOSPADM

## 2025-07-06 RX ORDER — ADHESIVE BANDAGE
30 BANDAGE TOPICAL DAILY PRN
Status: DISCONTINUED | OUTPATIENT
Start: 2025-07-06 | End: 2025-07-06 | Stop reason: HOSPADM

## 2025-07-06 RX ORDER — POLYETHYLENE GLYCOL (3350) 17 G/17G
17 POWDER, FOR SOLUTION ORAL DAILY PRN
Status: DISCONTINUED | OUTPATIENT
Start: 2025-07-06 | End: 2025-07-06 | Stop reason: HOSPADM

## 2025-07-06 RX ORDER — BISACODYL 10 MG/1
10 SUPPOSITORY RECTAL DAILY PRN
Status: DISCONTINUED | OUTPATIENT
Start: 2025-07-06 | End: 2025-07-06 | Stop reason: HOSPADM

## 2025-07-06 RX ORDER — BACLOFEN 5 MG/1
5 TABLET ORAL 2 TIMES DAILY
Status: DISCONTINUED | OUTPATIENT
Start: 2025-07-06 | End: 2025-07-06

## 2025-07-06 RX ORDER — DULOXETIN HYDROCHLORIDE 30 MG/1
30 CAPSULE, DELAYED RELEASE ORAL DAILY
Status: DISCONTINUED | OUTPATIENT
Start: 2025-07-06 | End: 2025-07-06

## 2025-07-06 RX ORDER — ACETAMINOPHEN 500 MG
1000 TABLET ORAL AS NEEDED
Status: DISCONTINUED | OUTPATIENT
Start: 2025-07-06 | End: 2025-07-06 | Stop reason: SDUPTHER

## 2025-07-06 RX ORDER — BUPROPION HYDROCHLORIDE 300 MG/1
300 TABLET ORAL DAILY
Status: DISCONTINUED | OUTPATIENT
Start: 2025-07-06 | End: 2025-07-06 | Stop reason: HOSPADM

## 2025-07-06 RX ORDER — LACTULOSE 10 G/10G
20 SOLUTION ORAL DAILY
Qty: 10 PACKET | Refills: 0 | Status: SHIPPED | OUTPATIENT
Start: 2025-07-06 | End: 2025-07-16

## 2025-07-06 RX ORDER — DEXTROMETHORPHAN POLISTIREX 30 MG/5 ML
1 SUSPENSION, EXTENDED RELEASE 12 HR ORAL AS NEEDED
Status: DISCONTINUED | OUTPATIENT
Start: 2025-07-06 | End: 2025-07-06 | Stop reason: HOSPADM

## 2025-07-06 RX ORDER — LEVOTHYROXINE SODIUM 100 UG/1
100 TABLET ORAL EVERY MORNING
Status: DISCONTINUED | OUTPATIENT
Start: 2025-07-06 | End: 2025-07-06

## 2025-07-06 RX ADMIN — MINERAL OIL 1 ENEMA: 100 ENEMA RECTAL at 13:18

## 2025-07-06 RX ADMIN — MAGNESIUM HYDROXIDE 30 ML: 1200 LIQUID ORAL at 13:18

## 2025-07-06 RX ADMIN — DOCUSATE SODIUM 50 MG AND SENNOSIDES 8.6 MG 2 TABLET: 8.6; 5 TABLET, FILM COATED ORAL at 09:22

## 2025-07-06 RX ADMIN — BUPROPION HYDROCHLORIDE 300 MG: 300 TABLET, EXTENDED RELEASE ORAL at 13:18

## 2025-07-06 RX ADMIN — POLYETHYLENE GLYCOL 3350 17 G: 17 POWDER, FOR SOLUTION ORAL at 13:18

## 2025-07-06 RX ADMIN — PREGABALIN 200 MG: 100 CAPSULE ORAL at 14:58

## 2025-07-06 RX ADMIN — SODIUM CHLORIDE 300 ML: 900 IRRIGANT IRRIGATION at 05:52

## 2025-07-06 RX ADMIN — Medication 1 CAPSULE: at 09:22

## 2025-07-06 RX ADMIN — SODIUM CHLORIDE, POTASSIUM CHLORIDE, SODIUM LACTATE AND CALCIUM CHLORIDE 100 ML/HR: 600; 310; 30; 20 INJECTION, SOLUTION INTRAVENOUS at 06:00

## 2025-07-06 RX ADMIN — ENOXAPARIN SODIUM 40 MG: 100 INJECTION SUBCUTANEOUS at 13:18

## 2025-07-06 NOTE — NURSING END OF SHIFT
Nursing End of Shift Summary:     Patient: Isabell Harper  MRN: [id number]  : 1975, Age: 50 y.o.    Location: [address] [place] [address]    Nursing Goals       Narrative Summary of Progress Toward Clinical Goals:  New admit, calm and cooperative, ambulatory independently, appetite fair.    Barriers to Goals/Nursing Concerns:  No    New Patient or Family Concerns/Issues:  No    Shift Summary:   Significant Events & Communications to Providers (Last 12 Hours)       Last 5 Values    No documentation.       Oxygen Usage (Last 12 Hours)       Last 5 Values       Row Name 25 1721                   Room Air or Baseline Oxygen Trial by Nursing    Is Patient on Room Air OR on the Same Amount of O2 as at Home? Yes            Mobility (Last 12 Hours)       Last 5 Values       Row Name 25 1732 25 1742                Mobility    Activity -- Ambulate in room       Distance Ambulated (feet) -- 50 Feet       Distance Ambulated (Meters Calculated) -- 15.24 Meters       Patient Position In bed --       Turning/Repositioning -- Turns self;Every 2 hours       Distance Ambulated (Meters Calculated)(Do Not Use) -- 15.24 Feet           Urethral Catheter       Active Urethral Catheter       None                  Active Lines       Active Central venous catheter / Peripherally inserted central catheter / Implantable Port / Hemodialysis catheter / Midline Catheter       None                  Infusing Medications   Medication Dose Last Rate    LR  100 mL/hr 100 mL/hr (25 181)     PRN Medications   Medication Dose Last Admin    sodium chloride  3 mL      sodium chloride  3 mL

## 2025-07-06 NOTE — PLAN OF CARE
Problem: Pain - Adult  Goal: Verbalizes/displays adequate comfort level or baseline comfort level  Outcome: Progressing  Flowsheets (Taken 7/6/2025 0529)  Verbalizes/displays adequate comfort level or baseline comfort level:   Encourage patient to monitor pain and request interventions   Assess pain using the appropriate pain scale   Administer analgesics based on type and severity of pain and evaluate response   Educate/Implement non-pharmacological measures as appropriate and evaluate response   Consider cultural, developmental and social influences on pain and pain management   Notify Provider if interventions unsuccessful or patient reports new pain     Problem: Pain - Adult  Goal: Verbalizes/displays adequate comfort level or baseline comfort level  Outcome: Progressing  Flowsheets (Taken 7/6/2025 0529)  Verbalizes/displays adequate comfort level or baseline comfort level:   Encourage patient to monitor pain and request interventions   Assess pain using the appropriate pain scale   Administer analgesics based on type and severity of pain and evaluate response   Educate/Implement non-pharmacological measures as appropriate and evaluate response   Consider cultural, developmental and social influences on pain and pain management   Notify Provider if interventions unsuccessful or patient reports new pain

## 2025-07-06 NOTE — PROGRESS NOTES
07/06/25  7:53 AM    ID: 50 y.o. female admitted 07/05/25 w/ abd pain, constipation. CT A/P w/ stool burden, concern for intussusception     SUBJECTIVE:  Ambulating independently in the room, recently from bathroom.  States she had a bowel movement this morning.  Admits abdominal pain slightly better.  Acknowledges she has a history of struggling with constipation.  Denies nausea and vomiting, fever and chills.    OBJECTIVE:  Temp:  [36.5 °C (97.7 °F)-37 °C (98.6 °F)] 36.6 °C (97.8 °F)  Heart Rate:  [] 72  Resp:  [18] 18  SpO2:  [91 %-100 %] 98 %  BP: (105-151)/() 122/80  REVIEWED    Intake/Output last 3 shifts:  No intake/output data recorded.  Intake/Output this shift:  No intake/output data recorded.  REVIEWED     Physical Exam:  General:  alert, oriented, no acute distress  Head:   normocephalic  Eyes:   extra ocular movements intact  Mouth:   Oral mucosa moist  Neck:   No lymphadenopathy, No JVD  Lungs:  CTAB, good air movement  Heart:  regular rate and rhythm, normal S1, S2, no murmurs, gallops or rub appreciated.  Abdomen:  Soft, diffusely tender, minimally distended. BS present. No rebound tenderness or guarding. No peritonitis or masses noted. No tympany noted upon percussion.   Musculoskeletal:   no edema, CMS intact.      Laboratory:  CBC with Platelet:    Lab Results   Component Value Date    WBC 3.5 (L) 07/06/2025    HGB 12.8 07/06/2025    HCT 37.1 07/06/2025     07/06/2025    RBC 4.26 07/06/2025    MCV 87.1 07/06/2025    MCH 30.1 07/06/2025    MCHC 34.5 07/06/2025    RDW 13.2 07/06/2025    MPV 8.5 07/06/2025     Comp:   Lab Results   Component Value Date     07/06/2025    K 3.7 07/06/2025     07/06/2025    CO2 25 07/06/2025    BUN 8 07/06/2025    CREATININE 0.61 07/06/2025    GLUCOSE 84 07/06/2025    CALCIUM 8.5 (L) 07/06/2025    PROT 6.4 07/06/2025    ALBUMIN 3.8 07/06/2025    AST 69 (H) 07/06/2025     (H) 07/06/2025    ALKPHOS 138 (H) 07/06/2025    BILITOT 0.30  07/06/2025     Magnesium:   Lab Results   Component Value Date    MG 2.0 07/06/2025     REVIEWED    Imaging:  CT ABDOMEN PELVIS W IV CONTRAST  Addendum Date: 7/5/2025  Addendum: ----------------------------- ADDENDUM BEGINS On further review, findings in the colon appear to have slightly migrated distally since prior CT and are favored to represent an irregular stool ball. Recommend CT follow-up to ensure resolution. Revised findings discussed with Dr. Lau. ADDENDUM ENDS -----------------------------     Result Date: 7/5/2025  Narrative: EXAM: CT ABDOMEN PELVIS W IV CONTRAST DATE: 7/5/2025 1:39 PM INDICATION:  Abdominal pain COMPARISON: 7/3/2025 TECHNIQUE: Postcontrast CT images of the abdomen and pelvis. Dose reduction technique utilized; automatic/anatomic modulation of X-ray tube current (Auto mA). CONTRAST: 80 mL of Isovue-370 was administered intravenously from a multiuse vial. FINDINGS: No bowel obstruction. Normal appendix. Colonic diverticulosis. Suspected colocolonic intussusception near the descending/sigmoid junction, progressed since prior CT. Large upstream stool burden. No free air or fluid.  Cholecystectomy. Normal liver, pancreas, spleen, adrenal glands, and kidneys. Arterial calcifications. No abdominopelvic lymphadenopathy. Extensive spinal fixation hardware.     Impression: IMPRESSION: Suspected colocolonic intussusception near the descending/sigmoid junction. Findings can be seen with underlying mass. Recommend correlation with colonoscopy. Large upstream stool burden.   REVIEWED    Diagnosis  Patient Active Problem List   Diagnosis    Lower GI bleed    Right lower quadrant abdominal pain    Fibromyalgia    Chronic back pain    Other specified hypothyroidism    Osteoarthritis of spine with radiculopathy, lumbar region    Postlaminectomy syndrome of lumbar region    Sacroiliitis (CMS/HCC)    Other chronic pain    Fall    Coccygeal pain    Acute hypoxemic respiratory failure due to COVID-19  (CMS/HCC)    Sinus bradycardia    LUIS ALBERTO on CPAP    Acute on chronic respiratory failure with hypoxia (CMS/HCC)    Abnormal glucose level    Constipation    Greater trochanteric bursitis    Hyperlipidemia    Idiopathic scoliosis    Lumbar radiculopathy    Lumbosacral neuritis    Lymphocytic thyroiditis    Migraine without aura    Mixed anxiety depressive disorder    Morbid obesity (CMS/HCC)    Nummular eczema    Post-inflammatory hyperpigmentation    Postoperative pain    Spinal deformity    Spinal stenosis of lumbar region    Tinea corporis    Abdominal pannus    Intertrigo    Intussusception (CMS/HCC)    Constipation by delayed colonic transit     Assessment:  50 y.o.female admitted 07/05/25 w/ abd pain, constipation. CT A/P w/ stool burden, concern for intussusception     Surgery:    07/06/25: Hemodynamically stable.  Nontoxic-appearing.  Continued abdominal pain but improved since admission.  Overall clinical picture most consistent with significant constipation and stool burden versus intussusception.  No plans nor recommendations for surgical intervention at this time.  Agree with holding off on GI consultation.  If further concern with intussusception would recommend GI consultation for colonoscopy.  Okay for diet from surgical standpoint. Recommend aggressive bowel regimen, consider GoLytely versus p.o. laxatives and enemas.     General Surgery will sign off at this time.  Please do not hesitate to call us back with any questions or concerns at any time.    Pt assessment, examination and POC discussed with and formulated alongside of Dr. Finch. Final plan at his discretion.     Samara Hall, CNP

## 2025-07-06 NOTE — MEDICATION HISTORY SPECIALIST NOTES
[facility] [id number]    CSN : [id number]  : [id number]    Information sources:  Epic  Complete Dispense Report    Patient home medications updated per resources. At this time I am unable to verify information provided by resources and in Epic, along with last doses and compliance of home medications. Medication history specialist will follow up with patient or family  tomorrow morning to continue to attempt to obtain this information. Epic is updated to the best of my ability at this time for this patient, however is subject to change after verification with patient and/or family.    Discontinued medications:  Duloxetine- patient asked for removal alternative therapy.  Lidocaine patch- patient asked for removal not using.  Tizanidine- patient asked for removal not taking.  Tramadol- therapy complete.  Pregabalin 50 mg- alternative strength.  Semaglutide- older than 6 months from  .    Dose changes:  Levothyroxine- 100 mcg daily - 88 mcg daily.    Profile was checked for  medications.

## 2025-07-06 NOTE — PLAN OF CARE
Problem: Pain - Adult  Goal: Verbalizes/displays adequate comfort level or baseline comfort level  Outcome: Progressing     Problem: Pain - Adult  Goal: Verbalizes/displays adequate comfort level or baseline comfort level  Outcome: Progressing

## 2025-07-06 NOTE — MEDICATION HISTORY SPECIALIST NOTES
[facility] [id number]    Follow up on previous Medication History evening shift note Patient initially updated by med history specialist with information available at that time, see previous note.  Allergies verified.  Interviewed patient via phone who confirmed medications and provided last doses.       Discontinued medications:  Ascorbic acid 250 mg - Therapy complete.  Baclofen 5 mg - Therapy complete    Discrepancies:  Estradiol 0.025 mg/ 24 hr - Patient applies on Fridays.

## 2025-07-06 NOTE — DISCHARGE SUMMARY
Admit Date: 7/5/2025   Discharge Date: No discharge date for patient encounter.  7/6/2025  Family Provider: Jacy Harris DO  Code Status: Full Code  Discharge Diagnosis:   Constipation  Intussusception ruled out  Chronic low back pain  Hashimoto thyroiditis    Activity: As tolerated    Diet: Regular    Hospital Course:   This is a 50 years old female with past medical history of chronic back pain, show with cellulitis who has been to the emergency room with abdominal pain for 1 week or so..  She did not have bowel movement for 3 days.  She had abdominal CAT scan 2 days prior to admission which showed significant constipation.  There was some concern about the patient having intussusception and she was seen by surgeon and intussusception ruled out.  Patient was placed on bowel regimen.  GI was consulted but they recommended bowel regimen after reviewing her chart.  She had a small bowel movement send she had large bowel movement after enema.  Patient condition improved after having a bowel movement.  She will go home to follow-up with her family doctor in 1 week or so.  Patient was hemodynamic stable and afebrile at time of discharge     Imaging  CT ABDOMEN PELVIS W IV CONTRAST  Addendum Date: 7/5/2025  Addendum: ----------------------------- ADDENDUM BEGINS On further review, findings in the colon appear to have slightly migrated distally since prior CT and are favored to represent an irregular stool ball. Recommend CT follow-up to ensure resolution. Revised findings discussed with Dr. Lau. ADDENDUM ENDS -----------------------------     Result Date: 7/5/2025  Narrative: EXAM: CT ABDOMEN PELVIS W IV CONTRAST DATE: 7/5/2025 1:39 PM INDICATION:  Abdominal pain COMPARISON: 7/3/2025 TECHNIQUE: Postcontrast CT images of the abdomen and pelvis. Dose reduction technique utilized; automatic/anatomic modulation of X-ray tube current (Auto mA). CONTRAST: 80 mL of Isovue-370 was administered intravenously from a multiuse  vial. FINDINGS: No bowel obstruction. Normal appendix. Colonic diverticulosis. Suspected colocolonic intussusception near the descending/sigmoid junction, progressed since prior CT. Large upstream stool burden. No free air or fluid.  Cholecystectomy. Normal liver, pancreas, spleen, adrenal glands, and kidneys. Arterial calcifications. No abdominopelvic lymphadenopathy. Extensive spinal fixation hardware.     Impression: IMPRESSION: Suspected colocolonic intussusception near the descending/sigmoid junction. Findings can be seen with underlying mass. Recommend correlation with colonoscopy. Large upstream stool burden.     CT abdomen pelvis without IV contrast  Result Date: 7/3/2025  Narrative: EXAM: CT ABDOMEN PELVIS WO IV CONTRAST DATE: 7/3/2025 1:18 PM INDICATION: Right lower quadrant pain; COMPARISON: None available. Technique: Dose reduction technique utilized; automatic/anatomic modulation of X-ray tube current (Auto mA). Helical axial imaging was performed through the abdomen and pelvis without intravenous contrast administration.  Axial reconstructions and coronal reformations were constructed and reviewed. Findings: Spinal fusion hardware. Lung bases are clear The visualized portions of liver is unremarkable.. Postcholecystectomy. Pancreas, adrenals , right kidney, left kidney  and spleen  are unremarkable.  There is no free fluid or air.  No abnormally enlarged lymph nodes in the abdomen or pelvis. Caliber is normal. However there is significantly increased volume of formed stool throughout the colon including a large volume in the right lower quadrant at the cecum. There is no abnormal appendix.      Impression: Impression: 1. Constipation.     X-ray abdomen 2 views AP with upright and or decubitus  Result Date: 6/30/2025  Narrative: EXAM: DX ABDOMEN 2 VW DATE: 6/30/2025 11:40 AM INDICATION: Generalized abdominal pain; COMPARISON: 6/27/2025 Findings: The lung bases are clear. No free air appreciated. The  bowel gas pattern is nonobstructive. There are no suspicious abdominal or pelvic calcifications. There are no focal soft tissue masses and there is no mass effect. Fixation of the thoracolumbar spine with hardware in stable alignment.     Impression: IMPRESSION: Nonobstructive bowel gas pattern    X-ray abdomen 2 views AP with upright and or decubitus  Result Date: 6/27/2025  Narrative: EXAM: DX ABDOMEN 2 VW DATE: 6/27/2025 11:33 AM INDICATION: Right lower quadrant pain; COMPARISON: 12/30/2024 FINDINGS: Stool is scattered throughout colon. No evidence of obstruction or perforation is present. Lung bases are clear. Extensive changes of posterior fusion throughout the lower thoracic and entire lumbar spine are present.     Impression: IMPRESSION: 1.  Nonspecific bowel gas pattern.        Condition at Discharge: Stable    Discharge to: Home    Discharge home medication     Your medication list        CHANGE how you take these medications        Instructions Last Dose Given Next Dose Due   lactulose 10 gram/15 mL solution  Commonly known as: CHRONULAC  What changed: Another medication with the same name was added. Make sure you understand how and when to take each.           lactulose 20 gram packet  Commonly known as: Kristalose  What changed: You were already taking a medication with the same name, and this prescription was added. Make sure you understand how and when to take each.      Take 1 packet (20 g total) by mouth daily for 10 days              CONTINUE taking these medications        Instructions Last Dose Given Next Dose Due   acetaminophen 500 mg tablet  Commonly known as: TYLENOL           calcium carbonate 500 mg calcium tablet  Commonly known as: OS-RYAN           cholecalciferol 25 mcg (1,000 unit) tablet  Generic drug: cholecalciferol (vitamin D3)           estradioL 0.025 mg/24 hr  Commonly known as: CLIMARA           naloxone 4 mg/actuation spray,non-aerosol           ondansetron ODT 4 mg  disintegrating tablet  Commonly known as: ZOFRAN-ODT           phentermine 37.5 mg tablet  Commonly known as: ADIPEX-P           pregabalin 200 mg capsule  Commonly known as: LYRICA           PROBIOTIC ORAL           Synthroid 88 mcg tablet  Generic drug: levothyroxine           VITAMIN K2 ORAL           Wellbutrin  mg 24 hr tablet  Generic drug: buPROPion XL                     Where to Get Your Medications        These medications were sent to [id number] - [facility] [address] [facility] [address]  [address] [facility] [address] [address]      Phone: [phone]   lactulose 20 gram packet         Physical Exam  Vitals and nursing note reviewed.   Constitutional:       General: She is not in acute distress.     Appearance: She is well-developed.   Cardiovascular:      Rate and Rhythm: Regular rhythm.      Heart sounds: Normal heart sounds.   Pulmonary:      Effort: No respiratory distress.      Breath sounds: Normal breath sounds.   Abdominal:      General: Bowel sounds are normal.      Palpations: Abdomen is soft.   Musculoskeletal:         General: Normal range of motion.      Cervical back: Neck supple.   Skin:     General: Skin is warm and dry.   Neurological:      General: No focal deficit present.      Mental Status: She is alert and oriented to person, place, and time.         Test result pending at discharge      Recommendation:  To come to the emergency room in case of chest pain, fever, shortness of breath or any acute symptoms  Follow-up with her primary care doctor in 1 week    I spent approximately 40 minutes, which included face-to-face time with the patient, discharged documentation, medication reconciliation and discharge planning     If there are any further questions after reviewing this discharge summary, please do not hesitate to contact Dr. Mcneill at [facility] hospitalist services by calling [phone].      A  voice recognition program was used to aid in documentation of this record.  Sometimes words are not printed exactly as they were spoken.  While efforts were made to carefully edit and correct any inaccuracies, some areas may be present; please take these into context.  Please contact the provider if areas are identified.

## 2025-07-07 ENCOUNTER — PATIENT OUTREACH (OUTPATIENT)
Dept: FAMILY MEDICINE | Facility: HOSPITAL | Age: 50
End: 2025-07-07
Payer: COMMERCIAL

## 2025-07-07 NOTE — PROGRESS NOTES
Isabell KATHY Dombarbara was contacted following recent hospitalization at [facility]. The patient was discharged from the hospital on 7/6/2025 .The Discharge Summary and After Visit Summary were reviewed. The patient's main diagnosis during the hospitalization was Constipation due to slow movement through bowels.  Followup appointment: F/u w PCP in the next two weeks. Any additional testing and labs will be discussed at the patient's upcoming post-hospital follow up appointment.    Transitional Care Management Follow Up (most recent)       Transitional Care Management - 07/07/25 1332          OTHER    Date of post hospital outreach 07/07/25     Contact Status Contact done     Speaking with the Patient or Patient's Caregiver? Patient     Is the patient on the Diabetes registry? N     Were patient's home medications changed or did they have any new medications added during the hospitalization? Y     Did someone go over the discharge summary with the patient or the patient's caregiver and discuss the medications listed on it? Y     Does the patient or patient's caregiver have any questions about the medication changes? N     Patient verbalized understanding of when to contact health care provider or when to get help right away? Y     Discharge instructions reviewed with patient or patient's caregiver and all questions answered? Y   Pt stated concerns about care while in hospital re: wrong results were conveyed to pt and pt felt there was more wrong than constipation. Pt has had many BM and still having abd pain. RN gave pt Advocacy # number    Is there a Home Health/DME Plan being enacted? Please note name of HH agency, DME vendor, contacted/received N     Does patient have psychosocial issues that might impact their health status? None     Does patient have financial barriers to care? None

## 2025-07-30 ENCOUNTER — OFFICE VISIT (OUTPATIENT)
Dept: PLASTIC SURGERY | Facility: CLINIC | Age: 50
End: 2025-07-30
Payer: COMMERCIAL

## 2025-07-30 VITALS
TEMPERATURE: 97.4 F | HEIGHT: 64 IN | BODY MASS INDEX: 26.69 KG/M2 | WEIGHT: 156.31 LBS | HEART RATE: 74 BPM | DIASTOLIC BLOOD PRESSURE: 47 MMHG | SYSTOLIC BLOOD PRESSURE: 101 MMHG | OXYGEN SATURATION: 96 % | RESPIRATION RATE: 18 BRPM

## 2025-07-30 DIAGNOSIS — Z98.890 S/P PANNICULECTOMY: ICD-10-CM

## 2025-07-30 DIAGNOSIS — Z98.890 S/P PANNICULECTOMY: Primary | ICD-10-CM

## 2025-07-30 DIAGNOSIS — Z09 POSTOPERATIVE FOLLOW-UP: Primary | ICD-10-CM

## 2025-07-30 DIAGNOSIS — L98.7 EXCESS SKIN: ICD-10-CM

## 2025-07-30 ASSESSMENT — PAIN SCALES - GENERAL: PAINLEVEL_OUTOF10: 0-NO PAIN

## 2025-07-30 ASSESSMENT — PAIN - FUNCTIONAL ASSESSMENT: PAIN_FUNCTIONAL_ASSESSMENT: NO/DENIES PAIN

## 2025-07-30 NOTE — PROGRESS NOTES
"Subjective:  Date of Surgery: 4/29/2025   Procedure: Panniculectomy  Patient is a 50 y.o. female who is here today for her final 3-month postoperative visit.  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  She does note that now that these are the only areas on her waistline that protrude, clothing is becoming more bothersome as it irritates on the skin.    Otherwise, patient feels she has healed well with no additional concerns today.  Patient denies fever, chills, malaise, or any other signs and symptoms of infection.    ROS:  Review of Systems : see HPI      Objective:  /47   Pulse 74   Temp 36.3 °C (97.4 °F) (Temporal)   Resp 18   Ht 1.626 m (5' 4.02\")   Wt 70.9 kg (156 lb 4.9 oz)   SpO2 96%   BMI 26.82 kg/m²    Physical Exam  Constitutional:       Appearance: Normal appearance.   Pulmonary:      Effort: Pulmonary effort is normal.   Skin:     General: Skin is warm and dry.   Neurological:      Mental Status: She is alert and oriented to person, place, and time.   Psychiatric:         Mood and Affect: Mood normal.       Abdomen:  Skin is warm, dry, intact with no erythema or ecchymosis.  Abdomen is soft, nontender with no palpable fluid collections.  Well-healed surgical incision with no evidence infection or dehiscence.  Notable standing clinical remedies to either apex of surgical incision, right worse than the left.  Drain sites well-healed.    Assessment/ Plan:  Isabell Harper is a 50-year-old female who presents to clinic today for 3-month follow-up visit s/p panniculectomy with umbilical sacrifice.  Upon examination, surgical incision well-healed.  Abdomen is soft, nontender with no palpable fluid collection.  Patient does have notable standing cone deformities still affecting either apex of surgical incision.  Once again discussed possibility of an office excision to remove excess standing cone deformities as these are becoming more bothersome to " patient and now she has been able to notice increased discomfort as close rub on these areas as they protrude further than her remaining waistline.    Otherwise, patient may return to all normal activities and exercise as tolerable.  No additional abdominal compression is indicated at this time.  Patient will be contacted by nursing staff in regards to scheduling standing cone deformity excisions.  Information will be submitted for insurance.  Patient notes understanding and agrees with plan above.  She has no additional questions today.  Patient will contact office if questions or concerns arise.        This visit was performed by Raymundo Juarez PA-C.  All extraneous collected information was purged from this note to provide useful review.  In addition to voice activated recording software this visit was completed in a team-based fashion with nursing staff, support staff and myself all actively participating in care. A voice to text program was used to aid in medical record documentation. Sometimes words are printed not exactly as they were spoken. While efforts were made to carefully edit and correct any inaccuracies, some errors may be present. Errors should be taken within the context of the discussion.  Please contact our office if you need assistance interpreting this medical record or notice any mistakes.     Raymundo Juarez PA-C

## 2025-08-31 ENCOUNTER — HEALTH MAINTENANCE LETTER (OUTPATIENT)
Age: 50
End: 2025-08-31

## (undated) DEVICE — SU VICRYL 0 CT-1 CR 8X18" J740D

## (undated) DEVICE — CUP AND LID 2PK 2OZ STERILE  SSK9006A

## (undated) DEVICE — DRAPE SHEET REV FOLD 3/4 9349

## (undated) DEVICE — CLIP LIGA MED HORIZON BLU 2200

## (undated) DEVICE — DRAPE STERI TOWEL LG 1010

## (undated) DEVICE — SU SILK 3-0 TIE 12X30" A304H

## (undated) DEVICE — GLOVE SENSICARE 6.5 SURG

## (undated) DEVICE — NDL SPINAL 18GA 3.5" 405184

## (undated) DEVICE — PACK NEURO MINOR UMMC SNE32MNMU4

## (undated) DEVICE — COVER BIG CASE BACK TABLE 6'X2' 420-HD-S

## (undated) DEVICE — DRSG TELFA ISLAND 4X14" 7544

## (undated) DEVICE — DRAIN ROUND W/RESERV KIT JACKSON PRATT 10FR 400ML SU130-402D

## (undated) DEVICE — SOL NACL 0.9% IRRIG 1000ML BOTTLE 2F7124

## (undated) DEVICE — SPONGE LAP 18X18" X8435

## (undated) DEVICE — BLADE CLIPPER SGL USE 9680

## (undated) DEVICE — DRESSING TEGADERM CHG 4X4.75 IN

## (undated) DEVICE — DRSG PRIMAPORE 02X3" 7133

## (undated) DEVICE — Device

## (undated) DEVICE — SPONGE RAY-TEC 4X4" 7317

## (undated) DEVICE — DRSG AQUACEL AG HYDROFIBER  3.5X10" 422605

## (undated) DEVICE — ESU CORD BIPOLAR GREEN 10-4000

## (undated) DEVICE — SU MONOCRYL 4-0 PS-2 27" UND Y426H

## (undated) DEVICE — IRRIGATION WOUND IRRISEPT WOUND DEBRIDEMENT SYSTEM

## (undated) DEVICE — ESU PENCIL W/COATED BLADE E2450H

## (undated) DEVICE — DEVICE STABILIZ 3 WAY FOLEY STATLOCK

## (undated) DEVICE — SPONGE COTTONOID 1X3" 80-1408

## (undated) DEVICE — DRAPE O ARM TUBE 9732722

## (undated) DEVICE — ENDO DISSECTOR BLUNT 05MM  BTD05

## (undated) DEVICE — BONE COLLECTOR ANSPACH B-COLLECTOR-1

## (undated) DEVICE — DRAPE IOBAN INCISE 13X13" 6640EZ

## (undated) DEVICE — LINEN TOWEL PACK X30 5481

## (undated) DEVICE — ADH SKIN CLOSURE PREMIERPRO EXOFIN 1.0ML 3470

## (undated) DEVICE — DRAPE HYH LRG 76 X 55 INCHES

## (undated) DEVICE — GLOVE SENSICARE MICRO PF 6.5

## (undated) DEVICE — DRSG MEDIPORE 3 1/2X13 3/4" 3573

## (undated) DEVICE — TIP YANKAUER OPEN CLEAR

## (undated) DEVICE — LINEN TOWEL PACK X6 WHITE 5487

## (undated) DEVICE — TRAY BASIC OR UNIVERSAL CUSTOM RCH (ORUNIV)

## (undated) DEVICE — SPONGE COTTONOID 1/2X1 1/2" 20-06S

## (undated) DEVICE — SUCTION RESERVOIR JP 100CC BULB JACKSON PRATT

## (undated) DEVICE — GLOVE BIOGEL PI ORTHOPRO 7.5

## (undated) DEVICE — BANDAGE COHESIVE 6"X5YD NS SELF ADHERING BLUE

## (undated) DEVICE — NDL COUNTER 40CT  31142311

## (undated) DEVICE — STAPLER SKIN WIDE 35

## (undated) DEVICE — DRAPE CHEST BREAST STL THORA 77X106X122" 11X14 FENESTRATED

## (undated) DEVICE — FORCEP BIOPSY RADIAL JAW 4

## (undated) DEVICE — SPONGE KITTNER 30-101

## (undated) DEVICE — SPONGE COTTONOID 1/2X1/2" 20-04S

## (undated) DEVICE — SOL NACL 0.9% INJ 500ML BAG 07983-03

## (undated) DEVICE — SYR 10ML FINGER CONTROL W/O NDL 309695

## (undated) DEVICE — SYRINGE 50CC LUER-LOK TIP

## (undated) DEVICE — GOWN SURGICAL XXL LEVEL 4

## (undated) DEVICE — SPONGE SURGIFOAM 01GM POWDER 1978

## (undated) DEVICE — GLOVE PROTEXIS MICRO 7.5  2D73PM75

## (undated) DEVICE — DRAPE STERI TOWEL SM 1000

## (undated) DEVICE — BUR STRK 1.1MM STRAIGHT ROUTER 5820-070-011

## (undated) DEVICE — SPONGE RAY-TEC 4X8" 7318

## (undated) DEVICE — SOL WATER IRRIG 1000ML BOTTLE 2F7114

## (undated) DEVICE — DRAPE MAYO STAND 23X54 8337

## (undated) DEVICE — PAD BOVIE ADULT 9' CORD REM ELECTRODE PATIENT RETURN

## (undated) DEVICE — PROTECTOR ARM ONE-STEP TRENDELENBURG 40418

## (undated) DEVICE — PREP CHLORAPREP CLEAR 3ML 930400

## (undated) DEVICE — GLOVE SENSICARE MICRO PF 7.5

## (undated) DEVICE — SU SILK 2-0 TIE 12X30" A305H

## (undated) DEVICE — SU VICRYL 2-0 CT-2 CR 8X18" J726D

## (undated) DEVICE — MARKER SPHERES PASSIVE MEDT PACK 5 8801075

## (undated) DEVICE — TUBING SUCTION MEDI-VAC SOFT 3/16"X20' N520A

## (undated) DEVICE — CLOSURE SKIN DERMABOND 22CM PRINEO

## (undated) DEVICE — ESU ELEC BLADE 6" COATED/INSULATED E1455-6

## (undated) DEVICE — KIT ENDO PROCEDURE CARRY ON

## (undated) DEVICE — GOWN SURGICAL AERO CHROME W/TOWEL LG LEVEL 4 STL

## (undated) DEVICE — SPONGE SURGIFOAM 100 1974

## (undated) DEVICE — SUTURE MONOCRYL 3-0 SH 27" UNDYED

## (undated) DEVICE — TUBING SUCTION 3/16"X10'

## (undated) DEVICE — BLADE BONE MILL STRK 3.2MM FINE 5400-702-000

## (undated) DEVICE — CELL SAVER

## (undated) DEVICE — SUT PDS11 2-0 CT-1 1/2 27

## (undated) DEVICE — RX SURGIFLO HEMOSTATIC MATRIX W/THROMBIN 8ML 2994

## (undated) DEVICE — BINDER ABDOMINAL UNIV 12" 26"-50" HIP

## (undated) DEVICE — SUCTION YANKAUER BULB TIP W 1 PIECE HANDLE

## (undated) DEVICE — DRAPE POUCH INSTRUMENT 1018

## (undated) DEVICE — SU VICRYL 3-0 SH 27" UND J416H

## (undated) DEVICE — SOL SOD CHL IRR .9% 500ML BTL USP PLASTIC POUR BOTTLE

## (undated) DEVICE — NEEDLE TUOHY 18G X 6

## (undated) DEVICE — DRAIN JP CHANNEL 19F 3/4 FLTD SI ROUND W/TROCAR 15FX6IN

## (undated) DEVICE — PENCIL EVACUATION SLIM SMOKE PLUMEPEN ULTRA

## (undated) DEVICE — SU ETHILON 3-0 PS-1 18" 1663H

## (undated) DEVICE — PREP SKIN CHLORAPREP ORNG 26ML STL

## (undated) DEVICE — FILTERLINE NASAL ADULT O2 LONG SHORT TERM DURATION

## (undated) DEVICE — GLOVE SENSICARE MICRO PF 6.0

## (undated) DEVICE — PACK SET-UP STD 9102

## (undated) DEVICE — STPL SKIN 35W ROTATING HEAD PRW35

## (undated) DEVICE — CLIP HORIZON MED BLUE 002200

## (undated) DEVICE — SUTURE SILK 0 FSL 18

## (undated) DEVICE — BUR STRK CARBIDE MATCH HEAD 3.0MM 5820-107-530C

## (undated) DEVICE — KIT PATIENT CARE JACKSON SPINE PACK 5808PV

## (undated) DEVICE — ESU ELEC BLADE E-SEP INSULATED NEPTUNE 70MM 0703-070-002

## (undated) DEVICE — PAD CHUX UNDERPAD 23X24" 7136

## (undated) DEVICE — SPONGE LAP 18X18" W LOOP STL

## (undated) DEVICE — HEMOSTAT ABSORB 3 GRAM POWDER ARISTA

## (undated) DEVICE — GLOVE BIOGEL PI IND 6.0 UNDERGLOVE

## (undated) DEVICE — NDL 21GA 1.5"

## (undated) DEVICE — CLIP HORIZON SM RED WIDE SLOT 001201

## (undated) DEVICE — CAP MALE/FEMALE LUER LOCK ALARIS DEADENDER

## (undated) DEVICE — SU VICRYL 0 CT-1 27" UND J260H

## (undated) DEVICE — CLIP APPLIER 13" LG LIGACLIP MCL20

## (undated) DEVICE — STAPLER ABSORBABLE SKIN

## (undated) DEVICE — SUTURE STRATAFIX 3-0 SPIRAL MONOCRYL PLUS

## (undated) DEVICE — ENDO DISSECTOR KITTNER 05MM 28-0804

## (undated) DEVICE — SU PDS II 0 TP-1 60" Z991G

## (undated) DEVICE — SUCTION MANIFOLD NEPTUNE 2 SYS 4 PORT 0702-020-000

## (undated) DEVICE — SUCTION SLEEVE NEPTUNE 2 165MM 0703-005-165

## (undated) DEVICE — ESU CORD BIPOLAR AND IRR TUBING AESCULAP US355

## (undated) DEVICE — DRAPE BACK TABLE  44X90" 8377

## (undated) DEVICE — DRAPE SHEET HALF 40X60" 9358

## (undated) DEVICE — BUR STRK HELICOIDAL RASP 3.2MM 5120-080-030

## (undated) DEVICE — SPONGE COTTONOID 1X3" 20-10S

## (undated) DEVICE — IOM SUPPLIES/SPECIALTY CARE

## (undated) DEVICE — PACK GOWN 3/PK DISP XL SBA32GPFCB

## (undated) DEVICE — SYR BULB IRRIG DOVER 60 ML LATEX FREE 67000

## (undated) DEVICE — WIPES FOLEY CARE SURESTEP PROVON DFC100

## (undated) DEVICE — BLADE BONE MILL STRK 5.0MM MED 5400-701-000

## (undated) DEVICE — ESU HOLSTER PLASTIC DISP E2400

## (undated) DEVICE — GLOVE BIOGEL PI IND 7.0 SURGICAL

## (undated) DEVICE — DRAPE C-ARM W/STRAPS 42X72" 07-CA104

## (undated) DEVICE — DRESSING 5X9" ABD PAD CURITY STL

## (undated) DEVICE — KIT DRAIN CLOSED WOUND SUCTION MED 400ML RESVR

## (undated) DEVICE — ESU GROUND PAD ADULT W/CORD E7507

## (undated) DEVICE — SUTURE PLAIN GUT 5-0 PC-1 18" FAST ABSORBING

## (undated) DEVICE — CATH TRAY FOLEY SURESTEP 16FR W/TMP PRB STLK LATEX A319416AM

## (undated) DEVICE — GLOVE SENSICARE SLT 5.5 SURG

## (undated) DEVICE — CLEANER BOVIE TIP SCRATCH PAD

## (undated) DEVICE — SOL ISOPROPYL RUBBING ALCOHOL USP 70% 4OZ HDX-20 I0020

## (undated) DEVICE — GLOVE BIOGEL PI IND 6.5 SURGICAL

## (undated) RX ORDER — FENTANYL CITRATE 50 UG/ML
INJECTION, SOLUTION INTRAMUSCULAR; INTRAVENOUS
Status: DISPENSED
Start: 2022-11-15

## (undated) RX ORDER — ACETAMINOPHEN 325 MG/1
TABLET ORAL
Status: DISPENSED
Start: 2022-11-15

## (undated) RX ORDER — CALCIUM CHLORIDE 100 MG/ML
INJECTION INTRAVENOUS; INTRAVENTRICULAR
Status: DISPENSED
Start: 2022-11-15

## (undated) RX ORDER — GABAPENTIN 300 MG/1
CAPSULE ORAL
Status: DISPENSED
Start: 2022-11-15

## (undated) RX ORDER — PROPOFOL 10 MG/ML
INJECTION, EMULSION INTRAVENOUS
Status: DISPENSED
Start: 2022-11-15